# Patient Record
Sex: MALE | Race: BLACK OR AFRICAN AMERICAN | NOT HISPANIC OR LATINO | Employment: OTHER | URBAN - METROPOLITAN AREA
[De-identification: names, ages, dates, MRNs, and addresses within clinical notes are randomized per-mention and may not be internally consistent; named-entity substitution may affect disease eponyms.]

---

## 2023-03-09 DIAGNOSIS — E78.49 OTHER HYPERLIPIDEMIA: Primary | ICD-10-CM

## 2023-03-09 DIAGNOSIS — R60.0 PEDAL EDEMA: ICD-10-CM

## 2023-03-09 RX ORDER — LOSARTAN POTASSIUM 25 MG/1
1 TABLET ORAL DAILY
COMMUNITY
Start: 2020-11-11 | End: 2023-08-01 | Stop reason: SDUPTHER

## 2023-03-09 RX ORDER — TAMSULOSIN HYDROCHLORIDE 0.4 MG/1
1 CAPSULE ORAL DAILY
COMMUNITY
Start: 2014-03-26 | End: 2023-08-01 | Stop reason: SDUPTHER

## 2023-03-09 RX ORDER — ACETAMINOPHEN 500 MG
1 TABLET ORAL DAILY
COMMUNITY
Start: 2017-06-29

## 2023-03-09 RX ORDER — AZELASTINE 1 MG/ML
SPRAY, METERED NASAL 2 TIMES DAILY
COMMUNITY
Start: 2020-02-17 | End: 2024-04-22 | Stop reason: ENTERED-IN-ERROR

## 2023-03-09 RX ORDER — DONEPEZIL HYDROCHLORIDE 10 MG/1
1 TABLET, FILM COATED ORAL DAILY
COMMUNITY
Start: 2017-03-21 | End: 2024-02-15 | Stop reason: SDUPTHER

## 2023-03-09 RX ORDER — FUROSEMIDE 20 MG/1
1 TABLET ORAL DAILY
COMMUNITY
Start: 2018-11-05 | End: 2023-03-09 | Stop reason: SDUPTHER

## 2023-03-09 RX ORDER — FUROSEMIDE 20 MG/1
20 TABLET ORAL DAILY
Qty: 90 TABLET | Refills: 0 | Status: SHIPPED | OUTPATIENT
Start: 2023-03-09 | End: 2023-03-13 | Stop reason: SDUPTHER

## 2023-03-09 RX ORDER — ASPIRIN 81 MG/1
1 TABLET ORAL DAILY
COMMUNITY
Start: 2019-04-08

## 2023-03-09 RX ORDER — POTASSIUM CHLORIDE 20 MEQ/1
1 TABLET, EXTENDED RELEASE ORAL DAILY
COMMUNITY
Start: 2022-09-06 | End: 2023-05-31 | Stop reason: SDUPTHER

## 2023-03-09 RX ORDER — MEMANTINE HYDROCHLORIDE 10 MG/1
1 TABLET ORAL 2 TIMES DAILY
COMMUNITY
Start: 2017-06-29 | End: 2024-02-15 | Stop reason: SDUPTHER

## 2023-03-09 RX ORDER — ATORVASTATIN CALCIUM 10 MG/1
10 TABLET, FILM COATED ORAL DAILY
Qty: 90 TABLET | Refills: 0 | Status: SHIPPED | OUTPATIENT
Start: 2023-03-09 | End: 2023-03-13 | Stop reason: SDUPTHER

## 2023-03-09 RX ORDER — FINASTERIDE 5 MG/1
1 TABLET, FILM COATED ORAL DAILY
COMMUNITY
Start: 2013-12-02 | End: 2023-05-31 | Stop reason: SDUPTHER

## 2023-03-09 RX ORDER — ATORVASTATIN CALCIUM 10 MG/1
1 TABLET, FILM COATED ORAL DAILY
COMMUNITY
Start: 2018-04-13 | End: 2023-03-09 | Stop reason: SDUPTHER

## 2023-03-09 RX ORDER — IPRATROPIUM BROMIDE AND ALBUTEROL SULFATE 2.5; .5 MG/3ML; MG/3ML
3 SOLUTION RESPIRATORY (INHALATION)
COMMUNITY
Start: 2018-03-07 | End: 2024-04-25 | Stop reason: HOSPADM

## 2023-03-09 RX ORDER — METOPROLOL TARTRATE 25 MG/1
1 TABLET, FILM COATED ORAL 2 TIMES DAILY
COMMUNITY
Start: 2016-11-11 | End: 2023-08-01 | Stop reason: SDUPTHER

## 2023-03-13 DIAGNOSIS — R60.0 PEDAL EDEMA: ICD-10-CM

## 2023-03-13 DIAGNOSIS — E78.49 OTHER HYPERLIPIDEMIA: ICD-10-CM

## 2023-03-13 RX ORDER — FUROSEMIDE 20 MG/1
20 TABLET ORAL DAILY
Qty: 90 TABLET | Refills: 0 | Status: SHIPPED | OUTPATIENT
Start: 2023-03-13 | End: 2023-05-31 | Stop reason: SDUPTHER

## 2023-03-13 RX ORDER — ATORVASTATIN CALCIUM 10 MG/1
10 TABLET, FILM COATED ORAL DAILY
Qty: 90 TABLET | Refills: 0 | Status: SHIPPED | OUTPATIENT
Start: 2023-03-13 | End: 2023-08-01 | Stop reason: SDUPTHER

## 2023-03-13 NOTE — TELEPHONE ENCOUNTER
Approving, but needs appt for additional refills. Patient need refill on furosemide 20mg to [facility]

## 2023-05-16 ENCOUNTER — OFFICE VISIT (OUTPATIENT)
Dept: PRIMARY CARE | Facility: CLINIC | Age: 88
End: 2023-05-16
Payer: MEDICARE

## 2023-05-16 VITALS
RESPIRATION RATE: 16 BRPM | SYSTOLIC BLOOD PRESSURE: 120 MMHG | DIASTOLIC BLOOD PRESSURE: 70 MMHG | WEIGHT: 172.2 LBS | TEMPERATURE: 98.4 F | HEART RATE: 68 BPM | BODY MASS INDEX: 24.71 KG/M2

## 2023-05-16 DIAGNOSIS — G30.9 MIXED ALZHEIMER'S AND VASCULAR DEMENTIA (MULTI): Primary | ICD-10-CM

## 2023-05-16 DIAGNOSIS — I48.11 LONGSTANDING PERSISTENT ATRIAL FIBRILLATION (MULTI): ICD-10-CM

## 2023-05-16 DIAGNOSIS — R35.1 BENIGN PROSTATIC HYPERPLASIA WITH NOCTURIA: ICD-10-CM

## 2023-05-16 DIAGNOSIS — E78.49 OTHER HYPERLIPIDEMIA: ICD-10-CM

## 2023-05-16 DIAGNOSIS — I10 PRIMARY HYPERTENSION: ICD-10-CM

## 2023-05-16 DIAGNOSIS — F02.80 MIXED ALZHEIMER'S AND VASCULAR DEMENTIA (MULTI): Primary | ICD-10-CM

## 2023-05-16 DIAGNOSIS — N40.1 BENIGN PROSTATIC HYPERPLASIA WITH NOCTURIA: ICD-10-CM

## 2023-05-16 DIAGNOSIS — I50.32 CHRONIC HEART FAILURE WITH PRESERVED EJECTION FRACTION (MULTI): ICD-10-CM

## 2023-05-16 DIAGNOSIS — F01.50 MIXED ALZHEIMER'S AND VASCULAR DEMENTIA (MULTI): Primary | ICD-10-CM

## 2023-05-16 PROBLEM — H04.203 BILATERAL EPIPHORA: Status: ACTIVE | Noted: 2023-05-16

## 2023-05-16 PROBLEM — H40.1190 OPEN ANGLE PRIMARY GLAUCOMA: Status: ACTIVE | Noted: 2023-05-16

## 2023-05-16 PROBLEM — M79.675 PAIN IN TOES OF BOTH FEET: Status: ACTIVE | Noted: 2023-05-16

## 2023-05-16 PROBLEM — A49.9 BACTERIAL UTI: Status: ACTIVE | Noted: 2023-05-16

## 2023-05-16 PROBLEM — R04.0 EPISTAXIS: Status: ACTIVE | Noted: 2023-05-16

## 2023-05-16 PROBLEM — I48.91 ATRIAL FIBRILLATION (MULTI): Status: ACTIVE | Noted: 2023-05-16

## 2023-05-16 PROBLEM — J32.9 CHRONIC SINUSITIS: Status: ACTIVE | Noted: 2023-05-16

## 2023-05-16 PROBLEM — R44.1 HALLUCINATION, VISUAL: Status: ACTIVE | Noted: 2023-05-16

## 2023-05-16 PROBLEM — I50.9 CHF (CONGESTIVE HEART FAILURE) (MULTI): Status: ACTIVE | Noted: 2023-05-16

## 2023-05-16 PROBLEM — I25.5 CARDIOMYOPATHY, ISCHEMIC: Status: ACTIVE | Noted: 2023-05-16

## 2023-05-16 PROBLEM — H26.492 POSTERIOR CAPSULAR OPACIFICATION VISUALLY SIGNIFICANT OF LEFT EYE: Status: ACTIVE | Noted: 2023-05-16

## 2023-05-16 PROBLEM — H02.539 LID RETRACTION: Status: ACTIVE | Noted: 2023-05-16

## 2023-05-16 PROBLEM — N39.0 BACTERIAL UTI: Status: ACTIVE | Noted: 2023-05-16

## 2023-05-16 PROBLEM — R45.3 APATHY: Status: ACTIVE | Noted: 2023-05-16

## 2023-05-16 PROBLEM — N40.0 BPH (BENIGN PROSTATIC HYPERPLASIA): Status: ACTIVE | Noted: 2023-05-16

## 2023-05-16 PROBLEM — B35.1 ONYCHOMYCOSIS: Status: ACTIVE | Noted: 2023-05-16

## 2023-05-16 PROBLEM — H93.8X3 FULLNESS IN EAR, BILATERAL: Status: ACTIVE | Noted: 2023-05-16

## 2023-05-16 PROBLEM — H61.23 BILATERAL IMPACTED CERUMEN: Status: ACTIVE | Noted: 2023-05-16

## 2023-05-16 PROBLEM — F03.90 DEMENTIA (MULTI): Status: ACTIVE | Noted: 2023-05-16

## 2023-05-16 PROBLEM — I67.9 CEREBROVASCULAR DISEASE: Status: ACTIVE | Noted: 2023-05-16

## 2023-05-16 PROBLEM — H02.102 ECTROPION OF RIGHT LOWER EYELID: Status: ACTIVE | Noted: 2023-05-16

## 2023-05-16 PROBLEM — H26.493 PCO (POSTERIOR CAPSULAR OPACIFICATION), BILATERAL: Status: ACTIVE | Noted: 2023-05-16

## 2023-05-16 PROBLEM — J34.89 NASAL DRYNESS: Status: ACTIVE | Noted: 2023-05-16

## 2023-05-16 PROBLEM — R41.3 MEMORY LOSS: Status: ACTIVE | Noted: 2023-05-16

## 2023-05-16 PROBLEM — E53.1 VITAMIN B6 DEFICIENCY: Status: ACTIVE | Noted: 2023-05-16

## 2023-05-16 PROBLEM — J34.89 NASAL DRAINAGE: Status: ACTIVE | Noted: 2023-05-16

## 2023-05-16 PROBLEM — J44.9 CHRONIC OBSTRUCTIVE PULMONARY DISEASE (MULTI): Status: ACTIVE | Noted: 2023-05-16

## 2023-05-16 PROBLEM — H40.10X0 ADVANCED OPEN-ANGLE GLAUCOMA: Status: ACTIVE | Noted: 2023-05-16

## 2023-05-16 PROBLEM — M79.674 PAIN IN TOES OF BOTH FEET: Status: ACTIVE | Noted: 2023-05-16

## 2023-05-16 PROBLEM — Z98.890 S/P YAG CAPSULOTOMY, BILATERAL: Status: ACTIVE | Noted: 2023-05-16

## 2023-05-16 PROBLEM — E78.5 HLD (HYPERLIPIDEMIA): Status: ACTIVE | Noted: 2023-05-16

## 2023-05-16 PROBLEM — H91.93 HEARING DIFFICULTY OF BOTH EARS: Status: ACTIVE | Noted: 2023-05-16

## 2023-05-16 PROBLEM — H61.21 IMPACTED CERUMEN OF RIGHT EAR: Status: ACTIVE | Noted: 2023-05-16

## 2023-05-16 PROBLEM — E53.8 FOLATE DEFICIENCY: Status: ACTIVE | Noted: 2023-05-16

## 2023-05-16 PROBLEM — Z96.1 PSEUDOPHAKIA OF BOTH EYES: Status: ACTIVE | Noted: 2023-05-16

## 2023-05-16 PROBLEM — I50.30 (HFPEF) HEART FAILURE WITH PRESERVED EJECTION FRACTION (MULTI): Status: ACTIVE | Noted: 2023-05-16

## 2023-05-16 PROCEDURE — 3078F DIAST BP <80 MM HG: CPT | Performed by: INTERNAL MEDICINE

## 2023-05-16 PROCEDURE — 3074F SYST BP LT 130 MM HG: CPT | Performed by: INTERNAL MEDICINE

## 2023-05-16 PROCEDURE — 99214 OFFICE O/P EST MOD 30 MIN: CPT | Performed by: INTERNAL MEDICINE

## 2023-05-16 PROCEDURE — 1160F RVW MEDS BY RX/DR IN RCRD: CPT | Performed by: INTERNAL MEDICINE

## 2023-05-16 PROCEDURE — 1159F MED LIST DOCD IN RCRD: CPT | Performed by: INTERNAL MEDICINE

## 2023-05-16 PROCEDURE — 1036F TOBACCO NON-USER: CPT | Performed by: INTERNAL MEDICINE

## 2023-05-16 ASSESSMENT — ENCOUNTER SYMPTOMS
DEPRESSION: 0
LOSS OF SENSATION IN FEET: 0
OCCASIONAL FEELINGS OF UNSTEADINESS: 0

## 2023-05-16 NOTE — PROGRESS NOTES
Jam Cox is a 89 y.o. male   Patient with a past medical history of Atrial fibrillation, BPH, HFpEF, COPD, HTN, HLD, Dementia, Chronic Sinusitis    Did fall 2 weeks ago  No injuries  Lost his balance  Refuses to use cane or walker    No chest pain/  SOB/ dizziness  BM OK  Energy level poor  Appetite OK             Review of Systems     Constitutional: no fever, no chills, not feeling poorly, not feeling tired and no recent weight gain, no recent weight loss.   ENT: no earache, no hearing loss, no nosebleeds, no nasal discharge, no sore throat and no hoarseness.   Cardiovascular: the heart rate was not slow, the heart rate was not fast, no chest pain, no palpitations, no intermittent leg claudication and no lower extremity edema.   Respiratory: no cough, wheezing or shortness of breath at rest or exertion  Gastrointestinal: no abdominal pain, no constipation, no melena, no nausea, no diarrhea, no vomiting and no blood in stools.   Musculoskeletal: no arthralgias, no myalgias, no back pain, no joint swelling, no joint stiffness, no limb pain and no limb swelling.   Integumentary: no skin rashes, no skin lesions, no itching, no skin wound and no dry skin.   Neurological: no headache, no confusion, no numbness, no dizziness, no tingling and no fainting.   All other systems have been reviewed and are negative for complaint.       There were no vitals filed for this visit.     Physical Exam     Constitutional   General appearance: Alert and in no acute distress.     Pulmonary   Respiratory assessment: No respiratory distress, normal respiratory rhythm and effort.    Auscultation of Lungs: Clear bilateral breath sounds.   Cardiovascular   Auscultation of heart: Apical pulse normal, heart rate and rhythm normal, normal S1 and S2, no murmurs and no pericardial rub.    Exam for edema: No peripheral edema.   Abdomen   Abdominal Exam: No bruits, normal bowel sounds, soft, non-tender, no abdominal mass palpated.    Liver  and Spleen exam: No hepato-splenomegaly.   Musculoskeletal   Examination of gait: Normal.    Inspection of digits and nails: No clubbing or cyanosis of the fingernails.    Inspection/palpation of joints, bones and muscles: No joint swelling. Normal movement of all extremities.   Skin   Skin inspection: Normal skin color and pigmentation, normal skin turgor and no visible rash.   Neurologic   Cranial nerves: Nerves 2-12 were intact, no focal neuro defects.     Assessment/Plan          Patient with a past medical history of Atrial fibrillation, BPH, HFpEF, COPD, HTN, HLD, Dementia, Chronic Sinusitis    # Fatigue with CASTANEDA  Check blood work    # 1  HTN  condition is stable  continue current medications      # Diastolic CHF  condition is stable  continue current medications      # Afib  cont Eliquis  Rate controlled      # HLD  condition is stable  continue current medications    # Dementia with falls  Refuses to use cane or a walker  Would recommend stopping the Aricept and Namenda if light headedness outweighs the benefit    Blood work

## 2023-05-23 ENCOUNTER — LAB (OUTPATIENT)
Dept: LAB | Facility: LAB | Age: 88
End: 2023-05-23
Payer: MEDICARE

## 2023-05-23 DIAGNOSIS — I10 PRIMARY HYPERTENSION: ICD-10-CM

## 2023-05-23 DIAGNOSIS — I50.32 CHRONIC HEART FAILURE WITH PRESERVED EJECTION FRACTION (MULTI): ICD-10-CM

## 2023-05-23 DIAGNOSIS — I48.11 LONGSTANDING PERSISTENT ATRIAL FIBRILLATION (MULTI): ICD-10-CM

## 2023-05-23 PROCEDURE — 84443 ASSAY THYROID STIM HORMONE: CPT

## 2023-05-23 PROCEDURE — 80053 COMPREHEN METABOLIC PANEL: CPT

## 2023-05-23 PROCEDURE — 85027 COMPLETE CBC AUTOMATED: CPT

## 2023-05-23 PROCEDURE — 36415 COLL VENOUS BLD VENIPUNCTURE: CPT

## 2023-05-24 LAB
ALANINE AMINOTRANSFERASE (SGPT) (U/L) IN SER/PLAS: 10 U/L (ref 10–52)
ALBUMIN (G/DL) IN SER/PLAS: 3.7 G/DL (ref 3.4–5)
ALKALINE PHOSPHATASE (U/L) IN SER/PLAS: 86 U/L (ref 33–136)
ANION GAP IN SER/PLAS: 13 MMOL/L (ref 10–20)
ASPARTATE AMINOTRANSFERASE (SGOT) (U/L) IN SER/PLAS: 15 U/L (ref 9–39)
BILIRUBIN TOTAL (MG/DL) IN SER/PLAS: 1.5 MG/DL (ref 0–1.2)
CALCIUM (MG/DL) IN SER/PLAS: 9.8 MG/DL (ref 8.6–10.6)
CARBON DIOXIDE, TOTAL (MMOL/L) IN SER/PLAS: 30 MMOL/L (ref 21–32)
CHLORIDE (MMOL/L) IN SER/PLAS: 104 MMOL/L (ref 98–107)
CREATININE (MG/DL) IN SER/PLAS: 0.91 MG/DL (ref 0.5–1.3)
ERYTHROCYTE DISTRIBUTION WIDTH (RATIO) BY AUTOMATED COUNT: 13.2 % (ref 11.5–14.5)
ERYTHROCYTE MEAN CORPUSCULAR HEMOGLOBIN CONCENTRATION (G/DL) BY AUTOMATED: 30.8 G/DL (ref 32–36)
ERYTHROCYTE MEAN CORPUSCULAR VOLUME (FL) BY AUTOMATED COUNT: 94 FL (ref 80–100)
ERYTHROCYTES (10*6/UL) IN BLOOD BY AUTOMATED COUNT: 4.33 X10E12/L (ref 4.5–5.9)
GFR MALE: 79 ML/MIN/1.73M2
GLUCOSE (MG/DL) IN SER/PLAS: 104 MG/DL (ref 74–99)
HEMATOCRIT (%) IN BLOOD BY AUTOMATED COUNT: 40.6 % (ref 41–52)
HEMOGLOBIN (G/DL) IN BLOOD: 12.5 G/DL (ref 13.5–17.5)
LEUKOCYTES (10*3/UL) IN BLOOD BY AUTOMATED COUNT: 4.7 X10E9/L (ref 4.4–11.3)
NRBC (PER 100 WBCS) BY AUTOMATED COUNT: 0 /100 WBC (ref 0–0)
PLATELETS (10*3/UL) IN BLOOD AUTOMATED COUNT: 187 X10E9/L (ref 150–450)
POTASSIUM (MMOL/L) IN SER/PLAS: 4.5 MMOL/L (ref 3.5–5.3)
PROTEIN TOTAL: 7.2 G/DL (ref 6.4–8.2)
SODIUM (MMOL/L) IN SER/PLAS: 142 MMOL/L (ref 136–145)
THYROTROPIN (MIU/L) IN SER/PLAS BY DETECTION LIMIT <= 0.05 MIU/L: 0.52 MIU/L (ref 0.44–3.98)
UREA NITROGEN (MG/DL) IN SER/PLAS: 11 MG/DL (ref 6–23)

## 2023-05-31 DIAGNOSIS — N40.0 BENIGN PROSTATIC HYPERPLASIA, UNSPECIFIED WHETHER LOWER URINARY TRACT SYMPTOMS PRESENT: ICD-10-CM

## 2023-05-31 DIAGNOSIS — E87.6 HYPOKALEMIA: ICD-10-CM

## 2023-05-31 DIAGNOSIS — R60.0 PEDAL EDEMA: ICD-10-CM

## 2023-06-01 RX ORDER — POTASSIUM CHLORIDE 20 MEQ/1
20 TABLET, EXTENDED RELEASE ORAL DAILY
Qty: 90 TABLET | Refills: 1 | Status: SHIPPED | OUTPATIENT
Start: 2023-06-01 | End: 2023-08-01 | Stop reason: SDUPTHER

## 2023-06-01 RX ORDER — FINASTERIDE 5 MG/1
5 TABLET, FILM COATED ORAL DAILY
Qty: 90 TABLET | Refills: 1 | Status: SHIPPED | OUTPATIENT
Start: 2023-06-01 | End: 2023-08-01 | Stop reason: SDUPTHER

## 2023-06-01 RX ORDER — FUROSEMIDE 20 MG/1
20 TABLET ORAL DAILY
Qty: 90 TABLET | Refills: 0 | Status: SHIPPED | OUTPATIENT
Start: 2023-06-01 | End: 2023-08-01 | Stop reason: SDUPTHER

## 2023-08-01 DIAGNOSIS — E78.49 OTHER HYPERLIPIDEMIA: ICD-10-CM

## 2023-08-01 DIAGNOSIS — N40.0 BENIGN PROSTATIC HYPERPLASIA, UNSPECIFIED WHETHER LOWER URINARY TRACT SYMPTOMS PRESENT: ICD-10-CM

## 2023-08-01 DIAGNOSIS — R60.0 PEDAL EDEMA: ICD-10-CM

## 2023-08-01 DIAGNOSIS — I10 PRIMARY HYPERTENSION: ICD-10-CM

## 2023-08-01 DIAGNOSIS — E87.6 HYPOKALEMIA: ICD-10-CM

## 2023-08-01 RX ORDER — METOPROLOL TARTRATE 25 MG/1
25 TABLET, FILM COATED ORAL 2 TIMES DAILY
Qty: 90 TABLET | Refills: 0 | Status: SHIPPED | OUTPATIENT
Start: 2023-08-01 | End: 2023-10-26 | Stop reason: SDUPTHER

## 2023-08-01 RX ORDER — LOSARTAN POTASSIUM 25 MG/1
25 TABLET ORAL DAILY
Qty: 90 TABLET | Refills: 0 | Status: SHIPPED | OUTPATIENT
Start: 2023-08-01 | End: 2023-08-02 | Stop reason: SDUPTHER

## 2023-08-01 RX ORDER — FUROSEMIDE 20 MG/1
20 TABLET ORAL DAILY
Qty: 90 TABLET | Refills: 0 | Status: SHIPPED | OUTPATIENT
Start: 2023-08-01 | End: 2023-08-02 | Stop reason: SDUPTHER

## 2023-08-01 RX ORDER — ATORVASTATIN CALCIUM 10 MG/1
10 TABLET, FILM COATED ORAL DAILY
Qty: 90 TABLET | Refills: 0 | Status: SHIPPED | OUTPATIENT
Start: 2023-08-01 | End: 2023-08-02 | Stop reason: SDUPTHER

## 2023-08-01 RX ORDER — FINASTERIDE 5 MG/1
5 TABLET, FILM COATED ORAL DAILY
Qty: 90 TABLET | Refills: 0 | Status: SHIPPED | OUTPATIENT
Start: 2023-08-01 | End: 2023-08-02 | Stop reason: SDUPTHER

## 2023-08-01 RX ORDER — TAMSULOSIN HYDROCHLORIDE 0.4 MG/1
0.4 CAPSULE ORAL DAILY
Qty: 90 CAPSULE | Refills: 0 | Status: SHIPPED | OUTPATIENT
Start: 2023-08-01 | End: 2023-08-02 | Stop reason: SDUPTHER

## 2023-08-01 RX ORDER — POTASSIUM CHLORIDE 20 MEQ/1
20 TABLET, EXTENDED RELEASE ORAL DAILY
Qty: 90 TABLET | Refills: 0 | Status: SHIPPED | OUTPATIENT
Start: 2023-08-01 | End: 2023-08-02 | Stop reason: SDUPTHER

## 2023-08-02 DIAGNOSIS — E78.49 OTHER HYPERLIPIDEMIA: ICD-10-CM

## 2023-08-02 DIAGNOSIS — E87.6 HYPOKALEMIA: ICD-10-CM

## 2023-08-02 DIAGNOSIS — I10 PRIMARY HYPERTENSION: ICD-10-CM

## 2023-08-02 DIAGNOSIS — N40.0 BENIGN PROSTATIC HYPERPLASIA, UNSPECIFIED WHETHER LOWER URINARY TRACT SYMPTOMS PRESENT: ICD-10-CM

## 2023-08-02 DIAGNOSIS — R60.0 PEDAL EDEMA: ICD-10-CM

## 2023-08-04 RX ORDER — POTASSIUM CHLORIDE 20 MEQ/1
20 TABLET, EXTENDED RELEASE ORAL DAILY
Qty: 90 TABLET | Refills: 1 | Status: SHIPPED | OUTPATIENT
Start: 2023-08-04 | End: 2024-02-01 | Stop reason: SDUPTHER

## 2023-08-04 RX ORDER — LOSARTAN POTASSIUM 25 MG/1
25 TABLET ORAL DAILY
Qty: 90 TABLET | Refills: 1 | Status: SHIPPED | OUTPATIENT
Start: 2023-08-04 | End: 2024-02-01 | Stop reason: SDUPTHER

## 2023-08-04 RX ORDER — ATORVASTATIN CALCIUM 10 MG/1
10 TABLET, FILM COATED ORAL DAILY
Qty: 90 TABLET | Refills: 1 | Status: SHIPPED | OUTPATIENT
Start: 2023-08-04 | End: 2024-02-01 | Stop reason: SDUPTHER

## 2023-08-04 RX ORDER — FINASTERIDE 5 MG/1
5 TABLET, FILM COATED ORAL DAILY
Qty: 90 TABLET | Refills: 1 | Status: SHIPPED | OUTPATIENT
Start: 2023-08-04 | End: 2024-02-01 | Stop reason: SDUPTHER

## 2023-08-04 RX ORDER — FUROSEMIDE 20 MG/1
20 TABLET ORAL DAILY
Qty: 90 TABLET | Refills: 1 | Status: SHIPPED | OUTPATIENT
Start: 2023-08-04 | End: 2024-02-15 | Stop reason: SDUPTHER

## 2023-08-04 RX ORDER — TAMSULOSIN HYDROCHLORIDE 0.4 MG/1
0.4 CAPSULE ORAL DAILY
Qty: 90 CAPSULE | Refills: 1 | Status: SHIPPED | OUTPATIENT
Start: 2023-08-04 | End: 2024-02-01 | Stop reason: SDUPTHER

## 2023-08-22 ENCOUNTER — APPOINTMENT (OUTPATIENT)
Dept: PRIMARY CARE | Facility: CLINIC | Age: 88
End: 2023-08-22
Payer: MEDICARE

## 2023-09-19 ENCOUNTER — OFFICE VISIT (OUTPATIENT)
Dept: PRIMARY CARE | Facility: CLINIC | Age: 88
End: 2023-09-19
Payer: MEDICARE

## 2023-09-19 VITALS
RESPIRATION RATE: 16 BRPM | HEART RATE: 68 BPM | TEMPERATURE: 98.2 F | BODY MASS INDEX: 24.54 KG/M2 | DIASTOLIC BLOOD PRESSURE: 70 MMHG | WEIGHT: 171 LBS | SYSTOLIC BLOOD PRESSURE: 130 MMHG

## 2023-09-19 DIAGNOSIS — I48.11 LONGSTANDING PERSISTENT ATRIAL FIBRILLATION (MULTI): Primary | ICD-10-CM

## 2023-09-19 DIAGNOSIS — F02.80 MIXED ALZHEIMER'S AND VASCULAR DEMENTIA (MULTI): ICD-10-CM

## 2023-09-19 DIAGNOSIS — E78.49 OTHER HYPERLIPIDEMIA: ICD-10-CM

## 2023-09-19 DIAGNOSIS — F01.50 MIXED ALZHEIMER'S AND VASCULAR DEMENTIA (MULTI): ICD-10-CM

## 2023-09-19 DIAGNOSIS — Z00.00 HEALTHCARE MAINTENANCE: ICD-10-CM

## 2023-09-19 DIAGNOSIS — G30.9 MIXED ALZHEIMER'S AND VASCULAR DEMENTIA (MULTI): ICD-10-CM

## 2023-09-19 DIAGNOSIS — I10 PRIMARY HYPERTENSION: ICD-10-CM

## 2023-09-19 PROCEDURE — 1036F TOBACCO NON-USER: CPT | Performed by: INTERNAL MEDICINE

## 2023-09-19 PROCEDURE — 99214 OFFICE O/P EST MOD 30 MIN: CPT | Performed by: INTERNAL MEDICINE

## 2023-09-19 PROCEDURE — 1159F MED LIST DOCD IN RCRD: CPT | Performed by: INTERNAL MEDICINE

## 2023-09-19 PROCEDURE — 3078F DIAST BP <80 MM HG: CPT | Performed by: INTERNAL MEDICINE

## 2023-09-19 PROCEDURE — 3075F SYST BP GE 130 - 139MM HG: CPT | Performed by: INTERNAL MEDICINE

## 2023-09-19 PROCEDURE — G0008 ADMIN INFLUENZA VIRUS VAC: HCPCS | Performed by: INTERNAL MEDICINE

## 2023-09-19 PROCEDURE — 1126F AMNT PAIN NOTED NONE PRSNT: CPT | Performed by: INTERNAL MEDICINE

## 2023-09-19 PROCEDURE — 90662 IIV NO PRSV INCREASED AG IM: CPT | Performed by: INTERNAL MEDICINE

## 2023-09-19 PROCEDURE — 1160F RVW MEDS BY RX/DR IN RCRD: CPT | Performed by: INTERNAL MEDICINE

## 2023-09-19 RX ORDER — CALCIUM CARBONATE 160(400)MG
1 TABLET,CHEWABLE ORAL DAILY
Qty: 1 EACH | Refills: 0 | Status: SHIPPED | OUTPATIENT
Start: 2023-09-19 | End: 2023-09-22 | Stop reason: SDUPTHER

## 2023-09-19 NOTE — PROGRESS NOTES
Jam Cox is a 89 y.o. male   Patient with a past medical history of Atrial fibrillation, BPH, HFpEF, COPD, HTN, HLD, Dementia, Chronic Sinusitis    Has fallen again  No injuries  Does not use a cane or a walker      No chest pain/  SOB/ dizziness  BM OK  Energy level poor  Appetite OK             Review of Systems     Constitutional: no fever, no chills, not feeling poorly, not feeling tired and no recent weight gain, no recent weight loss.   ENT: no earache, no hearing loss, no nosebleeds, no nasal discharge, no sore throat and no hoarseness.   Cardiovascular: the heart rate was not slow, the heart rate was not fast, no chest pain, no palpitations, no intermittent leg claudication and no lower extremity edema.   Respiratory: no cough, wheezing or shortness of breath at rest or exertion  Gastrointestinal: no abdominal pain, no constipation, no melena, no nausea, no diarrhea, no vomiting and no blood in stools.   Musculoskeletal: no arthralgias, no myalgias, no back pain, no joint swelling, no joint stiffness, no limb pain and no limb swelling.   Integumentary: no skin rashes, no skin lesions, no itching, no skin wound and no dry skin.   Neurological: no headache, no confusion, no numbness, no dizziness, no tingling and no fainting.   All other systems have been reviewed and are negative for complaint.       Vitals:    09/19/23 1539   BP: 130/70   Pulse: 68   Resp: 16   Temp: 36.8 °C (98.2 °F)        Physical Exam     Constitutional   General appearance: Alert and in no acute distress.     Pulmonary   Respiratory assessment: No respiratory distress, normal respiratory rhythm and effort.    Auscultation of Lungs: Clear bilateral breath sounds.   Cardiovascular   Auscultation of heart: Apical pulse normal, heart rate and rhythm normal, normal S1 and S2, no murmurs and no pericardial rub.    Exam for edema: No peripheral edema.   Abdomen   Abdominal Exam: No bruits, normal bowel sounds, soft, non-tender, no  abdominal mass palpated.    Liver and Spleen exam: No hepato-splenomegaly.   Musculoskeletal   Examination of gait: Normal.    Inspection of digits and nails: No clubbing or cyanosis of the fingernails.    Inspection/palpation of joints, bones and muscles: No joint swelling. Normal movement of all extremities.   Skin   Skin inspection: Normal skin color and pigmentation, normal skin turgor and no visible rash.   Neurologic   Cranial nerves: Nerves 2-12 were intact, no focal neuro defects.     Assessment/Plan          Patient with a past medical history of Atrial fibrillation, BPH, HFpEF, COPD, HTN, HLD, Dementia, Chronic Sinusitis          # 1  HTN  condition is stable  continue current medications      # Diastolic CHF  condition is stable  continue current medications      # Afib  cont Eliquis  Rate controlled      # HLD  condition is stable  continue current medications    # Dementia with falls  Refuses to use cane or a walker  Would recommend stopping the Aricept and Namenda if light headedness outweighs the benefit  Rx for Rollator

## 2023-09-21 ENCOUNTER — TELEPHONE (OUTPATIENT)
Dept: PRIMARY CARE | Facility: CLINIC | Age: 88
End: 2023-09-21

## 2023-09-22 DIAGNOSIS — F01.50 MIXED ALZHEIMER'S AND VASCULAR DEMENTIA (MULTI): ICD-10-CM

## 2023-09-22 DIAGNOSIS — F02.80 MIXED ALZHEIMER'S AND VASCULAR DEMENTIA (MULTI): ICD-10-CM

## 2023-09-22 DIAGNOSIS — G30.9 MIXED ALZHEIMER'S AND VASCULAR DEMENTIA (MULTI): ICD-10-CM

## 2023-09-22 RX ORDER — CALCIUM CARBONATE 160(400)MG
1 TABLET,CHEWABLE ORAL DAILY
Qty: 1 EACH | Refills: 0 | Status: SHIPPED | OUTPATIENT
Start: 2023-09-22 | End: 2023-10-06 | Stop reason: SDUPTHER

## 2023-10-06 DIAGNOSIS — F02.80 MIXED ALZHEIMER'S AND VASCULAR DEMENTIA (MULTI): ICD-10-CM

## 2023-10-06 DIAGNOSIS — F01.50 MIXED ALZHEIMER'S AND VASCULAR DEMENTIA (MULTI): ICD-10-CM

## 2023-10-06 DIAGNOSIS — G30.9 MIXED ALZHEIMER'S AND VASCULAR DEMENTIA (MULTI): ICD-10-CM

## 2023-10-06 RX ORDER — CALCIUM CARBONATE 160(400)MG
1 TABLET,CHEWABLE ORAL DAILY
Qty: 1 EACH | Refills: 0 | Status: SHIPPED | OUTPATIENT
Start: 2023-10-06

## 2023-10-06 RX ORDER — CALCIUM CARBONATE 160(400)MG
1 TABLET,CHEWABLE ORAL DAILY
Qty: 1 EACH | Refills: 0 | Status: SHIPPED | OUTPATIENT
Start: 2023-10-06 | End: 2023-10-06 | Stop reason: SDUPTHER

## 2023-10-26 DIAGNOSIS — I10 PRIMARY HYPERTENSION: ICD-10-CM

## 2023-10-26 RX ORDER — METOPROLOL TARTRATE 25 MG/1
25 TABLET, FILM COATED ORAL 2 TIMES DAILY
Qty: 90 TABLET | Refills: 0 | Status: SHIPPED | OUTPATIENT
Start: 2023-10-26 | End: 2023-11-01 | Stop reason: SDUPTHER

## 2023-11-01 DIAGNOSIS — I10 PRIMARY HYPERTENSION: ICD-10-CM

## 2023-11-01 RX ORDER — METOPROLOL TARTRATE 25 MG/1
25 TABLET, FILM COATED ORAL 2 TIMES DAILY
Qty: 90 TABLET | Refills: 0 | Status: SHIPPED | OUTPATIENT
Start: 2023-11-01 | End: 2024-02-15 | Stop reason: SDUPTHER

## 2023-12-18 ENCOUNTER — OFFICE VISIT (OUTPATIENT)
Dept: CARDIOLOGY | Facility: CLINIC | Age: 88
End: 2023-12-18
Payer: MEDICARE

## 2023-12-18 VITALS
HEART RATE: 76 BPM | WEIGHT: 169 LBS | BODY MASS INDEX: 24.2 KG/M2 | DIASTOLIC BLOOD PRESSURE: 60 MMHG | SYSTOLIC BLOOD PRESSURE: 125 MMHG | HEIGHT: 70 IN

## 2023-12-18 DIAGNOSIS — I48.91 ATRIAL FIBRILLATION, UNSPECIFIED TYPE (MULTI): Primary | ICD-10-CM

## 2023-12-18 PROCEDURE — 1160F RVW MEDS BY RX/DR IN RCRD: CPT | Performed by: INTERNAL MEDICINE

## 2023-12-18 PROCEDURE — 3078F DIAST BP <80 MM HG: CPT | Performed by: INTERNAL MEDICINE

## 2023-12-18 PROCEDURE — 1159F MED LIST DOCD IN RCRD: CPT | Performed by: INTERNAL MEDICINE

## 2023-12-18 PROCEDURE — 1126F AMNT PAIN NOTED NONE PRSNT: CPT | Performed by: INTERNAL MEDICINE

## 2023-12-18 PROCEDURE — 3074F SYST BP LT 130 MM HG: CPT | Performed by: INTERNAL MEDICINE

## 2023-12-18 PROCEDURE — 1036F TOBACCO NON-USER: CPT | Performed by: INTERNAL MEDICINE

## 2023-12-18 PROCEDURE — 99214 OFFICE O/P EST MOD 30 MIN: CPT | Performed by: INTERNAL MEDICINE

## 2023-12-18 PROCEDURE — 93005 ELECTROCARDIOGRAM TRACING: CPT | Performed by: INTERNAL MEDICINE

## 2023-12-18 ASSESSMENT — PAIN SCALES - GENERAL: PAINLEVEL: 0-NO PAIN

## 2023-12-18 ASSESSMENT — PATIENT HEALTH QUESTIONNAIRE - PHQ9
SUM OF ALL RESPONSES TO PHQ9 QUESTIONS 1 AND 2: 0
2. FEELING DOWN, DEPRESSED OR HOPELESS: NOT AT ALL
1. LITTLE INTEREST OR PLEASURE IN DOING THINGS: NOT AT ALL

## 2023-12-18 ASSESSMENT — ENCOUNTER SYMPTOMS
DEPRESSION: 0
LOSS OF SENSATION IN FEET: 0
OCCASIONAL FEELINGS OF UNSTEADINESS: 1

## 2023-12-18 NOTE — PROGRESS NOTES
Subjective:  Patient returns for a routine 1 year follow-up.  He is a pleasant but frail 89-year-old gentleman.  We follow him for hypertension and hyperlipidemia as well as PAF and a cardiomyopathy.  He has also had a prior stroke.  He is also struggling with some dementia.  He is accompanied by a very supportive granddaughter.  He continues to live at home with his wife.  He has a daughter that helps out with the household chores.  The granddaughter is the POA.  He has not had any hospitalizations.  He did have a fall but had a negative x-ray on his hip which they were concerned about.  His granddaughter does admit he is less steady and needs a cane on occasion at this point.  He also appears to be a bit more sleepy than usual.  He denies any other new health concerns.  They did not need any prescriptions renewed.    Subjective:  General: Alert, pleasant but cognitively challenged individual.  HEENT: Unchanged.  Lungs: Clear without crackles.  Cardiac: Normal S1 and S2 with soft systolic murmur.  Abdomen: Nontender with normal bowel sounds.  Extremities: No edema.  Skin: No acute rash.  Neuro: Grossly unchanged with persistent tremor.    EKG: Sinus rhythm with PVCs.  Left bundle branch block.  No acute ST or T wave changes.    Impression/plan:  Patient is a pleasant but frail 89-year-old gentleman.  He generally appears to be reasonably stable clinically at this time.  He does appear to be in sinus rhythm at this time and remains appropriately anticoagulated for his PAF and prior stroke.  His heart rate and blood pressure remain under good control.  He is on appropriate antiplatelet and lipid-lowering therapy.  Given his frail state, I elected not to embark on any additional cardiovascular testing at this time and will not make any medication changes.  I will see him back for routine follow-up in 1 year but will be happy to see him back sooner if needed.    Patient instructions:    Continue current medications  unchanged.    Return to clinic in 1 year.    Call for any intercurrent problems.

## 2023-12-27 ENCOUNTER — OFFICE VISIT (OUTPATIENT)
Dept: PRIMARY CARE | Facility: CLINIC | Age: 88
End: 2023-12-27
Payer: MEDICARE

## 2023-12-27 VITALS
RESPIRATION RATE: 18 BRPM | WEIGHT: 170.2 LBS | BODY MASS INDEX: 24.42 KG/M2 | HEART RATE: 66 BPM | SYSTOLIC BLOOD PRESSURE: 120 MMHG | TEMPERATURE: 98.3 F | DIASTOLIC BLOOD PRESSURE: 70 MMHG

## 2023-12-27 DIAGNOSIS — G30.9 MIXED ALZHEIMER'S AND VASCULAR DEMENTIA (MULTI): ICD-10-CM

## 2023-12-27 DIAGNOSIS — I48.11 LONGSTANDING PERSISTENT ATRIAL FIBRILLATION (MULTI): Primary | ICD-10-CM

## 2023-12-27 DIAGNOSIS — J42 CHRONIC BRONCHITIS, UNSPECIFIED CHRONIC BRONCHITIS TYPE (MULTI): ICD-10-CM

## 2023-12-27 DIAGNOSIS — I10 PRIMARY HYPERTENSION: ICD-10-CM

## 2023-12-27 DIAGNOSIS — F02.80 MIXED ALZHEIMER'S AND VASCULAR DEMENTIA (MULTI): ICD-10-CM

## 2023-12-27 DIAGNOSIS — F01.50 MIXED ALZHEIMER'S AND VASCULAR DEMENTIA (MULTI): ICD-10-CM

## 2023-12-27 PROCEDURE — 99214 OFFICE O/P EST MOD 30 MIN: CPT | Performed by: INTERNAL MEDICINE

## 2023-12-27 PROCEDURE — 1160F RVW MEDS BY RX/DR IN RCRD: CPT | Performed by: INTERNAL MEDICINE

## 2023-12-27 PROCEDURE — 3074F SYST BP LT 130 MM HG: CPT | Performed by: INTERNAL MEDICINE

## 2023-12-27 PROCEDURE — 3078F DIAST BP <80 MM HG: CPT | Performed by: INTERNAL MEDICINE

## 2023-12-27 PROCEDURE — 1036F TOBACCO NON-USER: CPT | Performed by: INTERNAL MEDICINE

## 2023-12-27 PROCEDURE — 1159F MED LIST DOCD IN RCRD: CPT | Performed by: INTERNAL MEDICINE

## 2023-12-27 PROCEDURE — 1126F AMNT PAIN NOTED NONE PRSNT: CPT | Performed by: INTERNAL MEDICINE

## 2023-12-27 NOTE — PROGRESS NOTES
Jam Cox is a 89 y.o. male   Patient with a past medical history of Atrial fibrillation, BPH, HFpEF, COPD, HTN, HLD, Dementia, Chronic Sinusitis    Has fallen again  Hurt his hip  Xrays negative  Will not use the cane        No chest pain/  SOB/ dizziness  BM OK  Energy level poor  Appetite OK             Review of Systems     Constitutional: no fever, no chills, not feeling poorly, not feeling tired and no recent weight gain, no recent weight loss.   ENT: no earache, no hearing loss, no nosebleeds, no nasal discharge, no sore throat and no hoarseness.   Cardiovascular: the heart rate was not slow, the heart rate was not fast, no chest pain, no palpitations, no intermittent leg claudication and no lower extremity edema.   Respiratory: wheezing or shortness of breath at rest or exertion  Gastrointestinal: no abdominal pain, no constipation, no melena, no nausea, no diarrhea, no vomiting and no blood in stools.   Musculoskeletal: no arthralgias, no myalgias, no back pain, no joint swelling, no joint stiffness, no limb pain and no limb swelling.   Integumentary: no skin rashes, no skin lesions, no itching, no skin wound and no dry skin.   Neurological: no headache, no confusion, no numbness, no dizziness, no tingling and no fainting.   All other systems have been reviewed and are negative for complaint.       Vitals:    12/27/23 1125   BP: 120/70   Pulse: 66   Resp: 18   Temp: 36.8 °C (98.3 °F)        Physical Exam     Constitutional   General appearance: Alert and in no acute distress.     Pulmonary   Respiratory assessment: No respiratory distress, normal respiratory rhythm and effort.    Auscultation of Lungs:  slight rhonchi  Cardiovascular   Auscultation of heart: Apical pulse normal, irregular rhythm  Exam for edema: No peripheral edema.   Abdomen   Abdominal Exam: No bruits, normal bowel sounds, soft, non-tender, no abdominal mass palpated.    Liver and Spleen exam: No hepato-splenomegaly.    Musculoskeletal   Examination of gait: Normal.    Inspection of digits and nails: No clubbing or cyanosis of the fingernails.    Inspection/palpation of joints, bones and muscles: No joint swelling. Normal movement of all extremities.   Skin   Skin inspection: Normal skin color and pigmentation, normal skin turgor and no visible rash.   Neurologic   Cranial nerves: Nerves 2-12 were intact, no focal neuro defects.   Alert x 2    Assessment/Plan          Patient with a past medical history of Atrial fibrillation, BPH, HFpEF, COPD, HTN, HLD, Dementia, Chronic Sinusitis          # 1  HTN  condition is stable  continue current medications      # Diastolic CHF  condition is stable  continue current medications      # Afib  cont Eliquis  Rate controlled  At risk of brain bleed with frequent falls, but refuses to use walker/ cane   Will need to go to ER if falls unwitnessed      # HLD  condition is stable  continue current medications    # Dementia with falls  Refuses to use cane or a walker  Would recommend stopping the Aricept and Namenda if light headedness outweighs the benefit  Rx for RollatorPatient was identified as a fall risk. Risk prevention instructions provided.    # COPD  Chronic cough  Using Mucinex  Duonebs as needed  No SOB

## 2023-12-28 ENCOUNTER — APPOINTMENT (OUTPATIENT)
Dept: PRIMARY CARE | Facility: CLINIC | Age: 88
End: 2023-12-28
Payer: MEDICARE

## 2024-01-30 DIAGNOSIS — I10 PRIMARY HYPERTENSION: ICD-10-CM

## 2024-01-30 RX ORDER — METOPROLOL TARTRATE 25 MG/1
25 TABLET, FILM COATED ORAL 2 TIMES DAILY
Qty: 180 TABLET | Refills: 1 | Status: ON HOLD | OUTPATIENT
Start: 2024-01-30 | End: 2024-04-24 | Stop reason: ALTCHOICE

## 2024-02-01 DIAGNOSIS — N40.0 BENIGN PROSTATIC HYPERPLASIA, UNSPECIFIED WHETHER LOWER URINARY TRACT SYMPTOMS PRESENT: ICD-10-CM

## 2024-02-01 DIAGNOSIS — E87.6 HYPOKALEMIA: ICD-10-CM

## 2024-02-01 DIAGNOSIS — I10 PRIMARY HYPERTENSION: ICD-10-CM

## 2024-02-01 DIAGNOSIS — E78.49 OTHER HYPERLIPIDEMIA: ICD-10-CM

## 2024-02-01 RX ORDER — TAMSULOSIN HYDROCHLORIDE 0.4 MG/1
0.4 CAPSULE ORAL DAILY
Qty: 90 CAPSULE | Refills: 1 | Status: SHIPPED | OUTPATIENT
Start: 2024-02-01 | End: 2024-02-15 | Stop reason: SDUPTHER

## 2024-02-01 RX ORDER — POTASSIUM CHLORIDE 20 MEQ/1
20 TABLET, EXTENDED RELEASE ORAL DAILY
Qty: 90 TABLET | Refills: 1 | Status: SHIPPED | OUTPATIENT
Start: 2024-02-01 | End: 2024-02-15 | Stop reason: SDUPTHER

## 2024-02-01 RX ORDER — LOSARTAN POTASSIUM 25 MG/1
25 TABLET ORAL DAILY
Qty: 90 TABLET | Refills: 1 | Status: SHIPPED | OUTPATIENT
Start: 2024-02-01 | End: 2024-02-15 | Stop reason: SDUPTHER

## 2024-02-01 RX ORDER — ATORVASTATIN CALCIUM 10 MG/1
10 TABLET, FILM COATED ORAL DAILY
Qty: 90 TABLET | Refills: 1 | Status: SHIPPED | OUTPATIENT
Start: 2024-02-01 | End: 2024-02-15 | Stop reason: SDUPTHER

## 2024-02-01 RX ORDER — FINASTERIDE 5 MG/1
5 TABLET, FILM COATED ORAL DAILY
Qty: 90 TABLET | Refills: 1 | Status: SHIPPED | OUTPATIENT
Start: 2024-02-01 | End: 2024-02-15 | Stop reason: SDUPTHER

## 2024-02-06 DIAGNOSIS — E78.49 OTHER HYPERLIPIDEMIA: ICD-10-CM

## 2024-02-06 DIAGNOSIS — N40.0 BENIGN PROSTATIC HYPERPLASIA, UNSPECIFIED WHETHER LOWER URINARY TRACT SYMPTOMS PRESENT: ICD-10-CM

## 2024-02-06 DIAGNOSIS — R60.0 PEDAL EDEMA: ICD-10-CM

## 2024-02-06 DIAGNOSIS — I10 PRIMARY HYPERTENSION: ICD-10-CM

## 2024-02-06 DIAGNOSIS — E87.6 HYPOKALEMIA: ICD-10-CM

## 2024-02-06 NOTE — TELEPHONE ENCOUNTER
PT DAUGHTER  CALLED ABOUT A BUMP THE PATIENT HAS ON HIS HEAD THAT HE KEEPS PICKING WITH DAUGHTER STATED THE BUMP HAS GOTTEN BIGGER AND WAS GOING TO  TAKE HIM TO URGENT CARE BUT WANTED TO KNOW ELLIE DUMONT OPINION FIRST ON WHAT TO DO.

## 2024-02-09 ENCOUNTER — OFFICE VISIT (OUTPATIENT)
Dept: PRIMARY CARE | Facility: CLINIC | Age: 89
End: 2024-02-09
Payer: MEDICARE

## 2024-02-09 VITALS
WEIGHT: 174 LBS | BODY MASS INDEX: 24.97 KG/M2 | SYSTOLIC BLOOD PRESSURE: 120 MMHG | DIASTOLIC BLOOD PRESSURE: 70 MMHG | RESPIRATION RATE: 16 BRPM | HEART RATE: 72 BPM | TEMPERATURE: 98.3 F

## 2024-02-09 DIAGNOSIS — L73.9 FOLLICULITIS: Primary | ICD-10-CM

## 2024-02-09 PROCEDURE — 3078F DIAST BP <80 MM HG: CPT | Performed by: INTERNAL MEDICINE

## 2024-02-09 PROCEDURE — 3074F SYST BP LT 130 MM HG: CPT | Performed by: INTERNAL MEDICINE

## 2024-02-09 PROCEDURE — 1159F MED LIST DOCD IN RCRD: CPT | Performed by: INTERNAL MEDICINE

## 2024-02-09 PROCEDURE — 99213 OFFICE O/P EST LOW 20 MIN: CPT | Performed by: INTERNAL MEDICINE

## 2024-02-09 PROCEDURE — 1126F AMNT PAIN NOTED NONE PRSNT: CPT | Performed by: INTERNAL MEDICINE

## 2024-02-09 PROCEDURE — 1036F TOBACCO NON-USER: CPT | Performed by: INTERNAL MEDICINE

## 2024-02-09 RX ORDER — CLINDAMYCIN PHOSPHATE 10 UG/ML
LOTION TOPICAL 2 TIMES DAILY
Qty: 60 ML | Refills: 0 | Status: SHIPPED | OUTPATIENT
Start: 2024-02-09 | End: 2025-02-08

## 2024-02-09 NOTE — PROGRESS NOTES
Jam Cox is a 89 y.o. male   Patient with a past medical history of Atrial fibrillation, BPH, HFpEF, COPD, HTN, HLD, Dementia, Chronic Sinusitis    The patient has a bump on his head that he keeps picking at  Not healing  Patient's wife did use some Bactroban which appears to have helped             Review of Systems     Constitutional: no fever, no chills, not feeling poorly, not feeling tired and no recent weight gain, no recent weight loss.   ENT: no earache, no hearing loss, no nosebleeds, no nasal discharge, no sore throat and no hoarseness.   Cardiovascular: the heart rate was not slow, the heart rate was not fast, no chest pain, no palpitations, no intermittent leg claudication and no lower extremity edema.   Respiratory: wheezing or shortness of breath at rest or exertion  Gastrointestinal: no abdominal pain, no constipation, no melena, no nausea, no diarrhea, no vomiting and no blood in stools.   Musculoskeletal: no arthralgias, no myalgias, no back pain, no joint swelling, no joint stiffness, no limb pain and no limb swelling.   Integumentary: Scalp rash/lesion  Neurological: no headache, no numbness, no dizziness, no tingling and no fainting.   All other systems have been reviewed and are negative for complaint.       There were no vitals filed for this visit.       Physical Exam     Constitutional   General appearance: Alert and in no acute distress.     Pulmonary   Respiratory assessment: No respiratory distress, normal respiratory rhythm and effort.    Auscultation of Lungs:  slight rhonchi  Cardiovascular   Auscultation of heart: Apical pulse normal, irregular rhythm  Exam for edema: No peripheral edema.   Abdomen   Abdominal Exam: No bruits, normal bowel sounds, soft, non-tender, no abdominal mass palpated.    Liver and Spleen exam: No hepato-splenomegaly.   Musculoskeletal   Examination of gait: Normal.    Inspection of digits and nails: No clubbing or cyanosis of the fingernails.     Inspection/palpation of joints, bones and muscles: No joint swelling. Normal movement of all extremities.   Skin   Skin inspection: Normal skin color and pigmentation, normal skin turgor and no visible rash.   Neurologic   Cranial nerves: Nerves 2-12 were intact, no focal neuro defects.   Alert x 2    Assessment/Plan          Patient with a past medical history of Atrial fibrillation, BPH, HFpEF, COPD, HTN, HLD, Dementia, Chronic Sinusitis      #Folliculitis  The patient keeps aggravating it by picking on it  We will prescribe Cleocin T lotion to apply twice a day  But the family will have to figure a way out of keeping his hands away from his head which will be challenging because of the patient's dementia

## 2024-02-15 ENCOUNTER — APPOINTMENT (OUTPATIENT)
Dept: NEUROLOGY | Facility: CLINIC | Age: 89
End: 2024-02-15
Payer: MEDICARE

## 2024-02-15 ENCOUNTER — TELEPHONE (OUTPATIENT)
Dept: CARDIOLOGY | Facility: CLINIC | Age: 89
End: 2024-02-15
Payer: MEDICARE

## 2024-02-15 DIAGNOSIS — F02.80 MIXED ALZHEIMER'S AND VASCULAR DEMENTIA (MULTI): ICD-10-CM

## 2024-02-15 DIAGNOSIS — F01.50 MIXED ALZHEIMER'S AND VASCULAR DEMENTIA (MULTI): ICD-10-CM

## 2024-02-15 DIAGNOSIS — G30.9 MIXED ALZHEIMER'S AND VASCULAR DEMENTIA (MULTI): ICD-10-CM

## 2024-02-15 RX ORDER — LOSARTAN POTASSIUM 25 MG/1
25 TABLET ORAL DAILY
Qty: 90 TABLET | Refills: 1 | Status: SHIPPED | OUTPATIENT
Start: 2024-02-15 | End: 2024-04-25 | Stop reason: HOSPADM

## 2024-02-15 RX ORDER — TAMSULOSIN HYDROCHLORIDE 0.4 MG/1
0.4 CAPSULE ORAL DAILY
Qty: 90 CAPSULE | Refills: 1 | Status: ON HOLD | OUTPATIENT
Start: 2024-02-15 | End: 2024-05-09

## 2024-02-15 RX ORDER — ATORVASTATIN CALCIUM 10 MG/1
10 TABLET, FILM COATED ORAL DAILY
Qty: 90 TABLET | Refills: 1 | Status: SHIPPED | OUTPATIENT
Start: 2024-02-15 | End: 2024-03-06 | Stop reason: SDUPTHER

## 2024-02-15 RX ORDER — METOPROLOL TARTRATE 25 MG/1
25 TABLET, FILM COATED ORAL 2 TIMES DAILY
Qty: 90 TABLET | Refills: 0 | Status: SHIPPED | OUTPATIENT
Start: 2024-02-15 | End: 2024-03-06 | Stop reason: SDUPTHER

## 2024-02-15 RX ORDER — FUROSEMIDE 20 MG/1
20 TABLET ORAL DAILY
Qty: 90 TABLET | Refills: 1 | Status: SHIPPED | OUTPATIENT
Start: 2024-02-15 | End: 2024-04-25 | Stop reason: HOSPADM

## 2024-02-15 RX ORDER — MEMANTINE HYDROCHLORIDE 10 MG/1
10 TABLET ORAL 2 TIMES DAILY
Qty: 90 TABLET | Refills: 0 | Status: SHIPPED | OUTPATIENT
Start: 2024-02-15

## 2024-02-15 RX ORDER — POTASSIUM CHLORIDE 20 MEQ/1
20 TABLET, EXTENDED RELEASE ORAL DAILY
Qty: 90 TABLET | Refills: 1 | Status: SHIPPED | OUTPATIENT
Start: 2024-02-15 | End: 2024-03-06 | Stop reason: SDUPTHER

## 2024-02-15 RX ORDER — DONEPEZIL HYDROCHLORIDE 10 MG/1
10 TABLET, FILM COATED ORAL DAILY
Qty: 90 TABLET | Refills: 0 | Status: SHIPPED | OUTPATIENT
Start: 2024-02-15 | End: 2024-04-04 | Stop reason: SDUPTHER

## 2024-02-15 RX ORDER — FINASTERIDE 5 MG/1
5 TABLET, FILM COATED ORAL DAILY
Qty: 90 TABLET | Refills: 1 | Status: SHIPPED | OUTPATIENT
Start: 2024-02-15

## 2024-02-15 NOTE — TELEPHONE ENCOUNTER
PT called in and needs RF on         RX: Tamsulosin 0.4 mg    Furosemide 20 mg      Losartan 25 mg      Potassium chloride  CR 20 meq ER tab      Finasteride 5 mg       Atorvastatin 10 mg      Metoprolol 25 mg        [facility] [place]

## 2024-02-15 NOTE — TELEPHONE ENCOUNTER
Patient nurse practioner Delia Reed is no longer working patient want to know if you can call in aricept and namenda until he see the his new doctor in September

## 2024-02-16 DIAGNOSIS — I48.0 PAROXYSMAL ATRIAL FIBRILLATION (MULTI): Primary | ICD-10-CM

## 2024-03-05 ENCOUNTER — TELEPHONE (OUTPATIENT)
Dept: PRIMARY CARE | Facility: CLINIC | Age: 89
End: 2024-03-05
Payer: MEDICARE

## 2024-03-05 DIAGNOSIS — E78.49 OTHER HYPERLIPIDEMIA: ICD-10-CM

## 2024-03-05 DIAGNOSIS — I10 PRIMARY HYPERTENSION: ICD-10-CM

## 2024-03-05 DIAGNOSIS — E87.6 HYPOKALEMIA: ICD-10-CM

## 2024-03-05 NOTE — TELEPHONE ENCOUNTER
PATIENT REQUEST REFILL FOR POTASSIUM 20 ROSAURA METOPROLOL  25 MG AND ATORVASTATIN 10 MG SEND TO [facility]

## 2024-03-06 RX ORDER — POTASSIUM CHLORIDE 20 MEQ/1
20 TABLET, EXTENDED RELEASE ORAL DAILY
Qty: 90 TABLET | Refills: 1 | Status: SHIPPED | OUTPATIENT
Start: 2024-03-06 | End: 2024-05-09 | Stop reason: HOSPADM

## 2024-03-06 RX ORDER — METOPROLOL TARTRATE 25 MG/1
25 TABLET, FILM COATED ORAL 2 TIMES DAILY
Qty: 90 TABLET | Refills: 0 | Status: SHIPPED | OUTPATIENT
Start: 2024-03-06 | End: 2024-04-04 | Stop reason: SDUPTHER

## 2024-03-06 RX ORDER — ATORVASTATIN CALCIUM 10 MG/1
10 TABLET, FILM COATED ORAL DAILY
Qty: 90 TABLET | Refills: 1 | Status: SHIPPED | OUTPATIENT
Start: 2024-03-06

## 2024-03-28 ENCOUNTER — OFFICE VISIT (OUTPATIENT)
Dept: OPHTHALMOLOGY | Facility: CLINIC | Age: 89
End: 2024-03-28
Payer: MEDICARE

## 2024-03-28 DIAGNOSIS — H40.10X3 ADVANCED OPEN-ANGLE GLAUCOMA, SEVERE STAGE: Primary | ICD-10-CM

## 2024-03-28 DIAGNOSIS — Z96.1 PSEUDOPHAKIA OF BOTH EYES: ICD-10-CM

## 2024-03-28 PROCEDURE — 92014 COMPRE OPH EXAM EST PT 1/>: CPT | Performed by: OPHTHALMOLOGY

## 2024-03-28 ASSESSMENT — REFRACTION_MANIFEST
OD_SPHERE: -0.75
OD_ADD: +2.50
OS_SPHERE: +0.00
OS_ADD: +2.50
OD_CYLINDER: SPHERE
OS_CYLINDER: SPHERE

## 2024-03-28 ASSESSMENT — EXTERNAL EXAM - LEFT EYE: OS_EXAM: NORMAL

## 2024-03-28 ASSESSMENT — CUP TO DISC RATIO
OD_RATIO: .8
OS_RATIO: .85

## 2024-03-28 ASSESSMENT — SLIT LAMP EXAM - LIDS
COMMENTS: GOOD POSITION
COMMENTS: GOOD POSITION

## 2024-03-28 ASSESSMENT — PACHYMETRY
OS_CT(UM): 528
OD_CT(UM): 537

## 2024-03-28 ASSESSMENT — TONOMETRY
IOP_METHOD: TONOPEN
OS_IOP_MMHG: 13
OD_IOP_MMHG: 10

## 2024-03-28 ASSESSMENT — EXTERNAL EXAM - RIGHT EYE: OD_EXAM: NORMAL

## 2024-03-28 ASSESSMENT — VISUAL ACUITY
OS_SC: 20/30
OD_SC: 20/25-1
METHOD: SNELLEN - LINEAR

## 2024-03-28 NOTE — PROGRESS NOTES
Assessment/Plan   Diagnoses and all orders for this visit:  Advanced open-angle glaucoma, severe stage  Pseudophakia of both eyes  IOP is still low off drops  OCT  nerve fiber layer (NFL) next visit

## 2024-04-01 ENCOUNTER — APPOINTMENT (OUTPATIENT)
Dept: OPHTHALMOLOGY | Facility: CLINIC | Age: 89
End: 2024-04-01
Payer: MEDICARE

## 2024-04-03 ENCOUNTER — TELEPHONE (OUTPATIENT)
Dept: PRIMARY CARE | Facility: CLINIC | Age: 89
End: 2024-04-03
Payer: MEDICARE

## 2024-04-03 DIAGNOSIS — F02.80 MIXED ALZHEIMER'S AND VASCULAR DEMENTIA (MULTI): ICD-10-CM

## 2024-04-03 DIAGNOSIS — F01.50 MIXED ALZHEIMER'S AND VASCULAR DEMENTIA (MULTI): ICD-10-CM

## 2024-04-03 DIAGNOSIS — I10 PRIMARY HYPERTENSION: ICD-10-CM

## 2024-04-03 DIAGNOSIS — G30.9 MIXED ALZHEIMER'S AND VASCULAR DEMENTIA (MULTI): ICD-10-CM

## 2024-04-04 RX ORDER — METOPROLOL TARTRATE 25 MG/1
25 TABLET, FILM COATED ORAL 2 TIMES DAILY
Qty: 90 TABLET | Refills: 0 | Status: SHIPPED | OUTPATIENT
Start: 2024-04-04 | End: 2024-05-15 | Stop reason: HOSPADM

## 2024-04-04 RX ORDER — DONEPEZIL HYDROCHLORIDE 10 MG/1
10 TABLET, FILM COATED ORAL DAILY
Qty: 90 TABLET | Refills: 0 | Status: SHIPPED | OUTPATIENT
Start: 2024-04-04

## 2024-04-22 ENCOUNTER — APPOINTMENT (OUTPATIENT)
Dept: RADIOLOGY | Facility: HOSPITAL | Age: 89
DRG: 291 | End: 2024-04-22
Payer: MEDICARE

## 2024-04-22 ENCOUNTER — OFFICE VISIT (OUTPATIENT)
Dept: PRIMARY CARE | Facility: CLINIC | Age: 89
End: 2024-04-22
Payer: MEDICARE

## 2024-04-22 ENCOUNTER — APPOINTMENT (OUTPATIENT)
Dept: CARDIOLOGY | Facility: HOSPITAL | Age: 89
DRG: 291 | End: 2024-04-22
Payer: MEDICARE

## 2024-04-22 ENCOUNTER — HOSPITAL ENCOUNTER (INPATIENT)
Facility: HOSPITAL | Age: 89
LOS: 3 days | Discharge: HOME HEALTH | DRG: 291 | End: 2024-04-25
Attending: EMERGENCY MEDICINE | Admitting: INTERNAL MEDICINE
Payer: MEDICARE

## 2024-04-22 VITALS
HEART RATE: 68 BPM | SYSTOLIC BLOOD PRESSURE: 120 MMHG | BODY MASS INDEX: 23.85 KG/M2 | TEMPERATURE: 98.5 F | RESPIRATION RATE: 20 BRPM | DIASTOLIC BLOOD PRESSURE: 70 MMHG | WEIGHT: 166.2 LBS

## 2024-04-22 DIAGNOSIS — R06.02 SHORTNESS OF BREATH: Primary | ICD-10-CM

## 2024-04-22 DIAGNOSIS — F01.50 MIXED ALZHEIMER'S AND VASCULAR DEMENTIA (MULTI): ICD-10-CM

## 2024-04-22 DIAGNOSIS — I50.33 ACUTE ON CHRONIC HEART FAILURE WITH PRESERVED EJECTION FRACTION (MULTI): ICD-10-CM

## 2024-04-22 DIAGNOSIS — J42 CHRONIC BRONCHITIS, UNSPECIFIED CHRONIC BRONCHITIS TYPE (MULTI): ICD-10-CM

## 2024-04-22 DIAGNOSIS — E78.49 OTHER HYPERLIPIDEMIA: ICD-10-CM

## 2024-04-22 DIAGNOSIS — J44.1 ASTHMA EXACERBATION WITH COPD (CHRONIC OBSTRUCTIVE PULMONARY DISEASE) (MULTI): ICD-10-CM

## 2024-04-22 DIAGNOSIS — I50.9 CONGESTIVE HEART FAILURE, UNSPECIFIED HF CHRONICITY, UNSPECIFIED HEART FAILURE TYPE (MULTI): ICD-10-CM

## 2024-04-22 DIAGNOSIS — F02.80 MIXED ALZHEIMER'S AND VASCULAR DEMENTIA (MULTI): ICD-10-CM

## 2024-04-22 DIAGNOSIS — Z00.00 ROUTINE GENERAL MEDICAL EXAMINATION AT HEALTH CARE FACILITY: Primary | ICD-10-CM

## 2024-04-22 DIAGNOSIS — J45.901 ASTHMA EXACERBATION WITH COPD (CHRONIC OBSTRUCTIVE PULMONARY DISEASE) (MULTI): ICD-10-CM

## 2024-04-22 DIAGNOSIS — I50.43 ACUTE ON CHRONIC COMBINED SYSTOLIC (CONGESTIVE) AND DIASTOLIC (CONGESTIVE) HEART FAILURE (MULTI): ICD-10-CM

## 2024-04-22 DIAGNOSIS — I50.32 CHRONIC HEART FAILURE WITH PRESERVED EJECTION FRACTION (MULTI): ICD-10-CM

## 2024-04-22 DIAGNOSIS — I48.11 LONGSTANDING PERSISTENT ATRIAL FIBRILLATION (MULTI): ICD-10-CM

## 2024-04-22 DIAGNOSIS — G30.9 MIXED ALZHEIMER'S AND VASCULAR DEMENTIA (MULTI): ICD-10-CM

## 2024-04-22 DIAGNOSIS — I10 PRIMARY HYPERTENSION: ICD-10-CM

## 2024-04-22 LAB
ALBUMIN SERPL BCP-MCNC: 4.1 G/DL (ref 3.4–5)
ALP SERPL-CCNC: 88 U/L (ref 33–136)
ALT SERPL W P-5'-P-CCNC: 33 U/L (ref 10–52)
ANION GAP BLDV CALCULATED.4IONS-SCNC: 12 MMOL/L (ref 10–25)
ANION GAP SERPL CALC-SCNC: 14 MMOL/L (ref 10–20)
AST SERPL W P-5'-P-CCNC: 39 U/L (ref 9–39)
BASE EXCESS BLDV CALC-SCNC: 0.5 MMOL/L (ref -2–3)
BASOPHILS # BLD AUTO: 0.02 X10*3/UL (ref 0–0.1)
BASOPHILS NFR BLD AUTO: 0.3 %
BILIRUB SERPL-MCNC: 4 MG/DL (ref 0–1.2)
BNP SERPL-MCNC: 1015 PG/ML (ref 0–99)
BODY TEMPERATURE: 37 DEGREES CELSIUS
BUN SERPL-MCNC: 13 MG/DL (ref 6–23)
CA-I BLDV-SCNC: 1.25 MMOL/L (ref 1.1–1.33)
CALCIUM SERPL-MCNC: 9.5 MG/DL (ref 8.6–10.3)
CHLORIDE BLDV-SCNC: 105 MMOL/L (ref 98–107)
CHLORIDE SERPL-SCNC: 104 MMOL/L (ref 98–107)
CO2 SERPL-SCNC: 25 MMOL/L (ref 21–32)
CREAT SERPL-MCNC: 1.05 MG/DL (ref 0.5–1.3)
EGFRCR SERPLBLD CKD-EPI 2021: 66 ML/MIN/1.73M*2
EOSINOPHIL # BLD AUTO: 0.05 X10*3/UL (ref 0–0.4)
EOSINOPHIL NFR BLD AUTO: 0.9 %
ERYTHROCYTE [DISTWIDTH] IN BLOOD BY AUTOMATED COUNT: 14.2 % (ref 11.5–14.5)
GLUCOSE BLDV-MCNC: 119 MG/DL (ref 74–99)
GLUCOSE SERPL-MCNC: 114 MG/DL (ref 74–99)
HCO3 BLDV-SCNC: 25.4 MMOL/L (ref 22–26)
HCT VFR BLD AUTO: 38.6 % (ref 41–52)
HCT VFR BLD EST: 38 % (ref 41–52)
HGB BLD-MCNC: 12.4 G/DL (ref 13.5–17.5)
HGB BLDV-MCNC: 12.8 G/DL (ref 13.5–17.5)
IMM GRANULOCYTES # BLD AUTO: 0.01 X10*3/UL (ref 0–0.5)
IMM GRANULOCYTES NFR BLD AUTO: 0.2 % (ref 0–0.9)
INHALED O2 CONCENTRATION: 22 %
LACTATE BLDV-SCNC: 2.9 MMOL/L (ref 0.4–2)
LYMPHOCYTES # BLD AUTO: 1.66 X10*3/UL (ref 0.8–3)
LYMPHOCYTES NFR BLD AUTO: 28.5 %
MCH RBC QN AUTO: 29.8 PG (ref 26–34)
MCHC RBC AUTO-ENTMCNC: 32.1 G/DL (ref 32–36)
MCV RBC AUTO: 93 FL (ref 80–100)
MONOCYTES # BLD AUTO: 0.66 X10*3/UL (ref 0.05–0.8)
MONOCYTES NFR BLD AUTO: 11.3 %
NEUTROPHILS # BLD AUTO: 3.42 X10*3/UL (ref 1.6–5.5)
NEUTROPHILS NFR BLD AUTO: 58.8 %
NRBC BLD-RTO: 0 /100 WBCS (ref 0–0)
OXYHGB MFR BLDV: 66 % (ref 45–75)
PCO2 BLDV: 41 MM HG (ref 41–51)
PH BLDV: 7.4 PH (ref 7.33–7.43)
PLATELET # BLD AUTO: 182 X10*3/UL (ref 150–450)
PO2 BLDV: 45 MM HG (ref 35–45)
POTASSIUM BLDV-SCNC: 4.5 MMOL/L (ref 3.5–5.3)
POTASSIUM SERPL-SCNC: 4.8 MMOL/L (ref 3.5–5.3)
PROT SERPL-MCNC: 7.1 G/DL (ref 6.4–8.2)
RBC # BLD AUTO: 4.16 X10*6/UL (ref 4.5–5.9)
SAO2 % BLDV: 68 % (ref 45–75)
SODIUM BLDV-SCNC: 138 MMOL/L (ref 136–145)
SODIUM SERPL-SCNC: 138 MMOL/L (ref 136–145)
WBC # BLD AUTO: 5.8 X10*3/UL (ref 4.4–11.3)

## 2024-04-22 PROCEDURE — G0439 PPPS, SUBSEQ VISIT: HCPCS | Performed by: INTERNAL MEDICINE

## 2024-04-22 PROCEDURE — 3078F DIAST BP <80 MM HG: CPT | Performed by: INTERNAL MEDICINE

## 2024-04-22 PROCEDURE — 1160F RVW MEDS BY RX/DR IN RCRD: CPT | Performed by: INTERNAL MEDICINE

## 2024-04-22 PROCEDURE — 71046 X-RAY EXAM CHEST 2 VIEWS: CPT

## 2024-04-22 PROCEDURE — 1158F ADVNC CARE PLAN TLK DOCD: CPT | Performed by: INTERNAL MEDICINE

## 2024-04-22 PROCEDURE — 99214 OFFICE O/P EST MOD 30 MIN: CPT | Performed by: INTERNAL MEDICINE

## 2024-04-22 PROCEDURE — 1123F ACP DISCUSS/DSCN MKR DOCD: CPT | Performed by: INTERNAL MEDICINE

## 2024-04-22 PROCEDURE — 99285 EMERGENCY DEPT VISIT HI MDM: CPT | Mod: 25

## 2024-04-22 PROCEDURE — 99222 1ST HOSP IP/OBS MODERATE 55: CPT

## 2024-04-22 PROCEDURE — 2500000001 HC RX 250 WO HCPCS SELF ADMINISTERED DRUGS (ALT 637 FOR MEDICARE OP): Performed by: NURSE PRACTITIONER

## 2024-04-22 PROCEDURE — 96374 THER/PROPH/DIAG INJ IV PUSH: CPT

## 2024-04-22 PROCEDURE — 94640 AIRWAY INHALATION TREATMENT: CPT

## 2024-04-22 PROCEDURE — 84132 ASSAY OF SERUM POTASSIUM: CPT | Performed by: EMERGENCY MEDICINE

## 2024-04-22 PROCEDURE — 3074F SYST BP LT 130 MM HG: CPT | Performed by: INTERNAL MEDICINE

## 2024-04-22 PROCEDURE — 1100000001 HC PRIVATE ROOM DAILY

## 2024-04-22 PROCEDURE — 1159F MED LIST DOCD IN RCRD: CPT | Performed by: INTERNAL MEDICINE

## 2024-04-22 PROCEDURE — 2500000004 HC RX 250 GENERAL PHARMACY W/ HCPCS (ALT 636 FOR OP/ED): Performed by: EMERGENCY MEDICINE

## 2024-04-22 PROCEDURE — 85025 COMPLETE CBC W/AUTO DIFF WBC: CPT | Performed by: EMERGENCY MEDICINE

## 2024-04-22 PROCEDURE — 2500000001 HC RX 250 WO HCPCS SELF ADMINISTERED DRUGS (ALT 637 FOR MEDICARE OP): Performed by: INTERNAL MEDICINE

## 2024-04-22 PROCEDURE — 1036F TOBACCO NON-USER: CPT | Performed by: INTERNAL MEDICINE

## 2024-04-22 PROCEDURE — 2500000002 HC RX 250 W HCPCS SELF ADMINISTERED DRUGS (ALT 637 FOR MEDICARE OP, ALT 636 FOR OP/ED): Performed by: EMERGENCY MEDICINE

## 2024-04-22 PROCEDURE — 83880 ASSAY OF NATRIURETIC PEPTIDE: CPT | Performed by: EMERGENCY MEDICINE

## 2024-04-22 PROCEDURE — 2500000002 HC RX 250 W HCPCS SELF ADMINISTERED DRUGS (ALT 637 FOR MEDICARE OP, ALT 636 FOR OP/ED): Mod: MUE | Performed by: EMERGENCY MEDICINE

## 2024-04-22 PROCEDURE — 36415 COLL VENOUS BLD VENIPUNCTURE: CPT | Performed by: EMERGENCY MEDICINE

## 2024-04-22 PROCEDURE — 2500000005 HC RX 250 GENERAL PHARMACY W/O HCPCS: Performed by: INTERNAL MEDICINE

## 2024-04-22 PROCEDURE — 71046 X-RAY EXAM CHEST 2 VIEWS: CPT | Performed by: RADIOLOGY

## 2024-04-22 PROCEDURE — 93005 ELECTROCARDIOGRAM TRACING: CPT

## 2024-04-22 RX ORDER — METOPROLOL TARTRATE 25 MG/1
25 TABLET, FILM COATED ORAL 2 TIMES DAILY
Status: DISCONTINUED | OUTPATIENT
Start: 2024-04-22 | End: 2024-04-25 | Stop reason: HOSPADM

## 2024-04-22 RX ORDER — METHYLPREDNISOLONE 4 MG/1
TABLET ORAL
Qty: 21 TABLET | Refills: 0 | Status: ON HOLD | OUTPATIENT
Start: 2024-04-22 | End: 2024-04-24 | Stop reason: ALTCHOICE

## 2024-04-22 RX ORDER — POLYETHYLENE GLYCOL 3350 17 G/17G
17 POWDER, FOR SOLUTION ORAL DAILY
Status: DISCONTINUED | OUTPATIENT
Start: 2024-04-23 | End: 2024-04-25 | Stop reason: HOSPADM

## 2024-04-22 RX ORDER — MEMANTINE HYDROCHLORIDE 5 MG/1
10 TABLET ORAL 2 TIMES DAILY
Status: DISCONTINUED | OUTPATIENT
Start: 2024-04-22 | End: 2024-04-25 | Stop reason: HOSPADM

## 2024-04-22 RX ORDER — FUROSEMIDE 10 MG/ML
20 INJECTION INTRAMUSCULAR; INTRAVENOUS ONCE
Status: COMPLETED | OUTPATIENT
Start: 2024-04-22 | End: 2024-04-22

## 2024-04-22 RX ORDER — IPRATROPIUM BROMIDE AND ALBUTEROL SULFATE 2.5; .5 MG/3ML; MG/3ML
3 SOLUTION RESPIRATORY (INHALATION)
Status: COMPLETED | OUTPATIENT
Start: 2024-04-22 | End: 2024-04-22

## 2024-04-22 RX ORDER — ATORVASTATIN CALCIUM 10 MG/1
10 TABLET, FILM COATED ORAL DAILY
Status: DISCONTINUED | OUTPATIENT
Start: 2024-04-23 | End: 2024-04-25 | Stop reason: HOSPADM

## 2024-04-22 RX ORDER — PREDNISONE 20 MG/1
20 TABLET ORAL ONCE
Status: COMPLETED | OUTPATIENT
Start: 2024-04-22 | End: 2024-04-22

## 2024-04-22 RX ORDER — ACETAMINOPHEN 325 MG/1
650 TABLET ORAL EVERY 6 HOURS PRN
Status: DISCONTINUED | OUTPATIENT
Start: 2024-04-22 | End: 2024-04-25 | Stop reason: HOSPADM

## 2024-04-22 RX ORDER — DONEPEZIL HYDROCHLORIDE 5 MG/1
10 TABLET, FILM COATED ORAL DAILY
Status: DISCONTINUED | OUTPATIENT
Start: 2024-04-23 | End: 2024-04-25 | Stop reason: HOSPADM

## 2024-04-22 RX ORDER — FUROSEMIDE 10 MG/ML
40 INJECTION INTRAMUSCULAR; INTRAVENOUS 2 TIMES DAILY
Status: DISCONTINUED | OUTPATIENT
Start: 2024-04-22 | End: 2024-04-25

## 2024-04-22 RX ORDER — ASPIRIN 81 MG/1
81 TABLET ORAL DAILY
Status: DISCONTINUED | OUTPATIENT
Start: 2024-04-23 | End: 2024-04-25 | Stop reason: HOSPADM

## 2024-04-22 RX ORDER — AZITHROMYCIN 250 MG/1
TABLET, FILM COATED ORAL DAILY
Qty: 6 TABLET | Refills: 0 | Status: SHIPPED | OUTPATIENT
Start: 2024-04-22 | End: 2024-04-25 | Stop reason: HOSPADM

## 2024-04-22 RX ORDER — FUROSEMIDE 10 MG/ML
40 INJECTION INTRAMUSCULAR; INTRAVENOUS 2 TIMES DAILY
Status: DISCONTINUED | OUTPATIENT
Start: 2024-04-23 | End: 2024-04-23

## 2024-04-22 RX ORDER — IPRATROPIUM BROMIDE AND ALBUTEROL SULFATE 2.5; .5 MG/3ML; MG/3ML
3 SOLUTION RESPIRATORY (INHALATION)
Status: DISCONTINUED | OUTPATIENT
Start: 2024-04-23 | End: 2024-04-23

## 2024-04-22 RX ORDER — LOSARTAN POTASSIUM 25 MG/1
25 TABLET ORAL DAILY
Status: DISCONTINUED | OUTPATIENT
Start: 2024-04-23 | End: 2024-04-24

## 2024-04-22 RX ORDER — FINASTERIDE 5 MG/1
5 TABLET, FILM COATED ORAL DAILY
Status: DISCONTINUED | OUTPATIENT
Start: 2024-04-23 | End: 2024-04-25 | Stop reason: HOSPADM

## 2024-04-22 RX ADMIN — PREDNISONE 20 MG: 20 TABLET ORAL at 13:21

## 2024-04-22 RX ADMIN — IPRATROPIUM BROMIDE AND ALBUTEROL SULFATE 3 ML: 2.5; .5 SOLUTION RESPIRATORY (INHALATION) at 14:36

## 2024-04-22 RX ADMIN — METOPROLOL TARTRATE 25 MG: 25 TABLET, FILM COATED ORAL at 23:26

## 2024-04-22 RX ADMIN — APIXABAN 5 MG: 5 TABLET, FILM COATED ORAL at 23:26

## 2024-04-22 RX ADMIN — MEMANTINE 10 MG: 5 TABLET ORAL at 23:26

## 2024-04-22 RX ADMIN — Medication: at 22:45

## 2024-04-22 RX ADMIN — FUROSEMIDE 20 MG: 10 INJECTION, SOLUTION INTRAMUSCULAR; INTRAVENOUS at 13:24

## 2024-04-22 RX ADMIN — IPRATROPIUM BROMIDE AND ALBUTEROL SULFATE 3 ML: 2.5; .5 SOLUTION RESPIRATORY (INHALATION) at 13:21

## 2024-04-22 RX ADMIN — IPRATROPIUM BROMIDE AND ALBUTEROL SULFATE 3 ML: 2.5; .5 SOLUTION RESPIRATORY (INHALATION) at 14:37

## 2024-04-22 SDOH — SOCIAL STABILITY: SOCIAL INSECURITY: DOES ANYONE TRY TO KEEP YOU FROM HAVING/CONTACTING OTHER FRIENDS OR DOING THINGS OUTSIDE YOUR HOME?: UNABLE TO ASSESS

## 2024-04-22 SDOH — SOCIAL STABILITY: SOCIAL INSECURITY: HAVE YOU HAD ANY THOUGHTS OF HARMING ANYONE ELSE?: UNABLE TO ASSESS

## 2024-04-22 SDOH — SOCIAL STABILITY: SOCIAL INSECURITY: WERE YOU ABLE TO COMPLETE ALL THE BEHAVIORAL HEALTH SCREENINGS?: YES

## 2024-04-22 SDOH — SOCIAL STABILITY: SOCIAL INSECURITY: ARE THERE ANY APPARENT SIGNS OF INJURIES/BEHAVIORS THAT COULD BE RELATED TO ABUSE/NEGLECT?: UNABLE TO ASSESS

## 2024-04-22 SDOH — SOCIAL STABILITY: SOCIAL INSECURITY: ABUSE: ADULT

## 2024-04-22 SDOH — SOCIAL STABILITY: SOCIAL INSECURITY: DO YOU FEEL UNSAFE GOING BACK TO THE PLACE WHERE YOU ARE LIVING?: UNABLE TO ASSESS

## 2024-04-22 SDOH — SOCIAL STABILITY: SOCIAL INSECURITY: HAS ANYONE EVER THREATENED TO HURT YOUR FAMILY OR YOUR PETS?: UNABLE TO ASSESS

## 2024-04-22 SDOH — SOCIAL STABILITY: SOCIAL INSECURITY: ARE YOU OR HAVE YOU BEEN THREATENED OR ABUSED PHYSICALLY, EMOTIONALLY, OR SEXUALLY BY ANYONE?: UNABLE TO ASSESS

## 2024-04-22 SDOH — SOCIAL STABILITY: SOCIAL INSECURITY: HAVE YOU HAD THOUGHTS OF HARMING ANYONE ELSE?: NO

## 2024-04-22 SDOH — SOCIAL STABILITY: SOCIAL INSECURITY: DO YOU FEEL ANYONE HAS EXPLOITED OR TAKEN ADVANTAGE OF YOU FINANCIALLY OR OF YOUR PERSONAL PROPERTY?: UNABLE TO ASSESS

## 2024-04-22 ASSESSMENT — LIFESTYLE VARIABLES
AUDIT-C TOTAL SCORE: 0
HOW OFTEN DO YOU HAVE 6 OR MORE DRINKS ON ONE OCCASION: NEVER
HOW OFTEN DO YOU HAVE A DRINK CONTAINING ALCOHOL: NEVER
AUDIT-C TOTAL SCORE: 0
HOW MANY STANDARD DRINKS CONTAINING ALCOHOL DO YOU HAVE ON A TYPICAL DAY: PATIENT DOES NOT DRINK
SKIP TO QUESTIONS 9-10: 1

## 2024-04-22 ASSESSMENT — PATIENT HEALTH QUESTIONNAIRE - PHQ9
1. LITTLE INTEREST OR PLEASURE IN DOING THINGS: NOT AT ALL
2. FEELING DOWN, DEPRESSED OR HOPELESS: NOT AT ALL
1. LITTLE INTEREST OR PLEASURE IN DOING THINGS: NOT AT ALL
SUM OF ALL RESPONSES TO PHQ9 QUESTIONS 1 AND 2: 0
2. FEELING DOWN, DEPRESSED OR HOPELESS: NOT AT ALL
SUM OF ALL RESPONSES TO PHQ9 QUESTIONS 1 & 2: 0

## 2024-04-22 ASSESSMENT — PAIN - FUNCTIONAL ASSESSMENT
PAIN_FUNCTIONAL_ASSESSMENT: 0-10
PAIN_FUNCTIONAL_ASSESSMENT: 0-10

## 2024-04-22 ASSESSMENT — COGNITIVE AND FUNCTIONAL STATUS - GENERAL
TOILETING: A LITTLE
DAILY ACTIVITIY SCORE: 18
WALKING IN HOSPITAL ROOM: A LITTLE
CLIMB 3 TO 5 STEPS WITH RAILING: A LITTLE
MOBILITY SCORE: 22
DRESSING REGULAR LOWER BODY CLOTHING: A LITTLE
PERSONAL GROOMING: A LITTLE
PATIENT BASELINE BEDBOUND: NO
EATING MEALS: A LITTLE
HELP NEEDED FOR BATHING: A LITTLE
DRESSING REGULAR UPPER BODY CLOTHING: A LITTLE

## 2024-04-22 ASSESSMENT — ENCOUNTER SYMPTOMS
LOSS OF SENSATION IN FEET: 0
DEPRESSION: 0
OCCASIONAL FEELINGS OF UNSTEADINESS: 0

## 2024-04-22 ASSESSMENT — ACTIVITIES OF DAILY LIVING (ADL)
WALKS IN HOME: NEEDS ASSISTANCE
TOILETING: NEEDS ASSISTANCE
HEARING - LEFT EAR: FUNCTIONAL
PATIENT'S MEMORY ADEQUATE TO SAFELY COMPLETE DAILY ACTIVITIES?: NO
ASSISTIVE_DEVICE: WALKER;DENTURES UPPER;DENTURES LOWER
GROOMING: INDEPENDENT
ADEQUATE_TO_COMPLETE_ADL: YES
BATHING: INDEPENDENT
LACK_OF_TRANSPORTATION: NO
DRESSING YOURSELF: INDEPENDENT
FEEDING YOURSELF: INDEPENDENT
HEARING - RIGHT EAR: FUNCTIONAL
JUDGMENT_ADEQUATE_SAFELY_COMPLETE_DAILY_ACTIVITIES: NO

## 2024-04-22 ASSESSMENT — PAIN SCALES - GENERAL
PAINLEVEL_OUTOF10: 0 - NO PAIN

## 2024-04-22 NOTE — CONSULTS
Consults  History Of Present Illness:    Jam Cox is a 89 y.o. male with past medical history of HFimpEF (LVEF 45-50% on echo in 2020), hypertension, paroxsymal atrial fibrillation on Eliquis, prior CVA, dementia who presents to [facility] from his PCP office where he was noted to be short of breath.  Cardiology consulted for CHF exacerbation.      Patient is confused,  but smiles and denies any concerns.  His granddaughter and POACarin is at bedside.  States they took patient to PCP visit for routine follow up.  Sent to ED for shortness of breath.  Granddaughter states patient is normally confused and tremulous at baseline, anxious as well.  No reported chest pain, but has been having shortness of breath , worse when walking stairs.  No arm or leg swelling.  No issues with blood in urine or stool.  Taking all medications as prescribed.      Home meds:  Eliquis 5mg oral BID, furosemide 20mg daily.  Metoprolol tartrate 25mg BID, losartan 25mg daily    Follows with Dr. Menezes for cardiology, last visit 12/2023    Past Cardiology Tests (Last 3 Years):  EKG:  Results for orders placed during the hospital encounter of 04/22/24    ECG 12 lead (Preliminary)  This result has not been signed. Information might be incomplete.    Narrative  Atrial fibrillation  Left axis deviation  Nonspecific intraventricular block  Minimal voltage criteria for LVH, may be normal variant ( [place] product )  Possible Anterolateral infarct , age undetermined  Abnormal ECG  When compared with ECG of 19-DEC-2022 11:28,  Atrial fibrillation has replaced Sinus rhythm  QRS axis Shifted left  Nonspecific T wave abnormality no longer evident in Inferior leads  Nonspecific T wave abnormality no longer evident in Lateral leads    Echo:    6/8/2020 tte  CONCLUSIONS:   1. The left ventricular systolic function is mildly decreased with a 45-50% estimated ejection fraction.   2. Spectral Doppler shows an impaired relaxation pattern of left  ventricular diastolic filling.   3. The left atrium is mild to moderately dilated.   4. Mild to moderate mitral valve regurgitation.   5. Slightly elevated RVSP.   6. Aortic valve sclerosis.   7. There is mild to moderate aortic valve regurgitation.   8. The estimated PASP is 38 mmHg.   9. There is plaque visualized in the ascending aorta.    TTE 4/2019  CONCLUSIONS:   1. The left ventricular systolic function is moderately decreased with a 35-40% estimated ejection fraction.   2. Abnormal septal motion consistent with left bundle branch block.   3. Spectral Doppler shows an impaired relaxation pattern of left ventricular diastolic filling.   4. Mild to moderate mitral valve regurgitation.   5. Slightly elevated RVSP.   6. There is mild to moderate tricuspid regurgitation.   7. There is moderate aortic valve regurgitation.   8. The estimated PASP is 36 mmHg.    TTE 2016  CONCLUSIONS:   1. The left ventricular systolic function is moderately to severely decreased with a 35% ejection fraction.   2. Spectral Doppler shows an impaired relaxation pattern of left ventricular diastolic filling.   3. The left atrium is mild to moderately dilated.   4. There is moderate mitral valve regurgitation.   5. Mildly elevated RVSP.   6. There is mild to moderate tricuspid regurgitation.   7. There is mild sclerotic thickening of the noncoronary cusp.   8. There is moderate aortic valve regurgitation.   9. There is moderate pulmonic valve regurgitation.    Cath:  No results found for this or any previous visit.    Stress Test:  No results found for this or any previous visit.    Cardiac Imaging:  No results found for this or any previous visit.      Past Medical History:  He has a past medical history of Abnormality of plasma protein, unspecified (05/01/2017), Benign prostatic hyperplasia without lower urinary tract symptoms (05/01/2017), Disorder of prostate, unspecified (05/01/2017), Elevated prostate specific antigen (PSA)  (05/01/2017), Elevated prostate specific antigen (PSA) (05/01/2017), Elevated prostate specific antigen (PSA) (05/01/2017), Elevated prostate specific antigen (PSA) (05/01/2017), Epistaxis (05/01/2017), Essential (primary) hypertension (12/19/2022), Frequency of micturition (05/01/2017), Hallucinations, unspecified (05/01/2017), Headache, unspecified (05/01/2017), Hematuria, unspecified (05/01/2017), Ischemic cardiomyopathy (12/13/2021), Other amnesia (05/01/2017), Other microscopic hematuria (05/01/2017), Other seasonal allergic rhinitis (05/01/2017), Other secondary cataract, right eye (05/31/2018), Other specified disorders of the male genital organs (05/01/2017), Personal history of other specified conditions (02/10/2020), Presence of intraocular lens (05/01/2017), Primary insomnia (05/01/2017), Primary open-angle glaucoma, unspecified eye, stage unspecified (05/01/2017), Unspecified atrial flutter (Multi) (05/01/2017), Unspecified glaucoma (05/01/2017), and Vitamin D deficiency, unspecified (05/01/2017).    Past Surgical History:  He has a past surgical history that includes Cataract extraction (10/07/2014); Other surgical history (10/07/2014); MR angio head wo IV contrast (3/3/2018); and MR angio neck wo IV contrast (3/3/2018).      Social History:  He reports that he has quit smoking. His smoking use included cigarettes. He has never been exposed to tobacco smoke. He has never used smokeless tobacco. He reports that he does not currently use alcohol. He reports that he does not currently use drugs.    Family History:  Family History   Problem Relation Name Age of Onset    No Known Problems Mother      No Known Problems Father          Allergies:  Ace inhibitors    ROS:  10 point review of systems including (Constitutional, Eyes, ENMT, Respiratory, Cardiac, Gastrointestinal, Neurological, Psychiatric, and Hematologic) was performed and is otherwise negative.    Objective Data:  Last Recorded Vitals:  Vitals:  "   24 1545 24 1600 24 1615 24 1630   BP:    (!) 143/102   Pulse: 100 97 95 (!) 102   Resp:    (!) 26   Temp:       TempSrc:       SpO2:  (!) 90%  (!) 93%   Weight:       Height:         Medical Gas Therapy: None (Room air)  Weight  Av.2 kg (168 lb 1.6 oz)  Min: 75.4 kg (166 lb 3.2 oz)  Max: 77.1 kg (170 lb)    LABS:  CMP:  Results from last 7 days   Lab Units 24  1214   SODIUM mmol/L 138   POTASSIUM mmol/L 4.8   CHLORIDE mmol/L 104   CO2 mmol/L 25   ANION GAP mmol/L 14   BUN mg/dL 13   CREATININE mg/dL 1.05   EGFR mL/min/1.73m*2 66   ALBUMIN g/dL 4.1   ALT U/L 33   AST U/L 39   BILIRUBIN TOTAL mg/dL 4.0*     CBC:  Results from last 7 days   Lab Units 24  1214   WBC AUTO x10*3/uL 5.8   HEMOGLOBIN g/dL 12.4*   HEMATOCRIT % 38.6*   PLATELETS AUTO x10*3/uL 182   MCV fL 93     COAG:     ABO: No results found for: \"ABO\"  HEME/ENDO:     CARDIAC:   Results from last 7 days   Lab Units 24  1214   BNP pg/mL 1,015*             Last I/O:  No intake or output data in the 24 hours ending 24 1636  Net IO Since Admission: No IO data has been entered for this period [24 1636]      Imaging Results:  XR chest 2 views    Result Date: 2024  Interpreted By:  Matthieu Tierney, STUDY: XR CHEST 2 VIEWS; 2024 12:32 pm   INDICATION: Signs/Symptoms:sob.   COMPARISON: 2018   ACCESSION NUMBER(S): [id number]   ORDERING CLINICIAN: JEFFREY NIETO   TECHNIQUE: AP and lateral views of the chest were obtained.   FINDINGS: The cardiac silhouette is quite prominent suggesting cardiomegaly. There are hazy ground-glass infiltrates present bilaterally along with minimal thickening of the fissural markings. No consolidation is noted.       Probable cardiomegaly with a hazy bilateral ground-glass infiltrates suggesting pulmonary edema and CHF. Findings are new compared to the prior study.   MACRO: none   Signed by: Matthieu Tierney 2024 12:48 PM Dictation workstation:   " [id number]    ECG 12 lead    Result Date: 4/22/2024  Atrial fibrillation Left axis deviation Nonspecific intraventricular block Minimal voltage criteria for LVH, may be normal variant ( [place] product ) Possible Anterolateral infarct , age undetermined Abnormal ECG When compared with ECG of 19-DEC-2022 11:28, Atrial fibrillation has replaced Sinus rhythm QRS axis Shifted left Nonspecific T wave abnormality no longer evident in Inferior leads Nonspecific T wave abnormality no longer evident in Lateral leads      Inpatient Medications:          Outpatient Medications:  Current Outpatient Medications   Medication Instructions    apixaban (ELIQUIS) 5 mg, oral, 2 times daily    aspirin 81 mg EC tablet 1 tablet, oral, Daily    atorvastatin (LIPITOR) 10 mg, oral, Daily    azelastine (Astelin) 137 mcg (0.1 %) nasal spray nasal, 2 times daily    azithromycin (Zithromax) 250 mg tablet Take 2 tablets (500 mg) by mouth once daily for 1 day, THEN 1 tablet (250 mg) once daily for 4 days. Take 2 tabs (500 mg) by mouth today, than 1 daily for 4 days..    cholecalciferol (Vitamin D-3) 50 mcg (2,000 unit) capsule 1 capsule, oral, Daily    clindamycin (Cleocin T) 1 % lotion Topical, 2 times daily    donepezil (ARICEPT) 10 mg, oral, Daily    finasteride (PROSCAR) 5 mg, oral, Daily    furosemide (LASIX) 20 mg, oral, Daily    ipratropium-albuteroL (Duo-Neb) 0.5-2.5 mg/3 mL nebulizer solution inhalation    losartan (COZAAR) 25 mg, oral, Daily    memantine (NAMENDA) 10 mg, oral, 2 times daily    methylPREDNISolone (Medrol Dospak) 4 mg tablets Take as directed on package.    metoprolol tartrate (LOPRESSOR) 25 mg, oral, 2 times daily    metoprolol tartrate (LOPRESSOR) 25 mg, oral, 2 times daily    potassium chloride CR 20 mEq ER tablet 20 mEq, oral, Daily    tamsulosin (FLOMAX) 0.4 mg, oral, Daily    VITAMIN B COMPLEX ORAL 1 tablet, oral, Daily    walker (Ultra-Light Rollator) misc 1 each, miscellaneous, Daily       Physical  Exam:    General:  Patient is awake, alert, and confused,  generalized tremors  HEENT:  Normocephalic.  Moist mucosa.    Neck:  + JVP  Cardiovascular:  Irregular rate and rhythm.   Pulmonary:  Crackles in bases  Abdomen:  Soft. Non-tender.   Non-distended.  Positive bowel sounds.  Lower Extremities:  2+ pedal pulses. No LE edema.  Neurologic:  Alert and confused, generalized tremors  Skin: Skin warm and dry, normal skin turgor.   Psychiatric: anxious       Assessment/Plan     Jam Cox is a 89 y.o. male with past medical history of HFimpEF (LVEF 45-50% on echo in 2020), hypertension, paroxsymal atrial fibrillation on Eliquis, prior CVA, dementia who presents to [facility] from his PCP office where he was noted to be short of breath.  Cardiology consulted for CHF exacerbation.      Assessment:  # Acute on chronic HFimpEF  # Paroxsymal atrial fibrillation on Eliquis  # Hypertension, acceptable  # Dementia    4/22 > CXR. suggesting pulmonary edema and CHF. BNP > 1000.  Creatinine stable.  H/h 12/38.  Volume up on exam.  Tele showing a fib with rates in 90's.  There are no significant clinical signs / symptoms or ischemic changes consistent with ACS.    Plan:  - We will obtain a transthoracic echocardiogram for structural evaluation including ejection fraction, assessment of regional wall motion abnormalities or valvular disease, and further evaluation of hemodynamics.  - Continue to diurese with furosemide 40mg IV BID.  Holding home furosemide 20mg daily at home.  Will likely need increased home dose of furosemide 40mg daily  - Continue home Eliquis 5mg BID  - Continue home metoprolol tartrate 25mg BID and losartan 25mg daily    He will need to follow up with Dr. Menezes within 2-3 weeks of hospital discharge.     Code Status:  No Order    ADIN Lindo-ZEHRA    ========================  Attending Note  ========================    Both the WINSTON and I have had a face to face encounter with the patient today. I  have examined the patient and edited the documented physical examination as necessary.  I personally reviewed the patient's recent labs, medications, orders, EKGs, and pertinent cardiac imaging.  I have reviewed the WINSTON's encounter note, approve the WINSTON's documentation and have edited the note to reflect the diagnostic and therapeutic plan.    Jam Cox is a 89 y.o. male with past medical history of HFimpEF (LVEF 45-50% on echo in 2020), hypertension, paroxsymal atrial fibrillation on Eliquis, prior CVA, dementia who presents to [facility] from his PCP office where he was noted to be short of breath.  Cardiology consulted for CHF exacerbation.      Patient is confused,  but smiles and denies any concerns.  His granddaughter and POA, Carin is at bedside.  States they took patient to PCP visit for routine follow up.  Sent to ED for shortness of breath.  Granddaughter states patient is normally confused and tremulous at baseline, anxious as well.  No reported chest pain, but has been having shortness of breath , worse when walking stairs.  No arm or leg swelling.  No issues with blood in urine or stool.  Taking all medications as prescribed.      Home meds:  Eliquis 5mg oral BID, furosemide 20mg daily.  Metoprolol tartrate 25mg BID, losartan 25mg daily    Follows with Dr. Menezes for cardiology, last visit 12/2023    No exacerbating or relieving factors.  Patient denies chest pain and angina.  Pt denies shortness of breath, dyspnea on exertion, orthopnea, and paroxysmal nocturnal dyspnea.  Pt denies worsening lower extremity edema.  Pt denies palpitations or syncope.  No recent falls.  No fever or chills.  No cough.  No change in bowel or bladder habits.  No sick contacts.  No recent travel.     Past medical history:  As above.    Medications were reviewed.    Allergies were reviewed.    Social history:  Patient denies smoking, alcohol abuse, or illicit drug use.    Family history:  No sudden cardiac death or  premature coronary artery disease.     Vital signs, telemetry, medications, labs, and imaging were reviewed as well.      Physical Exam:  General:  Patient is awake, alert, and confused,  generalized tremors  HEENT:  Normocephalic.  Moist mucosa.    Neck:  + JVP  Cardiovascular:  Irregular rate and rhythm.   Pulmonary:  Crackles in bases  Abdomen:  Soft. Non-tender.   Non-distended.  Positive bowel sounds.  Lower Extremities:  2+ pedal pulses. No LE edema.  Neurologic:  Alert and confused, generalized tremors  Skin: Skin warm and dry, normal skin turgor.   Psychiatric: anxious       Assessment/Plan   Jam Cox is a 89 y.o. male with past medical history of HFimpEF (LVEF 45-50% on echo in 2020), hypertension, paroxsymal atrial fibrillation on Eliquis, prior CVA, dementia who presents to [facility] from his PCP office where he was noted to be short of breath.  Cardiology consulted for CHF exacerbation.      Assessment:  # Acute on chronic HFimpEF  # Paroxsymal atrial fibrillation on Eliquis  # Hypertension, acceptable  # Dementia    4/22 > CXR. suggesting pulmonary edema and CHF. BNP > 1000.  Creatinine stable.  H/h 12/38.  Volume up on exam.  Tele showing a fib with rates in 90's.  There are no significant clinical signs / symptoms or ischemic changes consistent with ACS.    Plan:  - We will obtain a transthoracic echocardiogram for structural evaluation including ejection fraction, assessment of regional wall motion abnormalities or valvular disease, and further evaluation of hemodynamics.  - Continue to diurese with furosemide 40mg IV BID.  Holding home furosemide 20mg daily at home.  Will likely need increased home dose of furosemide 40mg daily  - Continue home Eliquis 5mg BID  - Continue home metoprolol tartrate 25mg BID and losartan 25mg daily  - Follow up with Dr. Menezes within 2-3 weeks of hospital discharge.     Pedro Muller MD

## 2024-04-22 NOTE — PROGRESS NOTES
Pharmacy Medication History Review    Jam Cox is a 89 y.o. male admitted for Shortness of breath. Pharmacy reviewed the patient's [id number] medications and allergies for accuracy.    The list below reflectives the updated PTA list. Please review each medication in order reconciliation for additional clarification and justification.  Prior to Admission Medications   Prescriptions Last Dose Informant   VITAMIN B COMPLEX ORAL 4/22/2024 Self   Sig: Take 1 tablet by mouth once daily.   apixaban (Eliquis) 5 mg tablet 4/22/2024 Self   Sig: Take 1 tablet (5 mg) by mouth 2 times a day.   aspirin 81 mg EC tablet 4/22/2024 Self   Sig: Take 1 tablet (81 mg) by mouth once daily.   atorvastatin (Lipitor) 10 mg tablet 4/22/2024 Self   Sig: Take 1 tablet (10 mg) by mouth once daily.   azithromycin (Zithromax) 250 mg tablet  Self   Sig: Take 2 tablets (500 mg) by mouth once daily for 1 day, THEN 1 tablet (250 mg) once daily for 4 days. Take 2 tabs (500 mg) by mouth today, than 1 daily for 4 days..   cholecalciferol (Vitamin D-3) 50 mcg (2,000 unit) capsule Unknown Self   Sig: Take 1 capsule (50 mcg) by mouth once daily.   clindamycin (Cleocin T) 1 % lotion Unknown Self   Sig: Apply topically 2 times a day.   donepezil (Aricept) 10 mg tablet 4/22/2024 Self   Sig: Take 1 tablet (10 mg) by mouth once daily.   finasteride (Proscar) 5 mg tablet 4/22/2024 Self   Sig: Take 1 tablet (5 mg) by mouth once daily.   furosemide (Lasix) 20 mg tablet 4/22/2024 Self   Sig: Take 1 tablet (20 mg) by mouth once daily.   ipratropium-albuteroL (Duo-Neb) 0.5-2.5 mg/3 mL nebulizer solution Unknown Self   Sig: Inhale 3 mL 4 times a day.   losartan (Cozaar) 25 mg tablet Unknown Self   Sig: Take 1 tablet (25 mg) by mouth once daily.   memantine (Namenda) 10 mg tablet 4/22/2024 Self   Sig: Take 1 tablet (10 mg) by mouth 2 times a day.   metoprolol tartrate (Lopressor) 25 mg tablet 4/22/2024 Self   Sig: Take 1 tablet (25 mg) by mouth 2 times a  day.   potassium chloride CR 20 mEq ER tablet 2024 Self   Sig: Take 1 tablet (20 mEq) by mouth once daily.   tamsulosin (Flomax) 0.4 mg 24 hr capsule 2024 Self   Sig: Take 1 capsule (0.4 mg) by mouth once daily.   walker (Ultra-Light Rollator) misc Unknown Self   Si each once daily.      Facility-Administered Medications: None         The list below reflectives the updated allergy list. Please review each documented allergy for additional clarification and justification.  Allergies  Reviewed by Bree Munoz RN on 2024        Severity Reactions Comments    Ace Inhibitors Not Specified Unknown ACE Inhibitors            Below are additional concerns with the patient's PTA list.  Spoke with family member at bedside for med list    Yulia Montoya

## 2024-04-22 NOTE — PROGRESS NOTES
"Jam Cox is a 89 y.o. male here for a Medicare Wellness Exam.    No chief complaint on file.       Patient with a past medical history of Atrial fibrillation, BPH, HFpEF, COPD, HTN, HLD, Dementia, Chronic Sinusitis    Has been SOB x 1 week  On exertion  More restless    Sounds SOB at rest too  Coughing constantly x 1 month         Medicare Wellness Exam    The patent is being seen for a follow up annual wellness visit  Past Medical, Surgical and family History: Reviewed and updated in chart  Interval History: Patient has not been hospitalized previously  Medications and Supplements: Review of all medications by a prescribing practitioner or clinical pharmacist (such as prescriptions, OTC, Herbal therapies and supplements) documented in the medical record.    Patient Self-Assessment of health: Fair  Tobacco Use: Yes  Alcohol Use: No  Illicit drug use: No  Patient using opioids: No    Current Diet: in general, an \"unhealthy\" diet  Adequate fluid intake: Yes  Caffeine intake: Yes  Exercise frequency: not active    Depression/Suicide screening: PHQ2/ PHQ9 (see screenings tab)    Hearing impairment: No  Uses hearing aids N/A  Cognitive impairment Observation: Yes   Patient or family reported cognitive impairment: Yes    Bathing: independent  Dressing: independent  Walking: with partial assistance  Taking Medications: with partial assistance  Feeding: independent  Personal Hygiene: independent  Managing Finances: dependant  Shopping: dependant  Housework/Basic Home Maintenance: dependant  Handling transportation: dependant  Preparing meals: dependant    Bowels: continent  Bladder: continent    Falls Risk: has notfallen in last 6 months.   Their fall has not resulted in an injury.   Fall risk Factors: Fall Risk Factors: Cognitive Impairment severe   Care Plan Risk: Care Plan: High Risk: Regular physical activity such as walking, water aerobics or hugh chi to improve strength, balance, coordination and flexibility. " Wear appropriate, sensible shoe wear. Remove fall hazards at home such as loose rugs, obstacles, use non-slip surface in bath or shower. Keep living space well lit. Use assistive devices such as cane or walker if recommended and at home use handrails on stairs, grab bars for shower or tub, raised toilet seat and seat in the shower or tub.       Home Safety Risk Factors: Home Safety Risk Factors: None  Advanced Directives:  Living will: Yes POA: Yes    Patient's End of Life Decisions: Provider agree to follow.      Past Medical History:   Diagnosis Date    Abnormality of plasma protein, unspecified 05/01/2017    Elevated blood protein    Benign prostatic hyperplasia without lower urinary tract symptoms 05/01/2017    BPH with elevated PSA    Disorder of prostate, unspecified 05/01/2017    Prostate disorder    Elevated prostate specific antigen (PSA) 05/01/2017    Elevated prostate specific antigen (PSA)    Elevated prostate specific antigen (PSA) 05/01/2017    Abnormal prostate specific antigen    Elevated prostate specific antigen (PSA) 05/01/2017    Abnormal PSA    Elevated prostate specific antigen (PSA) 05/01/2017    Abnormal prostate specific antigen test    Epistaxis 05/01/2017    Epistaxis, recurrent    Essential (primary) hypertension 12/19/2022    Hypertension    Frequency of micturition 05/01/2017    Urinary frequency    Hallucinations, unspecified 05/01/2017    Hallucinations    Headache, unspecified 05/01/2017    Bilateral headache    Hematuria, unspecified 05/01/2017    Hematuria    Ischemic cardiomyopathy 12/13/2021    Cardiomyopathy, ischemic    Other amnesia 05/01/2017    Memory loss    Other microscopic hematuria 05/01/2017    Hematuria, microscopic    Other seasonal allergic rhinitis 05/01/2017    Seasonal allergies    Other secondary cataract, right eye 05/31/2018    Posterior capsular opacification visually significant of right eye    Other specified disorders of the male genital organs 05/01/2017     Scrotal swelling    Personal history of other specified conditions 02/10/2020    History of epistaxis    Presence of intraocular lens 05/01/2017    Pseudophakia    Primary insomnia 05/01/2017    Primary insomnia    Primary open-angle glaucoma, unspecified eye, stage unspecified 05/01/2017    Open angle primary glaucoma    Unspecified atrial flutter (Multi) 05/01/2017    Atrial flutter    Unspecified glaucoma 05/01/2017    Glaucoma    Vitamin D deficiency, unspecified 05/01/2017    Vitamin D deficiency        Review of Systems     Constitutional: no fever, no chills, not feeling poorly, not feeling tired and no recent weight gain, no recent weight loss.   ENT: no earache, no hearing loss, no nosebleeds, no nasal discharge, no sore throat and no hoarseness.   Cardiovascular: the heart rate was not slow, the heart rate was not fast, no chest pain, no palpitations, no intermittent leg claudication and no lower extremity edema.   Respiratory: cough/ SOB  Gastrointestinal: no abdominal pain, no constipation, no melena, no nausea, no diarrhea, no vomiting and no blood in stools.   Musculoskeletal: no arthralgias, no myalgias, no back pain, no joint swelling, no joint stiffness, no limb pain and no limb swelling.   Integumentary: no skin rashes, no skin lesions, no itching, no skin wound and no dry skin.   Neurological: dementia  All other systems have been reviewed and are negative for complaint.          Physical Exam     Constitutional   General appearance: Alert and in no acute distress.     Pulmonary   Respiratory assessment: No respiratory distress, normal respiratory rhythm and effort.    Auscultation of Lungs: Clear bilateral breath sounds.   Cardiovascular   Auscultation of heart: Apical pulse normal, heart rate and rhythm normal, normal S1 and S2, no murmurs and no pericardial rub.    Exam for edema: No peripheral edema.   Abdomen   Abdominal Exam: No bruits, normal bowel sounds, soft, non-tender, no abdominal  mass palpated.    Liver and Spleen exam: No hepato-splenomegaly.   Musculoskeletal   Examination of gait: Normal.    Inspection of digits and nails: No clubbing or cyanosis of the fingernails.    Inspection/palpation of joints, bones and muscles: No joint swelling. Normal movement of all extremities.   Skin   Skin inspection: Normal skin color and pigmentation, normal skin turgor and no visible rash.   Neurologic   Cranial nerves: Nerves 2-12 were intact, Alert x 1-2         Assessment/Plan          Patient with a past medical history of Atrial fibrillation, BPH, HFpEF, COPD, HTN, HLD, Dementia, Chronic Sinusitis     # Exac of COPD  Use home nebulizer  Chest xray  Medrol dose pack/ Z caitlin    # Afib  Currently in NSR    # HfPEF  Check BNP  Lasix to 40 mg po every day x 3 days    # HTN    Stable  Continue current medications    # HLD  Stable  Continue current medications    # Dementia  progressing

## 2024-04-22 NOTE — ED PROVIDER NOTES
HPI   Chief Complaint   Patient presents with    Shortness of Breath       HPI  Patient is a 89-year-old male with a past medical history significant for atrial fibrillation, BPH, CHF, COPD, hypertension, hyperlipidemia, dementia, chronic sinusitis who presented to the emergency room with a chief complaint of shortness of breath.  According to family he has been short of breath and weak and wheezy for a few days.  She is not exactly sure how long.  Wife states that she has been giving him breathing treatments 3 times a day for several days and his albuterol inhaler on occasion.  She has not given him any breathing treatments or inhaler today because he was going to see his primary care physician.  He has been coughing up yellow sputum but no hematemesis.  He denies any chest pain and patient himself denies any shortness of breath but I suspect there is some component of dementia contributing.  No leg pain or swelling.  He has not experienced any fever, nausea, vomiting or diarrhea.  Patient was sent from his primary care physician's office as he was noted to be in some respiratory distress on assessment.      PMHx: As above  PSHx: Denies pertinent  FamilyHx: Denies pertinent  SocialHx: Denies  Allergies: ACE inhibitor  Medications: See Medication Reconciliation     ROS  As above but otherwise denies    Physical Exam    GENERAL: Awake and Alert, No Acute Distress  HEENT: AT/NC, PERRL, EOMI, Normal Oropharynx, No Signs of Dehydration  NECK: Normal Inspection, No JVD  CARDIOVASCULAR: RRR, No M/R/G  RESPIRATORY: Faint bilateral wheezes, no Rales or Rhonchi, Chest Wall Non-tender  ABDOMEN: Soft, non-tender abdomen, Normal Bowel Sounds, No Distention  BACK: No CVA Tenderness  SKIN: Normal Color, Warm, Dry, No Rashes   EXTREMITIES: Non-Tender, Full ROM, No Pedal Edema  NEURO: A&O x 3, Normal Motor and Sensation, Normal Mood and Affect    Nursing Assessment and Vitals Reviewed    EKG showed atrial fibrillation at 88 bpm.   There is left axis deviation and findings concerning for LVH and left bundle branch block which is seen on prior..    Medical Decision  Patient is a 89-year-old male with a past medical history significant for atrial fibrillation, BPH, CHF, COPD, hypertension, hyperlipidemia, dementia, chronic sinusitis who presented to the emergency room with a chief complaint of shortness of breath.  According to family he has been short of breath and weak and wheezy for a few days.  She is not exactly sure how long.  Wife states that she has been giving him breathing treatments 3 times a day for several days and his albuterol inhaler on occasion.  She has not given him any breathing treatments or inhaler today because he was going to see his primary care physician.  He has been coughing up yellow sputum but no hematemesis.  He denies any chest pain and patient himself denies any shortness of breath but I suspect there is some component of dementia contributing.  No leg pain or swelling.  He has not experienced any fever, nausea, vomiting or diarrhea.  Patient was sent from his primary care physician's office as he was noted to be in some respiratory distress on assessment.    On evaluation patient is well-appearing and in no acute distress.  Patient has faint wheezing in bilateral lungs.  He is saturating 94% on room air without signs of tachycardia, tachypnea or distress.  Given history, risk factors patient is started on DuoNebs and prednisone.  Will perform a workup.    Workup for patient included labs that revealed T. bili of 4 but he is currently without abdominal pain.  BNP is elevated at thousand.  He has no leukocytosis and only some mild anemia.  Chest x-ray showed signs concerning for pulmonary edema and CHF.  He is given Lasix.  After breathing treatments, steroids and Lasix patient is still desaturating to 90% on ambulation and becoming dyspneic.  As such plan to admit for further workup and management.                               Janice Coma Scale Score: 14                     Patient History   Past Medical History:   Diagnosis Date    Abnormality of plasma protein, unspecified 05/01/2017    Elevated blood protein    Benign prostatic hyperplasia without lower urinary tract symptoms 05/01/2017    BPH with elevated PSA    Disorder of prostate, unspecified 05/01/2017    Prostate disorder    Elevated prostate specific antigen (PSA) 05/01/2017    Elevated prostate specific antigen (PSA)    Elevated prostate specific antigen (PSA) 05/01/2017    Abnormal prostate specific antigen    Elevated prostate specific antigen (PSA) 05/01/2017    Abnormal PSA    Elevated prostate specific antigen (PSA) 05/01/2017    Abnormal prostate specific antigen test    Epistaxis 05/01/2017    Epistaxis, recurrent    Essential (primary) hypertension 12/19/2022    Hypertension    Frequency of micturition 05/01/2017    Urinary frequency    Hallucinations, unspecified 05/01/2017    Hallucinations    Headache, unspecified 05/01/2017    Bilateral headache    Hematuria, unspecified 05/01/2017    Hematuria    Ischemic cardiomyopathy 12/13/2021    Cardiomyopathy, ischemic    Other amnesia 05/01/2017    Memory loss    Other microscopic hematuria 05/01/2017    Hematuria, microscopic    Other seasonal allergic rhinitis 05/01/2017    Seasonal allergies    Other secondary cataract, right eye 05/31/2018    Posterior capsular opacification visually significant of right eye    Other specified disorders of the male genital organs 05/01/2017    Scrotal swelling    Personal history of other specified conditions 02/10/2020    History of epistaxis    Presence of intraocular lens 05/01/2017    Pseudophakia    Primary insomnia 05/01/2017    Primary insomnia    Primary open-angle glaucoma, unspecified eye, stage unspecified 05/01/2017    Open angle primary glaucoma    Unspecified atrial flutter (Multi) 05/01/2017    Atrial flutter    Unspecified glaucoma 05/01/2017    Glaucoma     Vitamin D deficiency, unspecified 05/01/2017    Vitamin D deficiency     Past Surgical History:   Procedure Laterality Date    CATARACT EXTRACTION  10/07/2014    Cataract Surgery    MR HEAD ANGIO WO IV CONTRAST  3/3/2018    MR HEAD ANGIO WO IV CONTRAST 3/3/2018 [facility]ICAL [facility]GAC[facility]    MR NECK ANGIO WO IV CONTRAST  3/3/2018    MR NECK ANGIO WO IV CONTRAST 3/3/2018 [facility] CLINICAL LEGAC[facility]    OTHER SURGICAL HISTORY  10/07/2014    Dental Surgery     Family History   Problem Relation Name Age of Onset    No Known Problems Mother      No Known Problems Father       Social History     Tobacco Use    Smoking status: Former     Types: Cigarettes     Passive exposure: Never    Smokeless tobacco: Never   Vaping Use    Vaping status: Never Used   Substance Use Topics    Alcohol use: Not Currently    Drug use: Not Currently       Physical Exam   ED Triage Vitals [04/22/24 1149]   Temperature Heart Rate Respirations BP   36.8 °C (98.2 °F) 92 18 103/66      Pulse Ox Temp Source Heart Rate Source Patient Position   97 % Temporal Monitor --      BP Location FiO2 (%)     -- --       Physical Exam    ED Course & MDM   Diagnoses as of 04/22/24 1616   Shortness of breath   Congestive heart failure, unspecified HF chronicity, unspecified heart failure type (Multi)       Medical Decision Making      Procedure  Procedures     Joanne Box MD  04/22/24 1554       Joanne Box MD  04/22/24 1616

## 2024-04-23 ENCOUNTER — APPOINTMENT (OUTPATIENT)
Dept: CARDIOLOGY | Facility: HOSPITAL | Age: 89
DRG: 291 | End: 2024-04-23
Payer: MEDICARE

## 2024-04-23 LAB
ANION GAP SERPL CALC-SCNC: 17 MMOL/L (ref 10–20)
AORTIC VALVE MEAN GRADIENT: 1 MMHG
AORTIC VALVE PEAK VELOCITY: 0.84 M/S
AV PEAK GRADIENT: 2.8 MMHG
AVA (PEAK VEL): 2.4 CM2
AVA (VTI): 2.72 CM2
BUN SERPL-MCNC: 14 MG/DL (ref 6–23)
CALCIUM SERPL-MCNC: 8.9 MG/DL (ref 8.6–10.3)
CARDIAC TROPONIN I PNL SERPL HS: 23 NG/L (ref 0–20)
CHLORIDE SERPL-SCNC: 103 MMOL/L (ref 98–107)
CO2 SERPL-SCNC: 21 MMOL/L (ref 21–32)
CREAT SERPL-MCNC: 1.12 MG/DL (ref 0.5–1.3)
EGFRCR SERPLBLD CKD-EPI 2021: 61 ML/MIN/1.73M*2
EJECTION FRACTION APICAL 4 CHAMBER: 43.6
ERYTHROCYTE [DISTWIDTH] IN BLOOD BY AUTOMATED COUNT: 14.2 % (ref 11.5–14.5)
GLUCOSE SERPL-MCNC: 125 MG/DL (ref 74–99)
HCT VFR BLD AUTO: 37 % (ref 41–52)
HGB BLD-MCNC: 12.7 G/DL (ref 13.5–17.5)
LEFT ATRIUM VOLUME AREA LENGTH INDEX BSA: 37.9 ML/M2
LEFT VENTRICLE INTERNAL DIMENSION DIASTOLE: 4.67 CM (ref 3.5–6)
LEFT VENTRICULAR OUTFLOW TRACT DIAMETER: 2 CM
LV EJECTION FRACTION BIPLANE: 42 %
MAGNESIUM SERPL-MCNC: 2.3 MG/DL (ref 1.6–2.4)
MCH RBC QN AUTO: 29.7 PG (ref 26–34)
MCHC RBC AUTO-ENTMCNC: 34.3 G/DL (ref 32–36)
MCV RBC AUTO: 87 FL (ref 80–100)
MITRAL VALVE E/E' RATIO: 16.57
NRBC BLD-RTO: 0 /100 WBCS (ref 0–0)
PLATELET # BLD AUTO: 183 X10*3/UL (ref 150–450)
POTASSIUM SERPL-SCNC: 3.9 MMOL/L (ref 3.5–5.3)
RBC # BLD AUTO: 4.27 X10*6/UL (ref 4.5–5.9)
RIGHT VENTRICLE PEAK SYSTOLIC PRESSURE: 50.6 MMHG
SODIUM SERPL-SCNC: 137 MMOL/L (ref 136–145)
TRICUSPID ANNULAR PLANE SYSTOLIC EXCURSION: 0.4 CM
WBC # BLD AUTO: 7.2 X10*3/UL (ref 4.4–11.3)

## 2024-04-23 PROCEDURE — 2500000002 HC RX 250 W HCPCS SELF ADMINISTERED DRUGS (ALT 637 FOR MEDICARE OP, ALT 636 FOR OP/ED): Mod: MUE | Performed by: INTERNAL MEDICINE

## 2024-04-23 PROCEDURE — 2500000001 HC RX 250 WO HCPCS SELF ADMINISTERED DRUGS (ALT 637 FOR MEDICARE OP): Performed by: NURSE PRACTITIONER

## 2024-04-23 PROCEDURE — 93306 TTE W/DOPPLER COMPLETE: CPT | Performed by: INTERNAL MEDICINE

## 2024-04-23 PROCEDURE — 99232 SBSQ HOSP IP/OBS MODERATE 35: CPT

## 2024-04-23 PROCEDURE — 83735 ASSAY OF MAGNESIUM: CPT | Performed by: INTERNAL MEDICINE

## 2024-04-23 PROCEDURE — 93306 TTE W/DOPPLER COMPLETE: CPT

## 2024-04-23 PROCEDURE — 80048 BASIC METABOLIC PNL TOTAL CA: CPT | Performed by: INTERNAL MEDICINE

## 2024-04-23 PROCEDURE — 99222 1ST HOSP IP/OBS MODERATE 55: CPT | Performed by: INTERNAL MEDICINE

## 2024-04-23 PROCEDURE — 85027 COMPLETE CBC AUTOMATED: CPT | Performed by: INTERNAL MEDICINE

## 2024-04-23 PROCEDURE — 36415 COLL VENOUS BLD VENIPUNCTURE: CPT | Performed by: INTERNAL MEDICINE

## 2024-04-23 PROCEDURE — 94640 AIRWAY INHALATION TREATMENT: CPT

## 2024-04-23 PROCEDURE — 2500000001 HC RX 250 WO HCPCS SELF ADMINISTERED DRUGS (ALT 637 FOR MEDICARE OP): Performed by: INTERNAL MEDICINE

## 2024-04-23 PROCEDURE — 2500000004 HC RX 250 GENERAL PHARMACY W/ HCPCS (ALT 636 FOR OP/ED): Performed by: NURSE PRACTITIONER

## 2024-04-23 PROCEDURE — 2500000004 HC RX 250 GENERAL PHARMACY W/ HCPCS (ALT 636 FOR OP/ED): Performed by: INTERNAL MEDICINE

## 2024-04-23 PROCEDURE — 2500000002 HC RX 250 W HCPCS SELF ADMINISTERED DRUGS (ALT 637 FOR MEDICARE OP, ALT 636 FOR OP/ED): Performed by: INTERNAL MEDICINE

## 2024-04-23 PROCEDURE — 84484 ASSAY OF TROPONIN QUANT: CPT | Performed by: INTERNAL MEDICINE

## 2024-04-23 PROCEDURE — 1200000002 HC GENERAL ROOM WITH TELEMETRY DAILY

## 2024-04-23 RX ORDER — IPRATROPIUM BROMIDE AND ALBUTEROL SULFATE 2.5; .5 MG/3ML; MG/3ML
3 SOLUTION RESPIRATORY (INHALATION) EVERY 2 HOUR PRN
Status: DISCONTINUED | OUTPATIENT
Start: 2024-04-23 | End: 2024-04-25 | Stop reason: HOSPADM

## 2024-04-23 RX ORDER — IPRATROPIUM BROMIDE AND ALBUTEROL SULFATE 2.5; .5 MG/3ML; MG/3ML
3 SOLUTION RESPIRATORY (INHALATION)
Status: DISCONTINUED | OUTPATIENT
Start: 2024-04-23 | End: 2024-04-25 | Stop reason: HOSPADM

## 2024-04-23 RX ADMIN — PERFLUTREN 10 ML OF DILUTION: 6.52 INJECTION, SUSPENSION INTRAVENOUS at 18:09

## 2024-04-23 RX ADMIN — IPRATROPIUM BROMIDE AND ALBUTEROL SULFATE 3 ML: 2.5; .5 SOLUTION RESPIRATORY (INHALATION) at 19:35

## 2024-04-23 RX ADMIN — MEMANTINE 10 MG: 5 TABLET ORAL at 20:49

## 2024-04-23 RX ADMIN — DONEPEZIL HYDROCHLORIDE 10 MG: 5 TABLET ORAL at 09:42

## 2024-04-23 RX ADMIN — APIXABAN 5 MG: 5 TABLET, FILM COATED ORAL at 20:49

## 2024-04-23 RX ADMIN — METOPROLOL TARTRATE 25 MG: 25 TABLET, FILM COATED ORAL at 09:42

## 2024-04-23 RX ADMIN — MEMANTINE 10 MG: 5 TABLET ORAL at 09:41

## 2024-04-23 RX ADMIN — FUROSEMIDE 40 MG: 10 INJECTION, SOLUTION INTRAMUSCULAR; INTRAVENOUS at 01:45

## 2024-04-23 RX ADMIN — FINASTERIDE 5 MG: 5 TABLET, FILM COATED ORAL at 09:42

## 2024-04-23 RX ADMIN — ATORVASTATIN CALCIUM 10 MG: 10 TABLET, FILM COATED ORAL at 09:42

## 2024-04-23 RX ADMIN — FUROSEMIDE 40 MG: 10 INJECTION, SOLUTION INTRAMUSCULAR; INTRAVENOUS at 12:28

## 2024-04-23 RX ADMIN — PSYLLIUM HUSK 1 PACKET: 3.4 POWDER ORAL at 09:43

## 2024-04-23 RX ADMIN — IPRATROPIUM BROMIDE AND ALBUTEROL SULFATE 3 ML: 2.5; .5 SOLUTION RESPIRATORY (INHALATION) at 00:25

## 2024-04-23 RX ADMIN — POLYETHYLENE GLYCOL 3350 17 G: 17 POWDER, FOR SOLUTION ORAL at 09:41

## 2024-04-23 RX ADMIN — ASPIRIN 81 MG: 81 TABLET, COATED ORAL at 09:42

## 2024-04-23 RX ADMIN — APIXABAN 5 MG: 5 TABLET, FILM COATED ORAL at 09:42

## 2024-04-23 RX ADMIN — METOPROLOL TARTRATE 25 MG: 25 TABLET, FILM COATED ORAL at 20:49

## 2024-04-23 RX ADMIN — IPRATROPIUM BROMIDE AND ALBUTEROL SULFATE 3 ML: 2.5; .5 SOLUTION RESPIRATORY (INHALATION) at 08:24

## 2024-04-23 RX ADMIN — IPRATROPIUM BROMIDE AND ALBUTEROL SULFATE 3 ML: 2.5; .5 SOLUTION RESPIRATORY (INHALATION) at 13:33

## 2024-04-23 RX ADMIN — LOSARTAN POTASSIUM 25 MG: 25 TABLET, FILM COATED ORAL at 09:43

## 2024-04-23 ASSESSMENT — COGNITIVE AND FUNCTIONAL STATUS - GENERAL
DAILY ACTIVITIY SCORE: 18
TOILETING: A LITTLE
DRESSING REGULAR UPPER BODY CLOTHING: A LITTLE
CLIMB 3 TO 5 STEPS WITH RAILING: A LOT
HELP NEEDED FOR BATHING: A LITTLE
PERSONAL GROOMING: A LITTLE
CLIMB 3 TO 5 STEPS WITH RAILING: A LOT
HELP NEEDED FOR BATHING: A LITTLE
PERSONAL GROOMING: A LITTLE
EATING MEALS: A LITTLE
WALKING IN HOSPITAL ROOM: A LOT
DAILY ACTIVITIY SCORE: 18
EATING MEALS: A LITTLE
MOBILITY SCORE: 20
MOBILITY SCORE: 20
DRESSING REGULAR LOWER BODY CLOTHING: A LITTLE
DRESSING REGULAR UPPER BODY CLOTHING: A LITTLE
DRESSING REGULAR LOWER BODY CLOTHING: A LITTLE
WALKING IN HOSPITAL ROOM: A LOT
TOILETING: A LITTLE

## 2024-04-23 ASSESSMENT — ENCOUNTER SYMPTOMS: SHORTNESS OF BREATH: 1

## 2024-04-23 ASSESSMENT — PAIN SCALES - GENERAL
PAINLEVEL_OUTOF10: 0 - NO PAIN

## 2024-04-23 ASSESSMENT — PAIN - FUNCTIONAL ASSESSMENT
PAIN_FUNCTIONAL_ASSESSMENT: 0-10

## 2024-04-23 ASSESSMENT — ACTIVITIES OF DAILY LIVING (ADL): LACK_OF_TRANSPORTATION: NO

## 2024-04-23 NOTE — CARE PLAN
The patient's goals for the shift include remain safe    The clinical goals for the shift include remain comfortable and free of falls    Over the shift, the patient did not make progress toward the following goals. Barriers to progression include dementia. Recommendations to address these barriers include frequent redirection and someone at the bedside constantly.

## 2024-04-23 NOTE — CARE PLAN
The patient's goals for the shift include      The clinical goals for the shift include safe and HD stable    Problem: Skin  Goal: Decreased wound size/increased tissue granulation at next dressing change  4/23/2024 0757 by Olayinka Harrison RN  Outcome: Progressing  4/23/2024 0155 by Olayinka Harrison RN  Outcome: Progressing  Goal: Participates in plan/prevention/treatment measures  4/23/2024 0757 by Olayinka Harrison RN  Outcome: Progressing  4/23/2024 0155 by Olayinka Harrison RN  Outcome: Progressing  Goal: Prevent/manage excess moisture  4/23/2024 0757 by Olayinka Harrison RN  Outcome: Progressing  4/23/2024 0155 by Olayinka Harrison RN  Outcome: Progressing  Goal: Prevent/minimize sheer/friction injuries  4/23/2024 0757 by Olayinka Harrison RN  Outcome: Progressing  4/23/2024 0155 by Olayinka Harrison RN  Outcome: Progressing  Goal: Promote/optimize nutrition  4/23/2024 0757 by Olayinka Harrison RN  Outcome: Progressing  4/23/2024 0155 by Olayinka Harrison RN  Outcome: Progressing  Goal: Promote skin healing  4/23/2024 0757 by Olayinka Harrison RN  Outcome: Progressing  4/23/2024 0155 by Olayinka Harrison RN  Outcome: Progressing     Problem: Pain  Goal: My pain/discomfort is manageable  4/23/2024 0757 by Olayinka Harrison RN  Outcome: Progressing  4/23/2024 0155 by Olayinka Harrison RN  Outcome: Progressing     Problem: Safety  Goal: Patient will be injury free during hospitalization  4/23/2024 0757 by Olayinka Harrison RN  Outcome: Progressing  4/23/2024 0155 by Olayinka Harrison RN  Outcome: Progressing  Goal: I will remain free of falls  4/23/2024 0757 by Olayinka Harrison RN  Outcome: Progressing  4/23/2024 0155 by Olayinka Harrison RN  Outcome: Progressing     Problem: Daily Care  Goal: Daily care needs are met  4/23/2024 0757 by Olayinka Harrison RN  Outcome: Progressing  4/23/2024 0155 by Olayinka Harrison, RN  Outcome: Progressing     Problem: Psychosocial Needs  Goal: Demonstrates ability to cope with hospitalization/illness  4/23/2024 0757 by Olayinka Harrison,  RN  Outcome: Progressing  4/23/2024 0155 by Olayinka Harrison RN  Outcome: Progressing  Goal: Collaborate with me, my family, and caregiver to identify my specific goals  4/23/2024 0757 by Olayinka Harrison RN  Outcome: Progressing  4/23/2024 0155 by Olayinka Harrison RN  Outcome: Progressing  Flowsheets (Taken 4/22/2024 2303)  Cultural Requests During Hospitalization: none  Spiritual Requests During Hospitalization: none     Problem: Discharge Barriers  Goal: My discharge needs are met  4/23/2024 0757 by Olayinka Harrison RN  Outcome: Progressing  4/23/2024 0155 by Olayinka Harrison RN  Outcome: Progressing

## 2024-04-23 NOTE — CARE PLAN
The patient's goals for the shift include      The clinical goals for the shift include safe and HD stable

## 2024-04-23 NOTE — H&P
Jam Cox is a 89 y.o. male   Shortness of Breath       Patient with a past medical history of Atrial fibrillation, BPH, HFpEF, COPD, HTN, HLD, Dementia, was seen for routine visit at the clinic  Noted to be struggling for breath  Sent to the emergency room for workup where imaging and blood work confirms acute congestive heart failure  Started on IV Lasix yesterday  Seen this morning, feels a bit better but still has some shortness of breath  Discussed with spouse at bedside    Past Medical History  Past Medical History:   Diagnosis Date    Abnormality of plasma protein, unspecified 05/01/2017    Elevated blood protein    Benign prostatic hyperplasia without lower urinary tract symptoms 05/01/2017    BPH with elevated PSA    Disorder of prostate, unspecified 05/01/2017    Prostate disorder    Elevated prostate specific antigen (PSA) 05/01/2017    Elevated prostate specific antigen (PSA)    Elevated prostate specific antigen (PSA) 05/01/2017    Abnormal prostate specific antigen    Elevated prostate specific antigen (PSA) 05/01/2017    Abnormal PSA    Elevated prostate specific antigen (PSA) 05/01/2017    Abnormal prostate specific antigen test    Epistaxis 05/01/2017    Epistaxis, recurrent    Essential (primary) hypertension 12/19/2022    Hypertension    Frequency of micturition 05/01/2017    Urinary frequency    Hallucinations, unspecified 05/01/2017    Hallucinations    Headache, unspecified 05/01/2017    Bilateral headache    Hematuria, unspecified 05/01/2017    Hematuria    Ischemic cardiomyopathy 12/13/2021    Cardiomyopathy, ischemic    Other amnesia 05/01/2017    Memory loss    Other microscopic hematuria 05/01/2017    Hematuria, microscopic    Other seasonal allergic rhinitis 05/01/2017    Seasonal allergies    Other secondary cataract, right eye 05/31/2018    Posterior capsular opacification visually significant of right eye    Other specified disorders of the male genital organs 05/01/2017     Scrotal swelling    Personal history of other specified conditions 02/10/2020    History of epistaxis    Presence of intraocular lens 05/01/2017    Pseudophakia    Primary insomnia 05/01/2017    Primary insomnia    Primary open-angle glaucoma, unspecified eye, stage unspecified 05/01/2017    Open angle primary glaucoma    Unspecified atrial flutter (Multi) 05/01/2017    Atrial flutter    Unspecified glaucoma 05/01/2017    Glaucoma    Vitamin D deficiency, unspecified 05/01/2017    Vitamin D deficiency       Surgical History  Past Surgical History:   Procedure Laterality Date    CATARACT EXTRACTION  10/07/2014    Cataract Surgery    MR HEAD ANGIO WO IV CONTRAST  3/3/2018    MR HEAD ANGIO WO IV CONTRAST 3/3/2018 [facility]    MR NECK ANGIO WO IV CONTRAST  3/3/2018    MR NECK ANGIO WO IV CONTRAST 3/3/2018 [facility] CLINICAL LEGAC[facility]    OTHER SURGICAL HISTORY  10/07/2014    Dental Surgery        Social History  He reports that he has quit smoking. His smoking use included cigarettes. He has never been exposed to tobacco smoke. He has never used smokeless tobacco. He reports that he does not currently use alcohol. He reports that he does not currently use drugs.    Family History  Family History   Problem Relation Name Age of Onset    No Known Problems Mother      No Known Problems Father          Allergies  Ace inhibitors    Review of Systems   Respiratory:  Positive for shortness of breath.         Constitutional: not feeling poorly, no fever, no recent weight gain and no recent weight loss.          Vitals:    04/23/24 0824   BP: 131/77   Pulse: 98   Resp: 17   Temp: 36.2 °C (97.2 °F)   SpO2: 94%        Scheduled medications  apixaban, 5 mg, oral, BID  aspirin, 81 mg, oral, Daily  atorvastatin, 10 mg, oral, Daily  donepezil, 10 mg, oral, Daily  finasteride, 5 mg, oral, Daily  furosemide, 40 mg, intravenous, BID  ipratropium-albuteroL, 3 mL, nebulization, TID  losartan, 25 mg, oral, Daily  memantine, 10 mg, oral,  BID  metoprolol tartrate, 25 mg, oral, BID  oxygen, , inhalation, Continuous - 02/gases  polyethylene glycol, 17 g, oral, Daily  psyllium, 1 packet, oral, Daily      Continuous medications     PRN medications  PRN medications: acetaminophen, ipratropium-albuteroL    Results from last 7 days   Lab Units 04/23/24  0630 04/22/24  1214   WBC AUTO x10*3/uL 7.2 5.8   HEMOGLOBIN g/dL 12.7* 12.4*   HEMATOCRIT % 37.0* 38.6*   PLATELETS AUTO x10*3/uL 183 182     Results from last 7 days   Lab Units 04/23/24  0630 04/22/24  1214   SODIUM mmol/L 137 138   POTASSIUM mmol/L 3.9 4.8   CHLORIDE mmol/L 103 104   CO2 mmol/L 21 25   BUN mg/dL 14 13   CREATININE mg/dL 1.12 1.05   CALCIUM mg/dL 8.9 9.5   PROTEIN TOTAL g/dL  --  7.1   BILIRUBIN TOTAL mg/dL  --  4.0*   ALK PHOS U/L  --  88   ALT U/L  --  33   AST U/L  --  39   GLUCOSE mg/dL 125* 114*     Results from last 7 days   Lab Units 04/23/24  0630   TROPHS ng/L 23*        XR chest 2 views   Final Result   Probable cardiomegaly with a hazy bilateral ground-glass infiltrates   suggesting pulmonary edema and CHF. Findings are new compared to the   prior study.        MACRO:   none        Signed by: Matthieu Tierney 4/22/2024 12:48 PM   Dictation workstation:   [id number]      Transthoracic Echo (TTE) Complete    (Results Pending)       Physical Exam     Constitutional   General appearance: Alert and in no acute distress.   Eyes   Inspection of eyes: Sclera and conjunctiva were normal.    Pupil exam: Pupils were equal in size. Extraocular movements were intact.   Pulmonary   Respiratory assessment: Tachypnea bilateral crackles  Cardiovascular   Auscultation of heart: Apical pulse normal, heart rate and rhythm normal, normal S1 and S2, no murmurs and no pericardial rub.    Exam for edema: No peripheral edema.   Abdomen   Abdominal Exam: No bruits, normal bowel sounds, soft, non-tender, no abdominal mass palpated.    Liver and Spleen exam: No hepato-splenomegaly.   Musculoskeletal    Examination of gait: Normal.    Inspection of digits and nails: No clubbing or cyanosis of the fingernails.    Inspection/palpation of joints, bones and muscles: No joint swelling. Normal movement of all extremities.   Skin   Skin inspection: Normal skin color and pigmentation, normal skin turgor and no visible rash.   Neurologic   Cranial nerves: Nerves 2-12 were intact, no focal neuro defects.   Psychiatric   Alert x 1-2      Assessment/Plan      #Acute on chronic combined systolic and diastolic congestive heart failure  Has responded well to IV Lasix  But continues to feel really short of breath  Will need at least another day of IV medications    #Paroxysmal atrial fibrillation  Rate controlled  Continue Eliquis    #Hypertension  Stable continue home medications    #Dyslipidemia  Continue statins    #Dementia  Will monitor for sundowning

## 2024-04-23 NOTE — PROGRESS NOTES
Jam Cox is a 89 y.o. male on day 1 of admission presenting with Shortness of breath.     04/23/24 1320   Discharge Planning   Living Arrangements Spouse/significant other;Children   Support Systems Spouse/significant other;Children   Assistance Needed walker, help with ADLs   Type of Residence Private residence   Number of Stairs to Enter Residence 3   Number of Stairs Within Residence 16   Do you have animals or pets at home? No   Who is requesting discharge planning? Provider   Home or Post Acute Services None   Patient expects to be discharged to: Home with spouse and daughter   Does the patient need discharge transport arranged? No   Financial Resource Strain   How hard is it for you to pay for the very basics like food, housing, medical care, and heating? Not hard   Housing Stability   In the last 12 months, was there a time when you were not able to pay the mortgage or rent on time? N   In the last 12 months, was there a time when you did not have a steady place to sleep or slept in a shelter (including now)? N   Transportation Needs   In the past 12 months, has lack of transportation kept you from medical appointments or from getting medications? no   In the past 12 months, has lack of transportation kept you from meetings, work, or from getting things needed for daily living? No     Spoke with patient's spouse Crystal at the bedside to discuss his preferences for care. Discussed how patient manages health at home. Lives home with wife and daughter. Uses walker to assist with ambulation, family assists with patients needs. Daughter transports patient and wife to appointments, helps with grocery shopping, etc. Spouse denies any problems getting to appointments or obtaining/affording medications. No additional resources or needs identified.    Plan: admitte dfor CHF, getting IV lasix, and awaiting ECHO  Status: inpatient  Payor: [employer]  Disposition: Home with spouse and daughter  Barrier: iv  lasix, echo  ADOD:   tomorrow      Zelda Holt RN

## 2024-04-23 NOTE — PROGRESS NOTES
Subjective Data:  Felling better  Patient states less shortness of breath  No chest pain, no palpitations  Echocardiogram pending    Overnight Events:    None reported     Objective Data:  Last Recorded Vitals:  Vitals:    04/22/24 2231 04/23/24 0025 04/23/24 0214 04/23/24 0824   BP: 139/67  148/66 131/77   BP Location: Right arm  Right arm Right arm   Patient Position:   Lying Lying   Pulse: (!) 128 101 107 98   Resp: 18 18 18 17   Temp: 36.6 °C (97.9 °F)  37.3 °C (99.2 °F) 36.2 °C (97.2 °F)   TempSrc: Oral  Temporal Oral   SpO2: 97%  96% 94%   Weight:       Height:           Last Labs:  CBC - 4/23/2024:  6:30 AM  7.2 12.7 183    37.0      CMP - 4/23/2024:  6:30 AM  8.9 7.1 39 --- 4.0   _ 4.1 33 88      PTT - No results in last year.  _   _ _     TROPHS   Date/Time Value Ref Range Status   04/23/2024 06:30 AM 23 0 - 20 ng/L Final     BNP   Date/Time Value Ref Range Status   04/22/2024 12:14 PM 1,015 0 - 99 pg/mL Final   03/05/2018 05:40  0 - 99 pg/mL Final     Comment:     .  <100 pg/mL - Heart failure unlikely  100-299 pg/mL - Intermediate probability of acute heart  .               failure exacerbation. Correlate with clinical  .               context and patient history.    >=300 pg/mL - Heart Failure likely. Correlate with clinical  .               context and patient history.  BNP testing is performed using different testing   methodology at [facility] than at other   [facility] hospitals. Direct result comparisons should   only be made within the same method.     10/05/2017 10:39  0 - 99 pg/mL Final     Comment:     .  <100 pg/mL - Heart failure unlikely  100-299 pg/mL - Intermediate probability of acute heart  .               failure exacerbation. Correlate with clinical  .               context and patient history.    >=300 pg/mL - Heart Failure likely. Correlate with clinical  .               context and patient history.  BNP testing is performed using different testing   methodology  "at [facility] than at other   [facility] hospitals. Direct result comparisons should   only be made within the same method.       VLDL   Date/Time Value Ref Range Status   03/05/2018 05:40 AM 15 0 - 40 mg/dL Final      Last I/O:  No intake/output data recorded.    Past Cardiology Tests (Last 3 Years):  EKG:  ECG 12 lead 04/22/2024 (Preliminary)    Echo:  No results found for this or any previous visit from the past 1095 days.    Ejection Fractions:  No results found for: \"EF\"  Cath:  No results found for this or any previous visit from the past 1095 days.    Stress Test:  No results found for this or any previous visit from the past 1095 days.    Cardiac Imaging:  No results found for this or any previous visit from the past 1095 days.      Inpatient Medications:  Scheduled medications   Medication Dose Route Frequency    apixaban  5 mg oral BID    aspirin  81 mg oral Daily    atorvastatin  10 mg oral Daily    donepezil  10 mg oral Daily    finasteride  5 mg oral Daily    furosemide  40 mg intravenous BID    ipratropium-albuteroL  3 mL nebulization TID    losartan  25 mg oral Daily    memantine  10 mg oral BID    metoprolol tartrate  25 mg oral BID    oxygen   inhalation Continuous - 02/gases    polyethylene glycol  17 g oral Daily    psyllium  1 packet oral Daily     PRN medications   Medication    acetaminophen    ipratropium-albuteroL     Continuous Medications   Medication Dose Last Rate       Physical Exam:  General:  Patient is awake, alert  HEENT:  Normocephalic.  Moist mucosa.    Neck:  + JVP  Cardiovascular:  Irregular rate and rhythm.   Pulmonary:  Crackles in bases  Abdomen:  Soft. Non-tender.   Non-distended.  Positive bowel sounds.  Lower Extremities:  2+ pedal pulses. No LE edema.  Neurologic:  Alert and confused, generalized tremors  Skin: Skin warm and dry, normal skin turgor.   Psychiatric: anxious        Assessment/Plan   Jam Cox is a 89 y.o. male with past medical history of " HFimpEF (LVEF 45-50% on echo in 2020), hypertension, paroxsymal atrial fibrillation on Eliquis, prior CVA, dementia who presents to AMS from his PCP office where he was noted to be short of breath.  Cardiology consulted for CHF exacerbation.       Assessment:  # Acute on chronic HFimpEF  # Paroxsymal atrial fibrillation on Eliquis  # Hypertension, acceptable  # Dementia    Plan   -Echocardiogram pending  - Continue to diurese with furosemide IV. Holding home furosemide 20mg daily at home.  Will likely need increased home dose of furosemide 40mg daily  - Continue home Eliquis 5mg BID  - Continue home metoprolol tartrate 25mg BID and losartan 25mg daily  - Follow up with Dr. Menezes within 2-3 weeks of hospital discharge.      Pedro Muller MD    Code Status:  Full Code    Pedro Muller MD

## 2024-04-24 DIAGNOSIS — I50.23 ACUTE ON CHRONIC SYSTOLIC HEART FAILURE (MULTI): Primary | ICD-10-CM

## 2024-04-24 LAB
ANION GAP SERPL CALC-SCNC: 14 MMOL/L (ref 10–20)
BUN SERPL-MCNC: 13 MG/DL (ref 6–23)
CALCIUM SERPL-MCNC: 8.7 MG/DL (ref 8.6–10.3)
CHLORIDE SERPL-SCNC: 103 MMOL/L (ref 98–107)
CO2 SERPL-SCNC: 26 MMOL/L (ref 21–32)
CREAT SERPL-MCNC: 0.97 MG/DL (ref 0.5–1.3)
EGFRCR SERPLBLD CKD-EPI 2021: 73 ML/MIN/1.73M*2
ERYTHROCYTE [DISTWIDTH] IN BLOOD BY AUTOMATED COUNT: 14.5 % (ref 11.5–14.5)
GLUCOSE SERPL-MCNC: 99 MG/DL (ref 74–99)
HCT VFR BLD AUTO: 39.1 % (ref 41–52)
HGB BLD-MCNC: 12.6 G/DL (ref 13.5–17.5)
MAGNESIUM SERPL-MCNC: 1.9 MG/DL (ref 1.6–2.4)
MCH RBC QN AUTO: 29.6 PG (ref 26–34)
MCHC RBC AUTO-ENTMCNC: 32.2 G/DL (ref 32–36)
MCV RBC AUTO: 92 FL (ref 80–100)
NRBC BLD-RTO: 0 /100 WBCS (ref 0–0)
PLATELET # BLD AUTO: 172 X10*3/UL (ref 150–450)
POTASSIUM SERPL-SCNC: 3.5 MMOL/L (ref 3.5–5.3)
Q ONSET: 187 MS
QRS COUNT: 14 BEATS
QRS DURATION: 136 MS
QT INTERVAL: 396 MS
QTC CALCULATION(BAZETT): 479 MS
QTC FREDERICIA: 450 MS
R AXIS: -63 DEGREES
RBC # BLD AUTO: 4.25 X10*6/UL (ref 4.5–5.9)
SODIUM SERPL-SCNC: 139 MMOL/L (ref 136–145)
T AXIS: 87 DEGREES
T OFFSET: 385 MS
VENTRICULAR RATE: 88 BPM
WBC # BLD AUTO: 6.9 X10*3/UL (ref 4.4–11.3)

## 2024-04-24 PROCEDURE — 2500000001 HC RX 250 WO HCPCS SELF ADMINISTERED DRUGS (ALT 637 FOR MEDICARE OP): Performed by: INTERNAL MEDICINE

## 2024-04-24 PROCEDURE — RXMED WILLOW AMBULATORY MEDICATION CHARGE

## 2024-04-24 PROCEDURE — 2500000004 HC RX 250 GENERAL PHARMACY W/ HCPCS (ALT 636 FOR OP/ED): Performed by: NURSE PRACTITIONER

## 2024-04-24 PROCEDURE — 2500000006 HC RX 250 W HCPCS SELF ADMINISTERED DRUGS (ALT 637 FOR ALL PAYERS): Mod: MUE | Performed by: NURSE PRACTITIONER

## 2024-04-24 PROCEDURE — 2500000002 HC RX 250 W HCPCS SELF ADMINISTERED DRUGS (ALT 637 FOR MEDICARE OP, ALT 636 FOR OP/ED): Performed by: NURSE PRACTITIONER

## 2024-04-24 PROCEDURE — 85027 COMPLETE CBC AUTOMATED: CPT | Performed by: INTERNAL MEDICINE

## 2024-04-24 PROCEDURE — 1200000002 HC GENERAL ROOM WITH TELEMETRY DAILY

## 2024-04-24 PROCEDURE — 2500000002 HC RX 250 W HCPCS SELF ADMINISTERED DRUGS (ALT 637 FOR MEDICARE OP, ALT 636 FOR OP/ED): Performed by: INTERNAL MEDICINE

## 2024-04-24 PROCEDURE — 2500000004 HC RX 250 GENERAL PHARMACY W/ HCPCS (ALT 636 FOR OP/ED): Performed by: INTERNAL MEDICINE

## 2024-04-24 PROCEDURE — 36415 COLL VENOUS BLD VENIPUNCTURE: CPT | Performed by: INTERNAL MEDICINE

## 2024-04-24 PROCEDURE — 99232 SBSQ HOSP IP/OBS MODERATE 35: CPT | Performed by: INTERNAL MEDICINE

## 2024-04-24 PROCEDURE — 2500000006 HC RX 250 W HCPCS SELF ADMINISTERED DRUGS (ALT 637 FOR ALL PAYERS): Performed by: NURSE PRACTITIONER

## 2024-04-24 PROCEDURE — 2500000001 HC RX 250 WO HCPCS SELF ADMINISTERED DRUGS (ALT 637 FOR MEDICARE OP): Performed by: NURSE PRACTITIONER

## 2024-04-24 PROCEDURE — 94640 AIRWAY INHALATION TREATMENT: CPT

## 2024-04-24 PROCEDURE — 80048 BASIC METABOLIC PNL TOTAL CA: CPT | Performed by: INTERNAL MEDICINE

## 2024-04-24 PROCEDURE — 2500000002 HC RX 250 W HCPCS SELF ADMINISTERED DRUGS (ALT 637 FOR MEDICARE OP, ALT 636 FOR OP/ED): Mod: MUE | Performed by: NURSE PRACTITIONER

## 2024-04-24 PROCEDURE — 83735 ASSAY OF MAGNESIUM: CPT | Performed by: INTERNAL MEDICINE

## 2024-04-24 PROCEDURE — 2500000002 HC RX 250 W HCPCS SELF ADMINISTERED DRUGS (ALT 637 FOR MEDICARE OP, ALT 636 FOR OP/ED): Mod: MUE | Performed by: INTERNAL MEDICINE

## 2024-04-24 RX ORDER — FLUTICASONE FUROATE AND VILANTEROL 200; 25 UG/1; UG/1
1 POWDER RESPIRATORY (INHALATION)
Status: DISCONTINUED | OUTPATIENT
Start: 2024-04-24 | End: 2024-04-24 | Stop reason: CLARIF

## 2024-04-24 RX ORDER — BUDESONIDE 0.5 MG/2ML
0.5 INHALANT ORAL
Status: DISCONTINUED | OUTPATIENT
Start: 2024-04-24 | End: 2024-04-25 | Stop reason: HOSPADM

## 2024-04-24 RX ORDER — FORMOTEROL FUMARATE DIHYDRATE 20 UG/2ML
20 SOLUTION RESPIRATORY (INHALATION)
Status: DISCONTINUED | OUTPATIENT
Start: 2024-04-24 | End: 2024-04-25 | Stop reason: HOSPADM

## 2024-04-24 RX ORDER — POTASSIUM CHLORIDE 20 MEQ/1
40 TABLET, EXTENDED RELEASE ORAL DAILY
Status: DISCONTINUED | OUTPATIENT
Start: 2024-04-24 | End: 2024-04-24

## 2024-04-24 RX ORDER — FLUTICASONE PROPIONATE AND SALMETEROL 250; 50 UG/1; UG/1
1 POWDER RESPIRATORY (INHALATION)
Qty: 60 EACH | Refills: 0 | Status: SHIPPED | OUTPATIENT
Start: 2024-04-24 | End: 2024-06-11 | Stop reason: SDUPTHER

## 2024-04-24 RX ORDER — OLANZAPINE 10 MG/2ML
2.5 INJECTION, POWDER, FOR SOLUTION INTRAMUSCULAR EVERY 6 HOURS PRN
Status: DISCONTINUED | OUTPATIENT
Start: 2024-04-24 | End: 2024-04-25 | Stop reason: HOSPADM

## 2024-04-24 RX ORDER — SPIRONOLACTONE 25 MG/1
25 TABLET ORAL DAILY
Status: DISCONTINUED | OUTPATIENT
Start: 2024-04-24 | End: 2024-04-25 | Stop reason: HOSPADM

## 2024-04-24 RX ORDER — IPRATROPIUM BROMIDE AND ALBUTEROL SULFATE 2.5; .5 MG/3ML; MG/3ML
3 SOLUTION RESPIRATORY (INHALATION) EVERY 4 HOURS PRN
Qty: 180 ML | Refills: 0 | Status: SHIPPED | OUTPATIENT
Start: 2024-04-24 | End: 2024-05-24

## 2024-04-24 RX ORDER — POTASSIUM CHLORIDE 20 MEQ/1
20 TABLET, EXTENDED RELEASE ORAL DAILY
Status: DISCONTINUED | OUTPATIENT
Start: 2024-04-25 | End: 2024-04-25 | Stop reason: HOSPADM

## 2024-04-24 RX ORDER — SPIRONOLACTONE 25 MG/1
25 TABLET ORAL DAILY
Qty: 30 TABLET | Refills: 0 | Status: SHIPPED | OUTPATIENT
Start: 2024-04-25 | End: 2024-05-02 | Stop reason: HOSPADM

## 2024-04-24 RX ADMIN — POLYETHYLENE GLYCOL 3350 17 G: 17 POWDER, FOR SOLUTION ORAL at 08:54

## 2024-04-24 RX ADMIN — APIXABAN 5 MG: 5 TABLET, FILM COATED ORAL at 08:54

## 2024-04-24 RX ADMIN — IPRATROPIUM BROMIDE AND ALBUTEROL SULFATE 3 ML: 2.5; .5 SOLUTION RESPIRATORY (INHALATION) at 19:41

## 2024-04-24 RX ADMIN — FORMOTEROL FUMARATE DIHYDRATE 20 MCG: 20 SOLUTION RESPIRATORY (INHALATION) at 19:41

## 2024-04-24 RX ADMIN — PSYLLIUM HUSK 1 PACKET: 3.4 POWDER ORAL at 08:54

## 2024-04-24 RX ADMIN — FINASTERIDE 5 MG: 5 TABLET, FILM COATED ORAL at 08:54

## 2024-04-24 RX ADMIN — FUROSEMIDE 40 MG: 10 INJECTION, SOLUTION INTRAMUSCULAR; INTRAVENOUS at 00:53

## 2024-04-24 RX ADMIN — BUDESONIDE INHALATION 0.5 MG: 0.5 SUSPENSION RESPIRATORY (INHALATION) at 19:40

## 2024-04-24 RX ADMIN — EMPAGLIFLOZIN 10 MG: 10 TABLET, FILM COATED ORAL at 15:17

## 2024-04-24 RX ADMIN — MEMANTINE 10 MG: 5 TABLET ORAL at 20:35

## 2024-04-24 RX ADMIN — SACUBITRIL AND VALSARTAN 1 TABLET: 24; 26 TABLET, FILM COATED ORAL at 20:35

## 2024-04-24 RX ADMIN — DONEPEZIL HYDROCHLORIDE 10 MG: 5 TABLET ORAL at 08:54

## 2024-04-24 RX ADMIN — SPIRONOLACTONE 25 MG: 25 TABLET ORAL at 15:17

## 2024-04-24 RX ADMIN — METOPROLOL TARTRATE 25 MG: 25 TABLET, FILM COATED ORAL at 20:35

## 2024-04-24 RX ADMIN — METOPROLOL TARTRATE 25 MG: 25 TABLET, FILM COATED ORAL at 08:54

## 2024-04-24 RX ADMIN — LOSARTAN POTASSIUM 25 MG: 25 TABLET, FILM COATED ORAL at 08:54

## 2024-04-24 RX ADMIN — ATORVASTATIN CALCIUM 10 MG: 10 TABLET, FILM COATED ORAL at 08:54

## 2024-04-24 RX ADMIN — MEMANTINE 10 MG: 5 TABLET ORAL at 08:54

## 2024-04-24 RX ADMIN — POTASSIUM CHLORIDE 40 MEQ: 1500 TABLET, EXTENDED RELEASE ORAL at 10:17

## 2024-04-24 RX ADMIN — APIXABAN 5 MG: 5 TABLET, FILM COATED ORAL at 20:35

## 2024-04-24 RX ADMIN — ASPIRIN 81 MG: 81 TABLET, COATED ORAL at 08:54

## 2024-04-24 RX ADMIN — IPRATROPIUM BROMIDE AND ALBUTEROL SULFATE 3 ML: 2.5; .5 SOLUTION RESPIRATORY (INHALATION) at 13:28

## 2024-04-24 RX ADMIN — IPRATROPIUM BROMIDE AND ALBUTEROL SULFATE 3 ML: 2.5; .5 SOLUTION RESPIRATORY (INHALATION) at 07:32

## 2024-04-24 RX ADMIN — FUROSEMIDE 40 MG: 10 INJECTION, SOLUTION INTRAMUSCULAR; INTRAVENOUS at 14:00

## 2024-04-24 ASSESSMENT — COGNITIVE AND FUNCTIONAL STATUS - GENERAL
PERSONAL GROOMING: A LITTLE
DRESSING REGULAR LOWER BODY CLOTHING: A LITTLE
DAILY ACTIVITIY SCORE: 18
TOILETING: A LITTLE
HELP NEEDED FOR BATHING: A LITTLE
DRESSING REGULAR UPPER BODY CLOTHING: A LITTLE
MOBILITY SCORE: 20
EATING MEALS: A LITTLE
CLIMB 3 TO 5 STEPS WITH RAILING: A LOT
WALKING IN HOSPITAL ROOM: A LOT

## 2024-04-24 NOTE — PROGRESS NOTES
Jam Cox is a 89 y.o. male     Breathing better this morning  Laying in bed appears comfortable  Still with some crackles in the lung bases    Review of Systems     Constitutional: no fever, no chills, not feeling poorly, not feeling tired         Vitals:    04/24/24 1132   BP: 133/87   Pulse: 61   Resp: 17   Temp: 36.7 °C (98 °F)   SpO2: 95%        Scheduled medications  apixaban, 5 mg, oral, BID  aspirin, 81 mg, oral, Daily  atorvastatin, 10 mg, oral, Daily  donepezil, 10 mg, oral, Daily  empagliflozin, 10 mg, oral, Daily  finasteride, 5 mg, oral, Daily  furosemide, 40 mg, intravenous, BID  ipratropium-albuteroL, 3 mL, nebulization, TID  memantine, 10 mg, oral, BID  metoprolol tartrate, 25 mg, oral, BID  oxygen, , inhalation, Continuous - 02/gases  polyethylene glycol, 17 g, oral, Daily  potassium chloride CR, 40 mEq, oral, Daily  psyllium, 1 packet, oral, Daily  sacubitriL-valsartan, 1 tablet, oral, BID  spironolactone, 25 mg, oral, Daily      Continuous medications     PRN medications  PRN medications: acetaminophen, ipratropium-albuteroL, OLANZapine    Lab Review   Results from last 7 days   Lab Units 04/24/24  0539 04/23/24  0630 04/22/24  1214   WBC AUTO x10*3/uL 6.9 7.2 5.8   HEMOGLOBIN g/dL 12.6* 12.7* 12.4*   HEMATOCRIT % 39.1* 37.0* 38.6*   PLATELETS AUTO x10*3/uL 172 183 182     Results from last 7 days   Lab Units 04/24/24  0539 04/23/24  0630 04/22/24  1214   SODIUM mmol/L 139 137 138   POTASSIUM mmol/L 3.5 3.9 4.8   CHLORIDE mmol/L 103 103 104   CO2 mmol/L 26 21 25   BUN mg/dL 13 14 13   CREATININE mg/dL 0.97 1.12 1.05   CALCIUM mg/dL 8.7 8.9 9.5   PROTEIN TOTAL g/dL  --   --  7.1   BILIRUBIN TOTAL mg/dL  --   --  4.0*   ALK PHOS U/L  --   --  88   ALT U/L  --   --  33   AST U/L  --   --  39   GLUCOSE mg/dL 99 125* 114*     Results from last 7 days   Lab Units 04/23/24  0630   TROPHS ng/L 23*        Transthoracic Echo (TTE) Complete   Final Result      XR chest 2 views   Final Result   Probable  cardiomegaly with a hazy bilateral ground-glass infiltrates   suggesting pulmonary edema and CHF. Findings are new compared to the   prior study.        MACRO:   none        Signed by: Matthieu Tierney 4/22/2024 12:48 PM   Dictation workstation:   [id number]            Physical Exam     Constitutional   General appearance: Alert and in no acute distress.     Pulmonary   Respiratory assessment: No respiratory distress, normal respiratory rhythm and effort.    Auscultation of Lungs: Bibasilar crackles  Cardiovascular   Auscultation of heart: Apical pulse normal, heart rate and rhythm normal, normal S1 and S2, no murmurs and no pericardial rub.    Exam for edema: No peripheral edema.   Abdomen   Abdominal Exam: No bruits, normal bowel sounds, soft, non-tender, no abdominal mass palpated.    Liver and Spleen exam: No hepato-splenomegaly.   Musculoskeletal   Examination of gait: ROM intact  Skin inspection: Normal skin color and pigmentation, normal skin turgor and no visible rash.   Neurologic   Cranial nerves: Nerves 2-12 were intact, no focal neuro defects.  Alert x 1    Assessment/Plan      #Acute on chronic combined systolic and diastolic congestive heart failure  Has responded well to IV Lasix  Appears close to baseline  Will discuss with cardiology about possible discharge today  If not should be ready by tomorrow    #COPD  DuoNebs as needed     #Paroxysmal atrial fibrillation  Rate controlled  Continue Eliquis     #Hypertension  Stable continue home medications     #Dyslipidemia  Continue statins     #Dementia  Will monitor for sundowning

## 2024-04-24 NOTE — CARE PLAN
Pt would benefit from a home nebulizer machine for known COPD/persistent wheezing on exam.   Pt verbalizes understanding.

## 2024-04-24 NOTE — PROGRESS NOTES
Subjective Data:  Still with some shortness of breath  More expiratory wheezing at the moment  No chest pain, no palpitations    Spoke with his daughters at bedside.     Not on tele    Overnight Events:    None reported     Objective Data:  Last Recorded Vitals:  Vitals:    04/24/24 0728 04/24/24 0732 04/24/24 1127 04/24/24 1132   BP: 135/78  120/71 133/87   BP Location: Right arm  Right arm Right arm   Patient Position: Lying  Lying Lying   Pulse: 96  90 61   Resp: 17  17 17   Temp: 36.7 °C (98.1 °F)  36.4 °C (97.6 °F) 36.7 °C (98 °F)   TempSrc: Axillary  Axillary Oral   SpO2: 90% 93% 96% 95%   Weight:       Height:           Last Labs:  CBC - 4/24/2024:  5:39 AM  6.9 12.6 172    39.1      CMP - 4/24/2024:  5:39 AM  8.7 7.1 39 --- 4.0   _ 4.1 33 88      PTT - No results in last year.  _   _ _     TROPHS   Date/Time Value Ref Range Status   04/23/2024 06:30 AM 23 0 - 20 ng/L Final     BNP   Date/Time Value Ref Range Status   04/22/2024 12:14 PM 1,015 0 - 99 pg/mL Final   03/05/2018 05:40  0 - 99 pg/mL Final     Comment:     .  <100 pg/mL - Heart failure unlikely  100-299 pg/mL - Intermediate probability of acute heart  .               failure exacerbation. Correlate with clinical  .               context and patient history.    >=300 pg/mL - Heart Failure likely. Correlate with clinical  .               context and patient history.  BNP testing is performed using different testing   methodology at [facility] than at other   [facility] hospitals. Direct result comparisons should   only be made within the same method.     10/05/2017 10:39  0 - 99 pg/mL Final     Comment:     .  <100 pg/mL - Heart failure unlikely  100-299 pg/mL - Intermediate probability of acute heart  .               failure exacerbation. Correlate with clinical  .               context and patient history.    >=300 pg/mL - Heart Failure likely. Correlate with clinical  .               context and patient history.  BNP testing  "is performed using different testing   methodology at [facility] than at other   [facility] hospitals. Direct result comparisons should   only be made within the same method.       VLDL   Date/Time Value Ref Range Status   03/05/2018 05:40 AM 15 0 - 40 mg/dL Final      Last I/O:  I/O last 3 completed shifts:  In: 240 (3.1 mL/kg) [P.O.:240]  Out: - (0 mL/kg)   Weight: 77.1 kg     Past Cardiology Tests (Last 3 Years):  EKG:  ECG 12 lead 04/22/2024 (Preliminary)    Echo:  No results found for this or any previous visit from the past 1095 days.    Ejection Fractions:  No results found for: \"EF\"  Cath:  No results found for this or any previous visit from the past 1095 days.    Stress Test:  No results found for this or any previous visit from the past 1095 days.    Cardiac Imaging:  No results found for this or any previous visit from the past 1095 days.      Inpatient Medications:  Scheduled medications   Medication Dose Route Frequency    apixaban  5 mg oral BID    aspirin  81 mg oral Daily    atorvastatin  10 mg oral Daily    donepezil  10 mg oral Daily    empagliflozin  10 mg oral Daily    finasteride  5 mg oral Daily    furosemide  40 mg intravenous BID    ipratropium-albuteroL  3 mL nebulization TID    memantine  10 mg oral BID    metoprolol tartrate  25 mg oral BID    oxygen   inhalation Continuous - 02/gases    polyethylene glycol  17 g oral Daily    potassium chloride CR  40 mEq oral Daily    psyllium  1 packet oral Daily    sacubitriL-valsartan  1 tablet oral BID    spironolactone  25 mg oral Daily     PRN medications   Medication    acetaminophen    ipratropium-albuteroL    OLANZapine     Continuous Medications   Medication Dose Last Rate       Physical Exam:  General:  Patient is awake, alert  HEENT:  Normocephalic.  Moist mucosa.    Neck:  + JVP  Cardiovascular:  Irregular rate and rhythm.   Pulmonary:  Crackles in bases, expiratory wheezing  Abdomen:  Soft. Non-tender.   Non-distended.  Positive " bowel sounds.  Lower Extremities:  2+ pedal pulses. No LE edema.  Neurologic:  Alert and confused, generalized tremors  Skin: Skin warm and dry, normal skin turgor.   Psychiatric: anxious        Assessment/Plan   Jam Cox is a 89 y.o. male with past medical history of HFimpEF (LVEF 45-50% on echo in 2020), hypertension, paroxsymal atrial fibrillation on Eliquis, prior CVA, dementia who presents to AMS from his PCP office where he was noted to be short of breath.  Cardiology consulted for CHF exacerbation.       Assessment:  # Acute on chronic HFimpEF  # Paroxsymal atrial fibrillation on Eliquis  # Hypertension, acceptable  # Dementia      TTE 4/23/2024  CONCLUSIONS:   1. Left ventricular systolic function is mildly decreased with a 40-45% estimated ejection fraction.   2. No left ventricular thrombus visualized.   3. There is an elevated mean left atrial pressure.   4. There is reduced right ventricular systolic function.   5. Mild to moderately elevated right ventricular systolic pressure.   6. Moderate aortic valve regurgitation.   7. There is global hypokinesis of the left ventricle with minor regional variations.   8. There has been a decline in the calculated LVEF from 45-50% with Biplane 53% to currently 40-45% with Biplane 42%. Diastology is indeterminate. RVSP has increased to 51 mmHg.    Plan   - Continue to diurese with furosemide IV. Holding home furosemide 20mg daily at home.  Will likely need increased home dose of furosemide 40mg daily  - Continue home Eliquis 5mg BID  - Continue home metoprolol tartrate 25mg BID   GDMT:   - Stop home losartan, start Entresto 24-26mg BID  - Start Jardiance 10mg daily, and spironolactone 25mg daily    - Follow up with Dr. Menezes within 2-3 weeks of hospital discharge.        Code Status:  Full Code    Marta Shin, APRN-CNP

## 2024-04-24 NOTE — PROGRESS NOTES
Medicine NP follow up note    Subjective:  Seen earlier.   No complaints, family at bedside.     Vitals (Last 24 Hours):  Heart Rate:  [61-98]   Temp:  [36.4 °C (97.6 °F)-36.8 °C (98.2 °F)]   Resp:  [17-18]   BP: (107-135)/(67-87)   SpO2:  [90 %-96 %]     I have reviewed all imaging reports and labs pertinent to this visit / presenting problem    PHYSICAL EXAM:  Constitutional: frail, NAD, alert and cooperative  Eyes: no icterus  ENMT: mucous membranes moist, no lesions  Head/Neck: supple  Respiratory/Thorax: bibasilar wheezing, non-labored breathing, no cough, on RA  Cardiovascular: irregular, no murmurs heard  Gastrointestinal: ND/S/NT  : no Cantu, no SP/flank discomfort  Musculoskeletal: no joint swelling, ROM intact  Extremities: no edema  Neurological: baseline memory deficit, otherwise grossly non-focal  Skin: warm and dry  Psych: calm, stable mood     MEDS:  Scheduled meds  apixaban, 5 mg, oral, BID  aspirin, 81 mg, oral, Daily  atorvastatin, 10 mg, oral, Daily  budesonide, 0.5 mg, nebulization, BID  donepezil, 10 mg, oral, Daily  empagliflozin, 10 mg, oral, Daily  finasteride, 5 mg, oral, Daily  formoterol, 20 mcg, nebulization, BID  furosemide, 40 mg, intravenous, BID  ipratropium-albuteroL, 3 mL, nebulization, TID  memantine, 10 mg, oral, BID  metoprolol tartrate, 25 mg, oral, BID  oxygen, , inhalation, Continuous - 02/gases  polyethylene glycol, 17 g, oral, Daily  potassium chloride CR, 40 mEq, oral, Daily  psyllium, 1 packet, oral, Daily  sacubitriL-valsartan, 1 tablet, oral, BID  spironolactone, 25 mg, oral, Daily    PRN meds  PRN medications: acetaminophen, ipratropium-albuteroL, OLANZapine    ASSESSMENT/PLAN:  89 y.o. man with past medical history of HFimpEF, HTN, PAF on Eliquis, prior CVA, COPD, dementia, admitted from PCP Dr Shen's office 2/2 SOB/acute HF.     Acute on chronic HFmrEF  -echo/worsened EF noted   -IV lasix per cardio, Entresto/Jardiance/Spironolactone added today (priced through  [employer], acceptable cost)     PAF  -continue metoprolol, eliquis     COPD  -add advair, continue nebulizers, home machine dispensed     Dementia   -stable at present, maintain proper sleep / wake cycle     Other comorbidities as above  -continue meds as ordered and adjust based on clinical course     VTE / GI prophylaxis   -on full AC, bowel regimen in place     Discharge planning  -home with HC on DC    Discussed with Dr. Ac Oconnor, APRN-CNP

## 2024-04-24 NOTE — PROGRESS NOTES
Care Coordinator Note:  TCC spoke with wife and dtr Mariah Ambrose [phone] at bedside regarding dc planning. [facility]. Discussed PT OT RN and they are agreeable. Would like [facility] as PCP is LAKESHA mendoza. NP notified will need referral placed to [facility].     Plan: patient in with sob, CHF. Echo done, remains on IV lasix 40 BID   Status: inpatient  Payor: [employer] medicare  Disposition: Home with wife and dtr and NEW [facility] PT OT RN.   Barrier: cardio clearance, iv diuresing  ADOD: 1-2 days    Crystal Fish [facility]      04/24/24 1124   Discharge Planning   Living Arrangements Spouse/significant other;Children   Home or Post Acute Services In home services   Type of Home Care Services Home nursing visits;Home OT;Home PT   Patient expects to be discharged to: Home with New [facility] RN PT OT. Wife wife and Dtr

## 2024-04-24 NOTE — CONSULTS
Reason For Consult  Chronic Systolic Heart Failure    History Of Present Illness  Jam Cox is a 89 y.o. male     Past Medical History  He has a past medical history of Abnormality of plasma protein, unspecified (05/01/2017), Benign prostatic hyperplasia without lower urinary tract symptoms (05/01/2017), Disorder of prostate, unspecified (05/01/2017), Elevated prostate specific antigen (PSA) (05/01/2017), Elevated prostate specific antigen (PSA) (05/01/2017), Elevated prostate specific antigen (PSA) (05/01/2017), Elevated prostate specific antigen (PSA) (05/01/2017), Epistaxis (05/01/2017), Essential (primary) hypertension (12/19/2022), Frequency of micturition (05/01/2017), Hallucinations, unspecified (05/01/2017), Headache, unspecified (05/01/2017), Hematuria, unspecified (05/01/2017), Ischemic cardiomyopathy (12/13/2021), Other amnesia (05/01/2017), Other microscopic hematuria (05/01/2017), Other seasonal allergic rhinitis (05/01/2017), Other secondary cataract, right eye (05/31/2018), Other specified disorders of the male genital organs (05/01/2017), Personal history of other specified conditions (02/10/2020), Presence of intraocular lens (05/01/2017), Primary insomnia (05/01/2017), Primary open-angle glaucoma, unspecified eye, stage unspecified (05/01/2017), Unspecified atrial flutter (Multi) (05/01/2017), Unspecified glaucoma (05/01/2017), and Vitamin D deficiency, unspecified (05/01/2017).    Surgical History  He has a past surgical history that includes Cataract extraction (10/07/2014); Other surgical history (10/07/2014); MR angio head wo IV contrast (3/3/2018); and MR angio neck wo IV contrast (3/3/2018).     Social History  He reports that he has quit smoking. His smoking use included cigarettes. He has never been exposed to tobacco smoke. He has never used smokeless tobacco. He reports that he does not currently use alcohol. He reports that he does not currently use drugs.    Family History  Family  History   Problem Relation Name Age of Onset    No Known Problems Mother      No Known Problems Father          Allergies  Ace inhibitors       Last Recorded Vitals  Blood pressure 133/87, pulse 61, temperature 36.7 °C (98 °F), temperature source Oral, resp. rate 17, height 1.829 m (6'), weight 77.1 kg (170 lb), SpO2 92%.    Relevant Results  LVEF estimated 40%-45% Echo 4/22/24  BNP 1,105 on 4/22/24     Assessment/Plan   Met with patient, wife, and two daughters at bedside.  Provided a digital scale and BP monitor with daily log.  Patient Alert and Oriented x 2.  Not to time. Discussed CHF, signs and symptoms, and when to call cardiologist. Reinforced the importance of following a Low Sodium Diet and monitoring daily weight, lower leg edema, and shortness of breath. Daily BP/Weight/HR log provided.  Reviewed importance of taking prescribed medications after discharge.  Living with Heart Failure Education Booklet provided and utilized for education.  Provided HF Navigator's Business card for additional resource post discharge.   HEART FAILURE EDUCATION:  1. Weigh yourself daily and record on your weight log.  2. If you gain more than 2 or 3 pounds overnight, call your cardiologist.  3. Follow a low sodium diet. No more than 2000 mg in one day, or more than 650 mg per meal.  4. Limit total fluids to no more than 8 cups (or 2 liters) per day - this includes all fluids (water, coffee, juice, milk, tea, etc.)  5. Monitor your blood pressure daily and record on your weight log.  6. Call to schedule your follow-up appointments when you get home if they were not already scheduled for you.  7. Keep your follow-up appointments! Bring your weight log with you so the doctors can see your weight trend and blood pressure readings.  8. Be sure to  any new prescriptions and take them as directed. If unsure of the medications, be sure to call your cardiologist.  9. Stay as active as you can tolerate.   10. If you notice  subtle change of symptoms (slight increase in swelling, slight shortness of breath, a new intolerance to laying flat, a new cough), be sure to call your cardiologist.    You have been referred to the [facility].This program is completely free and does not take the place of regularly scheduled doctor visits If you don't get a call from a nurse w/in 24 hours after discharge,please call [phone].   You have been referred to [facility].  It is located in the [facility] at [address].  If you need to cancel or reschedule, please call [phone].  Thank you         Delia Valencia RN

## 2024-04-24 NOTE — CARE PLAN
The patient's goals for the shift include remain safe    The clinical goals for the shift include patient will remain safe and free from falls/injury this shift    Over the shift, the patient did make progress toward the following goals. Patient in room with daughter at the bedside. Patient daughter request that bed alarm be turned off through the night and daughter would like to assist patient when needed.

## 2024-04-25 ENCOUNTER — DOCUMENTATION (OUTPATIENT)
Dept: HOME HEALTH SERVICES | Facility: HOME HEALTH | Age: 89
End: 2024-04-25
Payer: MEDICARE

## 2024-04-25 ENCOUNTER — HOME HEALTH ADMISSION (OUTPATIENT)
Dept: HOME HEALTH SERVICES | Facility: HOME HEALTH | Age: 89
End: 2024-04-25
Payer: MEDICARE

## 2024-04-25 ENCOUNTER — PHARMACY VISIT (OUTPATIENT)
Dept: PHARMACY | Facility: CLINIC | Age: 89
End: 2024-04-25
Payer: MEDICARE

## 2024-04-25 VITALS
HEIGHT: 72 IN | SYSTOLIC BLOOD PRESSURE: 101 MMHG | OXYGEN SATURATION: 94 % | DIASTOLIC BLOOD PRESSURE: 55 MMHG | WEIGHT: 157.41 LBS | TEMPERATURE: 97 F | BODY MASS INDEX: 21.32 KG/M2 | RESPIRATION RATE: 18 BRPM | HEART RATE: 96 BPM

## 2024-04-25 LAB
ANION GAP SERPL CALC-SCNC: 17 MMOL/L (ref 10–20)
BUN SERPL-MCNC: 11 MG/DL (ref 6–23)
CALCIUM SERPL-MCNC: 9 MG/DL (ref 8.6–10.3)
CHLORIDE SERPL-SCNC: 102 MMOL/L (ref 98–107)
CO2 SERPL-SCNC: 26 MMOL/L (ref 21–32)
CREAT SERPL-MCNC: 1.07 MG/DL (ref 0.5–1.3)
EGFRCR SERPLBLD CKD-EPI 2021: 65 ML/MIN/1.73M*2
ERYTHROCYTE [DISTWIDTH] IN BLOOD BY AUTOMATED COUNT: 14.6 % (ref 11.5–14.5)
GLUCOSE SERPL-MCNC: 102 MG/DL (ref 74–99)
HCT VFR BLD AUTO: 44.5 % (ref 41–52)
HGB BLD-MCNC: 14.5 G/DL (ref 13.5–17.5)
MAGNESIUM SERPL-MCNC: 1.9 MG/DL (ref 1.6–2.4)
MCH RBC QN AUTO: 30 PG (ref 26–34)
MCHC RBC AUTO-ENTMCNC: 32.6 G/DL (ref 32–36)
MCV RBC AUTO: 92 FL (ref 80–100)
NRBC BLD-RTO: 0 /100 WBCS (ref 0–0)
PLATELET # BLD AUTO: 212 X10*3/UL (ref 150–450)
POTASSIUM SERPL-SCNC: 3.9 MMOL/L (ref 3.5–5.3)
RBC # BLD AUTO: 4.83 X10*6/UL (ref 4.5–5.9)
SODIUM SERPL-SCNC: 141 MMOL/L (ref 136–145)
WBC # BLD AUTO: 8.3 X10*3/UL (ref 4.4–11.3)

## 2024-04-25 PROCEDURE — 2500000006 HC RX 250 W HCPCS SELF ADMINISTERED DRUGS (ALT 637 FOR ALL PAYERS): Performed by: NURSE PRACTITIONER

## 2024-04-25 PROCEDURE — 2500000002 HC RX 250 W HCPCS SELF ADMINISTERED DRUGS (ALT 637 FOR MEDICARE OP, ALT 636 FOR OP/ED): Mod: MUE | Performed by: INTERNAL MEDICINE

## 2024-04-25 PROCEDURE — 2500000001 HC RX 250 WO HCPCS SELF ADMINISTERED DRUGS (ALT 637 FOR MEDICARE OP): Performed by: INTERNAL MEDICINE

## 2024-04-25 PROCEDURE — 2500000006 HC RX 250 W HCPCS SELF ADMINISTERED DRUGS (ALT 637 FOR ALL PAYERS): Mod: MUE | Performed by: NURSE PRACTITIONER

## 2024-04-25 PROCEDURE — 94664 DEMO&/EVAL PT USE INHALER: CPT

## 2024-04-25 PROCEDURE — 2500000002 HC RX 250 W HCPCS SELF ADMINISTERED DRUGS (ALT 637 FOR MEDICARE OP, ALT 636 FOR OP/ED): Performed by: INTERNAL MEDICINE

## 2024-04-25 PROCEDURE — 99232 SBSQ HOSP IP/OBS MODERATE 35: CPT

## 2024-04-25 PROCEDURE — 83735 ASSAY OF MAGNESIUM: CPT | Performed by: NURSE PRACTITIONER

## 2024-04-25 PROCEDURE — 80048 BASIC METABOLIC PNL TOTAL CA: CPT | Performed by: NURSE PRACTITIONER

## 2024-04-25 PROCEDURE — 99239 HOSP IP/OBS DSCHRG MGMT >30: CPT | Performed by: INTERNAL MEDICINE

## 2024-04-25 PROCEDURE — 36415 COLL VENOUS BLD VENIPUNCTURE: CPT | Performed by: NURSE PRACTITIONER

## 2024-04-25 PROCEDURE — 2500000001 HC RX 250 WO HCPCS SELF ADMINISTERED DRUGS (ALT 637 FOR MEDICARE OP): Performed by: NURSE PRACTITIONER

## 2024-04-25 PROCEDURE — 2500000002 HC RX 250 W HCPCS SELF ADMINISTERED DRUGS (ALT 637 FOR MEDICARE OP, ALT 636 FOR OP/ED): Mod: MUE | Performed by: NURSE PRACTITIONER

## 2024-04-25 PROCEDURE — 85027 COMPLETE CBC AUTOMATED: CPT | Performed by: NURSE PRACTITIONER

## 2024-04-25 PROCEDURE — 2500000002 HC RX 250 W HCPCS SELF ADMINISTERED DRUGS (ALT 637 FOR MEDICARE OP, ALT 636 FOR OP/ED): Performed by: NURSE PRACTITIONER

## 2024-04-25 PROCEDURE — 2500000004 HC RX 250 GENERAL PHARMACY W/ HCPCS (ALT 636 FOR OP/ED): Performed by: NURSE PRACTITIONER

## 2024-04-25 PROCEDURE — 94640 AIRWAY INHALATION TREATMENT: CPT

## 2024-04-25 RX ORDER — FUROSEMIDE 40 MG/1
40 TABLET ORAL DAILY
Status: DISCONTINUED | OUTPATIENT
Start: 2024-04-25 | End: 2024-04-25 | Stop reason: HOSPADM

## 2024-04-25 RX ORDER — FUROSEMIDE 40 MG/1
40 TABLET ORAL DAILY
Qty: 30 TABLET | Refills: 0 | Status: SHIPPED | OUTPATIENT
Start: 2024-04-25 | End: 2024-05-15 | Stop reason: HOSPADM

## 2024-04-25 RX ADMIN — MEMANTINE 10 MG: 5 TABLET ORAL at 08:52

## 2024-04-25 RX ADMIN — ASPIRIN 81 MG: 81 TABLET, COATED ORAL at 08:54

## 2024-04-25 RX ADMIN — SACUBITRIL AND VALSARTAN 1 TABLET: 24; 26 TABLET, FILM COATED ORAL at 08:56

## 2024-04-25 RX ADMIN — APIXABAN 5 MG: 5 TABLET, FILM COATED ORAL at 08:56

## 2024-04-25 RX ADMIN — FINASTERIDE 5 MG: 5 TABLET, FILM COATED ORAL at 08:54

## 2024-04-25 RX ADMIN — SPIRONOLACTONE 25 MG: 25 TABLET ORAL at 08:53

## 2024-04-25 RX ADMIN — BUDESONIDE INHALATION 0.5 MG: 0.5 SUSPENSION RESPIRATORY (INHALATION) at 07:22

## 2024-04-25 RX ADMIN — METOPROLOL TARTRATE 25 MG: 25 TABLET, FILM COATED ORAL at 08:54

## 2024-04-25 RX ADMIN — EMPAGLIFLOZIN 10 MG: 10 TABLET, FILM COATED ORAL at 08:56

## 2024-04-25 RX ADMIN — DONEPEZIL HYDROCHLORIDE 10 MG: 5 TABLET ORAL at 08:54

## 2024-04-25 RX ADMIN — FORMOTEROL FUMARATE DIHYDRATE 20 MCG: 20 SOLUTION RESPIRATORY (INHALATION) at 07:22

## 2024-04-25 RX ADMIN — IPRATROPIUM BROMIDE AND ALBUTEROL SULFATE 3 ML: 2.5; .5 SOLUTION RESPIRATORY (INHALATION) at 07:22

## 2024-04-25 RX ADMIN — FUROSEMIDE 40 MG: 10 INJECTION, SOLUTION INTRAMUSCULAR; INTRAVENOUS at 05:21

## 2024-04-25 RX ADMIN — ATORVASTATIN CALCIUM 10 MG: 10 TABLET, FILM COATED ORAL at 08:53

## 2024-04-25 RX ADMIN — POTASSIUM CHLORIDE 20 MEQ: 1500 TABLET, EXTENDED RELEASE ORAL at 08:54

## 2024-04-25 NOTE — PROGRESS NOTES
Subjective Data:  The patient states he el feeling better than yesterday  No shortness of breath noticed today  No chest pain, no palpitations     Overnight Events:    None reported     Objective Data:  Last Recorded Vitals:  Vitals:    04/25/24 0448 04/25/24 0719 04/25/24 0722 04/25/24 0725   BP: 119/74 107/71     BP Location: Right arm      Patient Position: Lying      Pulse: 97 95     Resp: 17 18     Temp: 36.6 °C (97.8 °F) 36.6 °C (97.9 °F)     TempSrc:       SpO2: 96% 90% 93%    Weight:    71.4 kg (157 lb 6.5 oz)   Height:           Last Labs:  CBC - 4/25/2024:  6:17 AM  8.3 14.5 212    44.5      CMP - 4/25/2024:  6:17 AM  9.0 7.1 39 --- 4.0   _ 4.1 33 88      PTT - No results in last year.  _   _ _     TROPHS   Date/Time Value Ref Range Status   04/23/2024 06:30 AM 23 0 - 20 ng/L Final     BNP   Date/Time Value Ref Range Status   04/22/2024 12:14 PM 1,015 0 - 99 pg/mL Final   03/05/2018 05:40  0 - 99 pg/mL Final     Comment:     .  <100 pg/mL - Heart failure unlikely  100-299 pg/mL - Intermediate probability of acute heart  .               failure exacerbation. Correlate with clinical  .               context and patient history.    >=300 pg/mL - Heart Failure likely. Correlate with clinical  .               context and patient history.  BNP testing is performed using different testing   methodology at [facility] than at [facility]. Direct result comparisons should   only be made within the same method.     10/05/2017 10:39  0 - 99 pg/mL Final     Comment:     .  <100 pg/mL - Heart failure unlikely  100-299 pg/mL - Intermediate probability of acute heart  .               failure exacerbation. Correlate with clinical  .               context and patient history.    >=300 pg/mL - Heart Failure likely. Correlate with clinical  .               context and patient history.  BNP testing is performed using different testing   methodology at [facility] than at  "other   system hospitals. Direct result comparisons should   only be made within the same method.       VLDL   Date/Time Value Ref Range Status   03/05/2018 05:40 AM 15 0 - 40 mg/dL Final      Last I/O:  I/O last 3 completed shifts:  In: - (0 mL/kg)   Out: 2 (0 mL/kg) [Urine:2 (0 mL/kg/hr)]  Weight: 77.1 kg     Past Cardiology Tests (Last 3 Years):  EKG:  ECG 12 lead 04/22/2024    Echo:  Transthoracic Echo (TTE) Complete 04/23/2024    Ejection Fractions:  No results found for: \"EF\"  Cath:  No results found for this or any previous visit from the past 1095 days.    Stress Test:  No results found for this or any previous visit from the past 1095 days.    Cardiac Imaging:  No results found for this or any previous visit from the past 1095 days.      Inpatient Medications:  Scheduled medications   Medication Dose Route Frequency    apixaban  5 mg oral BID    aspirin  81 mg oral Daily    atorvastatin  10 mg oral Daily    budesonide  0.5 mg nebulization BID    donepezil  10 mg oral Daily    empagliflozin  10 mg oral Daily    finasteride  5 mg oral Daily    formoterol  20 mcg nebulization BID    furosemide  40 mg intravenous BID    ipratropium-albuteroL  3 mL nebulization TID    memantine  10 mg oral BID    metoprolol tartrate  25 mg oral BID    oxygen   inhalation Continuous - 02/gases    polyethylene glycol  17 g oral Daily    potassium chloride CR  20 mEq oral Daily    psyllium  1 packet oral Daily    sacubitriL-valsartan  1 tablet oral BID    spironolactone  25 mg oral Daily     PRN medications   Medication    acetaminophen    ipratropium-albuteroL    OLANZapine     Continuous Medications   Medication Dose Last Rate       Physical Exam:  General:  Patient is awake, alert  HEENT:  Normocephalic.  Moist mucosa.    Neck:  normal JVP  Cardiovascular:  Irregular rate and rhythm.   Pulmonary:  Crackles in bases, expiratory wheezing  Abdomen:  Soft. Non-tender.   Non-distended.  Positive bowel sounds.  Lower Extremities:  2+ " pedal pulses. No LE edema.  Neurologic:  Alert and confused, generalized tremors  Skin: Skin warm and dry, normal skin turgor.   Psychiatric: anxious      Assessment/Plan   Jam Cox is a 89 y.o. male with past medical history of HFimpEF (LVEF 45-50% on echo in 2020), hypertension, paroxsymal atrial fibrillation on Eliquis, prior CVA, dementia who presents to AMS from his PCP office where he was noted to be short of breath.  Cardiology consulted for CHF exacerbation.       Assessment:  # Acute on chronic HFimpEF  # Paroxsymal atrial fibrillation on Eliquis  # Hypertension, acceptable  # Dementia        TTE 4/23/2024  CONCLUSIONS:   1. Left ventricular systolic function is mildly decreased with a 40-45% estimated ejection fraction.   2. No left ventricular thrombus visualized.   3. There is an elevated mean left atrial pressure.   4. There is reduced right ventricular systolic function.   5. Mild to moderately elevated right ventricular systolic pressure.   6. Moderate aortic valve regurgitation.   7. There is global hypokinesis of the left ventricle with minor regional variations.   8. There has been a decline in the calculated LVEF from 45-50% with Biplane 53% to currently 40-45% with Biplane 42%. Diastology is indeterminate. RVSP has increased to 51 mmHg.     Plan   - Can be transitioned to oral furosemide 40mg daily  - Continue home Eliquis 5mg BID  - Continue home metoprolol tartrate 25mg BID   GDMT:   - Entresto 24-26mg BID, Jardiance 10mg daily, and spironolactone 25mg daily  - Follow up with Dr. Menezes within 2-3 weeks of hospital discharge.   - No cardiological barriers to discharge. Cardiology will sing off, please call if questions    Code Status:  Full Code    Pedro Muller MD

## 2024-04-25 NOTE — DISCHARGE SUMMARY
Discharge Diagnosis  Shortness of breath    Issues Requiring Follow-Up  BMP in 1 week    Test Results Pending At Discharge  Pending Labs       No current pending labs.            Hospital Course  Patient with a past medical his significant for combined systolic diastolic congestive heart failure hypertension paroxysmal regulation dementia was admitted with worsening shortness of breath and acute congestive heart failure diuresed with Lasix with good response  Is able to ambulate without any shortness of breath now  2 L echo shows an ejection fraction between 40 to 45%  We will transition the patient to Lasix 40 mg every day  Adding Entresto twice a day  Jardiance 10 mg every day  Spironolactone 25 mg every day  Will continue with other medications as before  Discharged in stable condition  Will see him in the clinic in a week      Pertinent Physical Exam At Time of Discharge  Physical Exam      Constitutional   General appearance: Alert and in no acute distress.     Pulmonary   Respiratory assessment: No respiratory distress, normal respiratory rhythm and effort.    Auscultation of Lungs: Clear bilateral breath sounds.   Cardiovascular   Auscultation of heart: Apical pulse normal, heart rate and rhythm normal, normal S1 and S2, no murmurs and no pericardial rub.    Exam for edema: No peripheral edema.   Abdomen   Abdominal Exam: No bruits, normal bowel sounds, soft, non-tender, no abdominal mass palpated.    Liver and Spleen exam: No hepato-splenomegaly.   Musculoskeletal   Examination of gait: Normal.    Inspection of digits and nails: No clubbing or cyanosis of the fingernails.    Inspection/palpation of joints, bones and muscles: No joint swelling. Normal movement of all extremities.   Skin   Skin inspection: Normal skin color and pigmentation, normal skin turgor and no visible rash.   Neurologic   Cranial nerves: Nerves 2-12 were intact, no focal neuro defects.  Alert x 1-2      Home Medications     Medication  List      START taking these medications     Entresto 24-26 mg tablet; Generic drug: sacubitriL-valsartan; Take 1   tablet by mouth 2 times a day.   fluticasone propion-salmeteroL 250-50 mcg/dose diskus inhaler; Commonly   known as: Advair Diskus; Inhale 1 puff 2 times a day. Rinse mouth with   water after use to reduce aftertaste and incidence of candidiasis. Do not   swallow.   Jardiance 10 mg; Generic drug: empagliflozin; Take 1 tablet (10 mg) by   mouth once daily.   spironolactone 25 mg tablet; Commonly known as: Aldactone; Take 1 tablet   (25 mg) by mouth once daily.     CHANGE how you take these medications     furosemide 40 mg tablet; Commonly known as: Lasix; Take 1 tablet (40 mg)   by mouth once daily.; What changed: medication strength, how much to take   ipratropium-albuteroL 0.5-2.5 mg/3 mL nebulizer solution; Commonly known   as: Duo-Neb; Take 3 mL by nebulization every 4 hours if needed for   wheezing or shortness of breath.; What changed: how to take this, when to   take this, reasons to take this     CONTINUE taking these medications     apixaban 5 mg tablet; Commonly known as: Eliquis; Take 1 tablet (5 mg)   by mouth 2 times a day.   aspirin 81 mg EC tablet   atorvastatin 10 mg tablet; Commonly known as: Lipitor; Take 1 tablet (10   mg) by mouth once daily.   cholecalciferol 50 mcg (2,000 unit) capsule; Commonly known as: Vitamin   D-3   clindamycin 1 % lotion; Commonly known as: Cleocin T; Apply topically 2   times a day.   donepezil 10 mg tablet; Commonly known as: Aricept; Take 1 tablet (10   mg) by mouth once daily.   finasteride 5 mg tablet; Commonly known as: Proscar; Take 1 tablet (5   mg) by mouth once daily.   memantine 10 mg tablet; Commonly known as: Namenda; Take 1 tablet (10   mg) by mouth 2 times a day.   metoprolol tartrate 25 mg tablet; Commonly known as: Lopressor; Take 1   tablet (25 mg) by mouth 2 times a day.   potassium chloride CR 20 mEq ER tablet; Commonly known as: Klor-Con  M20;   Take 1 tablet (20 mEq) by mouth once daily.   tamsulosin 0.4 mg 24 hr capsule; Commonly known as: Flomax; Take 1   capsule (0.4 mg) by mouth once daily.   Ultra-Light Rollator misc; Generic drug: walker; 1 each once daily.   VITAMIN B COMPLEX ORAL     STOP taking these medications     azithromycin 250 mg tablet; Commonly known as: Zithromax   losartan 25 mg tablet; Commonly known as: Cozaar       Outpatient Follow-Up  Future Appointments   Date Time Provider Department [place]   5/15/2024  1:30 PM [facility] [id number] ROOM [id number] [facility]   6/12/2024  3:00 PM Sanjay Black MD [id number] [facility]   7/25/2024 10:30 AM Malcolm Shen MD [id number] [facility]   4/10/2025 10:45 AM Sherrell Malcolm MD [id number] [facility]     Patient seen at bedside. Events from the last visit reviewed. Discussed with staff. Results of tests and investigations from last visit reviewed and discussed with patient/Family. Electronic chart on [facility] reviewed. Input / Recommendations  from consultants  appreciated and reviewed and agreed with.     discharge summary and profile completed. medications reviewed and discussed with patient and family.  scripts completed and signed.     total discharge time in excess of 30 minutes.    Malcolm Shen MD

## 2024-04-25 NOTE — HH CARE COORDINATION
Home Care received a Referral for Nursing, Physical Therapy, and Occupational Therapy. We have processed the referral for a Start of Care on 4/26-4/27.     If you have any questions or concerns, please feel free to contact us at [phone]. Follow the prompts, enter your five digit zip code, and you will be directed to your care team on CENTL 3.

## 2024-04-25 NOTE — DISCHARGE INSTRUCTIONS
Upon discharge, continue taking Furosemide 40mg daily. Monitor for signs and symptoms of fluid overload.   Please Follow up with Dr. Menezes within 2-3 weeks of hospital discharge.   Please Follow up with your Dr. Shen within 1 week of hospital discharge.

## 2024-04-25 NOTE — PROGRESS NOTES
Care Coordinator Note:    Plan: Patient in with chf. Echo done. Cleared by cardio for dc today  Status: inpatient  Payor: [employer]  Disposition: Home with new [facility] RN PT OT. Plan for CHF carepath and labs to be drawn  Barrier: None  ADOD: TODAY  TCC notified [facility] 3 team of patients DC today.     Crystal Polina [facility]      04/25/24 1212   Discharge Planning   [facility]

## 2024-04-25 NOTE — CARE PLAN
The patient's goals for the shift include remain safe    The clinical goals for the shift include patient will remain safe and free from falls/injury this shift    Problem: Skin  Goal: Decreased wound size/increased tissue granulation at next dressing change  Outcome: Progressing  Goal: Participates in plan/prevention/treatment measures  Outcome: Progressing  Goal: Prevent/manage excess moisture  Outcome: Progressing  Goal: Prevent/minimize sheer/friction injuries  Outcome: Progressing  Goal: Promote/optimize nutrition  Outcome: Progressing  Goal: Promote skin healing  Outcome: Progressing

## 2024-04-26 ENCOUNTER — PATIENT OUTREACH (OUTPATIENT)
Dept: HOME HEALTH SERVICES | Age: 89
End: 2024-04-26
Payer: MEDICARE

## 2024-04-26 ENCOUNTER — PATIENT OUTREACH (OUTPATIENT)
Dept: CARE COORDINATION | Facility: CLINIC | Age: 89
End: 2024-04-26
Payer: MEDICARE

## 2024-04-26 SDOH — HEALTH STABILITY: MENTAL HEALTH: HOW OFTEN DO YOU HAVE A DRINK CONTAINING ALCOHOL?: NEVER

## 2024-04-26 SDOH — HEALTH STABILITY: MENTAL HEALTH
STRESS IS WHEN SOMEONE FEELS TENSE, NERVOUS, ANXIOUS, OR CAN'T SLEEP AT NIGHT BECAUSE THEIR MIND IS TROUBLED. HOW STRESSED ARE YOU?: ONLY A LITTLE

## 2024-04-26 SDOH — SOCIAL STABILITY: SOCIAL INSECURITY
WITHIN THE LAST YEAR, HAVE TO BEEN RAPED OR FORCED TO HAVE ANY KIND OF SEXUAL ACTIVITY BY YOUR PARTNER OR EX-PARTNER?: NO

## 2024-04-26 SDOH — SOCIAL STABILITY: SOCIAL NETWORK: HOW OFTEN DO YOU ATTENT MEETINGS OF THE CLUB OR ORGANIZATION YOU BELONG TO?: NEVER

## 2024-04-26 SDOH — ECONOMIC STABILITY: INCOME INSECURITY: HOW HARD IS IT FOR YOU TO PAY FOR THE VERY BASICS LIKE FOOD, HOUSING, MEDICAL CARE, AND HEATING?: NOT HARD AT ALL

## 2024-04-26 SDOH — SOCIAL STABILITY: SOCIAL NETWORK: ARE YOU MARRIED, WIDOWED, DIVORCED, SEPARATED, NEVER MARRIED, OR LIVING WITH A PARTNER?: MARRIED

## 2024-04-26 SDOH — ECONOMIC STABILITY: FOOD INSECURITY: WITHIN THE PAST 12 MONTHS, YOU WORRIED THAT YOUR FOOD WOULD RUN OUT BEFORE YOU GOT MONEY TO BUY MORE.: NEVER TRUE

## 2024-04-26 SDOH — HEALTH STABILITY: PHYSICAL HEALTH: ON AVERAGE, HOW MANY MINUTES DO YOU ENGAGE IN EXERCISE AT THIS LEVEL?: 0 MIN

## 2024-04-26 SDOH — SOCIAL STABILITY: SOCIAL NETWORK: HOW OFTEN DO YOU GET TOGETHER WITH FRIENDS OR RELATIVES?: THREE TIMES A WEEK

## 2024-04-26 SDOH — SOCIAL STABILITY: SOCIAL NETWORK
DO YOU BELONG TO ANY CLUBS OR ORGANIZATIONS SUCH AS CHURCH GROUPS UNIONS, FRATERNAL OR ATHLETIC GROUPS, OR SCHOOL GROUPS?: NO

## 2024-04-26 SDOH — ECONOMIC STABILITY: TRANSPORTATION INSECURITY
IN THE PAST 12 MONTHS, HAS LACK OF TRANSPORTATION KEPT YOU FROM MEETINGS, WORK, OR FROM GETTING THINGS NEEDED FOR DAILY LIVING?: NO

## 2024-04-26 SDOH — HEALTH STABILITY: PHYSICAL HEALTH: ON AVERAGE, HOW MANY DAYS PER WEEK DO YOU ENGAGE IN MODERATE TO STRENUOUS EXERCISE (LIKE A BRISK WALK)?: 0 DAYS

## 2024-04-26 SDOH — ECONOMIC STABILITY: INCOME INSECURITY: IN THE PAST 12 MONTHS, HAS THE ELECTRIC, GAS, OIL, OR WATER COMPANY THREATENED TO SHUT OFF SERVICE IN YOUR HOME?: NO

## 2024-04-26 SDOH — SOCIAL STABILITY: SOCIAL NETWORK: HOW OFTEN DO YOU ATTEND CHURCH OR RELIGIOUS SERVICES?: 1 TO 4 TIMES PER YEAR

## 2024-04-26 SDOH — HEALTH STABILITY: MENTAL HEALTH
HOW OFTEN DO YOU NEED TO HAVE SOMEONE HELP YOU WHEN YOU READ INSTRUCTIONS, PAMPHLETS, OR OTHER WRITTEN MATERIAL FROM YOUR DOCTOR OR PHARMACY?: OFTEN

## 2024-04-26 SDOH — SOCIAL STABILITY: SOCIAL INSECURITY
WITHIN THE LAST YEAR, HAVE YOU BEEN HUMILIATED OR EMOTIONALLY ABUSED IN OTHER WAYS BY YOUR PARTNER OR EX-PARTNER?: PATIENT DECLINED

## 2024-04-26 SDOH — HEALTH STABILITY: MENTAL HEALTH: HOW OFTEN DO YOU HAVE 6 OR MORE DRINKS ON ONE OCCASION?: NEVER

## 2024-04-26 SDOH — ECONOMIC STABILITY: FOOD INSECURITY: WITHIN THE PAST 12 MONTHS, THE FOOD YOU BOUGHT JUST DIDN'T LAST AND YOU DIDN'T HAVE MONEY TO GET MORE.: NEVER TRUE

## 2024-04-26 SDOH — SOCIAL STABILITY: SOCIAL NETWORK: IN A TYPICAL WEEK, HOW MANY TIMES DO YOU TALK ON THE PHONE WITH FAMILY, FRIENDS, OR NEIGHBORS?: THREE TIMES A WEEK

## 2024-04-26 SDOH — SOCIAL STABILITY: SOCIAL INSECURITY
WITHIN THE LAST YEAR, HAVE YOU BEEN KICKED, HIT, SLAPPED, OR OTHERWISE PHYSICALLY HURT BY YOUR PARTNER OR EX-PARTNER?: PATIENT DECLINED

## 2024-04-26 SDOH — SOCIAL STABILITY: SOCIAL INSECURITY: WITHIN THE LAST YEAR, HAVE YOU BEEN AFRAID OF YOUR PARTNER OR EX-PARTNER?: PATIENT DECLINED

## 2024-04-26 SDOH — ECONOMIC STABILITY: TRANSPORTATION INSECURITY
IN THE PAST 12 MONTHS, HAS THE LACK OF TRANSPORTATION KEPT YOU FROM MEDICAL APPOINTMENTS OR FROM GETTING MEDICATIONS?: NO

## 2024-04-26 SDOH — HEALTH STABILITY: MENTAL HEALTH: HOW MANY STANDARD DRINKS CONTAINING ALCOHOL DO YOU HAVE ON A TYPICAL DAY?: PATIENT DOES NOT DRINK

## 2024-04-26 ASSESSMENT — LIFESTYLE VARIABLES
SKIP TO QUESTIONS 9-10: 1
AUDIT-C TOTAL SCORE: 0

## 2024-04-26 NOTE — PROGRESS NOTES
Discharge Facility:  [facility]  Discharge Diagnosis: shortness of breath, congestive heart failure  Admission Date: 4/22/24  Discharge Date: 4/25/25    PCP Appointment Date: no appointments, message sent to office  Specialist Appointment Date: cardiology 5/15/24, labwork  Hospital Encounter and Summary: Linked  See discharge assessment below for further details    Engagement  Call Start Time: 1045 (spoke primarily with wife wolf and daughter who assist in caring for patient) (4/26/2024 10:45 AM)    Medications  Medications reviewed with patient/caregiver?: Yes (4/26/2024 10:45 AM)  Is the patient having any side effects they believe may be caused by any medication additions or changes?: No (4/26/2024 10:45 AM)  Does the patient have all medications ordered at discharge?: Yes (4/26/2024 10:45 AM)  Medication Comments: new/changed medications reviewed. START  Entresto  Advair Diskus  Jardiance  spironolactone  CHANGE  furosemide duoneb (4/26/2024 10:45 AM)    Appointments  Does the patient have a primary care provider?: Yes (4/26/2024 10:45 AM)  Care Management Interventions: Advised patient to make appointment (4/26/2024 10:45 AM)  Care Management Interventions: Advised patient to keep appointment (4/26/2024 10:45 AM)    Self Management  What is the home health agency?:  (4/26/2024 10:45 AM)  Has home health visited the patient within 72 hours of discharge?: Call prior to 72 hours (4/26/2024 10:45 AM)  What Durable Medical Equipment (DME) was ordered?: n/a (4/26/2024 10:45 AM)    Patient Teaching  Care Management Interventions: Reviewed instructions with patient (4/26/2024 10:45 AM)  What is the patient's perception of their health status since discharge?: Improving (wife reports he seems okay but tired) (4/26/2024 10:45 AM)

## 2024-04-27 ENCOUNTER — PATIENT OUTREACH (OUTPATIENT)
Dept: HOME HEALTH SERVICES | Age: 89
End: 2024-04-27

## 2024-04-27 ENCOUNTER — HOME CARE VISIT (OUTPATIENT)
Dept: HOME HEALTH SERVICES | Facility: HOME HEALTH | Age: 89
End: 2024-04-27
Payer: MEDICARE

## 2024-04-27 ENCOUNTER — HOSPITAL ENCOUNTER (INPATIENT)
Facility: HOSPITAL | Age: 89
LOS: 4 days | Discharge: HOME HEALTH | DRG: 683 | End: 2024-05-02
Attending: EMERGENCY MEDICINE | Admitting: INTERNAL MEDICINE
Payer: MEDICARE

## 2024-04-27 ENCOUNTER — APPOINTMENT (OUTPATIENT)
Dept: CARDIOLOGY | Facility: HOSPITAL | Age: 89
DRG: 683 | End: 2024-04-27
Payer: MEDICARE

## 2024-04-27 ENCOUNTER — APPOINTMENT (OUTPATIENT)
Dept: RADIOLOGY | Facility: HOSPITAL | Age: 89
DRG: 683 | End: 2024-04-27
Payer: MEDICARE

## 2024-04-27 DIAGNOSIS — N17.9 AKI (ACUTE KIDNEY INJURY) (CMS-HCC): Primary | ICD-10-CM

## 2024-04-27 DIAGNOSIS — R13.12 OROPHARYNGEAL DYSPHAGIA: ICD-10-CM

## 2024-04-27 DIAGNOSIS — I48.91 ATRIAL FIBRILLATION, UNSPECIFIED TYPE (MULTI): ICD-10-CM

## 2024-04-27 DIAGNOSIS — R06.02 SHORTNESS OF BREATH: ICD-10-CM

## 2024-04-27 DIAGNOSIS — R53.83 OTHER FATIGUE: ICD-10-CM

## 2024-04-27 LAB
ALBUMIN SERPL BCP-MCNC: 3.6 G/DL (ref 3.4–5)
ALP SERPL-CCNC: 78 U/L (ref 33–136)
ALT SERPL W P-5'-P-CCNC: 18 U/L (ref 10–52)
ANION GAP BLDV CALCULATED.4IONS-SCNC: 10 MMOL/L (ref 10–25)
ANION GAP SERPL CALC-SCNC: 14 MMOL/L (ref 10–20)
AST SERPL W P-5'-P-CCNC: 17 U/L (ref 9–39)
BASE EXCESS BLDV CALC-SCNC: 3.9 MMOL/L (ref -2–3)
BASOPHILS # BLD AUTO: 0.02 X10*3/UL (ref 0–0.1)
BASOPHILS NFR BLD AUTO: 0.2 %
BILIRUB SERPL-MCNC: 2.8 MG/DL (ref 0–1.2)
BODY TEMPERATURE: 37 DEGREES CELSIUS
BUN SERPL-MCNC: 27 MG/DL (ref 6–23)
CA-I BLDV-SCNC: 1.22 MMOL/L (ref 1.1–1.33)
CALCIUM SERPL-MCNC: 9.1 MG/DL (ref 8.6–10.3)
CARDIAC TROPONIN I PNL SERPL HS: 27 NG/L (ref 0–20)
CHLORIDE BLDV-SCNC: 99 MMOL/L (ref 98–107)
CHLORIDE SERPL-SCNC: 100 MMOL/L (ref 98–107)
CO2 SERPL-SCNC: 26 MMOL/L (ref 21–32)
CREAT SERPL-MCNC: 1.62 MG/DL (ref 0.5–1.3)
EGFRCR SERPLBLD CKD-EPI 2021: 39 ML/MIN/1.73M*2
EOSINOPHIL # BLD AUTO: 0.01 X10*3/UL (ref 0–0.4)
EOSINOPHIL NFR BLD AUTO: 0.1 %
ERYTHROCYTE [DISTWIDTH] IN BLOOD BY AUTOMATED COUNT: 14.1 % (ref 11.5–14.5)
FLUAV RNA RESP QL NAA+PROBE: NOT DETECTED
FLUBV RNA RESP QL NAA+PROBE: NOT DETECTED
GLUCOSE BLDV-MCNC: 132 MG/DL (ref 74–99)
GLUCOSE SERPL-MCNC: 128 MG/DL (ref 74–99)
HCO3 BLDV-SCNC: 28.5 MMOL/L (ref 22–26)
HCT VFR BLD AUTO: 43.3 % (ref 41–52)
HCT VFR BLD EST: 44 % (ref 41–52)
HGB BLD-MCNC: 14.1 G/DL (ref 13.5–17.5)
HGB BLDV-MCNC: 14.6 G/DL (ref 13.5–17.5)
IMM GRANULOCYTES # BLD AUTO: 0.07 X10*3/UL (ref 0–0.5)
IMM GRANULOCYTES NFR BLD AUTO: 0.6 % (ref 0–0.9)
INHALED O2 CONCENTRATION: 21 %
INR PPP: 2.2 (ref 0.9–1.1)
LACTATE BLDV-SCNC: 1.8 MMOL/L (ref 0.4–2)
LYMPHOCYTES # BLD AUTO: 0.88 X10*3/UL (ref 0.8–3)
LYMPHOCYTES NFR BLD AUTO: 7.8 %
MAGNESIUM SERPL-MCNC: 2.1 MG/DL (ref 1.6–2.4)
MCH RBC QN AUTO: 29.7 PG (ref 26–34)
MCHC RBC AUTO-ENTMCNC: 32.6 G/DL (ref 32–36)
MCV RBC AUTO: 91 FL (ref 80–100)
MONOCYTES # BLD AUTO: 0.85 X10*3/UL (ref 0.05–0.8)
MONOCYTES NFR BLD AUTO: 7.6 %
NEUTROPHILS # BLD AUTO: 9.42 X10*3/UL (ref 1.6–5.5)
NEUTROPHILS NFR BLD AUTO: 83.7 %
NRBC BLD-RTO: 0 /100 WBCS (ref 0–0)
OXYHGB MFR BLDV: 58.3 % (ref 45–75)
PCO2 BLDV: 42 MM HG (ref 41–51)
PH BLDV: 7.44 PH (ref 7.33–7.43)
PLATELET # BLD AUTO: 208 X10*3/UL (ref 150–450)
PO2 BLDV: 35 MM HG (ref 35–45)
POTASSIUM BLDV-SCNC: 4.7 MMOL/L (ref 3.5–5.3)
POTASSIUM SERPL-SCNC: 4.6 MMOL/L (ref 3.5–5.3)
PROT SERPL-MCNC: 7.1 G/DL (ref 6.4–8.2)
PROTHROMBIN TIME: 24.9 SECONDS (ref 9.8–12.8)
RBC # BLD AUTO: 4.75 X10*6/UL (ref 4.5–5.9)
RSV RNA RESP QL NAA+PROBE: NOT DETECTED
SAO2 % BLDV: 60 % (ref 45–75)
SARS-COV-2 RNA RESP QL NAA+PROBE: NOT DETECTED
SODIUM BLDV-SCNC: 133 MMOL/L (ref 136–145)
SODIUM SERPL-SCNC: 135 MMOL/L (ref 136–145)
WBC # BLD AUTO: 11.3 X10*3/UL (ref 4.4–11.3)

## 2024-04-27 PROCEDURE — 71045 X-RAY EXAM CHEST 1 VIEW: CPT

## 2024-04-27 PROCEDURE — 84484 ASSAY OF TROPONIN QUANT: CPT | Performed by: EMERGENCY MEDICINE

## 2024-04-27 PROCEDURE — 99285 EMERGENCY DEPT VISIT HI MDM: CPT | Mod: 25

## 2024-04-27 PROCEDURE — 84075 ASSAY ALKALINE PHOSPHATASE: CPT | Performed by: EMERGENCY MEDICINE

## 2024-04-27 PROCEDURE — 83880 ASSAY OF NATRIURETIC PEPTIDE: CPT | Performed by: EMERGENCY MEDICINE

## 2024-04-27 PROCEDURE — 1090000001 HH PPS REVENUE CREDIT

## 2024-04-27 PROCEDURE — 1090000002 HH PPS REVENUE DEBIT

## 2024-04-27 PROCEDURE — 71045 X-RAY EXAM CHEST 1 VIEW: CPT | Mod: FOREIGN READ | Performed by: RADIOLOGY

## 2024-04-27 PROCEDURE — 36415 COLL VENOUS BLD VENIPUNCTURE: CPT | Performed by: EMERGENCY MEDICINE

## 2024-04-27 PROCEDURE — 83735 ASSAY OF MAGNESIUM: CPT | Performed by: EMERGENCY MEDICINE

## 2024-04-27 PROCEDURE — 96374 THER/PROPH/DIAG INJ IV PUSH: CPT

## 2024-04-27 PROCEDURE — 87637 SARSCOV2&INF A&B&RSV AMP PRB: CPT | Performed by: EMERGENCY MEDICINE

## 2024-04-27 PROCEDURE — 84132 ASSAY OF SERUM POTASSIUM: CPT | Mod: 91 | Performed by: EMERGENCY MEDICINE

## 2024-04-27 PROCEDURE — 169592 NO-PAY CLAIM PROCEDURE

## 2024-04-27 PROCEDURE — 93005 ELECTROCARDIOGRAM TRACING: CPT

## 2024-04-27 PROCEDURE — 0023 HH SOC

## 2024-04-27 PROCEDURE — 2500000004 HC RX 250 GENERAL PHARMACY W/ HCPCS (ALT 636 FOR OP/ED): Performed by: EMERGENCY MEDICINE

## 2024-04-27 PROCEDURE — 85610 PROTHROMBIN TIME: CPT | Performed by: EMERGENCY MEDICINE

## 2024-04-27 PROCEDURE — 85025 COMPLETE CBC W/AUTO DIFF WBC: CPT | Performed by: EMERGENCY MEDICINE

## 2024-04-27 PROCEDURE — G0299 HHS/HOSPICE OF RN EA 15 MIN: HCPCS | Mod: HHH

## 2024-04-27 PROCEDURE — 96361 HYDRATE IV INFUSION ADD-ON: CPT

## 2024-04-27 PROCEDURE — 99291 CRITICAL CARE FIRST HOUR: CPT | Performed by: EMERGENCY MEDICINE

## 2024-04-27 RX ADMIN — SODIUM CHLORIDE, POTASSIUM CHLORIDE, SODIUM LACTATE AND CALCIUM CHLORIDE 500 ML: 600; 310; 30; 20 INJECTION, SOLUTION INTRAVENOUS at 23:11

## 2024-04-27 ASSESSMENT — COLUMBIA-SUICIDE SEVERITY RATING SCALE - C-SSRS
1. IN THE PAST MONTH, HAVE YOU WISHED YOU WERE DEAD OR WISHED YOU COULD GO TO SLEEP AND NOT WAKE UP?: NO
6. HAVE YOU EVER DONE ANYTHING, STARTED TO DO ANYTHING, OR PREPARED TO DO ANYTHING TO END YOUR LIFE?: NO
2. HAVE YOU ACTUALLY HAD ANY THOUGHTS OF KILLING YOURSELF?: NO

## 2024-04-27 ASSESSMENT — PAIN SCALES - GENERAL: PAINLEVEL_OUTOF10: 0 - NO PAIN

## 2024-04-27 ASSESSMENT — PAIN - FUNCTIONAL ASSESSMENT: PAIN_FUNCTIONAL_ASSESSMENT: 0-10

## 2024-04-27 NOTE — PROGRESS NOTES
Daily Call Note: [facility] weekly call w Dr Abreu, and this RN.  Called conducted w pt's wife.  Wife reports decreased PO intake.  Pt has [facility] visits.  Today [facility] RN noted POX 86 % on RA, pt did breathing tx, and POX improved.  Pt to have Medic visit tomorrow and to be connected to [facility].  Wife in agreement.  No other concerns voiced.  Next [facility] weekly call 5/4/24 at 1730.    Pt Education: POC  Barriers:   Topics for Daily Review:   Pt demonstrates clear understanding: Yes    Daily Weight:  149 lbs  There were no vitals filed for this visit.   Last 3 Weights:  Wt Readings from Last 7 Encounters:   04/25/24 71.4 kg (157 lb 6.5 oz)   04/22/24 75.4 kg (166 lb 3.2 oz)   02/09/24 78.9 kg (174 lb)   12/27/23 77.2 kg (170 lb 3.2 oz)   12/18/23 76.7 kg (169 lb)   09/19/23 77.6 kg (171 lb)   08/10/23 76.5 kg (168 lb 11.2 oz)       Masimo Device: No   Masimo Clinical Impression:     Virtual Visits--Scheduled (Most Recent Date at Top)  Follow up Appointments  Recent Visits  Date Type Provider Dept   04/22/24 Office Visit Malcolm Shen MD [id number]   Showing recent visits within past 30 days and meeting all other requirements  Future Appointments  Date Type Provider Dept   07/25/24 Appointment Malcolm Shen MD [id number]   Showing future appointments within next 90 days and meeting all other requirements       Frequency of RN Calls & Virtual Visits per Team Agreement: Healthy at Home Frequency: Daily    Medication issues Addressed (what was done):     Follow up appointments scheduled by [facility] Staff:   Referrals made by [facility] staff:

## 2024-04-28 ENCOUNTER — HOME CARE VISIT (OUTPATIENT)
Dept: HOME HEALTH SERVICES | Facility: HOME HEALTH | Age: 89
End: 2024-04-28
Payer: MEDICARE

## 2024-04-28 ENCOUNTER — APPOINTMENT (OUTPATIENT)
Dept: RADIOLOGY | Facility: HOSPITAL | Age: 89
DRG: 683 | End: 2024-04-28
Payer: MEDICARE

## 2024-04-28 VITALS
HEART RATE: 66 BPM | SYSTOLIC BLOOD PRESSURE: 100 MMHG | DIASTOLIC BLOOD PRESSURE: 60 MMHG | TEMPERATURE: 98.4 F | RESPIRATION RATE: 20 BRPM | OXYGEN SATURATION: 92 %

## 2024-04-28 PROBLEM — N17.9 AKI (ACUTE KIDNEY INJURY) (CMS-HCC): Status: ACTIVE | Noted: 2024-04-28

## 2024-04-28 LAB
APPEARANCE UR: CLEAR
BILIRUB UR STRIP.AUTO-MCNC: NEGATIVE MG/DL
BNP SERPL-MCNC: 425 PG/ML (ref 0–99)
CARDIAC TROPONIN I PNL SERPL HS: 21 NG/L (ref 0–20)
COLOR UR: YELLOW
GLUCOSE UR STRIP.AUTO-MCNC: ABNORMAL MG/DL
KETONES UR STRIP.AUTO-MCNC: NEGATIVE MG/DL
LEUKOCYTE ESTERASE UR QL STRIP.AUTO: NEGATIVE
NITRITE UR QL STRIP.AUTO: NEGATIVE
PH UR STRIP.AUTO: 5.5 [PH]
PROT UR STRIP.AUTO-MCNC: NEGATIVE MG/DL
RBC # UR STRIP.AUTO: NEGATIVE /UL
SP GR UR STRIP.AUTO: 1.02
UROBILINOGEN UR STRIP.AUTO-MCNC: ABNORMAL MG/DL

## 2024-04-28 PROCEDURE — 71250 CT THORAX DX C-: CPT | Performed by: RADIOLOGY

## 2024-04-28 PROCEDURE — 1200000002 HC GENERAL ROOM WITH TELEMETRY DAILY

## 2024-04-28 PROCEDURE — 71250 CT THORAX DX C-: CPT

## 2024-04-28 PROCEDURE — 84484 ASSAY OF TROPONIN QUANT: CPT | Performed by: EMERGENCY MEDICINE

## 2024-04-28 PROCEDURE — 36415 COLL VENOUS BLD VENIPUNCTURE: CPT | Performed by: EMERGENCY MEDICINE

## 2024-04-28 PROCEDURE — 2500000002 HC RX 250 W HCPCS SELF ADMINISTERED DRUGS (ALT 637 FOR MEDICARE OP, ALT 636 FOR OP/ED): Mod: MUE | Performed by: EMERGENCY MEDICINE

## 2024-04-28 PROCEDURE — 94640 AIRWAY INHALATION TREATMENT: CPT

## 2024-04-28 PROCEDURE — 2500000004 HC RX 250 GENERAL PHARMACY W/ HCPCS (ALT 636 FOR OP/ED): Performed by: INTERNAL MEDICINE

## 2024-04-28 PROCEDURE — 2500000002 HC RX 250 W HCPCS SELF ADMINISTERED DRUGS (ALT 637 FOR MEDICARE OP, ALT 636 FOR OP/ED): Mod: MUE | Performed by: INTERNAL MEDICINE

## 2024-04-28 PROCEDURE — 81003 URINALYSIS AUTO W/O SCOPE: CPT | Performed by: EMERGENCY MEDICINE

## 2024-04-28 PROCEDURE — 99222 1ST HOSP IP/OBS MODERATE 55: CPT | Performed by: INTERNAL MEDICINE

## 2024-04-28 PROCEDURE — 1090000002 HH PPS REVENUE DEBIT

## 2024-04-28 PROCEDURE — 2500000001 HC RX 250 WO HCPCS SELF ADMINISTERED DRUGS (ALT 637 FOR MEDICARE OP): Performed by: EMERGENCY MEDICINE

## 2024-04-28 PROCEDURE — 2500000006 HC RX 250 W HCPCS SELF ADMINISTERED DRUGS (ALT 637 FOR ALL PAYERS): Mod: MUE | Performed by: INTERNAL MEDICINE

## 2024-04-28 PROCEDURE — 2500000005 HC RX 250 GENERAL PHARMACY W/O HCPCS: Performed by: INTERNAL MEDICINE

## 2024-04-28 PROCEDURE — 2500000001 HC RX 250 WO HCPCS SELF ADMINISTERED DRUGS (ALT 637 FOR MEDICARE OP): Performed by: INTERNAL MEDICINE

## 2024-04-28 PROCEDURE — 2500000005 HC RX 250 GENERAL PHARMACY W/O HCPCS: Performed by: EMERGENCY MEDICINE

## 2024-04-28 PROCEDURE — 1090000001 HH PPS REVENUE CREDIT

## 2024-04-28 RX ORDER — ASPIRIN 81 MG/1
81 TABLET ORAL DAILY
Status: DISCONTINUED | OUTPATIENT
Start: 2024-04-28 | End: 2024-05-02 | Stop reason: HOSPADM

## 2024-04-28 RX ORDER — IPRATROPIUM BROMIDE AND ALBUTEROL SULFATE 2.5; .5 MG/3ML; MG/3ML
3 SOLUTION RESPIRATORY (INHALATION) EVERY 2 HOUR PRN
Status: DISCONTINUED | OUTPATIENT
Start: 2024-04-28 | End: 2024-04-28 | Stop reason: ALTCHOICE

## 2024-04-28 RX ORDER — METOPROLOL TARTRATE 25 MG/1
25 TABLET, FILM COATED ORAL 2 TIMES DAILY
Status: DISCONTINUED | OUTPATIENT
Start: 2024-04-28 | End: 2024-05-02 | Stop reason: HOSPADM

## 2024-04-28 RX ORDER — METOPROLOL TARTRATE 1 MG/ML
2.5 INJECTION, SOLUTION INTRAVENOUS ONCE
Status: COMPLETED | OUTPATIENT
Start: 2024-04-28 | End: 2024-04-28

## 2024-04-28 RX ORDER — GUAIFENESIN 600 MG/1
600 TABLET, EXTENDED RELEASE ORAL EVERY 12 HOURS PRN
Status: DISCONTINUED | OUTPATIENT
Start: 2024-04-28 | End: 2024-05-02 | Stop reason: HOSPADM

## 2024-04-28 RX ORDER — ONDANSETRON HYDROCHLORIDE 2 MG/ML
4 INJECTION, SOLUTION INTRAVENOUS EVERY 8 HOURS PRN
Status: DISCONTINUED | OUTPATIENT
Start: 2024-04-28 | End: 2024-05-02 | Stop reason: HOSPADM

## 2024-04-28 RX ORDER — ONDANSETRON 4 MG/1
4 TABLET, FILM COATED ORAL EVERY 8 HOURS PRN
Status: DISCONTINUED | OUTPATIENT
Start: 2024-04-28 | End: 2024-05-02 | Stop reason: HOSPADM

## 2024-04-28 RX ORDER — PANTOPRAZOLE SODIUM 40 MG/1
40 TABLET, DELAYED RELEASE ORAL
Status: DISCONTINUED | OUTPATIENT
Start: 2024-04-29 | End: 2024-05-02 | Stop reason: HOSPADM

## 2024-04-28 RX ORDER — DONEPEZIL HYDROCHLORIDE 5 MG/1
10 TABLET, FILM COATED ORAL DAILY
Status: DISCONTINUED | OUTPATIENT
Start: 2024-04-28 | End: 2024-05-02 | Stop reason: HOSPADM

## 2024-04-28 RX ORDER — POLYETHYLENE GLYCOL 3350 17 G/17G
17 POWDER, FOR SOLUTION ORAL DAILY PRN
Status: DISCONTINUED | OUTPATIENT
Start: 2024-04-28 | End: 2024-05-02 | Stop reason: HOSPADM

## 2024-04-28 RX ORDER — ACETAMINOPHEN 160 MG/5ML
650 SOLUTION ORAL EVERY 4 HOURS PRN
Status: DISCONTINUED | OUTPATIENT
Start: 2024-04-28 | End: 2024-05-02 | Stop reason: HOSPADM

## 2024-04-28 RX ORDER — SODIUM CHLORIDE 9 MG/ML
75 INJECTION, SOLUTION INTRAVENOUS CONTINUOUS
Status: ACTIVE | OUTPATIENT
Start: 2024-04-28 | End: 2024-04-28

## 2024-04-28 RX ORDER — METOPROLOL TARTRATE 1 MG/ML
5 INJECTION, SOLUTION INTRAVENOUS EVERY 6 HOURS PRN
Status: DISCONTINUED | OUTPATIENT
Start: 2024-04-28 | End: 2024-05-02 | Stop reason: HOSPADM

## 2024-04-28 RX ORDER — FINASTERIDE 5 MG/1
5 TABLET, FILM COATED ORAL DAILY
Status: DISCONTINUED | OUTPATIENT
Start: 2024-04-28 | End: 2024-05-02 | Stop reason: HOSPADM

## 2024-04-28 RX ORDER — ACETAMINOPHEN 650 MG/1
650 SUPPOSITORY RECTAL EVERY 4 HOURS PRN
Status: DISCONTINUED | OUTPATIENT
Start: 2024-04-28 | End: 2024-05-02 | Stop reason: HOSPADM

## 2024-04-28 RX ORDER — IPRATROPIUM BROMIDE AND ALBUTEROL SULFATE 2.5; .5 MG/3ML; MG/3ML
3 SOLUTION RESPIRATORY (INHALATION)
Status: DISCONTINUED | OUTPATIENT
Start: 2024-04-28 | End: 2024-05-02 | Stop reason: HOSPADM

## 2024-04-28 RX ORDER — TALC
3 POWDER (GRAM) TOPICAL NIGHTLY PRN
Status: DISCONTINUED | OUTPATIENT
Start: 2024-04-28 | End: 2024-04-28 | Stop reason: SDUPTHER

## 2024-04-28 RX ORDER — ONDANSETRON HYDROCHLORIDE 2 MG/ML
4 INJECTION, SOLUTION INTRAVENOUS EVERY 6 HOURS PRN
Status: DISCONTINUED | OUTPATIENT
Start: 2024-04-28 | End: 2024-04-28 | Stop reason: SDUPTHER

## 2024-04-28 RX ORDER — PANTOPRAZOLE SODIUM 40 MG/1
40 TABLET, DELAYED RELEASE ORAL
Status: DISCONTINUED | OUTPATIENT
Start: 2024-04-28 | End: 2024-04-28 | Stop reason: SDUPTHER

## 2024-04-28 RX ORDER — METOPROLOL TARTRATE 25 MG/1
25 TABLET, FILM COATED ORAL ONCE
Status: COMPLETED | OUTPATIENT
Start: 2024-04-28 | End: 2024-04-28

## 2024-04-28 RX ORDER — MEMANTINE HYDROCHLORIDE 5 MG/1
10 TABLET ORAL 2 TIMES DAILY
Status: DISCONTINUED | OUTPATIENT
Start: 2024-04-28 | End: 2024-05-02 | Stop reason: HOSPADM

## 2024-04-28 RX ORDER — ACETAMINOPHEN 325 MG/1
650 TABLET ORAL EVERY 4 HOURS PRN
Status: DISCONTINUED | OUTPATIENT
Start: 2024-04-28 | End: 2024-05-02 | Stop reason: HOSPADM

## 2024-04-28 RX ORDER — TALC
3 POWDER (GRAM) TOPICAL NIGHTLY PRN
Status: DISCONTINUED | OUTPATIENT
Start: 2024-04-28 | End: 2024-05-02 | Stop reason: HOSPADM

## 2024-04-28 RX ORDER — TAMSULOSIN HYDROCHLORIDE 0.4 MG/1
0.4 CAPSULE ORAL DAILY
Status: DISCONTINUED | OUTPATIENT
Start: 2024-04-28 | End: 2024-05-02

## 2024-04-28 RX ORDER — ACETAMINOPHEN 325 MG/1
650 TABLET ORAL EVERY 6 HOURS PRN
Status: DISCONTINUED | OUTPATIENT
Start: 2024-04-28 | End: 2024-04-28 | Stop reason: SDUPTHER

## 2024-04-28 RX ORDER — BUDESONIDE 0.5 MG/2ML
0.5 INHALANT ORAL
Status: DISCONTINUED | OUTPATIENT
Start: 2024-04-28 | End: 2024-05-02 | Stop reason: HOSPADM

## 2024-04-28 RX ORDER — IPRATROPIUM BROMIDE AND ALBUTEROL SULFATE 2.5; .5 MG/3ML; MG/3ML
3 SOLUTION RESPIRATORY (INHALATION) EVERY 4 HOURS PRN
Status: DISCONTINUED | OUTPATIENT
Start: 2024-04-28 | End: 2024-05-02 | Stop reason: HOSPADM

## 2024-04-28 RX ORDER — PANTOPRAZOLE SODIUM 40 MG/10ML
40 INJECTION, POWDER, LYOPHILIZED, FOR SOLUTION INTRAVENOUS
Status: DISCONTINUED | OUTPATIENT
Start: 2024-04-29 | End: 2024-05-02 | Stop reason: HOSPADM

## 2024-04-28 RX ADMIN — Medication 2 L/MIN: at 15:00

## 2024-04-28 RX ADMIN — ASPIRIN 81 MG: 81 TABLET, COATED ORAL at 10:32

## 2024-04-28 RX ADMIN — METOPROLOL TARTRATE 2.5 MG: 1 INJECTION, SOLUTION INTRAVENOUS at 01:34

## 2024-04-28 RX ADMIN — METOPROLOL TARTRATE 25 MG: 25 TABLET, FILM COATED ORAL at 01:34

## 2024-04-28 RX ADMIN — FINASTERIDE 5 MG: 5 TABLET, FILM COATED ORAL at 10:31

## 2024-04-28 RX ADMIN — APIXABAN 2.5 MG: 2.5 TABLET, FILM COATED ORAL at 20:35

## 2024-04-28 RX ADMIN — IPRATROPIUM BROMIDE AND ALBUTEROL SULFATE 3 ML: 2.5; .5 SOLUTION RESPIRATORY (INHALATION) at 20:45

## 2024-04-28 RX ADMIN — MEMANTINE 10 MG: 5 TABLET ORAL at 20:35

## 2024-04-28 RX ADMIN — IPRATROPIUM BROMIDE AND ALBUTEROL SULFATE 3 ML: 2.5; .5 SOLUTION RESPIRATORY (INHALATION) at 14:36

## 2024-04-28 RX ADMIN — Medication 2 L/MIN: at 20:45

## 2024-04-28 RX ADMIN — IPRATROPIUM BROMIDE AND ALBUTEROL SULFATE 3 ML: 2.5; .5 SOLUTION RESPIRATORY (INHALATION) at 08:13

## 2024-04-28 RX ADMIN — Medication 3 MG: at 20:38

## 2024-04-28 RX ADMIN — METOPROLOL TARTRATE 5 MG: 5 INJECTION INTRAVENOUS at 17:34

## 2024-04-28 RX ADMIN — MEMANTINE 10 MG: 5 TABLET ORAL at 10:31

## 2024-04-28 RX ADMIN — APIXABAN 2.5 MG: 2.5 TABLET, FILM COATED ORAL at 10:40

## 2024-04-28 RX ADMIN — METOPROLOL TARTRATE 25 MG: 25 TABLET, FILM COATED ORAL at 20:35

## 2024-04-28 RX ADMIN — POLYETHYLENE GLYCOL 3350 17 G: 17 POWDER, FOR SOLUTION ORAL at 10:33

## 2024-04-28 RX ADMIN — BUDESONIDE 0.5 MG: 0.5 INHALANT RESPIRATORY (INHALATION) at 20:45

## 2024-04-28 RX ADMIN — DONEPEZIL HYDROCHLORIDE 10 MG: 5 TABLET ORAL at 10:32

## 2024-04-28 RX ADMIN — TAMSULOSIN HYDROCHLORIDE 0.4 MG: 0.4 CAPSULE ORAL at 10:32

## 2024-04-28 RX ADMIN — SODIUM CHLORIDE 75 ML/HR: 9 INJECTION, SOLUTION INTRAVENOUS at 10:33

## 2024-04-28 SDOH — SOCIAL STABILITY: SOCIAL INSECURITY: HAVE YOU HAD THOUGHTS OF HARMING ANYONE ELSE?: YES

## 2024-04-28 SDOH — SOCIAL STABILITY: SOCIAL INSECURITY: WERE YOU ABLE TO COMPLETE ALL THE BEHAVIORAL HEALTH SCREENINGS?: YES

## 2024-04-28 SDOH — SOCIAL STABILITY: SOCIAL INSECURITY: DOES ANYONE TRY TO KEEP YOU FROM HAVING/CONTACTING OTHER FRIENDS OR DOING THINGS OUTSIDE YOUR HOME?: NO

## 2024-04-28 SDOH — SOCIAL STABILITY: SOCIAL INSECURITY: HAVE YOU HAD ANY THOUGHTS OF HARMING ANYONE ELSE?: NO

## 2024-04-28 SDOH — SOCIAL STABILITY: SOCIAL INSECURITY: ARE YOU OR HAVE YOU BEEN THREATENED OR ABUSED PHYSICALLY, EMOTIONALLY, OR SEXUALLY BY ANYONE?: NO

## 2024-04-28 SDOH — SOCIAL STABILITY: SOCIAL INSECURITY: DO YOU FEEL ANYONE HAS EXPLOITED OR TAKEN ADVANTAGE OF YOU FINANCIALLY OR OF YOUR PERSONAL PROPERTY?: NO

## 2024-04-28 SDOH — SOCIAL STABILITY: SOCIAL INSECURITY: DO YOU FEEL UNSAFE GOING BACK TO THE PLACE WHERE YOU ARE LIVING?: NO

## 2024-04-28 SDOH — ECONOMIC STABILITY: FOOD INSECURITY: MEALS PER DAY: 2

## 2024-04-28 SDOH — SOCIAL STABILITY: SOCIAL INSECURITY: HAS ANYONE EVER THREATENED TO HURT YOUR FAMILY OR YOUR PETS?: NO

## 2024-04-28 SDOH — SOCIAL STABILITY: SOCIAL INSECURITY: ABUSE: ADULT

## 2024-04-28 SDOH — SOCIAL STABILITY: SOCIAL INSECURITY: ARE THERE ANY APPARENT SIGNS OF INJURIES/BEHAVIORS THAT COULD BE RELATED TO ABUSE/NEGLECT?: NO

## 2024-04-28 ASSESSMENT — ACTIVITIES OF DAILY LIVING (ADL)
OASIS_M1830: 03
CURRENT_FUNCTION: ONE PERSON
PATIENT'S MEMORY ADEQUATE TO SAFELY COMPLETE DAILY ACTIVITIES?: NO
ADEQUATE_TO_COMPLETE_ADL: YES
GROOMING: INDEPENDENT
ENTERING_EXITING_HOME: MODERATE ASSIST
ASSISTIVE_DEVICE: WALKER;DENTURES UPPER;DENTURES LOWER
WALKS IN HOME: NEEDS ASSISTANCE
TOILETING: NEEDS ASSISTANCE
HEARING - RIGHT EAR: FUNCTIONAL
FEEDING YOURSELF: INDEPENDENT
DRESSING YOURSELF: INDEPENDENT
JUDGMENT_ADEQUATE_SAFELY_COMPLETE_DAILY_ACTIVITIES: NO
HEARING - LEFT EAR: FUNCTIONAL
BATHING: INDEPENDENT
LACK_OF_TRANSPORTATION: NO

## 2024-04-28 ASSESSMENT — ENCOUNTER SYMPTOMS
SHORTNESS OF BREATH: 1
CHANGE IN APPETITE: UNCHANGED
BOWEL INCONTINENCE: 1
APPETITE LEVEL: POOR
STOOL FREQUENCY: DAILY
LAST BOWEL MOVEMENT: 66957
MUSCLE WEAKNESS: 1

## 2024-04-28 ASSESSMENT — PAIN - FUNCTIONAL ASSESSMENT
PAIN_FUNCTIONAL_ASSESSMENT: 0-10

## 2024-04-28 ASSESSMENT — COGNITIVE AND FUNCTIONAL STATUS - GENERAL
WALKING IN HOSPITAL ROOM: A LITTLE
CLIMB 3 TO 5 STEPS WITH RAILING: A LITTLE
DAILY ACTIVITIY SCORE: 22
MOBILITY SCORE: 22
DAILY ACTIVITIY SCORE: 22
HELP NEEDED FOR BATHING: A LITTLE
WALKING IN HOSPITAL ROOM: A LITTLE
TOILETING: A LITTLE
PATIENT BASELINE BEDBOUND: NO
CLIMB 3 TO 5 STEPS WITH RAILING: A LITTLE
MOBILITY SCORE: 22
HELP NEEDED FOR BATHING: A LITTLE
TOILETING: A LITTLE

## 2024-04-28 ASSESSMENT — LIFESTYLE VARIABLES
AUDIT-C TOTAL SCORE: 0
AUDIT-C TOTAL SCORE: 0
SKIP TO QUESTIONS 9-10: 1
HOW OFTEN DO YOU HAVE 6 OR MORE DRINKS ON ONE OCCASION: NEVER
HOW OFTEN DO YOU HAVE A DRINK CONTAINING ALCOHOL: NEVER
SMOKING_STATUS: 0
HOW MANY STANDARD DRINKS CONTAINING ALCOHOL DO YOU HAVE ON A TYPICAL DAY: PATIENT DOES NOT DRINK

## 2024-04-28 ASSESSMENT — PAIN SCALES - PAIN ASSESSMENT IN ADVANCED DEMENTIA (PAINAD)
NEGVOCALIZATION: 0 - NONE.
NEGVOCALIZATION: 0
FACIALEXPRESSION: 0
BODYLANGUAGE: 0 - RELAXED.
FACIALEXPRESSION: 0 - SMILING OR INEXPRESSIVE.
BODYLANGUAGE: 0
BREATHING: 0

## 2024-04-28 ASSESSMENT — PATIENT HEALTH QUESTIONNAIRE - PHQ9
1. LITTLE INTEREST OR PLEASURE IN DOING THINGS: NOT AT ALL
2. FEELING DOWN, DEPRESSED OR HOPELESS: NOT AT ALL
SUM OF ALL RESPONSES TO PHQ9 QUESTIONS 1 & 2: 0

## 2024-04-28 ASSESSMENT — PAIN SCALES - GENERAL
PAINLEVEL_OUTOF10: 0 - NO PAIN
PAINLEVEL_OUTOF10: 0 - NO PAIN

## 2024-04-28 NOTE — H&P
Jam Cox is a 89 y.o. male   Shortness of Breath       Patient with a past medical history of   combined systolic diastolic congestive heart failure hypertension paroxysmal atrial fibrillation dementia who was recently admitted for acute congestive heart failure  Started on diuretics and discharged home in stable condition  Was doing well for a day or 2 but then started getting progressively weaker with loss of appetite and difficulty ambulating  Advised him to bring him back to the emergency room where he was noted to be dehydrated with acute kidney injury  Also was noted to be hypoxic with pulse ox in the 80s  Discussed with patient's spouse at bedside  Past Medical History  Past Medical History:   Diagnosis Date    Abnormality of plasma protein, unspecified 05/01/2017    Elevated blood protein    Benign prostatic hyperplasia without lower urinary tract symptoms 05/01/2017    BPH with elevated PSA    Disorder of prostate, unspecified 05/01/2017    Prostate disorder    Elevated prostate specific antigen (PSA) 05/01/2017    Elevated prostate specific antigen (PSA)    Elevated prostate specific antigen (PSA) 05/01/2017    Abnormal prostate specific antigen    Elevated prostate specific antigen (PSA) 05/01/2017    Abnormal PSA    Elevated prostate specific antigen (PSA) 05/01/2017    Abnormal prostate specific antigen test    Epistaxis 05/01/2017    Epistaxis, recurrent    Essential (primary) hypertension 12/19/2022    Hypertension    Frequency of micturition 05/01/2017    Urinary frequency    Hallucinations, unspecified 05/01/2017    Hallucinations    Headache, unspecified 05/01/2017    Bilateral headache    Hematuria, unspecified 05/01/2017    Hematuria    Ischemic cardiomyopathy 12/13/2021    Cardiomyopathy, ischemic    Other amnesia 05/01/2017    Memory loss    Other microscopic hematuria 05/01/2017    Hematuria, microscopic    Other seasonal allergic rhinitis 05/01/2017    Seasonal allergies    Other  secondary cataract, right eye 05/31/2018    Posterior capsular opacification visually significant of right eye    Other specified disorders of the male genital organs 05/01/2017    Scrotal swelling    Personal history of other specified conditions 02/10/2020    History of epistaxis    Presence of intraocular lens 05/01/2017    Pseudophakia    Primary insomnia 05/01/2017    Primary insomnia    Primary open-angle glaucoma, unspecified eye, stage unspecified 05/01/2017    Open angle primary glaucoma    Unspecified atrial flutter (Multi) 05/01/2017    Atrial flutter    Unspecified glaucoma 05/01/2017    Glaucoma    Vitamin D deficiency, unspecified 05/01/2017    Vitamin D deficiency       Surgical History  Past Surgical History:   Procedure Laterality Date    CATARACT EXTRACTION  10/07/2014    Cataract Surgery    MR HEAD ANGIO WO IV CONTRAST  3/3/2018    MR HEAD ANGIO WO IV CONTRAST 3/3/2018 [facility] CLINICAL LEGACY    MR NECK ANGIO WO IV CONTRAST  3/3/2018    MR NECK ANGIO WO IV CONTRAST 3/3/2018 [facility] CLINICAL LEGACY    OTHER SURGICAL HISTORY  10/07/2014    Dental Surgery        Social History  He reports that he has quit smoking. His smoking use included cigarettes. He has never been exposed to tobacco smoke. He has never used smokeless tobacco. He reports that he does not currently use alcohol. He reports that he does not currently use drugs.    Family History  Family History   Problem Relation Name Age of Onset    No Known Problems Mother      No Known Problems Father          Allergies  Ace inhibitors    Review of Systems   Respiratory:  Positive for shortness of breath.         Feeling poorly  Loss of appetite  Increasing weakness and lethargy  All other systems have been reviewed and are negative for complaint.     Vitals:    04/28/24 0730   BP: (!) 99/44   Pulse: 86   Resp: 22   Temp: 36.6 °C (97.9 °F)   SpO2: 94%        Scheduled medications  apixaban, 2.5 mg, oral, BID  aspirin, 81 mg, oral, Daily  budesonide, 0.5  mg, nebulization, BID  donepezil, 10 mg, oral, Daily  finasteride, 5 mg, oral, Daily  ipratropium-albuteroL, 3 mL, nebulization, TID  memantine, 10 mg, oral, BID  metoprolol tartrate, 25 mg, oral, BID  [START ON 4/29/2024] pantoprazole, 40 mg, oral, Daily before breakfast   Or  [START ON 4/29/2024] pantoprazole, 40 mg, intravenous, Daily before breakfast  sacubitriL-valsartan, 1 tablet, oral, BID  tamsulosin, 0.4 mg, oral, Daily      Continuous medications  sodium chloride 0.9%, 75 mL/hr      PRN medications  PRN medications: acetaminophen **OR** acetaminophen **OR** acetaminophen, guaiFENesin, ipratropium-albuteroL, melatonin, ondansetron **OR** ondansetron, polyethylene glycol    Results from last 7 days   Lab Units 04/27/24 2254 04/25/24  0617 04/24/24  0539   WBC AUTO x10*3/uL 11.3 8.3 6.9   HEMOGLOBIN g/dL 14.1 14.5 12.6*   HEMATOCRIT % 43.3 44.5 39.1*   PLATELETS AUTO x10*3/uL 208 212 172     Results from last 7 days   Lab Units 04/27/24 2254 04/25/24  0617 04/24/24  0539 04/23/24  0630 04/22/24  1214   SODIUM mmol/L 135* 141 139   < > 138   POTASSIUM mmol/L 4.6 3.9 3.5   < > 4.8   CHLORIDE mmol/L 100 102 103   < > 104   CO2 mmol/L 26 26 26   < > 25   BUN mg/dL 27* 11 13   < > 13   CREATININE mg/dL 1.62* 1.07 0.97   < > 1.05   CALCIUM mg/dL 9.1 9.0 8.7   < > 9.5   PROTEIN TOTAL g/dL 7.1  --   --   --  7.1   BILIRUBIN TOTAL mg/dL 2.8*  --   --   --  4.0*   ALK PHOS U/L 78  --   --   --  88   ALT U/L 18  --   --   --  33   AST U/L 17  --   --   --  39   GLUCOSE mg/dL 128* 102* 99   < > 114*    < > = values in this interval not displayed.     Results from last 7 days   Lab Units 04/28/24  0027 04/27/24  2254 04/23/24  0630   TROPHS ng/L 21* 27* 23*        XR chest 1 view   Final Result   No acute pulmonary abnormality.   Signed by Stanford Gaffney MD          Physical Exam     Constitutional   General appearance: Alert   Eyes   Inspection of eyes: Sclera and conjunctiva were normal.    Pupil exam: Pupils were  equal in size. Extraocular movements were intact.   Pulmonary   Respiratory assessment: No respiratory distress, normal respiratory rhythm and effort.    Auscultation of Lungs: Bilateral crackles  Cardiovascular   Auscultation of heart: Irregular rhythm  Exam for edema: No peripheral edema.   Abdomen   Abdominal Exam: No bruits, normal bowel sounds, soft, non-tender, no abdominal mass palpated.    Liver and Spleen exam: No hepato-splenomegaly.   Musculoskeletal     Inspection of digits and nails: No clubbing or cyanosis of the fingernails.    Inspection/palpation of joints, bones and muscles: No joint swelling. Normal movement of all extremities.   Skin   Skin inspection: Normal skin color and pigmentation, normal skin turgor and no visible rash.   Neurologic   Cranial nerves: Nerves 2-12 were intact, no focal neuro defects.   Psychiatric   Orientation: Alert x 1-2  Mood and affect: Normal.       Assessment/Plan      #Acute kidney injury  Likely brought on by dehydration secondary to poor oral intake  Plus a slew of medications that was started recently  We will hold medications  Gentle hydration  Monitor BMP    #Hypoxia  Check a CT chest  Continue DuoNebs    #Combined systolic/diastolic congestive heart failure  Holding medications for now    #Hypertension  Low blood pressures  We will hold medications    #Paroxysmal atrial fibrillation  Rates are elevated  Continue Eliquis    #Dementia  PT OT eval

## 2024-04-28 NOTE — ED PROVIDER NOTES
History/Exam limitations: none.   Additional history was obtained from patient, relative(s), and past medical records.          HPI:    Jam Cox is a 89 y.o. male PMH hypertension, combined systolic/diastolic CHF, dementia, paroxysmal A-fib, hyperlipidemia presenting with reports of increased fatigue at home.  Patient's family states that he was markedly fatigued at home and was hard to keep awake.  They noticed that his O2 saturation briefly appeared to be in the 80s at home however when home health nurse rechecked it was in the 90s.  Has a probably had decreased oral intake as well over the last several days since returning from the hospital.  Patient recent hospitalized for volume overload and diuresed.  Compliant with his Eliquis.  Patient is alert, oriented, denies any chest pain, shortness of breath, abdominal pain, diarrhea, dysuria, fever, chills, neck stiffness.          Physical Exam:  ED Triage Vitals [04/27/24 2135]   Temperature Heart Rate Respirations BP   37.1 °C (98.8 °F) 73 18 94/57      Pulse Ox Temp Source Heart Rate Source Patient Position   (!) 93 % Temporal -- --      BP Location FiO2 (%)     -- --        GEN:      Alert, mildly fatigued appearing  Eyes:       PERRL, EOMI  HENT:      NC/AT, OP clear, airway patent, MM  CV:      Normal rate, irregular rhythm, no MRG, no LE pitting edema, 2+ radial and pedal pulses  PULM:     CTAB, no w/r/r, easy WOB, symmetric chest rise  ABD:      Soft, NT, ND, no masses, BS +  :       No CVA TTP  NEURO:   A&Ox3, no focal deficits    MSK:      FROM, no joint deformities or swelling, no e/o trauma  SKIN:       Warm and dry  PSYCH:    Appropriate mood and affect         MDM/ED Course:   Jam Cox is a 89 y.o. male PMH hypertension, combined systolic/diastolic CHF, dementia, paroxysmal A-fib, hyperlipidemia presenting with reports of increased fatigue at home.  Vitals markable for blood pressure 94/57, satting mid 90s on room air.  Exam as documented  above.  Per chart review patient's most recent blood pressures charted are 160 and 101/55, similar.  IV placed and labs drawn.  Differential includes volume overload, pneumonia, inaccurate home measurement, pneumothorax, PE (less likely given Eliquis compliance, lack of chest pain, no current hypoxia).  Patient was given a DuoNeb prior to my assessment, lungs clear.  Venous blood gas 7.44/42, lactate normal.  RSV COVID-negative, influenza negative.  Chemistry with creatinine 1.62 BUN 27, patient has a normal baseline, sodium 135.  Suspect patient may be somewhat dehydration given poor p.o. intake recently and recent diuresis.  CBC reassuring.  Magnesium reassuring.  Troponin negative x 2 less likely ACS.   from last 1015.  Urinalysis reassuring.  Patient was given 500 cc IV fluids with improvement in fatigue.  Blood pressure improved to 108/57.  Reportedly had not taken his home metoprolol 25 mg tartrate this evening, home dose was given in addition to 2.5 mg IV dose given increase in heart rates with A-fib RVR into the 120s.  Patient had successful rate control with rates in the 90s consistently following this.  Patient care signed out to Dr. Shen for continued management in stable condition.    EKG reviewed by me: 9:36 PM atrial fibrillation with RVR, rate 119, left axis, LBBB with appropriate discordance, no STEMI, QTc 475 ms, similar to prior EKGs the exception of new RVR.     ED Course as of 04/30/24 2102   Sat Apr 27, 2024   2207 Patient satting 98% on my initial assessment.  [JM]   Sun Apr 28, 2024   0104 Per family, patient did not take his nighttime medications, heart rate primarily in the low 100s, intermittently will jump to the 110s.  Will give home dose of metoprolol, blood pressure improved to 118/71 with approximate 400 cc IV fluid. [JM]   0111 Patient is having some heart rates in the 110s to 120s, will give 2.5 mg IV metoprolol and his home dose which she reported did not take this evening.  [JM]      ED Course User Index  [JM] Gage Yost MD         Diagnoses as of 04/30/24 2102   YOSEF (acute kidney injury) (CMS-HCC)   Atrial fibrillation, unspecified type (Multi)   Other fatigue         Chronic medical conditions affecting care listed in MDM. I independently reviewed imaging studies and agreed with radiology reads. I reviewed recent and relevant outside records including PCP notes, Prior discharge summaries, and prior radiology reports.    Procedure  Critical Care    Performed by: Gage Yost MD  Authorized by: Gage Yost MD    Critical care provider statement:     Critical care time (minutes):  32    Critical care time was exclusive of:  Separately billable procedures and treating other patients    Critical care was necessary to treat or prevent imminent or life-threatening deterioration of the following conditions: A-fib RVR.    Critical care was time spent personally by me on the following activities:  Development of treatment plan with patient or surrogate, evaluation of patient's response to treatment, discussions with primary provider, examination of patient, ordering and performing treatments and interventions, ordering and review of laboratory studies, ordering and review of radiographic studies, pulse oximetry, re-evaluation of patient's condition and review of old charts    Care discussed with: admitting provider        Diagnosis:   1.  YOSEF  2.  Atrial fibrillation with RVR  3.  Fatigue    Dispo: Hospitalized in stable condition      Disclaimer: Portions of this note were dictated by speech recognition. An attempt at proof reading was made to minimize errors. Minor errors in transcription may be present.  Please call if questions.     Gage Yost MD  04/30/24 2101       Gage Yost MD  04/30/24 2102

## 2024-04-28 NOTE — ED TRIAGE NOTES
Pt presents today with SOB and lethargic starting today. Pt had a visit from his home nurse where he was 88% on RA. Decreased appetite. Pt was released from the hospital Thursday where he was admitted for CHF, ECHO showed EF was 40-45.

## 2024-04-28 NOTE — CARE PLAN
Problem: Pain - Adult  Goal: Verbalizes/displays adequate comfort level or baseline comfort level  Outcome: Progressing     Problem: Safety - Adult  Goal: Free from fall injury  Outcome: Progressing     Problem: Discharge Planning  Goal: Discharge to home or other facility with appropriate resources  Outcome: Progressing     Problem: Chronic Conditions and Co-morbidities  Goal: Patient's chronic conditions and co-morbidity symptoms are monitored and maintained or improved  Outcome: Progressing   The patient's goals for the shift include      The clinical goals for the shift include Patient will wean off oxygen this shift

## 2024-04-29 ENCOUNTER — HOME CARE VISIT (OUTPATIENT)
Dept: HOME HEALTH SERVICES | Facility: HOME HEALTH | Age: 89
End: 2024-04-29
Payer: MEDICARE

## 2024-04-29 LAB
ANION GAP SERPL CALC-SCNC: 15 MMOL/L (ref 10–20)
BUN SERPL-MCNC: 14 MG/DL (ref 6–23)
CALCIUM SERPL-MCNC: 8.7 MG/DL (ref 8.6–10.3)
CHLORIDE SERPL-SCNC: 103 MMOL/L (ref 98–107)
CO2 SERPL-SCNC: 26 MMOL/L (ref 21–32)
CREAT SERPL-MCNC: 1.11 MG/DL (ref 0.5–1.3)
EGFRCR SERPLBLD CKD-EPI 2021: 62 ML/MIN/1.73M*2
ERYTHROCYTE [DISTWIDTH] IN BLOOD BY AUTOMATED COUNT: 14.4 % (ref 11.5–14.5)
EST. AVERAGE GLUCOSE BLD GHB EST-MCNC: 108 MG/DL
GLUCOSE SERPL-MCNC: 133 MG/DL (ref 74–99)
HBA1C MFR BLD: 5.4 %
HCT VFR BLD AUTO: 40.2 % (ref 41–52)
HGB BLD-MCNC: 12.5 G/DL (ref 13.5–17.5)
MCH RBC QN AUTO: 29.6 PG (ref 26–34)
MCHC RBC AUTO-ENTMCNC: 31.1 G/DL (ref 32–36)
MCV RBC AUTO: 95 FL (ref 80–100)
NRBC BLD-RTO: 0 /100 WBCS (ref 0–0)
PLATELET # BLD AUTO: 180 X10*3/UL (ref 150–450)
POTASSIUM SERPL-SCNC: 4.7 MMOL/L (ref 3.5–5.3)
Q ONSET: 212 MS
QRS COUNT: 19 BEATS
QRS DURATION: 126 MS
QT INTERVAL: 338 MS
QTC CALCULATION(BAZETT): 475 MS
QTC FREDERICIA: 424 MS
R AXIS: -34 DEGREES
RBC # BLD AUTO: 4.23 X10*6/UL (ref 4.5–5.9)
SODIUM SERPL-SCNC: 139 MMOL/L (ref 136–145)
T AXIS: 107 DEGREES
T OFFSET: 381 MS
T4 FREE SERPL-MCNC: 1.01 NG/DL (ref 0.61–1.12)
TSH SERPL-ACNC: 0.4 MIU/L (ref 0.44–3.98)
VENTRICULAR RATE: 119 BPM
WBC # BLD AUTO: 9.3 X10*3/UL (ref 4.4–11.3)

## 2024-04-29 PROCEDURE — 2500000001 HC RX 250 WO HCPCS SELF ADMINISTERED DRUGS (ALT 637 FOR MEDICARE OP): Performed by: INTERNAL MEDICINE

## 2024-04-29 PROCEDURE — 2500000002 HC RX 250 W HCPCS SELF ADMINISTERED DRUGS (ALT 637 FOR MEDICARE OP, ALT 636 FOR OP/ED): Mod: MUE | Performed by: INTERNAL MEDICINE

## 2024-04-29 PROCEDURE — 94640 AIRWAY INHALATION TREATMENT: CPT

## 2024-04-29 PROCEDURE — 84439 ASSAY OF FREE THYROXINE: CPT | Performed by: NURSE PRACTITIONER

## 2024-04-29 PROCEDURE — 85027 COMPLETE CBC AUTOMATED: CPT | Performed by: INTERNAL MEDICINE

## 2024-04-29 PROCEDURE — 2500000006 HC RX 250 W HCPCS SELF ADMINISTERED DRUGS (ALT 637 FOR ALL PAYERS): Mod: MUE | Performed by: INTERNAL MEDICINE

## 2024-04-29 PROCEDURE — 94664 DEMO&/EVAL PT USE INHALER: CPT

## 2024-04-29 PROCEDURE — 1090000002 HH PPS REVENUE DEBIT

## 2024-04-29 PROCEDURE — 84443 ASSAY THYROID STIM HORMONE: CPT | Performed by: NURSE PRACTITIONER

## 2024-04-29 PROCEDURE — 97165 OT EVAL LOW COMPLEX 30 MIN: CPT | Mod: GO

## 2024-04-29 PROCEDURE — 36415 COLL VENOUS BLD VENIPUNCTURE: CPT | Performed by: INTERNAL MEDICINE

## 2024-04-29 PROCEDURE — 99232 SBSQ HOSP IP/OBS MODERATE 35: CPT | Performed by: INTERNAL MEDICINE

## 2024-04-29 PROCEDURE — 83036 HEMOGLOBIN GLYCOSYLATED A1C: CPT | Mod: AHULAB | Performed by: NURSE PRACTITIONER

## 2024-04-29 PROCEDURE — 1090000001 HH PPS REVENUE CREDIT

## 2024-04-29 PROCEDURE — 80048 BASIC METABOLIC PNL TOTAL CA: CPT | Performed by: INTERNAL MEDICINE

## 2024-04-29 PROCEDURE — 2500000002 HC RX 250 W HCPCS SELF ADMINISTERED DRUGS (ALT 637 FOR MEDICARE OP, ALT 636 FOR OP/ED): Performed by: EMERGENCY MEDICINE

## 2024-04-29 PROCEDURE — 2500000005 HC RX 250 GENERAL PHARMACY W/O HCPCS: Performed by: INTERNAL MEDICINE

## 2024-04-29 PROCEDURE — 1200000002 HC GENERAL ROOM WITH TELEMETRY DAILY

## 2024-04-29 PROCEDURE — 9420000001 HC RT PATIENT EDUCATION 5 MIN

## 2024-04-29 RX ADMIN — MEMANTINE 10 MG: 5 TABLET ORAL at 20:38

## 2024-04-29 RX ADMIN — FINASTERIDE 5 MG: 5 TABLET, FILM COATED ORAL at 10:26

## 2024-04-29 RX ADMIN — DONEPEZIL HYDROCHLORIDE 10 MG: 5 TABLET ORAL at 10:26

## 2024-04-29 RX ADMIN — IPRATROPIUM BROMIDE AND ALBUTEROL SULFATE 3 ML: 2.5; .5 SOLUTION RESPIRATORY (INHALATION) at 13:45

## 2024-04-29 RX ADMIN — PANTOPRAZOLE SODIUM 40 MG: 40 TABLET, DELAYED RELEASE ORAL at 06:01

## 2024-04-29 RX ADMIN — APIXABAN 2.5 MG: 2.5 TABLET, FILM COATED ORAL at 10:26

## 2024-04-29 RX ADMIN — TAMSULOSIN HYDROCHLORIDE 0.4 MG: 0.4 CAPSULE ORAL at 10:25

## 2024-04-29 RX ADMIN — IPRATROPIUM BROMIDE AND ALBUTEROL SULFATE 3 ML: 2.5; .5 SOLUTION RESPIRATORY (INHALATION) at 07:59

## 2024-04-29 RX ADMIN — IPRATROPIUM BROMIDE AND ALBUTEROL SULFATE 3 ML: 2.5; .5 SOLUTION RESPIRATORY (INHALATION) at 19:14

## 2024-04-29 RX ADMIN — MEMANTINE 10 MG: 5 TABLET ORAL at 10:26

## 2024-04-29 RX ADMIN — BUDESONIDE 0.5 MG: 0.5 INHALANT RESPIRATORY (INHALATION) at 19:14

## 2024-04-29 RX ADMIN — METOPROLOL TARTRATE 25 MG: 25 TABLET, FILM COATED ORAL at 10:26

## 2024-04-29 RX ADMIN — ASPIRIN 81 MG: 81 TABLET, COATED ORAL at 10:26

## 2024-04-29 RX ADMIN — SACUBITRIL AND VALSARTAN 1 TABLET: 24; 26 TABLET, FILM COATED ORAL at 10:25

## 2024-04-29 RX ADMIN — BUDESONIDE 0.5 MG: 0.5 INHALANT RESPIRATORY (INHALATION) at 07:59

## 2024-04-29 RX ADMIN — Medication 2 L/MIN: at 08:00

## 2024-04-29 RX ADMIN — APIXABAN 2.5 MG: 2.5 TABLET, FILM COATED ORAL at 20:38

## 2024-04-29 RX ADMIN — SACUBITRIL AND VALSARTAN 1 TABLET: 24; 26 TABLET, FILM COATED ORAL at 20:38

## 2024-04-29 ASSESSMENT — COGNITIVE AND FUNCTIONAL STATUS - GENERAL
PERSONAL GROOMING: A LITTLE
DAILY ACTIVITIY SCORE: 22
CLIMB 3 TO 5 STEPS WITH RAILING: A LITTLE
DAILY ACTIVITIY SCORE: 19
TOILETING: A LITTLE
WALKING IN HOSPITAL ROOM: A LITTLE
HELP NEEDED FOR BATHING: A LITTLE
DRESSING REGULAR LOWER BODY CLOTHING: A LITTLE
MOBILITY SCORE: 22
DRESSING REGULAR UPPER BODY CLOTHING: A LITTLE
TOILETING: A LITTLE
HELP NEEDED FOR BATHING: A LITTLE

## 2024-04-29 ASSESSMENT — ACTIVITIES OF DAILY LIVING (ADL)
BATHING_ASSISTANCE: MINIMAL
LACK_OF_TRANSPORTATION: NO
ADL_ASSISTANCE: INDEPENDENT

## 2024-04-29 ASSESSMENT — PAIN SCALES - GENERAL: PAINLEVEL_OUTOF10: 0 - NO PAIN

## 2024-04-29 NOTE — PROGRESS NOTES
Occupational Therapy    Evaluation    Patient Name: Jam Cox  MRN: [id number]  Today's Date: 4/29/2024  Time Calculation  Start Time: 1639  Stop Time: 1656  Time Calculation (min): 17 min    Assessment  IP OT Assessment  OT Assessment:  (OT Eval complete, patient is SBA for most tasks. Decreased insight, cues required for all hand placement activities during transfers. Patient would benefit from LOW intensity therapy to maximize functional independence.)  Prognosis: Good  Evaluation/Treatment Tolerance: Patient tolerated treatment well  Medical Staff Made Aware: Yes  End of Session Communication: Bedside nurse  End of Session Patient Position: Bed, 3 rail up, Alarm on  Plan:  Treatment Interventions: ADL retraining, Functional transfer training, Endurance training, Patient/family training, Neuromuscular reeducation  OT Frequency: 3 times per week  OT Discharge Recommendations: Low intensity level of continued care  OT - OK to Discharge: Yes    Subjective   Current Problem:  1. YOSEF (acute kidney injury) (CMS-HCC)        2. Atrial fibrillation, unspecified type (Multi)        3. Other fatigue          General:  General  Reason for Referral:  (89 y/o male with SOB, CHF and increased fatigue with Spo2 88% on admission.)  Past Medical History Relevant to Rehab:   Past Medical History:   Diagnosis Date    Abnormality of plasma protein, unspecified 05/01/2017    Elevated blood protein    Benign prostatic hyperplasia without lower urinary tract symptoms 05/01/2017    BPH with elevated PSA    Disorder of prostate, unspecified 05/01/2017    Prostate disorder    Elevated prostate specific antigen (PSA) 05/01/2017    Elevated prostate specific antigen (PSA)    Elevated prostate specific antigen (PSA) 05/01/2017    Abnormal prostate specific antigen    Elevated prostate specific antigen (PSA) 05/01/2017    Abnormal PSA    Elevated prostate specific antigen (PSA) 05/01/2017    Abnormal prostate specific antigen test     Epistaxis 05/01/2017    Epistaxis, recurrent    Essential (primary) hypertension 12/19/2022    Hypertension    Frequency of micturition 05/01/2017    Urinary frequency    Hallucinations, unspecified 05/01/2017    Hallucinations    Headache, unspecified 05/01/2017    Bilateral headache    Hematuria, unspecified 05/01/2017    Hematuria    Ischemic cardiomyopathy 12/13/2021    Cardiomyopathy, ischemic    Other amnesia 05/01/2017    Memory loss    Other microscopic hematuria 05/01/2017    Hematuria, microscopic    Other seasonal allergic rhinitis 05/01/2017    Seasonal allergies    Other secondary cataract, right eye 05/31/2018    Posterior capsular opacification visually significant of right eye    Other specified disorders of the male genital organs 05/01/2017    Scrotal swelling    Personal history of other specified conditions 02/10/2020    History of epistaxis    Presence of intraocular lens 05/01/2017    Pseudophakia    Primary insomnia 05/01/2017    Primary insomnia    Primary open-angle glaucoma, unspecified eye, stage unspecified 05/01/2017    Open angle primary glaucoma    Unspecified atrial flutter (Multi) 05/01/2017    Atrial flutter    Unspecified glaucoma 05/01/2017    Glaucoma    Vitamin D deficiency, unspecified 05/01/2017    Vitamin D deficiency       Family/Caregiver Present: Yes  Caregiver Feedback:  (Family receptive of recommendations)  Prior to Session Communication: Bedside nurse  Patient Position Received: Bed, 3 rail up, Alarm on  General Comment:  (Patient unsteady with transitional movements, does better with wheeled walker, max cues to use correctly.)  Precautions:  Medical Precautions: Fall precautions    Objective   Cognition:  Overall Cognitive Status:  (Oriented to person, place, cues for month and year)     Home Living:  Type of Home:  (2 story home with 5 steps, R side HR, bed/bath on 2nd floor with 14 steps, L HR, walk in shower, raised toilet seat.)  Lives With: Spouse  Home Adaptive  Equipment:  (Shower seat, grab bars, rollator, wheeled walker, cane, raised toilet seat.)   Prior Function:  Level of [place]: Independent with ADLs and functional transfers  Receives Help From: Family  ADL Assistance: Independent  Homemaking Assistance:  (Spouse performs)  Ambulatory Assistance: Independent (No assistive device)  Hand Dominance: Right  IADL History:  Homemaking Responsibilities:  (Spouse is primarily responsible.)  ADL:  Eating Assistance: Independent  Grooming Assistance: Stand by  Bathing Assistance: Minimal  UE Dressing Assistance: Stand by  LE Dressing Assistance: Stand by (To don socks)  Toileting Assistance with Device: Stand by  Functional Assistance: Stand by  Activity Tolerance:  Endurance: Tolerates 10 - 20 min exercise with multiple rests  Activity Tolerance Comments:  (Fair activity tolerance)  Bed Mobility/Transfers: Bed Mobility  Bed Mobility: Yes  Bed Mobility 1  Bed Mobility 1: Supine to sitting  Level of Assistance 1: Contact guard  Bed Mobility Comments 1:  (Increased time required.)  Bed Mobility 2  Bed Mobility  2: Sitting to supine  Level of Assistance 2: Contact guard  Bed Mobility Comments 2:  (Cues for hand placement)    Transfers  Transfer: Yes  Transfer 1  Transfer From 1: Sit to  Transfer to 1: Stand  Technique 1: Sit to stand  Transfer Device 1: Walker  Transfer Level of Assistance 1: Contact guard  Trials/Comments 1:  (Cues for hand placement)    Sitting Balance:  Static Sitting Balance  Static Sitting-Comment/Number of Minutes:  (Supervision)  Dynamic Sitting Balance  Dynamic Sitting-Comments:  (Supervision)  Standing Balance:  Static Standing Balance  Static Standing-Comment/Number of Minutes:  (CGA required.)  Dynamic Standing Balance  Dynamic Standing-Comments:  (CGA required.)    IADL's:   Homemaking Responsibilities:  (Spouse is primarily responsible.)    Strength:  Strength Comments:  (4/5 B UEs)    Hand Function:  Hand Function  Gross Grasp:  Functional  Coordination: Functional    Outcome Measures: [facility] Daily Activity  Putting on and taking off regular lower body clothing: A little  Bathing (including washing, rinsing, drying): A little  Putting on and taking off regular upper body clothing: A little  Toileting, which includes using toilet, bedpan or urinal: A little  Taking care of personal grooming such as brushing teeth: A little  Eating Meals: None  Daily Activity - Total Score: 19      Education Documentation  ADL Training, taught by Augie Borja OT at 4/29/2024  5:13 PM.  Learner: Patient  Readiness: Acceptance  Method: Explanation  Response: Verbalizes Understanding    Education Comments  No comments found.      Goals:   Encounter Problems       Encounter Problems (Active)       ADLs       Patient will perform UB and LB bathing  with independent level of assistance.        Start:  04/29/24    Expected End:  05/13/24            Patient with complete upper body dressing with independent level of assistance donning and doffing all UE clothes with no adaptive equipment while edge of bed        Start:  04/29/24    Expected End:  05/13/24            Patient with complete lower body dressing with independent level of assistance donning and doffing all LE clothes  with no adaptive equipment while edge of bed        Start:  04/29/24    Expected End:  05/13/24            Patient will complete daily grooming tasks brushing teeth and washing face/hair with independent level of assistance and PRN adaptive equipment while edge of bed .       Start:  04/29/24    Expected End:  05/13/24            Patient will complete toileting including hygiene clothing management/hygiene with independent level of assistance and raised toilet seat.       Start:  04/29/24    Expected End:  05/13/24               BALANCE       Pt will maintain dynamic standing balance during ADL task with independent level of assistance in order to demonstrate decreased risk of falling  and improved postural control.       Start:  04/29/24    Expected End:  05/13/24               TRANSFERS       Patient will perform bed mobility independent level of assistance and bed rails in order to improve safety and independence with mobility       Start:  04/29/24    Expected End:  05/13/24            Patient will complete functional transfer to all surfaces with front wheeled walker with supervision level of assistance.       Start:  04/29/24    Expected End:  05/13/24

## 2024-04-29 NOTE — PROGRESS NOTES
Jam Cox is a 89 y.o. male     Kidney functions are better  We will ask physical therapy to assess the patient's strength and balance  Reintroduce medications  Will monitor 1 more day    Review of Systems     Constitutional: no fever, no chills, not feeling poorly, not feeling tired   Cardiovascular: no chest pain   Respiratory: no cough, wheezing or shortness of breath a  Gastrointestinal: no abdominal pain, no constipation, no melena, no nausea, no diarrhea, no vomiting and no blood in stools.   Neurological: no headache,   All other systems have been reviewed and are negative for complaint.       Vitals:    04/29/24 1250   BP: 120/60   Pulse: 95   Resp: 18   Temp: 36.7 °C (98.1 °F)   SpO2: 100%        Scheduled medications  apixaban, 2.5 mg, oral, BID  aspirin, 81 mg, oral, Daily  budesonide, 0.5 mg, nebulization, BID  donepezil, 10 mg, oral, Daily  finasteride, 5 mg, oral, Daily  ipratropium-albuteroL, 3 mL, nebulization, TID  memantine, 10 mg, oral, BID  metoprolol tartrate, 25 mg, oral, BID  oxygen, , inhalation, Continuous - Inhalation  pantoprazole, 40 mg, oral, Daily before breakfast   Or  pantoprazole, 40 mg, intravenous, Daily before breakfast  sacubitriL-valsartan, 1 tablet, oral, BID  tamsulosin, 0.4 mg, oral, Daily      Continuous medications     PRN medications  PRN medications: acetaminophen **OR** acetaminophen **OR** acetaminophen, guaiFENesin, ipratropium-albuteroL, melatonin, metoprolol, ondansetron **OR** ondansetron, polyethylene glycol    Lab Review   Results from last 7 days   Lab Units 04/29/24  0646 04/27/24 2254 04/25/24  0617   WBC AUTO x10*3/uL 9.3 11.3 8.3   HEMOGLOBIN g/dL 12.5* 14.1 14.5   HEMATOCRIT % 40.2* 43.3 44.5   PLATELETS AUTO x10*3/uL 180 208 212     Results from last 7 days   Lab Units 04/29/24  0646 04/27/24 2254 04/25/24  0617   SODIUM mmol/L 139 135* 141   POTASSIUM mmol/L 4.7 4.6 3.9   CHLORIDE mmol/L 103 100 102   CO2 mmol/L 26 26 26   BUN mg/dL 14 27* 11    CREATININE mg/dL 1.11 1.62* 1.07   CALCIUM mg/dL 8.7 9.1 9.0   PROTEIN TOTAL g/dL  --  7.1  --    BILIRUBIN TOTAL mg/dL  --  2.8*  --    ALK PHOS U/L  --  78  --    ALT U/L  --  18  --    AST U/L  --  17  --    GLUCOSE mg/dL 133* 128* 102*     Results from last 7 days   Lab Units 04/28/24  0027 04/27/24  2254 04/23/24  0630   TROPHS ng/L 21* 27* 23*        CT chest wo IV contrast   Final Result   Hypoventilatory exam.        Mild hazy density in the posterior lower lobes could be atelectasis   or scarring. There is mild localized but ill-defined parenchymal   scarring in the posterolateral mid to lower right upper lobe.        No masslike density in either lung worrisome for tumor or pneumonia.        Mild nonspecific and grossly stable mediastinal adenopathy, most   likely reactive.        Cardiomegaly. Advanced four-vessel coronary artery calcifications.        Moderate to advanced mural calcifications in the thoracic aorta and   advanced mural calcifications in the imaged proximal abdominal aorta.        Mild cholelithiasis.        MACRO:   None        Signed by: Eusebio Jesus 4/29/2024 8:23 AM   Dictation workstation:   [id number]      XR chest 1 view   Final Result   No acute pulmonary abnormality.   Signed by Stanford Gaffney MD            Physical Exam     Constitutional   General appearance: Alert and in no acute distress.     Pulmonary   Respiratory assessment: No respiratory distress, normal respiratory rhythm and effort.    Auscultation of Lungs: Clear bilateral breath sounds.   Cardiovascular   Auscultation of heart: Irregularly irregular  Exam for edema: No peripheral edema.   Abdomen   Abdominal Exam: No bruits, normal bowel sounds, soft, non-tender, no abdominal mass palpated.    Liver and Spleen exam: No hepato-splenomegaly.   Musculoskeletal     Skin inspection: Normal skin color and pigmentation, normal skin turgor and no visible rash.   Neurologic   Cranial nerves: Nerves 2-12 were intact, no  focal neuro defects.  Alert x 1    Assessment/Plan      #Acute kidney injury  Has resolved  We will restart medications including Entresto  Hold Aldactone    #Hypoxia  CT chest shows atelectasis  Continue DuoNebs  Pulmonary hygiene     #Combined systolic/diastolic congestive heart failure  Reintroduce medications and monitor tonight     #Hypertension  Low blood pressures  Restart medications and monitor     #Paroxysmal atrial fibrillation  Rates are elevated  Continue Eliquis     #Dementia  #Deconditioning  PT OT eval

## 2024-04-29 NOTE — PROGRESS NOTES
04/29/24 1304   Discharge Planning   Living Arrangements Children;Family members;Spouse/significant other   Support Systems Children;Family members;Spouse/significant other   Assistance Needed relies on others for dc planning   Type of Residence Private residence   Who is requesting discharge planning? Provider   Home or Post Acute Services In home services   Type of Home Care Services Home nursing visits;Home OT;Home PT   Patient expects to be discharged to: home, resume with [facility]   Does the patient need discharge transport arranged? No   Housing Stability   In the last 12 months, was there a time when you were not able to pay the mortgage or rent on time? N   In the last 12 months, how many places have you lived? 1   In the last 12 months, was there a time when you did not have a steady place to sleep or slept in a shelter (including now)? N   Transportation Needs   In the past 12 months, has lack of transportation kept you from medical appointments or from getting medications? no   In the past 12 months, has lack of transportation kept you from meetings, work, or from getting things needed for daily living? No   Patient Choice   Patient / Family choosing to utilize agency / facility established prior to hospitalization Yes     Met with patient and granddaughter- Carin at bedside who also states she is POA  Explained role of TCC  Patient admitted for CHF/SOB was 88% on RA at home with [facility] nurse    Patient relies on others for his care, lives with his wife and daughter, family provides all his transport to [facility], family will provide transport home at dc, requesting resume referral for [facility]. Also requesting dietician consult, NP aware.

## 2024-04-30 ENCOUNTER — APPOINTMENT (OUTPATIENT)
Dept: CARDIOLOGY | Facility: HOSPITAL | Age: 89
DRG: 683 | End: 2024-04-30
Payer: MEDICARE

## 2024-04-30 ENCOUNTER — APPOINTMENT (OUTPATIENT)
Dept: RADIOLOGY | Facility: HOSPITAL | Age: 89
DRG: 683 | End: 2024-04-30
Payer: MEDICARE

## 2024-04-30 LAB
ANION GAP SERPL CALC-SCNC: 13 MMOL/L (ref 10–20)
BUN SERPL-MCNC: 10 MG/DL (ref 6–23)
CALCIUM SERPL-MCNC: 8.2 MG/DL (ref 8.6–10.3)
CHLORIDE SERPL-SCNC: 104 MMOL/L (ref 98–107)
CO2 SERPL-SCNC: 24 MMOL/L (ref 21–32)
CREAT SERPL-MCNC: 0.93 MG/DL (ref 0.5–1.3)
EGFRCR SERPLBLD CKD-EPI 2021: 77 ML/MIN/1.73M*2
ERYTHROCYTE [DISTWIDTH] IN BLOOD BY AUTOMATED COUNT: 14.3 % (ref 11.5–14.5)
GLUCOSE SERPL-MCNC: 111 MG/DL (ref 74–99)
HCT VFR BLD AUTO: 37.7 % (ref 41–52)
HGB BLD-MCNC: 12.1 G/DL (ref 13.5–17.5)
MCH RBC QN AUTO: 29.1 PG (ref 26–34)
MCHC RBC AUTO-ENTMCNC: 32.1 G/DL (ref 32–36)
MCV RBC AUTO: 91 FL (ref 80–100)
NRBC BLD-RTO: 0 /100 WBCS (ref 0–0)
PLATELET # BLD AUTO: 182 X10*3/UL (ref 150–450)
POTASSIUM SERPL-SCNC: 4.4 MMOL/L (ref 3.5–5.3)
RBC # BLD AUTO: 4.16 X10*6/UL (ref 4.5–5.9)
SODIUM SERPL-SCNC: 137 MMOL/L (ref 136–145)
WBC # BLD AUTO: 9.1 X10*3/UL (ref 4.4–11.3)

## 2024-04-30 PROCEDURE — 2500000005 HC RX 250 GENERAL PHARMACY W/O HCPCS: Performed by: INTERNAL MEDICINE

## 2024-04-30 PROCEDURE — 1090000001 HH PPS REVENUE CREDIT

## 2024-04-30 PROCEDURE — 92611 MOTION FLUOROSCOPY/SWALLOW: CPT | Mod: GN

## 2024-04-30 PROCEDURE — 74230 X-RAY XM SWLNG FUNCJ C+: CPT

## 2024-04-30 PROCEDURE — 1090000002 HH PPS REVENUE DEBIT

## 2024-04-30 PROCEDURE — 94667 MNPJ CHEST WALL 1ST: CPT

## 2024-04-30 PROCEDURE — 71045 X-RAY EXAM CHEST 1 VIEW: CPT

## 2024-04-30 PROCEDURE — 74230 X-RAY XM SWLNG FUNCJ C+: CPT | Performed by: STUDENT IN AN ORGANIZED HEALTH CARE EDUCATION/TRAINING PROGRAM

## 2024-04-30 PROCEDURE — 2500000006 HC RX 250 W HCPCS SELF ADMINISTERED DRUGS (ALT 637 FOR ALL PAYERS): Performed by: INTERNAL MEDICINE

## 2024-04-30 PROCEDURE — 99232 SBSQ HOSP IP/OBS MODERATE 35: CPT | Performed by: INTERNAL MEDICINE

## 2024-04-30 PROCEDURE — 85027 COMPLETE CBC AUTOMATED: CPT | Performed by: INTERNAL MEDICINE

## 2024-04-30 PROCEDURE — 97161 PT EVAL LOW COMPLEX 20 MIN: CPT | Mod: GP

## 2024-04-30 PROCEDURE — 1200000002 HC GENERAL ROOM WITH TELEMETRY DAILY

## 2024-04-30 PROCEDURE — 2500000004 HC RX 250 GENERAL PHARMACY W/ HCPCS (ALT 636 FOR OP/ED): Performed by: INTERNAL MEDICINE

## 2024-04-30 PROCEDURE — 2500000002 HC RX 250 W HCPCS SELF ADMINISTERED DRUGS (ALT 637 FOR MEDICARE OP, ALT 636 FOR OP/ED): Performed by: EMERGENCY MEDICINE

## 2024-04-30 PROCEDURE — 2500000002 HC RX 250 W HCPCS SELF ADMINISTERED DRUGS (ALT 637 FOR MEDICARE OP, ALT 636 FOR OP/ED): Performed by: INTERNAL MEDICINE

## 2024-04-30 PROCEDURE — 92610 EVALUATE SWALLOWING FUNCTION: CPT | Mod: GN

## 2024-04-30 PROCEDURE — 71045 X-RAY EXAM CHEST 1 VIEW: CPT | Performed by: RADIOLOGY

## 2024-04-30 PROCEDURE — 93005 ELECTROCARDIOGRAM TRACING: CPT

## 2024-04-30 PROCEDURE — 3430000001 HC RX 343 DIAGNOSTIC RADIOPHARMACEUTICALS: Performed by: INTERNAL MEDICINE

## 2024-04-30 PROCEDURE — 80048 BASIC METABOLIC PNL TOTAL CA: CPT | Performed by: INTERNAL MEDICINE

## 2024-04-30 PROCEDURE — 2500000001 HC RX 250 WO HCPCS SELF ADMINISTERED DRUGS (ALT 637 FOR MEDICARE OP): Performed by: INTERNAL MEDICINE

## 2024-04-30 PROCEDURE — 9420000001 HC RT PATIENT EDUCATION 5 MIN

## 2024-04-30 PROCEDURE — 94640 AIRWAY INHALATION TREATMENT: CPT

## 2024-04-30 RX ORDER — FUROSEMIDE 40 MG/1
40 TABLET ORAL DAILY
Status: DISCONTINUED | OUTPATIENT
Start: 2024-04-30 | End: 2024-04-30

## 2024-04-30 RX ORDER — FUROSEMIDE 10 MG/ML
20 INJECTION INTRAMUSCULAR; INTRAVENOUS EVERY 24 HOURS
Status: DISCONTINUED | OUTPATIENT
Start: 2024-04-30 | End: 2024-05-02

## 2024-04-30 RX ADMIN — IPRATROPIUM BROMIDE AND ALBUTEROL SULFATE 3 ML: 2.5; .5 SOLUTION RESPIRATORY (INHALATION) at 20:27

## 2024-04-30 RX ADMIN — IPRATROPIUM BROMIDE AND ALBUTEROL SULFATE 3 ML: 2.5; .5 SOLUTION RESPIRATORY (INHALATION) at 12:21

## 2024-04-30 RX ADMIN — FUROSEMIDE 20 MG: 10 INJECTION, SOLUTION INTRAMUSCULAR; INTRAVENOUS at 12:25

## 2024-04-30 RX ADMIN — IPRATROPIUM BROMIDE AND ALBUTEROL SULFATE 3 ML: 2.5; .5 SOLUTION RESPIRATORY (INHALATION) at 07:14

## 2024-04-30 RX ADMIN — TAMSULOSIN HYDROCHLORIDE 0.4 MG: 0.4 CAPSULE ORAL at 09:27

## 2024-04-30 RX ADMIN — APIXABAN 2.5 MG: 2.5 TABLET, FILM COATED ORAL at 09:27

## 2024-04-30 RX ADMIN — METOPROLOL TARTRATE 5 MG: 5 INJECTION INTRAVENOUS at 02:48

## 2024-04-30 RX ADMIN — BUDESONIDE 0.5 MG: 0.5 INHALANT RESPIRATORY (INHALATION) at 07:14

## 2024-04-30 RX ADMIN — METOPROLOL TARTRATE 25 MG: 25 TABLET, FILM COATED ORAL at 09:27

## 2024-04-30 RX ADMIN — DONEPEZIL HYDROCHLORIDE 10 MG: 5 TABLET ORAL at 09:27

## 2024-04-30 RX ADMIN — PANTOPRAZOLE SODIUM 40 MG: 40 TABLET, DELAYED RELEASE ORAL at 06:10

## 2024-04-30 RX ADMIN — ASPIRIN 81 MG: 81 TABLET, COATED ORAL at 09:27

## 2024-04-30 RX ADMIN — BARIUM SULFATE 10 ML: 400 PASTE ORAL at 13:14

## 2024-04-30 RX ADMIN — BARIUM SULFATE 30 ML: 0.81 POWDER, FOR SUSPENSION ORAL at 13:13

## 2024-04-30 RX ADMIN — FINASTERIDE 5 MG: 5 TABLET, FILM COATED ORAL at 09:27

## 2024-04-30 RX ADMIN — MEMANTINE 10 MG: 5 TABLET ORAL at 09:27

## 2024-04-30 RX ADMIN — SACUBITRIL AND VALSARTAN 1 TABLET: 24; 26 TABLET, FILM COATED ORAL at 09:27

## 2024-04-30 RX ADMIN — APIXABAN 2.5 MG: 2.5 TABLET, FILM COATED ORAL at 21:37

## 2024-04-30 RX ADMIN — Medication 2 L/MIN: at 08:00

## 2024-04-30 RX ADMIN — BUDESONIDE 0.5 MG: 0.5 INHALANT RESPIRATORY (INHALATION) at 20:27

## 2024-04-30 RX ADMIN — BARIUM SULFATE 30 ML: 400 SUSPENSION ORAL at 13:14

## 2024-04-30 RX ADMIN — METOPROLOL TARTRATE 25 MG: 25 TABLET, FILM COATED ORAL at 21:37

## 2024-04-30 RX ADMIN — MEMANTINE 10 MG: 5 TABLET ORAL at 21:37

## 2024-04-30 RX ADMIN — SACUBITRIL AND VALSARTAN 1 TABLET: 24; 26 TABLET, FILM COATED ORAL at 21:37

## 2024-04-30 RX ADMIN — BARIUM SULFATE 5 ML: 400 SUSPENSION ORAL at 13:13

## 2024-04-30 ASSESSMENT — COGNITIVE AND FUNCTIONAL STATUS - GENERAL
DRESSING REGULAR UPPER BODY CLOTHING: A LITTLE
TOILETING: A LITTLE
TURNING FROM BACK TO SIDE WHILE IN FLAT BAD: A LITTLE
STANDING UP FROM CHAIR USING ARMS: A LITTLE
DRESSING REGULAR LOWER BODY CLOTHING: A LITTLE
WALKING IN HOSPITAL ROOM: A LITTLE
MOVING TO AND FROM BED TO CHAIR: A LITTLE
DAILY ACTIVITIY SCORE: 19
MOVING TO AND FROM BED TO CHAIR: A LITTLE
STANDING UP FROM CHAIR USING ARMS: A LITTLE
PERSONAL GROOMING: A LITTLE
CLIMB 3 TO 5 STEPS WITH RAILING: A LITTLE
TURNING FROM BACK TO SIDE WHILE IN FLAT BAD: A LITTLE
CLIMB 3 TO 5 STEPS WITH RAILING: A LITTLE
WALKING IN HOSPITAL ROOM: A LITTLE
MOBILITY SCORE: 18
MOVING FROM LYING ON BACK TO SITTING ON SIDE OF FLAT BED WITH BEDRAILS: A LITTLE
MOBILITY SCORE: 19
HELP NEEDED FOR BATHING: A LITTLE

## 2024-04-30 ASSESSMENT — PAIN SCALES - PAIN ASSESSMENT IN ADVANCED DEMENTIA (PAINAD)
CONSOLABILITY: 0
TOTALSCORE: 0
CONSOLABILITY: 0 - NO NEED TO CONSOLE.

## 2024-04-30 ASSESSMENT — PAIN - FUNCTIONAL ASSESSMENT
PAIN_FUNCTIONAL_ASSESSMENT: 0-10
PAIN_FUNCTIONAL_ASSESSMENT: 0-10

## 2024-04-30 ASSESSMENT — ACTIVITIES OF DAILY LIVING (ADL): ADL_ASSISTANCE: INDEPENDENT

## 2024-04-30 ASSESSMENT — PAIN SCALES - GENERAL
PAINLEVEL_OUTOF10: 0 - NO PAIN
PAINLEVEL_OUTOF10: 0 - NO PAIN

## 2024-04-30 NOTE — PROGRESS NOTES
Jam Cox is a 89 y.o. male     Did not do well with swallow eval  Will get an MBS done today  Sounds a little more crackly in the lungs and is back on oxygen    Review of Systems     Constitutional: no fever, no chills, not feeling poorly, not feeling tired   Cardiovascular: no chest pain   Respiratory: Cough  Gastrointestinal: no abdominal pain, no constipation, no melena, no nausea, no diarrhea, no vomiting and no blood in stools.   Neurological: no headache,   All other systems have been reviewed and are negative for complaint.       Vitals:    04/30/24 0847   BP: 114/61   Pulse: (!) 114   Resp: 18   Temp: 36.9 °C (98.5 °F)   SpO2: 93%        Scheduled medications  apixaban, 2.5 mg, oral, BID  aspirin, 81 mg, oral, Daily  budesonide, 0.5 mg, nebulization, BID  donepezil, 10 mg, oral, Daily  finasteride, 5 mg, oral, Daily  ipratropium-albuteroL, 3 mL, nebulization, TID  memantine, 10 mg, oral, BID  metoprolol tartrate, 25 mg, oral, BID  oxygen, , inhalation, Continuous - Inhalation  pantoprazole, 40 mg, oral, Daily before breakfast   Or  pantoprazole, 40 mg, intravenous, Daily before breakfast  sacubitriL-valsartan, 1 tablet, oral, BID  tamsulosin, 0.4 mg, oral, Daily      Continuous medications     PRN medications  PRN medications: acetaminophen **OR** acetaminophen **OR** acetaminophen, guaiFENesin, ipratropium-albuteroL, melatonin, metoprolol, ondansetron **OR** ondansetron, polyethylene glycol    Lab Review   Results from last 7 days   Lab Units 04/30/24  0611 04/29/24  0646 04/27/24  2254   WBC AUTO x10*3/uL 9.1 9.3 11.3   HEMOGLOBIN g/dL 12.1* 12.5* 14.1   HEMATOCRIT % 37.7* 40.2* 43.3   PLATELETS AUTO x10*3/uL 182 180 208     Results from last 7 days   Lab Units 04/30/24  0611 04/29/24  0646 04/27/24  2254   SODIUM mmol/L 137 139 135*   POTASSIUM mmol/L 4.4 4.7 4.6   CHLORIDE mmol/L 104 103 100   CO2 mmol/L 24 26 26   BUN mg/dL 10 14 27*   CREATININE mg/dL 0.93 1.11 1.62*   CALCIUM mg/dL 8.2* 8.7 9.1    PROTEIN TOTAL g/dL  --   --  7.1   BILIRUBIN TOTAL mg/dL  --   --  2.8*   ALK PHOS U/L  --   --  78   ALT U/L  --   --  18   AST U/L  --   --  17   GLUCOSE mg/dL 111* 133* 128*     Results from last 7 days   Lab Units 04/28/24  0027 04/27/24  2254   TROPHS ng/L 21* 27*        XR chest 1 view   Final Result   Development of mild CHF.        MACRO:   None        Signed by: Eusebio Jesus 4/30/2024 11:03 AM   Dictation workstation:   [id number]      CT chest wo IV contrast   Final Result   Hypoventilatory exam.        Mild hazy density in the posterior lower lobes could be atelectasis   or scarring. There is mild localized but ill-defined parenchymal   scarring in the posterolateral mid to lower right upper lobe.        No masslike density in either lung worrisome for tumor or pneumonia.        Mild nonspecific and grossly stable mediastinal adenopathy, most   likely reactive.        Cardiomegaly. Advanced four-vessel coronary artery calcifications.        Moderate to advanced mural calcifications in the thoracic aorta and   advanced mural calcifications in the imaged proximal abdominal aorta.        Mild cholelithiasis.        MACRO:   None        Signed by: Eusebio Jesus 4/29/2024 8:23 AM   Dictation workstation:   [id number]      XR chest 1 view   Final Result   No acute pulmonary abnormality.   Signed by Stanford Gaffney MD      FL modified barium swallow study    (Results Pending)         Physical Exam     Constitutional   General appearance: Alert and in no acute distress.     Pulmonary   Respiratory assessment: No respiratory distress, normal respiratory rhythm and effort.    Auscultation of Lungs: Clear bilateral breath sounds.   Cardiovascular   Auscultation of heart: Irregularly irregular  Exam for edema: No peripheral edema.   Abdomen   Abdominal Exam: No bruits, normal bowel sounds, soft, non-tender, no abdominal mass palpated.    Liver and Spleen exam: No hepato-splenomegaly.   Musculoskeletal     Skin  inspection: Normal skin color and pigmentation, normal skin turgor and no visible rash.   Neurologic   Cranial nerves: Nerves 2-12 were intact, no focal neuro defects.  Alert x 1    Assessment/Plan      #Acute kidney injury  Has resolved  We will restart medications including Entresto  Hold Aldactone    #Hypoxia  CT chest reviewed  Chest x-ray shows mild CHF  Will restart Lasix     #Combined systolic/diastolic congestive heart failure  Restart Lasix     #Hypertension  Low blood pressures  Restart medications and monitor     #Paroxysmal atrial fibrillation  Rates are elevated  Continue Eliquis     #Dementia  #Deconditioning  #Dysphagia  Will get MBS today

## 2024-04-30 NOTE — PROGRESS NOTES
Speech-Language Pathology    Inpatient Speech-Language Pathology Clinical Swallow Evaluation    Patient Name: Jam Cox  MRN: [id number]  : 1930  Today's Date: 24   Time Calculation  Start Time: 0850  Stop Time: 919  Time Calculation (min): 29 min        RECOMMENDATIONS:    Solid Diet Recommendations : NPO  2.   Liquid Diet Recommendations: NPO  3.   Medication Administration Recommendations: Non Oral  4.    MODIFIED BARIUM SWALLOW STUDY    Assessment:  Assessment Results: Patient seen for swallow evaluation. HOB elevated to 90 degrees with family members present at bedside. Patient with generalized OM weakness and dysarthric speech pattern. Ice chip and liquid boluses presented in a sequential manner. Bolus formation slowed with questionable delay in swallow onset. Patient with moist cough following multiple ice chip and water boluses presented via tsp and straw. Audible upper airway congestion noted post swallow. Patient's spouse reporting post prandial cough pta. Suspect aspiration therefore deferred presentation of 3 oz water challenge. Unable to determine swallowing safety at bedside. Will need to complete instrumental swallowing assessment to determine diet tolerance and extent of pharyngeal dysfunction.     Baseline Assessment:  Respiratory Status: Oxygen via nasal cannula  History of Intubation: No        Behavior/Cognition: Alert, Cooperative, Confused  Patient Positioning: Upright in Bed  Baseline Vocal Quality: Weak    Oral-Motor Assessment:  Dentition: Complete Dentures  Oral Motor: Impaired Function (generalized weakness)    Plan:  SLP Plan: Skilled SLP  SLP Frequency: 3x per week  Duration: Current admission  SLP Discharge Recommendations: Continue skilled speech therapy services post discharge  Discussed POC: Patient, Caregiver/family  Discussed Risks/Benefits: Yes  Patient/Caregiver Agreeable: Yes    Goals:   Patient will tolerate least restrictive diet without overt s/s  "aspiration.    General Visit Information:  Patient admitted: 4/27/24    Past Medical History: CHF, COPD, HTN, Dementia. Spouse endorsing dysphagia pta.    Chief Complaint/Reason for admission: Admitted with worsening SOB and \"acute CHF\".     Relevant Imaging Results: CT chest 4/28 \"Mild hazy density in the posterior lower lobes could be atelectasisor scarring. There is mild localized but ill-defined parenchymal scarring in the posterolateral mid to lower right upper lobe.\"    Living Environment: Other (comment)  Reason for Referral: concern for dysphagia  Ordering Physician: Milly Kersn CNP  Current Diet : regular/thin ordered    Pain:  Pain Assessment: 0-10  Pain Score: 0 - No pain    Treatment:    N/A    Inpatient Education:  Patient/Family educated on recommendation of Modified Barium swallow study and procedure  Family education results: gave verbal understanding                                           "

## 2024-04-30 NOTE — PROGRESS NOTES
Physical Therapy    Physical Therapy Evaluation    Patient Name: Jam Cox  MRN: [id number]  Today's Date: 4/30/2024   Time Calculation  Start Time: 1456  Stop Time: 1515  Time Calculation (min): 19 min    Assessment/Plan   PT Assessment  PT Assessment Results: Impaired balance, Decreased endurance, Decreased mobility, Decreased cognition, Impaired judgement, Decreased safety awareness  Rehab Prognosis: Good  Barriers to Discharge: none for PT  Evaluation/Treatment Tolerance: Patient limited by fatigue  Medical Staff Made Aware: Yes  Strengths: Support of Caregivers, Support and attitude of living partners, Premorbid level of function  Barriers to Participation: Comorbidities  End of Session Communication: Bedside nurse  Assessment Comment: Pt presents with  decreased ambulation and transfers, and mild unsteadiness; can benefit from skilled PT intervention to assist with discharge planning and address the aforementioned issues to enable the pt to return to their prior level of function, which was independent.  End of Session Patient Position: Bed, 3 rail up, Alarm on  IP OR SWING BED PT PLAN  Inpatient or Swing Bed: Inpatient  PT Plan  Treatment/Interventions: Bed mobility, Transfer training, Gait training, Stair training, Balance training, Neuromuscular re-education, Endurance training, Therapeutic activity  PT Plan: Skilled PT  PT Frequency: 3 times per week  PT Discharge Recommendations: Low intensity level of continued care, 24 hr supervision due to cognition  PT Recommended Transfer Status: Assist x1  PT - OK to Discharge: Yes (PT POC established)      Subjective   General Visit Information:  General  Reason for Referral: Pt admitted from home with SOB, CHF and increased fatigue.  Past Medical History Relevant to Rehab:   Past Medical History:   Diagnosis Date    Abnormality of plasma protein, unspecified 05/01/2017    Elevated blood protein    Benign prostatic hyperplasia without lower urinary tract  symptoms 05/01/2017    BPH with elevated PSA    Disorder of prostate, unspecified 05/01/2017    Prostate disorder    Elevated prostate specific antigen (PSA) 05/01/2017    Elevated prostate specific antigen (PSA)    Elevated prostate specific antigen (PSA) 05/01/2017    Abnormal prostate specific antigen    Elevated prostate specific antigen (PSA) 05/01/2017    Abnormal PSA    Elevated prostate specific antigen (PSA) 05/01/2017    Abnormal prostate specific antigen test    Epistaxis 05/01/2017    Epistaxis, recurrent    Essential (primary) hypertension 12/19/2022    Hypertension    Frequency of micturition 05/01/2017    Urinary frequency    Hallucinations, unspecified 05/01/2017    Hallucinations    Headache, unspecified 05/01/2017    Bilateral headache    Hematuria, unspecified 05/01/2017    Hematuria    Ischemic cardiomyopathy 12/13/2021    Cardiomyopathy, ischemic    Other amnesia 05/01/2017    Memory loss    Other microscopic hematuria 05/01/2017    Hematuria, microscopic    Other seasonal allergic rhinitis 05/01/2017    Seasonal allergies    Other secondary cataract, right eye 05/31/2018    Posterior capsular opacification visually significant of right eye    Other specified disorders of the male genital organs 05/01/2017    Scrotal swelling    Personal history of other specified conditions 02/10/2020    History of epistaxis    Presence of intraocular lens 05/01/2017    Pseudophakia    Primary insomnia 05/01/2017    Primary insomnia    Primary open-angle glaucoma, unspecified eye, stage unspecified 05/01/2017    Open angle primary glaucoma    Unspecified atrial flutter (Multi) 05/01/2017    Atrial flutter    Unspecified glaucoma 05/01/2017    Glaucoma    Vitamin D deficiency, unspecified 05/01/2017    Vitamin D deficiency     Past Surgical History:   Procedure Laterality Date    CATARACT EXTRACTION  10/07/2014    Cataract Surgery    MR HEAD ANGIO WO IV CONTRAST  3/3/2018    MR HEAD ANGIO WO IV CONTRAST 3/3/2018  [facility]ICAL [facility]GAC[facility]    MR NECK ANGIO WO IV CONTRAST  3/3/2018    MR NECK ANGIO WO IV CONTRAST 3/3/2018 [facility] CLINICAL LEGAC[facility]    OTHER SURGICAL HISTORY  10/07/2014    Dental Surgery       Family/Caregiver Present: Yes  Caregiver Feedback: Spouse and dtr present, very supportive.  Prior to Session Communication: Bedside nurse  Patient Position Received: Bed, 3 rail up, Alarm on  Preferred Learning Style: auditory, kinesthetic  General Comment: Pt asleep upon arrival, keeps falling asleep. Family reports that pt has been very sleepy. Pt wakes up more due to need to use toilet due to Lasix.  Home Living:  Home Living  Type of Home: House  Lives With: Spouse (dtr with + 24/7)  Home Adaptive Equipment:  (rollator)  Home Layout: Two level, Stairs to alternate level with rails  Alternate Level Stairs-Rails: Left  Alternate Level Stairs-Number of Steps: 14  Home Access: Stairs to enter with rails  Entrance Stairs-Rails: Right  Entrance Stairs-Number of Steps: 5  Prior Level of Function:  Prior Function Per Pt/Caregiver Report  Receives Help From: Family  ADL Assistance: Independent  Ambulatory Assistance: Independent  Prior Function Comments: family denies any falls  Precautions:  Precautions  Medical Precautions: Fall precautions, Oxygen therapy device and L/min    Objective   Pain:  Pain Assessment  Pain Assessment: 0-10  Pain Score: 0 - No pain  Cognition:  Cognition  Overall Cognitive Status:  (Pt very sleepy.)  Orientation Level: Disoriented to time, Disoriented to place  Insight: Mild  Impulsive: Moderately    General Assessments:  General Observation  General Observation: Pt very sleepy, exhibits decreased safety at this time. Family reports that he was doing better when he wasn't so tired. Family does feel safe taking him home, and is able to provide 24/7 assist.     Activity Tolerance  Endurance: Tolerates 10 - 20 min exercise with multiple rests    Perception  Inattention/Neglect: Appears intact    Static Sitting  Balance  Static Sitting-Balance Support: Feet supported, No upper extremity supported  Static Sitting-Level of Assistance: Close supervision  Static Sitting-Comment/Number of Minutes: 4  Dynamic Sitting Balance  Dynamic Sitting-Balance Support: Feet supported, No upper extremity supported  Dynamic Sitting-Balance: Forward lean, Reaching for objects  Dynamic Sitting-Comments: CGA    Static Standing Balance  Static Standing-Balance Support: Bilateral upper extremity supported  Static Standing-Level of Assistance: Contact guard  Dynamic Standing Balance  Dynamic Standing-Balance Support: Bilateral upper extremity supported  Dynamic Standing-Balance: Turning  Dynamic Standing-Comments: CGA  Functional Assessments:  Bed Mobility  Bed Mobility: Yes  Bed Mobility 1  Bed Mobility 1: Supine to sitting, Sitting to supine  Level of Assistance 1: Contact guard, Minimal verbal cues    Transfers  Transfer: Yes  Transfer 1  Technique 1: Sit to stand, Stand to sit  Transfer Level of Assistance 1: Contact guard  Trials/Comments 1: Pt with eyes closed, decreased safety, flexed posture. Instructed pt to return to sitting for safety.  Transfers 2  Technique 2: Sit to stand, Stand to sit  Transfer Device 2: Walker  Transfer Level of Assistance 2: Contact guard, Minimal verbal cues, Minimal tactile cues  Trials/Comments 2: Pt provided instruction in safe sit<->stand technique to enable them to move in/out of bed/chair safely; pt required min tactile and verbal cues for proper hand placements and to scoot to edge of sitting surface to facilitate ease of sit->stand, and to line up to and reach back for sitting surface before sitting  Transfers 3  Transfer to 3: Toilet  Technique 3: Sit to stand, Stand to sit  Transfer Device 3: Walker  Transfer Level of Assistance 3: Contact guard    Ambulation/Gait Training  Ambulation/Gait Training Performed: Yes  Ambulation/Gait Training 1  Surface 1: Level tile  Device 1: Rolling walker  Assistance 1:  Contact guard, Minimal verbal cues  Quality of Gait 1: Forward flexed posture (increased gait speed, cues to stay close to ww, cues to maintain safety)  Comments/Distance (ft) 1: 40', 12' x 2  Extremity/Trunk Assessments:  RUE   RUE : Within Functional Limits  LUE   LUE: Within Functional Limits  RLE   RLE : Within Functional Limits  LLE   LLE : Within Functional Limits  Outcome Measures:  [facility] Basic Mobility  Turning from your back to your side while in a flat bed without using bedrails: A little  Moving from lying on your back to sitting on the side of a flat bed without using bedrails: A little  Moving to and from bed to chair (including a wheelchair): A little  Standing up from a chair using your arms (e.g. wheelchair or bedside chair): A little  To walk in hospital room: A little  Climbing 3-5 steps with railing: A little  Basic Mobility - Total Score: 18    Encounter Problems       Encounter Problems (Active)       Balance       STG - Maintains dynamic standing balance without upper extremity support x 5 minutes with mod indep       Start:  04/30/24    Expected End:  05/14/24       INTERVENTIONS:  1. Practice standing with minimal support.  2. Educate patient about standing tolerance.  3. Educate patient about independence with gait, transfers, and ADL's.  4. Educate patient about use of assistive device.  5. Educate patient about self-directed care.            Mobility       STG - Patient will ambulate with ww vs no assistive device x 150' indep       Start:  04/30/24    Expected End:  05/14/24            STG - Patient will ascend and descend 5 steps with R HR with SBA.       Start:  04/30/24    Expected End:  05/14/24            STG - Patient will ascend and descend a flight of stairs with L HR with SBA       Start:  04/30/24    Expected End:  05/14/24               PT Transfers       STG - Transfer from bed to chair indep       Start:  04/30/24    Expected End:  05/14/24            STG - Patient to  transfer to and from sit to supine indep       Start:  04/30/24    Expected End:  05/14/24            STG - Patient will transfer sit to and from stand indep       Start:  04/30/24    Expected End:  05/14/24               Pain - Adult              Education Documentation  Precautions, taught by Saumya Florian, PT at 4/30/2024  3:48 PM.  Learner: Family, Patient  Readiness: Acceptance  Method: Explanation  Response: Verbalizes Understanding, Demonstrated Understanding, Needs Reinforcement    Body Mechanics, taught by Saumya Florian, PT at 4/30/2024  3:48 PM.  Learner: Family, Patient  Readiness: Acceptance  Method: Explanation  Response: Verbalizes Understanding, Demonstrated Understanding, Needs Reinforcement    Mobility Training, taught by Saumya Florian, PT at 4/30/2024  3:48 PM.  Learner: Family, Patient  Readiness: Acceptance  Method: Explanation  Response: Verbalizes Understanding, Demonstrated Understanding, Needs Reinforcement    Education Comments  No comments found.

## 2024-04-30 NOTE — CONSULTS
Nutrition Assessment Note  Nutrition Assessment      Reason for Assessment  Reason for Assessment: Admission nursing screening, Provider consult order MST- 2 and family requested    Chart reviewed and pt visited.    Jam is a 89 y.o. male that was admitted for YOSEF  PMH includes: CHF, HTN, Afib, dementia, recent admission for acute CHF    Pt was sleeping during visit, but family present to answer questions  Family states:  -#  -Typically did 3 meals daily at 100% a few weeks ago before appetite and po intake decreased  -No allergies nor concerns chewing/swallowing prior to this admission  -Agreeable to nutritional supplements if passes MBSS (MBSS passes- RD added mighty shakes BID)    Family requested high calorie food suggested- left family with handouts.    Scheduled medications  apixaban, 2.5 mg, oral, BID  aspirin, 81 mg, oral, Daily  budesonide, 0.5 mg, nebulization, BID  donepezil, 10 mg, oral, Daily  finasteride, 5 mg, oral, Daily  furosemide, 20 mg, intravenous, q24h  ipratropium-albuteroL, 3 mL, nebulization, TID  memantine, 10 mg, oral, BID  metoprolol tartrate, 25 mg, oral, BID  oxygen, , inhalation, Continuous - Inhalation  pantoprazole, 40 mg, oral, Daily before breakfast   Or  pantoprazole, 40 mg, intravenous, Daily before breakfast  sacubitriL-valsartan, 1 tablet, oral, BID  tamsulosin, 0.4 mg, oral, Daily      Continuous medications     PRN medications  PRN medications: acetaminophen **OR** acetaminophen **OR** acetaminophen, guaiFENesin, ipratropium-albuteroL, melatonin, metoprolol, ondansetron **OR** ondansetron, polyethylene glycol       Latest Reference Range & Units 04/29/24 06:46 04/30/24 06:11   GLUCOSE 74 - 99 mg/dL 133 (H) 111 (H)   SODIUM 136 - 145 mmol/L 139 137   POTASSIUM 3.5 - 5.3 mmol/L 4.7 4.4   CHLORIDE 98 - 107 mmol/L 103 104   Bicarbonate 21 - 32 mmol/L 26 24   Anion Gap 10 - 20 mmol/L 15 13   Blood Urea Nitrogen 6 - 23 mg/dL 14 10   Creatinine 0.50 - 1.30 mg/dL 1.11 0.93  "  EGFR >60 mL/min/1.73m*2 62 77   Calcium 8.6 - 10.3 mg/dL 8.7 8.2 (L)   Hemoglobin A1C see below % 5.4    HEMOGLOBIN 13.5 - 17.5 g/dL 12.5 (L) 12.1 (L)   HEMATOCRIT 41.0 - 52.0 % 40.2 (L) 37.7 (L)   (H): Data is abnormally high  (L): Data is abnormally low    Dietary Orders (From admission, onward)       Start     Ordered    04/30/24 1408  Oral nutritional supplements  Until discontinued        Question Answer Comment   Deliver with Lunch Chocolate or Strawberry   Select supplement: Mighty Shake        04/30/24 1407    04/30/24 1343  Adult diet Regular; Pureed 4; Mild thick 2  Diet effective now        Comments: MEDICATION CRUSHED IN APPLE SAUCE OR PUDDING   Question Answer Comment   Diet type Regular    Texture Pureed 4    Fluid consistency Mild thick 2        04/30/24 1342                    History:  Food and Nutrient History  Energy Intake: Poor < 50 %, Good > 75 %  Food and Nutrient History: Prior to a few weeks ago, po intake was 100% of 3 meals daily. Recently, appetite has been and intake <25% of meals (maybe 1-2 meals daily).  Vitamin/Herbal Supplement Use: Pt does not use nutritional supplement but family open to trying.    Anthropometrics:  Height: 175.3 cm (5' 9.02\")  Weight: 73.9 kg (163 lb)  BMI (Calculated): 24.06    Weight Change: 0    Wt Readings from Last 11 Encounters:   04/30/24 73.9 kg (163 lb)   04/25/24 71.4 kg (157 lb 6.5 oz)   04/22/24 75.4 kg (166 lb 3.2 oz)   02/09/24 78.9 kg (174 lb)   12/27/23 77.2 kg (170 lb 3.2 oz)   12/18/23 76.7 kg (169 lb)   09/19/23 77.6 kg (171 lb)   08/10/23 76.5 kg (168 lb 11.2 oz)   05/16/23 78.1 kg (172 lb 3.2 oz)   02/13/23 77.6 kg (171 lb)   02/06/23 77.2 kg (170 lb 4.8 oz)       Weight Change  Significant Weight Loss: Yes  Interpretation of Weight Loss: 1-2% in 1 week       IBW/kg (Dietitian Calculated): 72.7 kg  Percent of IBW: 102 %     I/O last 3 completed shifts:  In: 900 (12.2 mL/kg) [I.V.:900 (12.2 mL/kg)]  Out: - (0 mL/kg)   Weight: 73.9 kg   No " intake/output data recorded.    Energy Needs:    Estimated Energy Needs  Total Energy Estimated Needs (kCal): 1850 kCal  Total Estimated Energy Need per Day (kCal/kg): 2225 kCal/kg  Method for Estimating Needs: 25-30g/kg    Estimated Protein Needs  Total Protein Estimated Needs (g): 75 g  Total Protein Estimated Needs (g/kg): 90 g/kg  Method for Estimating Needs: 1.0-1.2g/kg    Estimated Fluid Needs  Method for Estimating Needs: 1mL/kcal or MD recommendations       Nutrition Focused Physical Findings:  Subcutaneous Fat Loss  Orbital Fat Pads: Mild-Moderate (slight dark circles and slight hollowing) (visual assessment- pt sleeping)  Buccal Fat Pads: Mild-Moderate (flat cheeks, minimal bounce)    Muscle Wasting  Temporalis: Severe (hollowed scooping depression)  Pectoralis (Clavicular Region): Mild-Moderate (some protrusion of clavicle)  Interosseous: Mild-Moderate (slightly depressed area between thumb and forefinger)    Edema  Edema: none       Physical Findings (Nutrition Deficiency/Toxicity)  Skin: Negative       Nutrition Diagnosis   Malnutrition Diagnosis  Patient has Malnutrition Diagnosis: Yes  Diagnosis Status: New  Malnutrition Diagnosis: Severe malnutrition related to acute disease or injury  As Evidenced by: 2% weight loss in 1 week, poor intake prior to hospital admit less than 50% of estimated energy needs for greater than 5 days, moderate fat losses and moderate/severe muscle loss.    Patient has Nutrition Diagnosis: Yes  Nutrition Diagnosis 1: Swallowing difficulity  Diagnosis Status (1): New  Related to (1): dysphagia  As Evidenced by (1): need for pureed/thickened liquids       Nutrition Interventions/Recommendations      Food and/or Nutrient Delivery Interventions  Meals and Snacks: Texture-modified diet, General healthful diet        Medical Food Supplement: Commercial food  Goal: Mighty shakes BID       Additional Interventions: Should appetite remain sub-optimal, pt may benefit from an appetite  stimulant. Pt may benefit from an MVI.    Should pt intake remain sub optimal, consider alternate route for nutrition vs goals of care discussion    Education Documentation  Nutrition Care Manual, taught by Susie Lomas RDN, ENE at 4/30/2024  4:43 PM.  Learner: Family  Readiness: Acceptance  Method: Explanation, Handout  Response: Verbalizes Understanding      Family asked for higher calorie food suggestions- worried that pt is losing wt. Suggested while he is here the mighty shakes since he needs nectar thickened liquids. Most foods that would be appropriate will be dairy products (but thickened). Family can add higher calorie foods to recipes like honey, butter jams.  Left family with handouts: high-calorie, high-protein nutrition therapy and IDDSI thickened liquid nutrition therapy     Nutrition Monitoring and Evaluation   Food and Nutrient Related History  Energy Intake: Estimated energy intake  Criteria: >75% of EEN consumed via po    Fluid Intake: Estimated fluid intake    Amount of Food: Estimated amout of food, Medical food intake  Criteria: >75% of meal trays consumed and nutritional supplements    Mealtime Behavior: Limited number of accepted foods, Refusal to eat/chew, Willingness to try new foods    Anthropometrics: Body Composition/Growth/Weight History  Weight: Measured weight, Weight change    Weight Change: Weight change percentage, Weight gain, Weight loss    Body Mass: Body mass index (BMI)       Biochemical Data, Medical Tests and Procedures  Electrolyte and Renal Panel: Calcium, serum, Calcium, ionized  Criteria: As clinically indicated       Glucose/Endocrine Profile: Glucose, casual  Criteria: As clinically indicated    Nutritional Anemia Profile: Hematocrit, Hemoglobin  Criteria: As clinically indicated       Nutrition Focused Physical Findings  Adipose: Loss of subcutaneous fat       Digestive System: Decrease in appetite    Muscles: Other (Comment)       Other: Overall appearance and  I/Os       Follow Up  Time Spent (min): 70 minutes  Last Date of Nutrition Visit: 04/30/24  Nutrition Follow-Up Needed?: Dietitian to reassess per policy  Follow up Comment: KJ Huang

## 2024-04-30 NOTE — PROGRESS NOTES
04/30/24 1254   Discharge Planning   Patient expects to be discharged to: home with family, resume [facility]     Patient seen by ST, now NPO and MBS needed    ADOD 1-3 days  BARRIERS- MBS results, pt/pot eval  DISPO- home with family and [facility]

## 2024-04-30 NOTE — CARE PLAN
The patient's goals for the shift include      The clinical goals for the shift include patient will maintain oxygen above 89% on room air this shift      Problem: Pain - Adult  Goal: Verbalizes/displays adequate comfort level or baseline comfort level  Outcome: Progressing     Problem: Safety - Adult  Goal: Free from fall injury  Outcome: Progressing     Problem: Discharge Planning  Goal: Discharge to home or other facility with appropriate resources  Outcome: Progressing     Problem: Chronic Conditions and Co-morbidities  Goal: Patient's chronic conditions and co-morbidity symptoms are monitored and maintained or improved  Outcome: Progressing

## 2024-04-30 NOTE — PROCEDURES
"Speech-Language Pathology      Modified Barium Swallow Study     Patient Name: Jam Cox  MRN: [id number]  : 1930  Today's Date: 24  Time Calculation  Start Time: 1245  Stop Time: 1310  Time Calculation (min): 25 min       Recommendations:  PUREE DIET WITH NECTAR LIQUIDS  UPRIGHT AT 90 DEGREES FOR ALL PO  SLOW RATE AND SMALL BOLUS SIZE  MEDICATION CRUSHED IN PUREE CARRIER     Assessment/Impression:    Full detailed SLP/Radiologist Modified Barium Swallow study report can be found under Chart Review tab, Imaging tab and  titled \"[place] Modified Barium Swallow Study\"      Pt. presenting with oropharyngeal dysphagia characterized by reduced bolus formation and swallow delay. Reduced bolus formation with solids exhibited by prolonged mastication with oral residue remaining post swallow. Premature pharyngeal entry viewed with all boluses. Thin/Nectar/Honey liquids extended to piriforms prior to swallow with purees and solids reaching valleculae. Laryngeal penetration viewed with thin and intermittently with nectar liquids. Nectar liquids cleared upper laryngeal vestibule post swallow. Thin liquids remained in airway followed by silent aspiration. Delayed cough exhibited in response to aspiration. No aspiration viewed with nectar liquids. No airway entry viewed with honey liquids or purees/solids. Modification of diet required to maximize swallowing efficiency and safety. Unable to assess effectiveness of compensatory swallowing strategies due to cognitive impairment. Did not perform an esophageal sweep due to body habitus and positioning challenges.    Plan:  Treatment/Interventions:  Patient/family education, Bolus trials  SLP Plan: Skilled SLP warranted  SLP Frequency: 3x per week  Duration: 30 days    Discussed POC: Patient  Discussed Risks/Benefits: Yes  Patient/Caregiver Agreeable: Yes    Pain:   Rating 0-10: 0  Location: n/a       Goals:  Pt. to tolerate least restrictive diet without pulmonary " compromise    Education:   Pt. educated on results of MBS study, recommended diet and recommended safe swallow strategies.  Unable to determine verbal understanding.

## 2024-05-01 LAB
ANION GAP SERPL CALC-SCNC: 14 MMOL/L (ref 10–20)
BUN SERPL-MCNC: 11 MG/DL (ref 6–23)
CALCIUM SERPL-MCNC: 9.2 MG/DL (ref 8.6–10.3)
CHLORIDE SERPL-SCNC: 101 MMOL/L (ref 98–107)
CO2 SERPL-SCNC: 25 MMOL/L (ref 21–32)
CREAT SERPL-MCNC: 1.05 MG/DL (ref 0.5–1.3)
EGFRCR SERPLBLD CKD-EPI 2021: 66 ML/MIN/1.73M*2
ERYTHROCYTE [DISTWIDTH] IN BLOOD BY AUTOMATED COUNT: 14.6 % (ref 11.5–14.5)
GLUCOSE SERPL-MCNC: 123 MG/DL (ref 74–99)
HCT VFR BLD AUTO: 43.2 % (ref 41–52)
HGB BLD-MCNC: 13.8 G/DL (ref 13.5–17.5)
MCH RBC QN AUTO: 29.7 PG (ref 26–34)
MCHC RBC AUTO-ENTMCNC: 31.9 G/DL (ref 32–36)
MCV RBC AUTO: 93 FL (ref 80–100)
NRBC BLD-RTO: 0 /100 WBCS (ref 0–0)
PLATELET # BLD AUTO: 200 X10*3/UL (ref 150–450)
POTASSIUM SERPL-SCNC: 4.3 MMOL/L (ref 3.5–5.3)
RBC # BLD AUTO: 4.64 X10*6/UL (ref 4.5–5.9)
SODIUM SERPL-SCNC: 136 MMOL/L (ref 136–145)
WBC # BLD AUTO: 9.3 X10*3/UL (ref 4.4–11.3)

## 2024-05-01 PROCEDURE — 1200000002 HC GENERAL ROOM WITH TELEMETRY DAILY

## 2024-05-01 PROCEDURE — 2500000004 HC RX 250 GENERAL PHARMACY W/ HCPCS (ALT 636 FOR OP/ED): Performed by: INTERNAL MEDICINE

## 2024-05-01 PROCEDURE — 36415 COLL VENOUS BLD VENIPUNCTURE: CPT | Performed by: INTERNAL MEDICINE

## 2024-05-01 PROCEDURE — 97530 THERAPEUTIC ACTIVITIES: CPT | Mod: GO,CO

## 2024-05-01 PROCEDURE — 92526 ORAL FUNCTION THERAPY: CPT | Mod: GN

## 2024-05-01 PROCEDURE — 2500000002 HC RX 250 W HCPCS SELF ADMINISTERED DRUGS (ALT 637 FOR MEDICARE OP, ALT 636 FOR OP/ED): Performed by: INTERNAL MEDICINE

## 2024-05-01 PROCEDURE — 94640 AIRWAY INHALATION TREATMENT: CPT

## 2024-05-01 PROCEDURE — 2500000005 HC RX 250 GENERAL PHARMACY W/O HCPCS: Performed by: INTERNAL MEDICINE

## 2024-05-01 PROCEDURE — 1090000002 HH PPS REVENUE DEBIT

## 2024-05-01 PROCEDURE — 2500000002 HC RX 250 W HCPCS SELF ADMINISTERED DRUGS (ALT 637 FOR MEDICARE OP, ALT 636 FOR OP/ED): Performed by: EMERGENCY MEDICINE

## 2024-05-01 PROCEDURE — 2500000004 HC RX 250 GENERAL PHARMACY W/ HCPCS (ALT 636 FOR OP/ED): Performed by: NURSE PRACTITIONER

## 2024-05-01 PROCEDURE — 1090000001 HH PPS REVENUE CREDIT

## 2024-05-01 PROCEDURE — 80048 BASIC METABOLIC PNL TOTAL CA: CPT | Performed by: INTERNAL MEDICINE

## 2024-05-01 PROCEDURE — 94668 MNPJ CHEST WALL SBSQ: CPT

## 2024-05-01 PROCEDURE — C9113 INJ PANTOPRAZOLE SODIUM, VIA: HCPCS | Performed by: INTERNAL MEDICINE

## 2024-05-01 PROCEDURE — 97535 SELF CARE MNGMENT TRAINING: CPT | Mod: GO,CO

## 2024-05-01 PROCEDURE — 85027 COMPLETE CBC AUTOMATED: CPT | Performed by: INTERNAL MEDICINE

## 2024-05-01 PROCEDURE — 2500000006 HC RX 250 W HCPCS SELF ADMINISTERED DRUGS (ALT 637 FOR ALL PAYERS): Performed by: INTERNAL MEDICINE

## 2024-05-01 PROCEDURE — 99232 SBSQ HOSP IP/OBS MODERATE 35: CPT | Performed by: INTERNAL MEDICINE

## 2024-05-01 PROCEDURE — 9420000001 HC RT PATIENT EDUCATION 5 MIN

## 2024-05-01 PROCEDURE — 2500000001 HC RX 250 WO HCPCS SELF ADMINISTERED DRUGS (ALT 637 FOR MEDICARE OP): Performed by: INTERNAL MEDICINE

## 2024-05-01 RX ORDER — METHYLPREDNISOLONE 4 MG/1
16 TABLET ORAL ONCE
Status: DISCONTINUED | OUTPATIENT
Start: 2024-05-03 | End: 2024-05-02 | Stop reason: HOSPADM

## 2024-05-01 RX ORDER — METHYLPREDNISOLONE 4 MG/1
12 TABLET ORAL ONCE
Status: DISCONTINUED | OUTPATIENT
Start: 2024-05-04 | End: 2024-05-02 | Stop reason: HOSPADM

## 2024-05-01 RX ORDER — METHYLPREDNISOLONE 4 MG/1
24 TABLET ORAL ONCE
Status: COMPLETED | OUTPATIENT
Start: 2024-05-01 | End: 2024-05-01

## 2024-05-01 RX ORDER — METHYLPREDNISOLONE 4 MG/1
8 TABLET ORAL ONCE
Status: DISCONTINUED | OUTPATIENT
Start: 2024-05-05 | End: 2024-05-02 | Stop reason: HOSPADM

## 2024-05-01 RX ORDER — METHYLPREDNISOLONE 4 MG/1
20 TABLET ORAL ONCE
Status: COMPLETED | OUTPATIENT
Start: 2024-05-02 | End: 2024-05-02

## 2024-05-01 RX ORDER — METHYLPREDNISOLONE 4 MG/1
4 TABLET ORAL ONCE
Status: DISCONTINUED | OUTPATIENT
Start: 2024-05-06 | End: 2024-05-02 | Stop reason: HOSPADM

## 2024-05-01 RX ADMIN — IPRATROPIUM BROMIDE AND ALBUTEROL SULFATE 3 ML: 2.5; .5 SOLUTION RESPIRATORY (INHALATION) at 13:49

## 2024-05-01 RX ADMIN — MEMANTINE 10 MG: 5 TABLET ORAL at 09:26

## 2024-05-01 RX ADMIN — METOPROLOL TARTRATE 25 MG: 25 TABLET, FILM COATED ORAL at 20:26

## 2024-05-01 RX ADMIN — METHYLPREDNISOLONE 24 MG: 4 TABLET ORAL at 12:35

## 2024-05-01 RX ADMIN — SACUBITRIL AND VALSARTAN 1 TABLET: 24; 26 TABLET, FILM COATED ORAL at 20:26

## 2024-05-01 RX ADMIN — FUROSEMIDE 20 MG: 10 INJECTION, SOLUTION INTRAMUSCULAR; INTRAVENOUS at 12:34

## 2024-05-01 RX ADMIN — BUDESONIDE 0.5 MG: 0.5 INHALANT RESPIRATORY (INHALATION) at 19:16

## 2024-05-01 RX ADMIN — Medication 2 L/MIN: at 07:30

## 2024-05-01 RX ADMIN — TAMSULOSIN HYDROCHLORIDE 0.4 MG: 0.4 CAPSULE ORAL at 09:26

## 2024-05-01 RX ADMIN — APIXABAN 2.5 MG: 2.5 TABLET, FILM COATED ORAL at 20:26

## 2024-05-01 RX ADMIN — MEMANTINE 10 MG: 5 TABLET ORAL at 20:26

## 2024-05-01 RX ADMIN — IPRATROPIUM BROMIDE AND ALBUTEROL SULFATE 3 ML: 2.5; .5 SOLUTION RESPIRATORY (INHALATION) at 19:16

## 2024-05-01 RX ADMIN — APIXABAN 2.5 MG: 2.5 TABLET, FILM COATED ORAL at 09:26

## 2024-05-01 RX ADMIN — Medication 3 MG: at 20:26

## 2024-05-01 RX ADMIN — SACUBITRIL AND VALSARTAN 1 TABLET: 24; 26 TABLET, FILM COATED ORAL at 09:26

## 2024-05-01 RX ADMIN — METOPROLOL TARTRATE 25 MG: 25 TABLET, FILM COATED ORAL at 09:26

## 2024-05-01 RX ADMIN — PANTOPRAZOLE SODIUM 40 MG: 40 INJECTION, POWDER, FOR SOLUTION INTRAVENOUS at 06:28

## 2024-05-01 RX ADMIN — DONEPEZIL HYDROCHLORIDE 10 MG: 5 TABLET ORAL at 09:26

## 2024-05-01 RX ADMIN — FINASTERIDE 5 MG: 5 TABLET, FILM COATED ORAL at 09:26

## 2024-05-01 RX ADMIN — ASPIRIN 81 MG: 81 TABLET, COATED ORAL at 09:26

## 2024-05-01 ASSESSMENT — COGNITIVE AND FUNCTIONAL STATUS - GENERAL
PERSONAL GROOMING: A LITTLE
DRESSING REGULAR LOWER BODY CLOTHING: A LOT
DAILY ACTIVITIY SCORE: 16
HELP NEEDED FOR BATHING: A LITTLE
DRESSING REGULAR UPPER BODY CLOTHING: A LITTLE
TOILETING: A LOT
EATING MEALS: A LITTLE

## 2024-05-01 ASSESSMENT — ACTIVITIES OF DAILY LIVING (ADL): HOME_MANAGEMENT_TIME_ENTRY: 8

## 2024-05-01 ASSESSMENT — PAIN - FUNCTIONAL ASSESSMENT
PAIN_FUNCTIONAL_ASSESSMENT: 0-10
PAIN_FUNCTIONAL_ASSESSMENT: 0-10

## 2024-05-01 ASSESSMENT — PAIN SCALES - GENERAL
PAINLEVEL_OUTOF10: 0 - NO PAIN
PAINLEVEL_OUTOF10: 0 - NO PAIN

## 2024-05-01 NOTE — PROGRESS NOTES
Occupational Therapy    Occupational Therapy Treatment    Name: Jam Cox  MRN: [id number]  : 1930  Date: 24  Time Calculation  Start Time: 1054  Stop Time: 1122  Time Calculation (min): 28 min    Assessment:  Prognosis: Fair  Evaluation/Treatment Tolerance: Patient limited by fatigue  Medical Staff Made Aware: Yes  End of Session Communication: Bedside nurse (RN notified)  End of Session Patient Position: Up in chair, Alarm on (RN notified)  Plan:  Treatment Interventions:  (Student participated in performance of tx and documentation under the direct supervision of Pam RAMIREZ SOTA.)  OT Frequency: 2 times per week  OT Discharge Recommendations: Low intensity level of continued care  Equipment Recommended upon Discharge: Wheeled walker  OT Recommended Transfer Status: Moderate assist  OT - OK to Discharge: Yes (Per POC)    Subjective   Previous Visit Info:  OT Last Visit  OT Received On: 24  General:  General  Reason for Referral: Pt admitted from home with SOB, CHF and increased fatigue.  Family/Caregiver Present: Yes (Pt wife and daughters present)  Prior to Session Communication: Bedside nurse  Patient Position Received: Bed, 3 rail up, Alarm on  Preferred Learning Style: auditory, verbal  General Comment: Pt agreeable and compliant to OT tx this day. Increased time with seated rest breaks d/t pt afib and fatigued  Precautions:  Medical Precautions: Oxygen therapy device and L/min, Fall precautions  Vitals:  Vital Signs  Heart Rate: 53 (Ranging from  for OOB activity. RN notified)  Heart Rate Source: Monitor  Pain Assessment:  Pain Assessment  Pain Assessment: 0-10  Pain Score: 0 - No pain     Objective   Cognition:  Orientation Level: Oriented X4  Activities of Daily Living:   Grooming  Grooming Level of Assistance: Close supervision, Minimal verbal cues  Grooming Where Assessed: Chair  Grooming Comments: Pt completed oral care and face washing task (VC provided for  "intiation and sequencing of task)    UE Bathing  UE Bathing Comments: Pt declined despite Max VC for encouragement stating \"daughter got him cleaned up already \"      UE Dressing  UE Dressing Comments: Pt declined despite Max VC for encouragement stating \"daughter changed gown this morning\"    Bed Mobility/Transfers: Bed Mobility  Bed Mobility: Yes  Bed Mobility 1  Bed Mobility 1: Supine to sitting  Level of Assistance 1: Modified independent  Bed Mobility Comments 1: HOB elevated  Bed Mobility 2  Bed Mobility  2: Scooting  Level of Assistance 2: Close supervision, Minimal verbal cues  Bed Mobility Comments 2: to scoot hips towards EOB (VC provided for ecouragement to complete task)    Transfers  Transfer: Yes  Transfer 1  Transfer From 1: Bed to  Transfer to 1: Chair with drop arm  Technique 1: Sit to stand, Stand to sit  Transfer Device 1: Gait belt, Walker  Transfer Level of Assistance 1: Moderate assistance, Moderate verbal cues  Trials/Comments 1: Mod assist d/t retropulsion (VC provided for hand placement, VC/TC provided to maintain upright posture and hand placement)    Sitting Balance:  Static Sitting Balance  Static Sitting-Balance Support: Feet supported, Bilateral upper extremity supported  Static Sitting-Level of Assistance: Close supervision  Static Sitting-Comment/Number of Minutes: Pt tolerated ~ 5 minutes of static sitting EOB. No LOB noted  Standing Balance:  Static Standing Balance  Static Standing-Balance Support: Bilateral upper extremity supported  Static Standing-Level of Assistance: Contact guard  Static Standing-Comment/Number of Minutes: Pt tolerated ~15 secs of static standing with FWW before reporting dizziness. (RN notified)      Outcome Measures:  [facility] Daily Activity  Putting on and taking off regular lower body clothing: A lot  Bathing (including washing, rinsing, drying): A little  Putting on and taking off regular upper body clothing: A little  Toileting, which includes using toilet, " bedpan or urinal: A lot  Taking care of personal grooming such as brushing teeth: A little  Eating Meals: A little  Daily Activity - Total Score: 16        Education Documentation  ADL Training, taught by DEBI Duarte at 5/1/2024 12:29 PM.  Learner: Patient  Readiness: Acceptance  Method: Explanation  Response: Demonstrated Understanding, Needs Reinforcement    Education Comments  No comments found.      Goals:  Encounter Problems       Encounter Problems (Active)       ADLs       Patient will perform UB and LB bathing  with independent level of assistance.  (Not Progressing)       Start:  04/29/24    Expected End:  05/01/24            Patient with complete upper body dressing with independent level of assistance donning and doffing all UE clothes with no adaptive equipment while edge of bed  (Not Progressing)       Start:  04/29/24    Expected End:  05/01/24            Patient with complete lower body dressing with independent level of assistance donning and doffing all LE clothes  with no adaptive equipment while edge of bed  (Not Progressing)       Start:  04/29/24    Expected End:  05/01/24            Patient will complete daily grooming tasks brushing teeth and washing face/hair with independent level of assistance and PRN adaptive equipment while edge of bed . (Progressing)       Start:  04/29/24    Expected End:  05/01/24            Patient will complete toileting including hygiene clothing management/hygiene with independent level of assistance and raised toilet seat. (Not Progressing)       Start:  04/29/24    Expected End:  05/01/24               BALANCE       Pt will maintain dynamic standing balance during ADL task with independent level of assistance in order to demonstrate decreased risk of falling and improved postural control. (Not Progressing)       Start:  04/29/24    Expected End:  05/01/24               TRANSFERS       Patient will perform bed mobility independent level of assistance and  bed rails in order to improve safety and independence with mobility (Progressing)       Start:  04/29/24    Expected End:  05/01/24            Patient will complete functional transfer to all surfaces with front wheeled walker with supervision level of assistance. (Progressing)       Start:  04/29/24    Expected End:  05/01/24

## 2024-05-01 NOTE — PROGRESS NOTES
Jam Cox is a 89 y.o. male   MBS noted  More wheezy today    Review of Systems     Constitutional: no fever, no chills, not feeling poorly, not feeling tired   Cardiovascular: no chest pain   Respiratory: Cough  Gastrointestinal: no abdominal pain, no constipation, no melena, no nausea, no diarrhea, no vomiting and no blood in stools.   Neurological: no headache,   All other systems have been reviewed and are negative for complaint.       Vitals:    05/01/24 1201   BP:    Pulse: 99   Resp:    Temp: 37 °C (98.6 °F)   SpO2: 93%        Scheduled medications  apixaban, 2.5 mg, oral, BID  aspirin, 81 mg, oral, Daily  budesonide, 0.5 mg, nebulization, BID  donepezil, 10 mg, oral, Daily  finasteride, 5 mg, oral, Daily  furosemide, 20 mg, intravenous, q24h  ipratropium-albuteroL, 3 mL, nebulization, TID  memantine, 10 mg, oral, BID  methylPREDNISolone, 24 mg, oral, Once   Followed by  [START ON 5/2/2024] methylPREDNISolone, 20 mg, oral, Once   Followed by  [START ON 5/3/2024] methylPREDNISolone, 16 mg, oral, Once   Followed by  [START ON 5/4/2024] methylPREDNISolone, 12 mg, oral, Once   Followed by  [START ON 5/5/2024] methylPREDNISolone, 8 mg, oral, Once   Followed by  [START ON 5/6/2024] methylPREDNISolone, 4 mg, oral, Once  metoprolol tartrate, 25 mg, oral, BID  oxygen, , inhalation, Continuous - Inhalation  pantoprazole, 40 mg, oral, Daily before breakfast   Or  pantoprazole, 40 mg, intravenous, Daily before breakfast  sacubitriL-valsartan, 1 tablet, oral, BID  tamsulosin, 0.4 mg, oral, Daily      Continuous medications     PRN medications  PRN medications: acetaminophen **OR** acetaminophen **OR** acetaminophen, guaiFENesin, ipratropium-albuteroL, melatonin, metoprolol, ondansetron **OR** ondansetron, polyethylene glycol    Lab Review   Results from last 7 days   Lab Units 05/01/24  0626 04/30/24  0611 04/29/24  0646   WBC AUTO x10*3/uL 9.3 9.1 9.3   HEMOGLOBIN g/dL 13.8 12.1* 12.5*   HEMATOCRIT % 43.2 37.7*  40.2*   PLATELETS AUTO x10*3/uL 200 182 180     Results from last 7 days   Lab Units 05/01/24  0626 04/30/24  0611 04/29/24  0646 04/27/24  2254   SODIUM mmol/L 136 137 139 135*   POTASSIUM mmol/L 4.3 4.4 4.7 4.6   CHLORIDE mmol/L 101 104 103 100   CO2 mmol/L 25 24 26 26   BUN mg/dL 11 10 14 27*   CREATININE mg/dL 1.05 0.93 1.11 1.62*   CALCIUM mg/dL 9.2 8.2* 8.7 9.1   PROTEIN TOTAL g/dL  --   --   --  7.1   BILIRUBIN TOTAL mg/dL  --   --   --  2.8*   ALK PHOS U/L  --   --   --  78   ALT U/L  --   --   --  18   AST U/L  --   --   --  17   GLUCOSE mg/dL 123* 111* 133* 128*     Results from last 7 days   Lab Units 04/28/24  0027 04/27/24  2254   TROPHS ng/L 21* 27*        XR chest 1 view   Final Result   Development of mild CHF.        MACRO:   None        Signed by: Eusebio Jesus 4/30/2024 11:03 AM   Dictation workstation:   [id number]      CT chest wo IV contrast   Final Result   Hypoventilatory exam.        Mild hazy density in the posterior lower lobes could be atelectasis   or scarring. There is mild localized but ill-defined parenchymal   scarring in the posterolateral mid to lower right upper lobe.        No masslike density in either lung worrisome for tumor or pneumonia.        Mild nonspecific and grossly stable mediastinal adenopathy, most   likely reactive.        Cardiomegaly. Advanced four-vessel coronary artery calcifications.        Moderate to advanced mural calcifications in the thoracic aorta and   advanced mural calcifications in the imaged proximal abdominal aorta.        Mild cholelithiasis.        MACRO:   None        Signed by: Eusebio Jesus 4/29/2024 8:23 AM   Dictation workstation:   [id number]      XR chest 1 view   Final Result   No acute pulmonary abnormality.   Signed by Stanford Gaffney MD      FL modified barium swallow study    (Results Pending)         Physical Exam     Constitutional   General appearance: Alert and in no acute distress.     Pulmonary   Respiratory assessment: No  respiratory distress, normal respiratory rhythm and effort.    Auscultation of Lungs: Lateral wheezing  Cardiovascular   Auscultation of heart: Irregularly irregular  Exam for edema: No peripheral edema.   Abdomen   Abdominal Exam: No bruits, normal bowel sounds, soft, non-tender, no abdominal mass palpated.    Liver and Spleen exam: No hepato-splenomegaly.   Musculoskeletal     Skin inspection: Normal skin color and pigmentation, normal skin turgor and no visible rash.   Neurologic   Cranial nerves: Nerves 2-12 were intact, no focal neuro defects.  Alert x 1    Assessment/Plan        #COPD exacerbation  Continue DuoNebs  Add Medrol Dosepak     #Combined systolic/diastolic congestive heart failure  New Lasix     #Hypertension  Stable continue medications     #Paroxysmal atrial fibrillation  Continue Eliquis     #Dementia  #Deconditioning  #Dysphagia  Modified diet commended by MBS

## 2024-05-01 NOTE — PROGRESS NOTES
Speech-Language Pathology    Inpatient  Speech-Language Pathology Dysphagia Treatment    Patient Name: Jam Cox  MRN: [id number]  : 1930  Today's Date: 24   Time Calculation  Start Time: 902  Stop Time: 931  Time Calculation (min): 29 min        Subjective:     Patient alert with confusion. Daughter present for session.    Objective:  Goals:   Pt. to tolerate least restrictive diet without pulmonary compromise       Therapeutic Swallow Intervention : Caregiver Education    Treatment Results/ Swallow Comments: Patient seen for dysphagia treatment. Daughter present at bedside. Reviewed results of MBSS with rationale for modified diet. Daughter reporting providing father with thin liquids. Encouraged family to obtain thickener from nursing staff in the event liquids requested by patient in order to remain consistent with diet recommendations. Concern for aspiration reviewed. Educated daughter on s/s aspiration to be aware of with need to contact medical staff if identified.     Handouts reviewed with swallowing/diet recommendations, compensatory swallowing strategies, instructions for thickened liquids and puree foods, oral hygiene, aspiration precautions and safest method for medication administration. Reinforced need to add thickener to all liquids (I.e. water, coffee, tea, soda, milk, juices, soup etc) in order to maintain safety and reduce aspiration risk. Avoidance of adding ice to thickened liquids reinforced to prevent dilution of liquid consistency.    POC discussed with recommendation for ongoing SLP to determine readiness for diet advancement. Plan to proceed with SLP guided therapeutic trials to assess patient tolerance advised.     Assessment:  SLP TX Intervention Outcome: Making Progress Towards Goals    Patient with good family support. Reinforcement of diet recommendations by family needed as patient unable to self regulate and implement same independently. Ongoing SLP services  required to determine diet tolerance and to assess potential for diet advancement.     Plan:    CONTINUE PUREE/NECTAR LIQUID DIET   MEDICATION CRUSHED IN PUREE CARRIER  SLP TO FOLLOW FOR DYSPHAGIA MANAGEMENT    SLP TX Plan: Continue Plan of Care  SLP Plan: Skilled SLP  SLP Frequency: 3x per week  Duration: Current admission  SLP Discharge Recommendations: Continue skilled speech therapy services post discharge  Discussed POC: Patient, Caregiver/family  Discussed Risks/Benefits: Yes  Patient/Caregiver Agreeable: Yes

## 2024-05-02 ENCOUNTER — DOCUMENTATION (OUTPATIENT)
Dept: HOME HEALTH SERVICES | Facility: HOME HEALTH | Age: 89
End: 2024-05-02
Payer: MEDICARE

## 2024-05-02 ENCOUNTER — APPOINTMENT (OUTPATIENT)
Dept: CARDIOLOGY | Facility: HOSPITAL | Age: 89
DRG: 683 | End: 2024-05-02
Payer: MEDICARE

## 2024-05-02 VITALS
HEART RATE: 103 BPM | RESPIRATION RATE: 18 BRPM | DIASTOLIC BLOOD PRESSURE: 54 MMHG | BODY MASS INDEX: 24.14 KG/M2 | WEIGHT: 163 LBS | SYSTOLIC BLOOD PRESSURE: 108 MMHG | OXYGEN SATURATION: 96 % | HEIGHT: 69 IN | TEMPERATURE: 97.9 F

## 2024-05-02 LAB
ANION GAP SERPL CALC-SCNC: 13 MMOL/L (ref 10–20)
BUN SERPL-MCNC: 18 MG/DL (ref 6–23)
CALCIUM SERPL-MCNC: 8.7 MG/DL (ref 8.6–10.3)
CHLORIDE SERPL-SCNC: 104 MMOL/L (ref 98–107)
CO2 SERPL-SCNC: 24 MMOL/L (ref 21–32)
CREAT SERPL-MCNC: 1.13 MG/DL (ref 0.5–1.3)
EGFRCR SERPLBLD CKD-EPI 2021: 61 ML/MIN/1.73M*2
ERYTHROCYTE [DISTWIDTH] IN BLOOD BY AUTOMATED COUNT: 14.4 % (ref 11.5–14.5)
GLUCOSE SERPL-MCNC: 133 MG/DL (ref 74–99)
HCT VFR BLD AUTO: 36.4 % (ref 41–52)
HGB BLD-MCNC: 11.8 G/DL (ref 13.5–17.5)
MAGNESIUM SERPL-MCNC: 2.1 MG/DL (ref 1.6–2.4)
MCH RBC QN AUTO: 29.4 PG (ref 26–34)
MCHC RBC AUTO-ENTMCNC: 32.4 G/DL (ref 32–36)
MCV RBC AUTO: 91 FL (ref 80–100)
NRBC BLD-RTO: 0 /100 WBCS (ref 0–0)
PLATELET # BLD AUTO: 200 X10*3/UL (ref 150–450)
POTASSIUM SERPL-SCNC: 4.2 MMOL/L (ref 3.5–5.3)
RBC # BLD AUTO: 4.01 X10*6/UL (ref 4.5–5.9)
SODIUM SERPL-SCNC: 137 MMOL/L (ref 136–145)
WBC # BLD AUTO: 9.8 X10*3/UL (ref 4.4–11.3)

## 2024-05-02 PROCEDURE — 94640 AIRWAY INHALATION TREATMENT: CPT

## 2024-05-02 PROCEDURE — 83735 ASSAY OF MAGNESIUM: CPT | Performed by: NURSE PRACTITIONER

## 2024-05-02 PROCEDURE — 99239 HOSP IP/OBS DSCHRG MGMT >30: CPT | Performed by: INTERNAL MEDICINE

## 2024-05-02 PROCEDURE — 2500000002 HC RX 250 W HCPCS SELF ADMINISTERED DRUGS (ALT 637 FOR MEDICARE OP, ALT 636 FOR OP/ED): Performed by: EMERGENCY MEDICINE

## 2024-05-02 PROCEDURE — 9420000001 HC RT PATIENT EDUCATION 5 MIN

## 2024-05-02 PROCEDURE — 1090000002 HH PPS REVENUE DEBIT

## 2024-05-02 PROCEDURE — 2500000001 HC RX 250 WO HCPCS SELF ADMINISTERED DRUGS (ALT 637 FOR MEDICARE OP): Performed by: INTERNAL MEDICINE

## 2024-05-02 PROCEDURE — 80048 BASIC METABOLIC PNL TOTAL CA: CPT | Performed by: INTERNAL MEDICINE

## 2024-05-02 PROCEDURE — 2500000004 HC RX 250 GENERAL PHARMACY W/ HCPCS (ALT 636 FOR OP/ED): Performed by: NURSE PRACTITIONER

## 2024-05-02 PROCEDURE — 1090000001 HH PPS REVENUE CREDIT

## 2024-05-02 PROCEDURE — 36415 COLL VENOUS BLD VENIPUNCTURE: CPT | Performed by: INTERNAL MEDICINE

## 2024-05-02 PROCEDURE — 94668 MNPJ CHEST WALL SBSQ: CPT

## 2024-05-02 PROCEDURE — 2500000002 HC RX 250 W HCPCS SELF ADMINISTERED DRUGS (ALT 637 FOR MEDICARE OP, ALT 636 FOR OP/ED): Performed by: INTERNAL MEDICINE

## 2024-05-02 PROCEDURE — 93005 ELECTROCARDIOGRAM TRACING: CPT

## 2024-05-02 PROCEDURE — 85027 COMPLETE CBC AUTOMATED: CPT | Performed by: INTERNAL MEDICINE

## 2024-05-02 RX ORDER — METHYLPREDNISOLONE 4 MG/1
TABLET ORAL DAILY
Qty: 15 TABLET | Refills: 0 | Status: CANCELLED | OUTPATIENT
Start: 2024-05-02 | End: 2024-05-07

## 2024-05-02 RX ORDER — FUROSEMIDE 40 MG/1
40 TABLET ORAL DAILY
Status: DISCONTINUED | OUTPATIENT
Start: 2024-05-02 | End: 2024-05-02 | Stop reason: HOSPADM

## 2024-05-02 RX ORDER — METHYLPREDNISOLONE 4 MG/1
TABLET ORAL DAILY
Qty: 10 TABLET | Refills: 0 | Status: SHIPPED | OUTPATIENT
Start: 2024-05-03 | End: 2024-05-15 | Stop reason: HOSPADM

## 2024-05-02 RX ADMIN — METHYLPREDNISOLONE 20 MG: 4 TABLET ORAL at 10:29

## 2024-05-02 RX ADMIN — PANTOPRAZOLE SODIUM 40 MG: 40 TABLET, DELAYED RELEASE ORAL at 06:01

## 2024-05-02 RX ADMIN — BUDESONIDE 0.5 MG: 0.5 INHALANT RESPIRATORY (INHALATION) at 07:29

## 2024-05-02 RX ADMIN — IPRATROPIUM BROMIDE AND ALBUTEROL SULFATE 3 ML: 2.5; .5 SOLUTION RESPIRATORY (INHALATION) at 07:29

## 2024-05-02 RX ADMIN — MEMANTINE 10 MG: 5 TABLET ORAL at 10:29

## 2024-05-02 RX ADMIN — FINASTERIDE 5 MG: 5 TABLET, FILM COATED ORAL at 10:29

## 2024-05-02 RX ADMIN — METOPROLOL TARTRATE 25 MG: 25 TABLET, FILM COATED ORAL at 10:29

## 2024-05-02 RX ADMIN — ASPIRIN 81 MG: 81 TABLET, COATED ORAL at 10:29

## 2024-05-02 RX ADMIN — APIXABAN 2.5 MG: 2.5 TABLET, FILM COATED ORAL at 10:29

## 2024-05-02 RX ADMIN — DONEPEZIL HYDROCHLORIDE 10 MG: 5 TABLET ORAL at 10:29

## 2024-05-02 RX ADMIN — FUROSEMIDE 40 MG: 40 TABLET ORAL at 10:29

## 2024-05-02 ASSESSMENT — PAIN SCALES - GENERAL: PAINLEVEL_OUTOF10: 0 - NO PAIN

## 2024-05-02 ASSESSMENT — COGNITIVE AND FUNCTIONAL STATUS - GENERAL
DRESSING REGULAR UPPER BODY CLOTHING: A LITTLE
MOBILITY SCORE: 18
TOILETING: A LITTLE
TURNING FROM BACK TO SIDE WHILE IN FLAT BAD: A LITTLE
CLIMB 3 TO 5 STEPS WITH RAILING: A LOT
DRESSING REGULAR LOWER BODY CLOTHING: A LOT
PERSONAL GROOMING: A LITTLE
DAILY ACTIVITIY SCORE: 18
WALKING IN HOSPITAL ROOM: A LITTLE
MOVING TO AND FROM BED TO CHAIR: A LITTLE
STANDING UP FROM CHAIR USING ARMS: A LITTLE
HELP NEEDED FOR BATHING: A LITTLE

## 2024-05-02 NOTE — HH CARE COORDINATION
Home Care received a Referral to Resume Care for Nursing, Physical Therapy, Occupational Therapy, and Registered Dietician. We have processed the referral for a Resumption of Care on 5.3 or 5.4.2024.     If you have any questions or concerns, please feel free to contact us at [phone]. Follow the prompts, enter your five digit zip code, and you will be directed to your care team on CENTL 3.

## 2024-05-02 NOTE — DISCHARGE SUMMARY
Discharge Diagnosis  YOSEF (acute kidney injury) (CMS-[facility])    Issues Requiring Follow-Up  Follow-up BMP  Monitor blood pressure    Test Results Pending At Discharge  Pending Labs       Order Current Status    Extra Urine Gray Tube Collected (04/28/24 6019)    Urinalysis with Reflex Culture and Microscopic In process            Hospital Course   Patient with a past medical history significant for combined systolic diastolic congestive heart failure hypertension dyslipidemia paroxysmal atrial fibrillation COPD and dementia who was recently admitted and treated for acute congestive heart failure was discharged home  Got progressively weaker and lethargic when at home readmitted with acute kidney injury and low blood pressures  Heart medications held and gentle hydration given with improvement and resolution of acute kidney injury  Medications then reintroduced at a lower dose  Also had a COPD exacerbation which was treated with a steroid taper  Now off oxygen  Will discharge the patient home on half dose of Entresto  Lasix 40 every day  We have stopped the patient's spironolactone  Nebulizers at home for COPD  Will discharge in stable condition with home health care    Pertinent Physical Exam At Time of Discharge  Physical Exam      Constitutional   General appearance: Alert and in no acute distress.     Pulmonary   Respiratory assessment: No respiratory distress, normal respiratory rhythm and effort.    Auscultation of Lungs: Rhonchi  Cardiovascular   Auscultation of heart: Apical pulse normal, heart rate and rhythm normal, normal S1 and S2, no murmurs and no pericardial rub.    Exam for edema: No peripheral edema.   Abdomen   Abdominal Exam: No bruits, normal bowel sounds, soft, non-tender, no abdominal mass palpated.    Liver and Spleen exam: No hepato-splenomegaly.   Musculoskeletal   Examination of gait: Normal.    Inspection of digits and nails: No clubbing or cyanosis of the fingernails.    Inspection/palpation of  joints, bones and muscles: No joint swelling. Normal movement of all extremities.   Skin   Skin inspection: Normal skin color and pigmentation, normal skin turgor and no visible rash.   Neurologic   Cranial nerves: Nerves 2-12 were intact, no focal neuro defects.       Home Medications     Medication List      START taking these medications     methylPREDNISolone 4 mg tablet; Commonly known as: Medrol; Take 4   tablets (16 mg) by mouth once daily for 1 day, THEN 3 tablets (12 mg) once   daily for 1 day, THEN 2 tablets (8 mg) once daily for 1 day, THEN 1 tablet   (4 mg) once daily for 1 day. Do not fill before May 3, 2024.; Start taking   on: May 3, 2024     CHANGE how you take these medications     sacubitriL-valsartan 24-26 mg tablet; Commonly known as: Entresto; What   changed: how much to take, additional instructions     CONTINUE taking these medications     apixaban 5 mg tablet; Commonly known as: Eliquis; Take 1 tablet (5 mg)   by mouth 2 times a day.   aspirin 81 mg EC tablet   atorvastatin 10 mg tablet; Commonly known as: Lipitor; Take 1 tablet (10   mg) by mouth once daily.   cholecalciferol 50 mcg (2,000 unit) capsule; Commonly known as: Vitamin   D-3   clindamycin 1 % lotion; Commonly known as: Cleocin T; Apply topically 2   times a day.   donepezil 10 mg tablet; Commonly known as: Aricept; Take 1 tablet (10   mg) by mouth once daily.   finasteride 5 mg tablet; Commonly known as: Proscar; Take 1 tablet (5   mg) by mouth once daily.   fluticasone propion-salmeteroL 250-50 mcg/dose diskus inhaler; Commonly   known as: Advair Diskus; Inhale 1 puff 2 times a day. Rinse mouth with   water after use to reduce aftertaste and incidence of candidiasis. Do not   swallow.   furosemide 40 mg tablet; Commonly known as: Lasix; Take 1 tablet (40 mg)   by mouth once daily.   ipratropium-albuteroL 0.5-2.5 mg/3 mL nebulizer solution; Commonly known   as: Duo-Neb; Take 3 mL by nebulization every 4 hours if needed for    wheezing or shortness of breath.   Jardiance 10 mg; Generic drug: empagliflozin; Take 1 tablet (10 mg) by   mouth once daily.   memantine 10 mg tablet; Commonly known as: Namenda; Take 1 tablet (10   mg) by mouth 2 times a day.   metoprolol tartrate 25 mg tablet; Commonly known as: Lopressor; Take 1   tablet (25 mg) by mouth 2 times a day.   potassium chloride CR 20 mEq ER tablet; Commonly known as: Klor-Con M20;   Take 1 tablet (20 mEq) by mouth once daily.   tamsulosin 0.4 mg 24 hr capsule; Commonly known as: Flomax; Take 1   capsule (0.4 mg) by mouth once daily.   Ultra-Light Rollator misc; Generic drug: walker; 1 each once daily.   VITAMIN B COMPLEX ORAL     STOP taking these medications     spironolactone 25 mg tablet; Commonly known as: Aldactone       Outpatient Follow-Up  Future Appointments   Date Time Provider Department Center   5/15/2024  1:30 PM [facility] [id number] ROOM [id number] [facility]   6/12/2024  3:00 PM Sanjay Black MD [id number] [facility]   7/25/2024 10:30 AM Malcolm Shen MD [id number] [facility]   4/10/2025 10:45 AM Sherrell Malcolm MD [id number] [facility]     Patient seen at bedside. Events from the last visit reviewed. Discussed with staff. Results of tests and investigations from last visit reviewed and discussed with patient/Family. Electronic chart on [facility] reviewed. Input / Recommendations  from consultants  appreciated and reviewed and agreed with.     discharge summary and profile completed. medications reviewed and discussed with patient and family.  scripts completed and signed.     total discharge time in excess of 30 minutes.    Malcolm Shen MD

## 2024-05-02 NOTE — CARE PLAN
Problem: Pain - Adult  Goal: Verbalizes/displays adequate comfort level or baseline comfort level  Outcome: Adequate for Discharge     Problem: Safety - Adult  Goal: Free from fall injury  Outcome: Adequate for Discharge     Problem: Discharge Planning  Goal: Discharge to home or other facility with appropriate resources  Outcome: Adequate for Discharge     Problem: Chronic Conditions and Co-morbidities  Goal: Patient's chronic conditions and co-morbidity symptoms are monitored and maintained or improved  Outcome: Adequate for Discharge     Problem: Skin  Goal: Decreased wound size/increased tissue granulation at next dressing change  Outcome: Adequate for Discharge  Goal: Participates in plan/prevention/treatment measures  Outcome: Adequate for Discharge  Goal: Prevent/manage excess moisture  Outcome: Adequate for Discharge  Goal: Prevent/minimize sheer/friction injuries  Outcome: Adequate for Discharge  Goal: Promote/optimize nutrition  Outcome: Adequate for Discharge  Goal: Promote skin healing  Outcome: Adequate for Discharge

## 2024-05-02 NOTE — PROGRESS NOTES
Jam Cox is a 89 y.o. male on day 4 of admission presenting with YOSEF (acute kidney injury) (CMS-formerly [facility]).     05/02/24 0803   Discharge Planning   Home or Post Acute Services In home services   Type of Home Care Services Home nursing visits;Home OT;Home PT   Patient expects to be discharged to: Home with family/ resume [facility]     Plan: continues treatment for CHF,COPD. MBS complete, speech recommendations puree/nectar thick liquids. Transitioned to PO medrol.   Disposition: Home/ resume [facility]  ADOD:  today/tomorrow      Zelda Holt RN

## 2024-05-03 ENCOUNTER — HOME CARE VISIT (OUTPATIENT)
Dept: HOME HEALTH SERVICES | Facility: HOME HEALTH | Age: 89
End: 2024-05-03
Payer: MEDICARE

## 2024-05-03 ENCOUNTER — HOSPITAL ENCOUNTER (INPATIENT)
Facility: HOSPITAL | Age: 89
LOS: 6 days | Discharge: HOME | DRG: 871 | End: 2024-05-09
Attending: EMERGENCY MEDICINE | Admitting: STUDENT IN AN ORGANIZED HEALTH CARE EDUCATION/TRAINING PROGRAM
Payer: MEDICARE

## 2024-05-03 ENCOUNTER — APPOINTMENT (OUTPATIENT)
Dept: RADIOLOGY | Facility: HOSPITAL | Age: 89
DRG: 871 | End: 2024-05-03
Payer: MEDICARE

## 2024-05-03 ENCOUNTER — CLINICAL SUPPORT (OUTPATIENT)
Dept: EMERGENCY MEDICINE | Facility: HOSPITAL | Age: 89
DRG: 871 | End: 2024-05-03
Payer: MEDICARE

## 2024-05-03 ENCOUNTER — APPOINTMENT (OUTPATIENT)
Dept: CARDIOLOGY | Facility: HOSPITAL | Age: 89
DRG: 871 | End: 2024-05-03
Payer: MEDICARE

## 2024-05-03 DIAGNOSIS — J18.9 PNEUMONIA OF RIGHT LOWER LOBE DUE TO INFECTIOUS ORGANISM: ICD-10-CM

## 2024-05-03 DIAGNOSIS — A41.9 SEPSIS, DUE TO UNSPECIFIED ORGANISM, UNSPECIFIED WHETHER ACUTE ORGAN DYSFUNCTION PRESENT (MULTI): ICD-10-CM

## 2024-05-03 DIAGNOSIS — J18.9 MULTIFOCAL PNEUMONIA: Primary | ICD-10-CM

## 2024-05-03 DIAGNOSIS — I50.22 HEART FAILURE, CHRONIC SYSTOLIC (MULTI): ICD-10-CM

## 2024-05-03 DIAGNOSIS — I10 PRIMARY HYPERTENSION: ICD-10-CM

## 2024-05-03 DIAGNOSIS — R06.02 SHORTNESS OF BREATH: ICD-10-CM

## 2024-05-03 DIAGNOSIS — I50.42 CHRONIC COMBINED SYSTOLIC AND DIASTOLIC CONGESTIVE HEART FAILURE (MULTI): ICD-10-CM

## 2024-05-03 LAB
ACANTHOCYTES BLD QL SMEAR: ABNORMAL
ALBUMIN SERPL BCP-MCNC: 2.9 G/DL (ref 3.4–5)
ALBUMIN SERPL BCP-MCNC: 3 G/DL (ref 3.4–5)
ALBUMIN SERPL BCP-MCNC: 3.7 G/DL (ref 3.4–5)
ALP SERPL-CCNC: 88 U/L (ref 33–136)
ALP SERPL-CCNC: 98 U/L (ref 33–136)
ALT SERPL W P-5'-P-CCNC: 106 U/L (ref 10–52)
ALT SERPL W P-5'-P-CCNC: 41 U/L (ref 10–52)
ANION GAP BLDMV CALCULATED.4IONS-SCNC: 10 MMO/L (ref 10–25)
ANION GAP BLDMV CALCULATED.4IONS-SCNC: 11 MMO/L (ref 10–25)
ANION GAP BLDMV CALCULATED.4IONS-SCNC: 11 MMO/L (ref 10–25)
ANION GAP BLDV CALCULATED.4IONS-SCNC: 12 MMOL/L (ref 10–25)
ANION GAP BLDV CALCULATED.4IONS-SCNC: 13 MMOL/L (ref 10–25)
ANION GAP BLDV CALCULATED.4IONS-SCNC: 14 MMOL/L (ref 10–25)
ANION GAP SERPL CALC-SCNC: 15 MMOL/L (ref 10–20)
ANION GAP SERPL CALC-SCNC: 16 MMOL/L (ref 10–20)
ANION GAP SERPL CALC-SCNC: 18 MMOL/L (ref 10–20)
AORTIC VALVE PEAK VELOCITY: 1.25 M/S
APPEARANCE UR: CLEAR
APTT PPP: 24 SECONDS (ref 27–38)
APTT PPP: 30 SECONDS (ref 27–38)
AST SERPL W P-5'-P-CCNC: 142 U/L (ref 9–39)
AST SERPL W P-5'-P-CCNC: 58 U/L (ref 9–39)
AV PEAK GRADIENT: 6.3 MMHG
AVA (PEAK VEL): 1.77 CM2
BASE EXCESS BLDMV CALC-SCNC: -0.6 MMOL/L (ref -2–3)
BASE EXCESS BLDMV CALC-SCNC: -0.6 MMOL/L (ref -2–3)
BASE EXCESS BLDMV CALC-SCNC: -0.8 MMOL/L (ref -2–3)
BASE EXCESS BLDV CALC-SCNC: -3 MMOL/L (ref -2–3)
BASE EXCESS BLDV CALC-SCNC: 0.6 MMOL/L (ref -2–3)
BASE EXCESS BLDV CALC-SCNC: 0.9 MMOL/L (ref -2–3)
BASOPHILS # BLD AUTO: 0.01 X10*3/UL (ref 0–0.1)
BASOPHILS # BLD MANUAL: 0 X10*3/UL (ref 0–0.1)
BASOPHILS NFR BLD AUTO: 0.1 %
BASOPHILS NFR BLD MANUAL: 0 %
BILIRUB SERPL-MCNC: 2.2 MG/DL (ref 0–1.2)
BILIRUB SERPL-MCNC: 2.6 MG/DL (ref 0–1.2)
BILIRUB UR STRIP.AUTO-MCNC: NEGATIVE MG/DL
BNP SERPL-MCNC: 1225 PG/ML (ref 0–99)
BNP SERPL-MCNC: 488 PG/ML (ref 0–99)
BODY TEMPERATURE: 37 DEGREES CELSIUS
BUN SERPL-MCNC: 24 MG/DL (ref 6–23)
BUN SERPL-MCNC: 29 MG/DL (ref 6–23)
BUN SERPL-MCNC: 30 MG/DL (ref 6–23)
BURR CELLS BLD QL SMEAR: ABNORMAL
CA-I BLDMV-SCNC: 1.17 MMOL/L (ref 1.1–1.33)
CA-I BLDMV-SCNC: 1.18 MMOL/L (ref 1.1–1.33)
CA-I BLDMV-SCNC: 1.24 MMOL/L (ref 1.1–1.33)
CA-I BLDV-SCNC: 1.15 MMOL/L (ref 1.1–1.33)
CA-I BLDV-SCNC: 1.22 MMOL/L (ref 1.1–1.33)
CA-I BLDV-SCNC: 1.22 MMOL/L (ref 1.1–1.33)
CALCIUM SERPL-MCNC: 8.7 MG/DL (ref 8.6–10.6)
CALCIUM SERPL-MCNC: 8.8 MG/DL (ref 8.6–10.6)
CALCIUM SERPL-MCNC: 9.8 MG/DL (ref 8.6–10.6)
CARDIAC TROPONIN I PNL SERPL HS: 23 NG/L (ref 0–53)
CHLORIDE BLD-SCNC: 97 MMOL/L (ref 98–107)
CHLORIDE BLD-SCNC: 99 MMOL/L (ref 98–107)
CHLORIDE BLD-SCNC: 99 MMOL/L (ref 98–107)
CHLORIDE BLDV-SCNC: 101 MMOL/L (ref 98–107)
CHLORIDE BLDV-SCNC: 101 MMOL/L (ref 98–107)
CHLORIDE BLDV-SCNC: 98 MMOL/L (ref 98–107)
CHLORIDE SERPL-SCNC: 101 MMOL/L (ref 98–107)
CHLORIDE SERPL-SCNC: 97 MMOL/L (ref 98–107)
CHLORIDE SERPL-SCNC: 98 MMOL/L (ref 98–107)
CO2 SERPL-SCNC: 24 MMOL/L (ref 21–32)
CO2 SERPL-SCNC: 25 MMOL/L (ref 21–32)
CO2 SERPL-SCNC: 26 MMOL/L (ref 21–32)
COLOR UR: YELLOW
CREAT SERPL-MCNC: 1.4 MG/DL (ref 0.5–1.3)
CREAT SERPL-MCNC: 1.83 MG/DL (ref 0.5–1.3)
CREAT SERPL-MCNC: 1.89 MG/DL (ref 0.5–1.3)
CRP SERPL-MCNC: 12.56 MG/DL
EGFRCR SERPLBLD CKD-EPI 2021: 33 ML/MIN/1.73M*2
EGFRCR SERPLBLD CKD-EPI 2021: 34 ML/MIN/1.73M*2
EGFRCR SERPLBLD CKD-EPI 2021: 47 ML/MIN/1.73M*2
EJECTION FRACTION APICAL 4 CHAMBER: 24.2
EOSINOPHIL # BLD AUTO: 0 X10*3/UL (ref 0–0.4)
EOSINOPHIL # BLD MANUAL: 0 X10*3/UL (ref 0–0.4)
EOSINOPHIL NFR BLD AUTO: 0 %
EOSINOPHIL NFR BLD MANUAL: 0 %
ERYTHROCYTE [DISTWIDTH] IN BLOOD BY AUTOMATED COUNT: 14.3 % (ref 11.5–14.5)
ERYTHROCYTE [DISTWIDTH] IN BLOOD BY AUTOMATED COUNT: 14.6 % (ref 11.5–14.5)
ERYTHROCYTE [SEDIMENTATION RATE] IN BLOOD BY WESTERGREN METHOD: 37 MM/H (ref 0–20)
FLUAV RNA RESP QL NAA+PROBE: NOT DETECTED
FLUBV RNA RESP QL NAA+PROBE: NOT DETECTED
GLUCOSE BLD MANUAL STRIP-MCNC: 204 MG/DL (ref 74–99)
GLUCOSE BLD MANUAL STRIP-MCNC: 217 MG/DL (ref 74–99)
GLUCOSE BLD MANUAL STRIP-MCNC: 221 MG/DL (ref 74–99)
GLUCOSE BLD MANUAL STRIP-MCNC: 248 MG/DL (ref 74–99)
GLUCOSE BLD-MCNC: 231 MG/DL (ref 74–99)
GLUCOSE BLD-MCNC: 252 MG/DL (ref 74–99)
GLUCOSE BLD-MCNC: 289 MG/DL (ref 74–99)
GLUCOSE BLDV-MCNC: 129 MG/DL (ref 74–99)
GLUCOSE BLDV-MCNC: 131 MG/DL (ref 74–99)
GLUCOSE BLDV-MCNC: 275 MG/DL (ref 74–99)
GLUCOSE SERPL-MCNC: 107 MG/DL (ref 74–99)
GLUCOSE SERPL-MCNC: 236 MG/DL (ref 74–99)
GLUCOSE SERPL-MCNC: 271 MG/DL (ref 74–99)
GLUCOSE UR STRIP.AUTO-MCNC: NORMAL MG/DL
HCO3 BLDMV-SCNC: 24.3 MMOL/L (ref 22–26)
HCO3 BLDMV-SCNC: 25.8 MMOL/L (ref 22–26)
HCO3 BLDMV-SCNC: 25.8 MMOL/L (ref 22–26)
HCO3 BLDV-SCNC: 22.7 MMOL/L (ref 22–26)
HCO3 BLDV-SCNC: 24.5 MMOL/L (ref 22–26)
HCO3 BLDV-SCNC: 27.9 MMOL/L (ref 22–26)
HCT VFR BLD AUTO: 36.6 % (ref 41–52)
HCT VFR BLD AUTO: 41.1 % (ref 41–52)
HCT VFR BLD EST: 35 % (ref 41–52)
HCT VFR BLD EST: 36 % (ref 41–52)
HCT VFR BLD EST: 36 % (ref 41–52)
HCT VFR BLD EST: 37 % (ref 41–52)
HCT VFR BLD EST: 40 % (ref 41–52)
HCT VFR BLD EST: 43 % (ref 41–52)
HGB BLD-MCNC: 11.7 G/DL (ref 13.5–17.5)
HGB BLD-MCNC: 13.9 G/DL (ref 13.5–17.5)
HGB BLDMV-MCNC: 11.8 G/DL (ref 13.5–17.5)
HGB BLDMV-MCNC: 12 G/DL (ref 13.5–17.5)
HGB BLDMV-MCNC: 12.3 G/DL (ref 13.5–17.5)
HGB BLDV-MCNC: 12 G/DL (ref 13.5–17.5)
HGB BLDV-MCNC: 13.2 G/DL (ref 13.5–17.5)
HGB BLDV-MCNC: 14.3 G/DL (ref 13.5–17.5)
HOLD SPECIMEN: NORMAL
IMM GRANULOCYTES # BLD AUTO: 0.03 X10*3/UL (ref 0–0.5)
IMM GRANULOCYTES # BLD AUTO: 0.07 X10*3/UL (ref 0–0.5)
IMM GRANULOCYTES NFR BLD AUTO: 0.3 % (ref 0–0.9)
IMM GRANULOCYTES NFR BLD AUTO: 0.5 % (ref 0–0.9)
INHALED O2 CONCENTRATION: 24 %
INHALED O2 CONCENTRATION: 44 %
INHALED O2 CONCENTRATION: 6 %
INHALED O2 CONCENTRATION: 6 %
INR PPP: 1.7 (ref 0.9–1.1)
INR PPP: 1.8 (ref 0.9–1.1)
KETONES UR STRIP.AUTO-MCNC: NEGATIVE MG/DL
LACTATE BLDMV-SCNC: 2 MMOL/L (ref 0.4–2)
LACTATE BLDMV-SCNC: 2.2 MMOL/L (ref 0.4–2)
LACTATE BLDMV-SCNC: 3.7 MMOL/L (ref 0.4–2)
LACTATE BLDV-SCNC: 2.6 MMOL/L (ref 0.4–2)
LACTATE BLDV-SCNC: 3.7 MMOL/L (ref 0.4–2)
LACTATE BLDV-SCNC: 4.4 MMOL/L (ref 0.4–2)
LACTATE SERPL-SCNC: 3.7 MMOL/L (ref 0.4–2)
LEFT ATRIUM VOLUME AREA LENGTH INDEX BSA: 40.7 ML/M2
LEFT VENTRICLE INTERNAL DIMENSION DIASTOLE: 4.7 CM (ref 3.5–6)
LEFT VENTRICULAR OUTFLOW TRACT DIAMETER: 2 CM
LEUKOCYTE ESTERASE UR QL STRIP.AUTO: NEGATIVE
LV EJECTION FRACTION BIPLANE: 32 %
LYMPHOCYTES # BLD AUTO: 0.79 X10*3/UL (ref 0.8–3)
LYMPHOCYTES # BLD MANUAL: 0.69 X10*3/UL (ref 0.8–3)
LYMPHOCYTES NFR BLD AUTO: 9.1 %
LYMPHOCYTES NFR BLD MANUAL: 4.9 %
MAGNESIUM SERPL-MCNC: 2.48 MG/DL (ref 1.6–2.4)
MCH RBC QN AUTO: 29.4 PG (ref 26–34)
MCH RBC QN AUTO: 29.4 PG (ref 26–34)
MCHC RBC AUTO-ENTMCNC: 32 G/DL (ref 32–36)
MCHC RBC AUTO-ENTMCNC: 33.8 G/DL (ref 32–36)
MCV RBC AUTO: 87 FL (ref 80–100)
MCV RBC AUTO: 92 FL (ref 80–100)
METAMYELOCYTES # BLD MANUAL: 0.34 X10*3/UL
METAMYELOCYTES NFR BLD MANUAL: 2.4 %
MONOCYTES # BLD AUTO: 0.63 X10*3/UL (ref 0.05–0.8)
MONOCYTES # BLD MANUAL: 0.8 X10*3/UL (ref 0.05–0.8)
MONOCYTES NFR BLD AUTO: 7.3 %
MONOCYTES NFR BLD MANUAL: 5.7 %
MRSA DNA SPEC QL NAA+PROBE: NOT DETECTED
NEUTROPHILS # BLD AUTO: 7.19 X10*3/UL (ref 1.6–5.5)
NEUTROPHILS # BLD MANUAL: 12.27 X10*3/UL (ref 1.6–5.5)
NEUTROPHILS NFR BLD AUTO: 83.2 %
NEUTS BAND # BLD MANUAL: 2.19 X10*3/UL (ref 0–0.5)
NEUTS BAND NFR BLD MANUAL: 15.5 %
NEUTS SEG # BLD MANUAL: 10.08 X10*3/UL (ref 1.6–5)
NEUTS SEG NFR BLD MANUAL: 71.5 %
NITRITE UR QL STRIP.AUTO: NEGATIVE
NRBC BLD-RTO: 0 /100 WBCS (ref 0–0)
NRBC BLD-RTO: 0 /100 WBCS (ref 0–0)
OVALOCYTES BLD QL SMEAR: ABNORMAL
OXYHGB MFR BLDMV: 42.8 % (ref 45–75)
OXYHGB MFR BLDMV: 56.4 % (ref 45–75)
OXYHGB MFR BLDMV: 83.1 % (ref 45–75)
OXYHGB MFR BLDV: 40.7 % (ref 45–75)
OXYHGB MFR BLDV: 53.1 % (ref 45–75)
OXYHGB MFR BLDV: 87 % (ref 45–75)
PCO2 BLDMV: 41 MM HG (ref 41–51)
PCO2 BLDMV: 49 MM HG (ref 41–51)
PCO2 BLDMV: 49 MM HG (ref 41–51)
PCO2 BLDV: 36 MM HG (ref 41–51)
PCO2 BLDV: 42 MM HG (ref 41–51)
PCO2 BLDV: 53 MM HG (ref 41–51)
PH BLDMV: 7.33 PH (ref 7.33–7.43)
PH BLDMV: 7.33 PH (ref 7.33–7.43)
PH BLDMV: 7.38 PH (ref 7.33–7.43)
PH BLDV: 7.33 PH (ref 7.33–7.43)
PH BLDV: 7.34 PH (ref 7.33–7.43)
PH BLDV: 7.44 PH (ref 7.33–7.43)
PH UR STRIP.AUTO: 5 [PH]
PHOSPHATE SERPL-MCNC: 3.8 MG/DL (ref 2.5–4.9)
PHOSPHATE SERPL-MCNC: 3.9 MG/DL (ref 2.5–4.9)
PLATELET # BLD AUTO: 251 X10*3/UL (ref 150–450)
PLATELET # BLD AUTO: 252 X10*3/UL (ref 150–450)
PO2 BLDMV: 31 MM HG (ref 35–45)
PO2 BLDMV: 37 MM HG (ref 35–45)
PO2 BLDMV: 53 MM HG (ref 35–45)
PO2 BLDV: 33 MM HG (ref 35–45)
PO2 BLDV: 37 MM HG (ref 35–45)
PO2 BLDV: 57 MM HG (ref 35–45)
POLYCHROMASIA BLD QL SMEAR: ABNORMAL
POTASSIUM BLDMV-SCNC: 4.6 MMOL/L (ref 3.5–5.3)
POTASSIUM BLDMV-SCNC: 5 MMOL/L (ref 3.5–5.3)
POTASSIUM BLDMV-SCNC: 5.6 MMOL/L (ref 3.5–5.3)
POTASSIUM BLDV-SCNC: 4.5 MMOL/L (ref 3.5–5.3)
POTASSIUM BLDV-SCNC: 5.2 MMOL/L (ref 3.5–5.3)
POTASSIUM BLDV-SCNC: 6.3 MMOL/L (ref 3.5–5.3)
POTASSIUM SERPL-SCNC: 4.3 MMOL/L (ref 3.5–5.3)
POTASSIUM SERPL-SCNC: 4.7 MMOL/L (ref 3.5–5.3)
POTASSIUM SERPL-SCNC: 5.4 MMOL/L (ref 3.5–5.3)
POTASSIUM SERPL-SCNC: 5.9 MMOL/L (ref 3.5–5.3)
PROCALCITONIN SERPL-MCNC: 11.8 NG/ML
PROT SERPL-MCNC: 6.3 G/DL (ref 6.4–8.2)
PROT SERPL-MCNC: 7.9 G/DL (ref 6.4–8.2)
PROT UR STRIP.AUTO-MCNC: ABNORMAL MG/DL
PROTHROMBIN TIME: 18.8 SECONDS (ref 9.8–12.8)
PROTHROMBIN TIME: 20.7 SECONDS (ref 9.8–12.8)
RBC # BLD AUTO: 3.98 X10*6/UL (ref 4.5–5.9)
RBC # BLD AUTO: 4.73 X10*6/UL (ref 4.5–5.9)
RBC # UR STRIP.AUTO: NEGATIVE /UL
RBC #/AREA URNS AUTO: ABNORMAL /HPF
RBC MORPH BLD: ABNORMAL
RIGHT VENTRICLE FREE WALL PEAK S': 6.96 CM/S
RIGHT VENTRICLE PEAK SYSTOLIC PRESSURE: 51.6 MMHG
RSV RNA RESP QL NAA+PROBE: NOT DETECTED
SAO2 % BLDMV: 44 % (ref 45–75)
SAO2 % BLDMV: 58 % (ref 45–75)
SAO2 % BLDMV: 85 % (ref 45–75)
SAO2 % BLDV: 41 % (ref 45–75)
SAO2 % BLDV: 55 % (ref 45–75)
SAO2 % BLDV: 90 % (ref 45–75)
SARS-COV-2 RNA RESP QL NAA+PROBE: NOT DETECTED
SODIUM BLDMV-SCNC: 129 MMOL/L (ref 136–145)
SODIUM BLDMV-SCNC: 129 MMOL/L (ref 136–145)
SODIUM BLDMV-SCNC: 130 MMOL/L (ref 136–145)
SODIUM BLDV-SCNC: 130 MMOL/L (ref 136–145)
SODIUM BLDV-SCNC: 133 MMOL/L (ref 136–145)
SODIUM BLDV-SCNC: 135 MMOL/L (ref 136–145)
SODIUM SERPL-SCNC: 133 MMOL/L (ref 136–145)
SODIUM SERPL-SCNC: 134 MMOL/L (ref 136–145)
SODIUM SERPL-SCNC: 137 MMOL/L (ref 136–145)
SP GR UR STRIP.AUTO: >1.05
SQUAMOUS #/AREA URNS AUTO: ABNORMAL /HPF
TOTAL CELLS COUNTED BLD: 123
TRICUSPID ANNULAR PLANE SYSTOLIC EXCURSION: 0.7 CM
UFH PPP CHRO-ACNC: 0.9 IU/ML
UROBILINOGEN UR STRIP.AUTO-MCNC: NORMAL MG/DL
WBC # BLD AUTO: 14.1 X10*3/UL (ref 4.4–11.3)
WBC # BLD AUTO: 8.7 X10*3/UL (ref 4.4–11.3)
WBC #/AREA URNS AUTO: ABNORMAL /HPF

## 2024-05-03 PROCEDURE — 83880 ASSAY OF NATRIURETIC PEPTIDE: CPT | Performed by: EMERGENCY MEDICINE

## 2024-05-03 PROCEDURE — 83880 ASSAY OF NATRIURETIC PEPTIDE: CPT | Mod: 91,MUE

## 2024-05-03 PROCEDURE — 85007 BL SMEAR W/DIFF WBC COUNT: CPT

## 2024-05-03 PROCEDURE — 99291 CRITICAL CARE FIRST HOUR: CPT | Performed by: EMERGENCY MEDICINE

## 2024-05-03 PROCEDURE — 37799 UNLISTED PX VASCULAR SURGERY: CPT

## 2024-05-03 PROCEDURE — 87637 SARSCOV2&INF A&B&RSV AMP PRB: CPT | Performed by: STUDENT IN AN ORGANIZED HEALTH CARE EDUCATION/TRAINING PROGRAM

## 2024-05-03 PROCEDURE — 84132 ASSAY OF SERUM POTASSIUM: CPT | Mod: 91

## 2024-05-03 PROCEDURE — 85025 COMPLETE CBC W/AUTO DIFF WBC: CPT | Performed by: EMERGENCY MEDICINE

## 2024-05-03 PROCEDURE — 1090000001 HH PPS REVENUE CREDIT

## 2024-05-03 PROCEDURE — 93010 ELECTROCARDIOGRAM REPORT: CPT | Performed by: EMERGENCY MEDICINE

## 2024-05-03 PROCEDURE — 2500000004 HC RX 250 GENERAL PHARMACY W/ HCPCS (ALT 636 FOR OP/ED): Performed by: EMERGENCY MEDICINE

## 2024-05-03 PROCEDURE — 92610 EVALUATE SWALLOWING FUNCTION: CPT | Mod: GN

## 2024-05-03 PROCEDURE — 1090000002 HH PPS REVENUE DEBIT

## 2024-05-03 PROCEDURE — 2500000004 HC RX 250 GENERAL PHARMACY W/ HCPCS (ALT 636 FOR OP/ED): Performed by: STUDENT IN AN ORGANIZED HEALTH CARE EDUCATION/TRAINING PROGRAM

## 2024-05-03 PROCEDURE — 2500000004 HC RX 250 GENERAL PHARMACY W/ HCPCS (ALT 636 FOR OP/ED)

## 2024-05-03 PROCEDURE — 71045 X-RAY EXAM CHEST 1 VIEW: CPT | Performed by: STUDENT IN AN ORGANIZED HEALTH CARE EDUCATION/TRAINING PROGRAM

## 2024-05-03 PROCEDURE — 96374 THER/PROPH/DIAG INJ IV PUSH: CPT | Mod: 59

## 2024-05-03 PROCEDURE — 2500000005 HC RX 250 GENERAL PHARMACY W/O HCPCS

## 2024-05-03 PROCEDURE — 94660 CPAP INITIATION&MGMT: CPT

## 2024-05-03 PROCEDURE — 82947 ASSAY GLUCOSE BLOOD QUANT: CPT | Mod: 91,MUE

## 2024-05-03 PROCEDURE — 87641 MR-STAPH DNA AMP PROBE: CPT | Performed by: EMERGENCY MEDICINE

## 2024-05-03 PROCEDURE — 2550000001 HC RX 255 CONTRASTS: Performed by: EMERGENCY MEDICINE

## 2024-05-03 PROCEDURE — 99291 CRITICAL CARE FIRST HOUR: CPT

## 2024-05-03 PROCEDURE — 2500000005 HC RX 250 GENERAL PHARMACY W/O HCPCS: Performed by: STUDENT IN AN ORGANIZED HEALTH CARE EDUCATION/TRAINING PROGRAM

## 2024-05-03 PROCEDURE — 71045 X-RAY EXAM CHEST 1 VIEW: CPT

## 2024-05-03 PROCEDURE — 86140 C-REACTIVE PROTEIN: CPT

## 2024-05-03 PROCEDURE — 85027 COMPLETE CBC AUTOMATED: CPT | Mod: 91

## 2024-05-03 PROCEDURE — 2500000001 HC RX 250 WO HCPCS SELF ADMINISTERED DRUGS (ALT 637 FOR MEDICARE OP)

## 2024-05-03 PROCEDURE — 93306 TTE W/DOPPLER COMPLETE: CPT

## 2024-05-03 PROCEDURE — 84132 ASSAY OF SERUM POTASSIUM: CPT | Mod: 91 | Performed by: EMERGENCY MEDICINE

## 2024-05-03 PROCEDURE — 87449 NOS EACH ORGANISM AG IA: CPT

## 2024-05-03 PROCEDURE — 85610 PROTHROMBIN TIME: CPT | Performed by: EMERGENCY MEDICINE

## 2024-05-03 PROCEDURE — 81001 URINALYSIS AUTO W/SCOPE: CPT | Performed by: EMERGENCY MEDICINE

## 2024-05-03 PROCEDURE — 84145 PROCALCITONIN (PCT): CPT

## 2024-05-03 PROCEDURE — 96375 TX/PRO/DX INJ NEW DRUG ADDON: CPT

## 2024-05-03 PROCEDURE — 96361 HYDRATE IV INFUSION ADD-ON: CPT

## 2024-05-03 PROCEDURE — 93005 ELECTROCARDIOGRAM TRACING: CPT

## 2024-05-03 PROCEDURE — 99223 1ST HOSP IP/OBS HIGH 75: CPT | Performed by: STUDENT IN AN ORGANIZED HEALTH CARE EDUCATION/TRAINING PROGRAM

## 2024-05-03 PROCEDURE — 85520 HEPARIN ASSAY: CPT

## 2024-05-03 PROCEDURE — 87040 BLOOD CULTURE FOR BACTERIA: CPT | Performed by: EMERGENCY MEDICINE

## 2024-05-03 PROCEDURE — 87081 CULTURE SCREEN ONLY: CPT

## 2024-05-03 PROCEDURE — 2020000001 HC ICU ROOM DAILY

## 2024-05-03 PROCEDURE — 83735 ASSAY OF MAGNESIUM: CPT

## 2024-05-03 PROCEDURE — 83605 ASSAY OF LACTIC ACID: CPT | Mod: MUE

## 2024-05-03 PROCEDURE — 84484 ASSAY OF TROPONIN QUANT: CPT | Performed by: EMERGENCY MEDICINE

## 2024-05-03 PROCEDURE — 71275 CT ANGIOGRAPHY CHEST: CPT | Performed by: STUDENT IN AN ORGANIZED HEALTH CARE EDUCATION/TRAINING PROGRAM

## 2024-05-03 PROCEDURE — 84132 ASSAY OF SERUM POTASSIUM: CPT | Performed by: STUDENT IN AN ORGANIZED HEALTH CARE EDUCATION/TRAINING PROGRAM

## 2024-05-03 PROCEDURE — 71275 CT ANGIOGRAPHY CHEST: CPT

## 2024-05-03 PROCEDURE — 36415 COLL VENOUS BLD VENIPUNCTURE: CPT

## 2024-05-03 PROCEDURE — 2500000002 HC RX 250 W HCPCS SELF ADMINISTERED DRUGS (ALT 637 FOR MEDICARE OP, ALT 636 FOR OP/ED)

## 2024-05-03 PROCEDURE — 93306 TTE W/DOPPLER COMPLETE: CPT | Performed by: INTERNAL MEDICINE

## 2024-05-03 PROCEDURE — 3E033XZ INTRODUCTION OF VASOPRESSOR INTO PERIPHERAL VEIN, PERCUTANEOUS APPROACH: ICD-10-PCS | Performed by: INTERNAL MEDICINE

## 2024-05-03 PROCEDURE — 84100 ASSAY OF PHOSPHORUS: CPT

## 2024-05-03 PROCEDURE — 85652 RBC SED RATE AUTOMATED: CPT

## 2024-05-03 PROCEDURE — 2500000002 HC RX 250 W HCPCS SELF ADMINISTERED DRUGS (ALT 637 FOR MEDICARE OP, ALT 636 FOR OP/ED): Performed by: EMERGENCY MEDICINE

## 2024-05-03 PROCEDURE — 96365 THER/PROPH/DIAG IV INF INIT: CPT

## 2024-05-03 PROCEDURE — 85610 PROTHROMBIN TIME: CPT | Mod: 91

## 2024-05-03 PROCEDURE — 05HM33Z INSERTION OF INFUSION DEVICE INTO RIGHT INTERNAL JUGULAR VEIN, PERCUTANEOUS APPROACH: ICD-10-PCS | Performed by: STUDENT IN AN ORGANIZED HEALTH CARE EDUCATION/TRAINING PROGRAM

## 2024-05-03 PROCEDURE — A4217 STERILE WATER/SALINE, 500 ML: HCPCS

## 2024-05-03 PROCEDURE — 2500000002 HC RX 250 W HCPCS SELF ADMINISTERED DRUGS (ALT 637 FOR MEDICARE OP, ALT 636 FOR OP/ED): Mod: MUE | Performed by: STUDENT IN AN ORGANIZED HEALTH CARE EDUCATION/TRAINING PROGRAM

## 2024-05-03 PROCEDURE — 2500000005 HC RX 250 GENERAL PHARMACY W/O HCPCS: Performed by: EMERGENCY MEDICINE

## 2024-05-03 PROCEDURE — 36415 COLL VENOUS BLD VENIPUNCTURE: CPT | Performed by: EMERGENCY MEDICINE

## 2024-05-03 PROCEDURE — 87899 AGENT NOS ASSAY W/OPTIC: CPT

## 2024-05-03 RX ORDER — FLUTICASONE FUROATE AND VILANTEROL 100; 25 UG/1; UG/1
1 POWDER RESPIRATORY (INHALATION)
Status: DISCONTINUED | OUTPATIENT
Start: 2024-05-03 | End: 2024-05-09 | Stop reason: HOSPADM

## 2024-05-03 RX ORDER — PANTOPRAZOLE SODIUM 40 MG/1
40 TABLET, DELAYED RELEASE ORAL
Status: DISCONTINUED | OUTPATIENT
Start: 2024-05-04 | End: 2024-05-06

## 2024-05-03 RX ORDER — VANCOMYCIN HYDROCHLORIDE 1 G/20ML
INJECTION, POWDER, LYOPHILIZED, FOR SOLUTION INTRAVENOUS DAILY PRN
Status: DISCONTINUED | OUTPATIENT
Start: 2024-05-03 | End: 2024-05-03 | Stop reason: SDUPTHER

## 2024-05-03 RX ORDER — DEXTROSE 50 % IN WATER (D50W) INTRAVENOUS SYRINGE
25
Status: DISCONTINUED | OUTPATIENT
Start: 2024-05-03 | End: 2024-05-09 | Stop reason: HOSPADM

## 2024-05-03 RX ORDER — IPRATROPIUM BROMIDE AND ALBUTEROL SULFATE 2.5; .5 MG/3ML; MG/3ML
3 SOLUTION RESPIRATORY (INHALATION)
Status: DISCONTINUED | OUTPATIENT
Start: 2024-05-03 | End: 2024-05-03

## 2024-05-03 RX ORDER — IPRATROPIUM BROMIDE AND ALBUTEROL SULFATE 2.5; .5 MG/3ML; MG/3ML
3 SOLUTION RESPIRATORY (INHALATION) EVERY 6 HOURS PRN
Status: DISCONTINUED | OUTPATIENT
Start: 2024-05-03 | End: 2024-05-09 | Stop reason: HOSPADM

## 2024-05-03 RX ORDER — IPRATROPIUM BROMIDE AND ALBUTEROL SULFATE 2.5; .5 MG/3ML; MG/3ML
SOLUTION RESPIRATORY (INHALATION)
Status: COMPLETED
Start: 2024-05-03 | End: 2024-05-03

## 2024-05-03 RX ORDER — FINASTERIDE 5 MG/1
5 TABLET, FILM COATED ORAL DAILY
Status: DISCONTINUED | OUTPATIENT
Start: 2024-05-03 | End: 2024-05-09 | Stop reason: HOSPADM

## 2024-05-03 RX ORDER — METOPROLOL TARTRATE 1 MG/ML
5 INJECTION, SOLUTION INTRAVENOUS ONCE
Status: COMPLETED | OUTPATIENT
Start: 2024-05-03 | End: 2024-05-03

## 2024-05-03 RX ORDER — ONDANSETRON HYDROCHLORIDE 2 MG/ML
4 INJECTION, SOLUTION INTRAVENOUS ONCE
Status: COMPLETED | OUTPATIENT
Start: 2024-05-03 | End: 2024-05-03

## 2024-05-03 RX ORDER — ONDANSETRON HYDROCHLORIDE 2 MG/ML
INJECTION, SOLUTION INTRAVENOUS
Status: COMPLETED
Start: 2024-05-03 | End: 2024-05-03

## 2024-05-03 RX ORDER — MAGNESIUM SULFATE HEPTAHYDRATE 40 MG/ML
INJECTION, SOLUTION INTRAVENOUS
Status: COMPLETED
Start: 2024-05-03 | End: 2024-05-03

## 2024-05-03 RX ORDER — DEXTROSE 50 % IN WATER (D50W) INTRAVENOUS SYRINGE
12.5
Status: DISCONTINUED | OUTPATIENT
Start: 2024-05-03 | End: 2024-05-09 | Stop reason: HOSPADM

## 2024-05-03 RX ORDER — VANCOMYCIN HYDROCHLORIDE 1 G/20ML
INJECTION, POWDER, LYOPHILIZED, FOR SOLUTION INTRAVENOUS DAILY PRN
Status: DISCONTINUED | OUTPATIENT
Start: 2024-05-03 | End: 2024-05-03

## 2024-05-03 RX ORDER — VANCOMYCIN HYDROCHLORIDE 750 MG/150ML
750 INJECTION, SOLUTION INTRAVENOUS ONCE
Status: DISCONTINUED | OUTPATIENT
Start: 2024-05-03 | End: 2024-05-03

## 2024-05-03 RX ORDER — INSULIN LISPRO 100 [IU]/ML
0-10 INJECTION, SOLUTION INTRAVENOUS; SUBCUTANEOUS
Status: DISCONTINUED | OUTPATIENT
Start: 2024-05-03 | End: 2024-05-09 | Stop reason: HOSPADM

## 2024-05-03 RX ORDER — MAGNESIUM SULFATE HEPTAHYDRATE 40 MG/ML
2 INJECTION, SOLUTION INTRAVENOUS ONCE
Status: COMPLETED | OUTPATIENT
Start: 2024-05-03 | End: 2024-05-03

## 2024-05-03 RX ORDER — NOREPINEPHRINE BITARTRATE/D5W 8 MG/250ML
0-3 PLASTIC BAG, INJECTION (ML) INTRAVENOUS CONTINUOUS
Status: DISCONTINUED | OUTPATIENT
Start: 2024-05-03 | End: 2024-05-05

## 2024-05-03 RX ORDER — MEMANTINE HYDROCHLORIDE 10 MG/1
10 TABLET ORAL 2 TIMES DAILY
Status: DISCONTINUED | OUTPATIENT
Start: 2024-05-03 | End: 2024-05-09 | Stop reason: HOSPADM

## 2024-05-03 RX ORDER — HEPARIN SODIUM 10000 [USP'U]/100ML
0-4000 INJECTION, SOLUTION INTRAVENOUS CONTINUOUS
Status: DISCONTINUED | OUTPATIENT
Start: 2024-05-03 | End: 2024-05-07

## 2024-05-03 RX ORDER — METOPROLOL TARTRATE 1 MG/ML
INJECTION, SOLUTION INTRAVENOUS
Status: COMPLETED
Start: 2024-05-03 | End: 2024-05-03

## 2024-05-03 RX ORDER — ASPIRIN 81 MG/1
81 TABLET ORAL DAILY
Status: DISCONTINUED | OUTPATIENT
Start: 2024-05-03 | End: 2024-05-09 | Stop reason: HOSPADM

## 2024-05-03 RX ORDER — VANCOMYCIN HYDROCHLORIDE 1 G/20ML
INJECTION, POWDER, LYOPHILIZED, FOR SOLUTION INTRAVENOUS DAILY PRN
Status: DISCONTINUED | OUTPATIENT
Start: 2024-05-03 | End: 2024-05-05

## 2024-05-03 RX ORDER — PANTOPRAZOLE SODIUM 40 MG/10ML
40 INJECTION, POWDER, LYOPHILIZED, FOR SOLUTION INTRAVENOUS
Status: DISCONTINUED | OUTPATIENT
Start: 2024-05-04 | End: 2024-05-03

## 2024-05-03 RX ORDER — VANCOMYCIN HYDROCHLORIDE 500 MG/100ML
500 INJECTION, SOLUTION INTRAVENOUS ONCE
Status: DISCONTINUED | OUTPATIENT
Start: 2024-05-03 | End: 2024-05-03

## 2024-05-03 RX ORDER — DONEPEZIL HYDROCHLORIDE 10 MG/1
10 TABLET, FILM COATED ORAL DAILY
Status: DISCONTINUED | OUTPATIENT
Start: 2024-05-03 | End: 2024-05-09 | Stop reason: HOSPADM

## 2024-05-03 RX ORDER — IPRATROPIUM BROMIDE AND ALBUTEROL SULFATE 2.5; .5 MG/3ML; MG/3ML
3 SOLUTION RESPIRATORY (INHALATION)
Status: COMPLETED | OUTPATIENT
Start: 2024-05-03 | End: 2024-05-03

## 2024-05-03 RX ORDER — VANCOMYCIN HYDROCHLORIDE 1 G/200ML
1000 INJECTION, SOLUTION INTRAVENOUS EVERY 24 HOURS
Status: DISCONTINUED | OUTPATIENT
Start: 2024-05-04 | End: 2024-05-03

## 2024-05-03 RX ORDER — ATORVASTATIN CALCIUM 10 MG/1
10 TABLET, FILM COATED ORAL DAILY
Status: DISCONTINUED | OUTPATIENT
Start: 2024-05-03 | End: 2024-05-09 | Stop reason: HOSPADM

## 2024-05-03 RX ORDER — DOBUTAMINE HYDROCHLORIDE 400 MG/100ML
2.5 INJECTION INTRAVENOUS CONTINUOUS
Status: DISCONTINUED | OUTPATIENT
Start: 2024-05-03 | End: 2024-05-06

## 2024-05-03 RX ADMIN — PERFLUTREN 3 ML OF DILUTION: 6.52 INJECTION, SUSPENSION INTRAVENOUS at 11:10

## 2024-05-03 RX ADMIN — IPRATROPIUM BROMIDE AND ALBUTEROL SULFATE 3 ML: .5; 3 SOLUTION RESPIRATORY (INHALATION) at 02:10

## 2024-05-03 RX ADMIN — ASPIRIN 81 MG: 81 TABLET, COATED ORAL at 12:14

## 2024-05-03 RX ADMIN — AMIODARONE HYDROCHLORIDE 150 MG: 1.5 INJECTION, SOLUTION INTRAVENOUS at 04:57

## 2024-05-03 RX ADMIN — HEPARIN SODIUM 1200 UNITS/HR: 10000 INJECTION, SOLUTION INTRAVENOUS at 14:47

## 2024-05-03 RX ADMIN — IPRATROPIUM BROMIDE AND ALBUTEROL SULFATE 3 ML: .5; 3 SOLUTION RESPIRATORY (INHALATION) at 01:50

## 2024-05-03 RX ADMIN — Medication 55 PERCENT: at 02:25

## 2024-05-03 RX ADMIN — AMIODARONE HYDROCHLORIDE 1 MG/MIN: 1.8 INJECTION, SOLUTION INTRAVENOUS at 16:14

## 2024-05-03 RX ADMIN — PIPERACILLIN SODIUM AND TAZOBACTAM SODIUM 4.5 G: 4; .5 INJECTION, SOLUTION INTRAVENOUS at 03:35

## 2024-05-03 RX ADMIN — NOREPINEPHRINE BITARTRATE 0.01 MCG/KG/MIN: 8 INJECTION, SOLUTION INTRAVENOUS at 05:02

## 2024-05-03 RX ADMIN — NOREPINEPHRINE BITARTRATE 0.14 MCG/KG/MIN: 8 INJECTION, SOLUTION INTRAVENOUS at 19:54

## 2024-05-03 RX ADMIN — METOPROLOL TARTRATE 5 MG: 1 INJECTION, SOLUTION INTRAVENOUS at 02:06

## 2024-05-03 RX ADMIN — VANCOMYCIN HYDROCHLORIDE 1250 MG: 5 INJECTION, POWDER, LYOPHILIZED, FOR SOLUTION INTRAVENOUS at 11:03

## 2024-05-03 RX ADMIN — MAGNESIUM SULFATE HEPTAHYDRATE 2 G: 40 INJECTION, SOLUTION INTRAVENOUS at 02:07

## 2024-05-03 RX ADMIN — ONDANSETRON 4 MG: 2 INJECTION INTRAMUSCULAR; INTRAVENOUS at 03:32

## 2024-05-03 RX ADMIN — AMIODARONE HYDROCHLORIDE 1 MG/MIN: 1.8 INJECTION, SOLUTION INTRAVENOUS at 05:10

## 2024-05-03 RX ADMIN — AMIODARONE HYDROCHLORIDE 1 MG/MIN: 1.8 INJECTION, SOLUTION INTRAVENOUS at 11:03

## 2024-05-03 RX ADMIN — AZITHROMYCIN 500 MG: 500 INJECTION, POWDER, LYOPHILIZED, FOR SOLUTION INTRAVENOUS at 04:10

## 2024-05-03 RX ADMIN — FINASTERIDE 5 MG: 5 TABLET, FILM COATED ORAL at 12:15

## 2024-05-03 RX ADMIN — IPRATROPIUM BROMIDE AND ALBUTEROL SULFATE 3 ML: .5; 3 SOLUTION RESPIRATORY (INHALATION) at 02:00

## 2024-05-03 RX ADMIN — INSULIN LISPRO 4 UNITS: 100 INJECTION, SOLUTION INTRAVENOUS; SUBCUTANEOUS at 17:20

## 2024-05-03 RX ADMIN — IOHEXOL 80 ML: 350 INJECTION, SOLUTION INTRAVENOUS at 03:09

## 2024-05-03 RX ADMIN — DONEPEZIL HYDROCHLORIDE 10 MG: 10 TABLET, FILM COATED ORAL at 14:48

## 2024-05-03 RX ADMIN — NOREPINEPHRINE BITARTRATE 0.18 MCG/KG/MIN: 8 INJECTION, SOLUTION INTRAVENOUS at 11:03

## 2024-05-03 RX ADMIN — METHYLPREDNISOLONE SODIUM SUCCINATE 125 MG: 125 INJECTION, POWDER, FOR SOLUTION INTRAMUSCULAR; INTRAVENOUS at 02:07

## 2024-05-03 RX ADMIN — MEMANTINE 10 MG: 10 TABLET ORAL at 22:25

## 2024-05-03 RX ADMIN — PIPERACILLIN SODIUM AND TAZOBACTAM SODIUM 2.25 G: 2; .25 INJECTION, SOLUTION INTRAVENOUS at 14:48

## 2024-05-03 RX ADMIN — PIPERACILLIN SODIUM AND TAZOBACTAM SODIUM 2.25 G: 2; .25 INJECTION, SOLUTION INTRAVENOUS at 22:25

## 2024-05-03 RX ADMIN — MEMANTINE 10 MG: 10 TABLET ORAL at 12:14

## 2024-05-03 RX ADMIN — SODIUM CHLORIDE, POTASSIUM CHLORIDE, SODIUM LACTATE AND CALCIUM CHLORIDE 1000 ML: 600; 310; 30; 20 INJECTION, SOLUTION INTRAVENOUS at 02:10

## 2024-05-03 RX ADMIN — ONDANSETRON HYDROCHLORIDE 4 MG: 2 INJECTION, SOLUTION INTRAVENOUS at 03:32

## 2024-05-03 RX ADMIN — AMIODARONE HYDROCHLORIDE 1 MG/MIN: 1.8 INJECTION, SOLUTION INTRAVENOUS at 22:25

## 2024-05-03 RX ADMIN — IPRATROPIUM BROMIDE AND ALBUTEROL SULFATE 3 ML: .5; 3 SOLUTION RESPIRATORY (INHALATION) at 02:21

## 2024-05-03 RX ADMIN — ATORVASTATIN CALCIUM 10 MG: 20 TABLET, FILM COATED ORAL at 12:14

## 2024-05-03 RX ADMIN — DOBUTAMINE HYDROCHLORIDE 2.5 MCG/KG/MIN: 400 INJECTION INTRAVENOUS at 14:47

## 2024-05-03 SDOH — SOCIAL STABILITY: SOCIAL INSECURITY
WITHIN THE LAST YEAR, HAVE YOU BEEN KICKED, HIT, SLAPPED, OR OTHERWISE PHYSICALLY HURT BY YOUR PARTNER OR EX-PARTNER?: NO

## 2024-05-03 SDOH — SOCIAL STABILITY: SOCIAL INSECURITY: WITHIN THE LAST YEAR, HAVE YOU BEEN AFRAID OF YOUR PARTNER OR EX-PARTNER?: NO

## 2024-05-03 SDOH — ECONOMIC STABILITY: FOOD INSECURITY: WITHIN THE PAST 12 MONTHS, THE FOOD YOU BOUGHT JUST DIDN'T LAST AND YOU DIDN'T HAVE MONEY TO GET MORE.: NEVER TRUE

## 2024-05-03 SDOH — ECONOMIC STABILITY: FOOD INSECURITY: WITHIN THE PAST 12 MONTHS, YOU WORRIED THAT YOUR FOOD WOULD RUN OUT BEFORE YOU GOT MONEY TO BUY MORE.: NEVER TRUE

## 2024-05-03 SDOH — SOCIAL STABILITY: SOCIAL INSECURITY: HAVE YOU HAD ANY THOUGHTS OF HARMING ANYONE ELSE?: UNABLE TO ASSESS

## 2024-05-03 SDOH — SOCIAL STABILITY: SOCIAL INSECURITY: HAS ANYONE EVER THREATENED TO HURT YOUR FAMILY OR YOUR PETS?: UNABLE TO ASSESS

## 2024-05-03 SDOH — ECONOMIC STABILITY: INCOME INSECURITY: IN THE PAST 12 MONTHS, HAS THE ELECTRIC, GAS, OIL, OR WATER COMPANY THREATENED TO SHUT OFF SERVICE IN YOUR HOME?: NO

## 2024-05-03 SDOH — SOCIAL STABILITY: SOCIAL INSECURITY: ARE YOU OR HAVE YOU BEEN THREATENED OR ABUSED PHYSICALLY, EMOTIONALLY, OR SEXUALLY BY ANYONE?: UNABLE TO ASSESS

## 2024-05-03 SDOH — SOCIAL STABILITY: SOCIAL INSECURITY: HAVE YOU HAD THOUGHTS OF HARMING ANYONE ELSE?: UNABLE TO ASSESS

## 2024-05-03 SDOH — SOCIAL STABILITY: SOCIAL INSECURITY: WITHIN THE LAST YEAR, HAVE YOU BEEN HUMILIATED OR EMOTIONALLY ABUSED IN OTHER WAYS BY YOUR PARTNER OR EX-PARTNER?: NO

## 2024-05-03 SDOH — SOCIAL STABILITY: SOCIAL INSECURITY: DO YOU FEEL UNSAFE GOING BACK TO THE PLACE WHERE YOU ARE LIVING?: UNABLE TO ASSESS

## 2024-05-03 SDOH — SOCIAL STABILITY: SOCIAL INSECURITY: DOES ANYONE TRY TO KEEP YOU FROM HAVING/CONTACTING OTHER FRIENDS OR DOING THINGS OUTSIDE YOUR HOME?: UNABLE TO ASSESS

## 2024-05-03 SDOH — SOCIAL STABILITY: SOCIAL INSECURITY: ARE THERE ANY APPARENT SIGNS OF INJURIES/BEHAVIORS THAT COULD BE RELATED TO ABUSE/NEGLECT?: UNABLE TO ASSESS

## 2024-05-03 SDOH — SOCIAL STABILITY: SOCIAL INSECURITY: WERE YOU ABLE TO COMPLETE ALL THE BEHAVIORAL HEALTH SCREENINGS?: NO

## 2024-05-03 SDOH — SOCIAL STABILITY: SOCIAL INSECURITY: DO YOU FEEL ANYONE HAS EXPLOITED OR TAKEN ADVANTAGE OF YOU FINANCIALLY OR OF YOUR PERSONAL PROPERTY?: UNABLE TO ASSESS

## 2024-05-03 SDOH — SOCIAL STABILITY: SOCIAL INSECURITY: ABUSE: ADULT

## 2024-05-03 ASSESSMENT — LIFESTYLE VARIABLES
HAVE YOU EVER FELT YOU SHOULD CUT DOWN ON YOUR DRINKING: NO
HOW OFTEN DO YOU HAVE 6 OR MORE DRINKS ON ONE OCCASION: NEVER
SUBSTANCE_ABUSE_PAST_12_MONTHS: NO
EVER FELT BAD OR GUILTY ABOUT YOUR DRINKING: NO
AUDIT-C TOTAL SCORE: 1
EVER HAD A DRINK FIRST THING IN THE MORNING TO STEADY YOUR NERVES TO GET RID OF A HANGOVER: NO
TOTAL SCORE: 0
HOW OFTEN DO YOU HAVE A DRINK CONTAINING ALCOHOL: MONTHLY OR LESS
HAVE PEOPLE ANNOYED YOU BY CRITICIZING YOUR DRINKING: NO
SKIP TO QUESTIONS 9-10: 1
HOW MANY STANDARD DRINKS CONTAINING ALCOHOL DO YOU HAVE ON A TYPICAL DAY: 1 OR 2
AUDIT-C TOTAL SCORE: 1
PRESCIPTION_ABUSE_PAST_12_MONTHS: NO

## 2024-05-03 ASSESSMENT — PATIENT HEALTH QUESTIONNAIRE - PHQ9
SUM OF ALL RESPONSES TO PHQ9 QUESTIONS 1 & 2: 0
2. FEELING DOWN, DEPRESSED OR HOPELESS: NOT AT ALL
1. LITTLE INTEREST OR PLEASURE IN DOING THINGS: NOT AT ALL

## 2024-05-03 ASSESSMENT — COGNITIVE AND FUNCTIONAL STATUS - GENERAL
DAILY ACTIVITIY SCORE: 18
HELP NEEDED FOR BATHING: A LITTLE
MOVING FROM LYING ON BACK TO SITTING ON SIDE OF FLAT BED WITH BEDRAILS: A LITTLE
TOILETING: A LITTLE
DAILY ACTIVITIY SCORE: 18
DRESSING REGULAR LOWER BODY CLOTHING: A LOT
CLIMB 3 TO 5 STEPS WITH RAILING: A LOT
MOVING TO AND FROM BED TO CHAIR: A LITTLE
MOVING TO AND FROM BED TO CHAIR: A LITTLE
STANDING UP FROM CHAIR USING ARMS: A LITTLE
MOBILITY SCORE: 17
PERSONAL GROOMING: A LITTLE
TURNING FROM BACK TO SIDE WHILE IN FLAT BAD: A LITTLE
TOILETING: A LITTLE
MOBILITY SCORE: 17
PATIENT BASELINE BEDBOUND: NO
MOVING FROM LYING ON BACK TO SITTING ON SIDE OF FLAT BED WITH BEDRAILS: A LITTLE
TURNING FROM BACK TO SIDE WHILE IN FLAT BAD: A LITTLE
DRESSING REGULAR UPPER BODY CLOTHING: A LITTLE
CLIMB 3 TO 5 STEPS WITH RAILING: A LOT
STANDING UP FROM CHAIR USING ARMS: A LITTLE
PERSONAL GROOMING: A LITTLE
WALKING IN HOSPITAL ROOM: A LITTLE
DRESSING REGULAR UPPER BODY CLOTHING: A LITTLE
DRESSING REGULAR LOWER BODY CLOTHING: A LOT
WALKING IN HOSPITAL ROOM: A LITTLE
HELP NEEDED FOR BATHING: A LITTLE

## 2024-05-03 ASSESSMENT — ACTIVITIES OF DAILY LIVING (ADL)
GROOMING: NEEDS ASSISTANCE
ADEQUATE_TO_COMPLETE_ADL: UNABLE TO ASSESS
JUDGMENT_ADEQUATE_SAFELY_COMPLETE_DAILY_ACTIVITIES: UNABLE TO ASSESS
LACK_OF_TRANSPORTATION: NO
DRESSING YOURSELF: NEEDS ASSISTANCE
PATIENT'S MEMORY ADEQUATE TO SAFELY COMPLETE DAILY ACTIVITIES?: UNABLE TO ASSESS
HEARING - RIGHT EAR: FUNCTIONAL
TOILETING: NEEDS ASSISTANCE
ASSISTIVE_DEVICE: WALKER;DENTURES UPPER;DENTURES LOWER
WALKS IN HOME: NEEDS ASSISTANCE
FEEDING YOURSELF: NEEDS ASSISTANCE
HEARING - LEFT EAR: FUNCTIONAL
BATHING: NEEDS ASSISTANCE

## 2024-05-03 ASSESSMENT — PAIN - FUNCTIONAL ASSESSMENT
PAIN_FUNCTIONAL_ASSESSMENT: 0-10

## 2024-05-03 ASSESSMENT — PAIN SCALES - GENERAL
PAINLEVEL_OUTOF10: 0 - NO PAIN

## 2024-05-03 NOTE — PROGRESS NOTES
SW intern met with patient and daughter Toñito  at bedside. See flowsheet for assessment details. Daughter confirmed patient lives at home with wife and daughter Mariah. Daughter states patients daughter Mariah is HCPOA. Copy is not scanned into chart. Daughter reports patient has DME at home including a cane, walker, and a rollater. Daughter reports patient was active with [facility] prior to admission. When prompted, daughter does not report SW needs or concerns. SW will continue to follow.      CADEN Richardson

## 2024-05-03 NOTE — CONSULTS
Nutrition Initial Assessment:   Nutrition Assessment    Reason for Assessment: Admission nursing screening    Patient is a 89 y.o. male presenting with SOB on the same day that he was discharged from recent hospital stay.  Now on levo and amio drips.    Past medical history includes HTN, dementia, systolic/diastolic CHF, paroxymal a-fib, HLD, COPD/asthma    Noted that on last hospital stay, SLP saw patient and rec'd pureed/nectar    Nutrition History:  Food and Nutrient History: Met with pt's daughter at bedside.  Pt was unable to answer questions at this time.  She reports an overall decrease in his appetite and intake since getting sick 3 weeks ago.  Says that since being changed to a pureed diet with thickened liquids his intake has actually increased.  They were following this diet at home for the brief time he was there.  Says he had a great dinner last night in ED including an Ensure shake and a milkshake.  He has lost 40#, possibly in longer than 3 weeks.  No GI issues to report.       Anthropometrics:  Height: 182.9 cm (6')   Weight: 71.2 kg (156 lb 15.5 oz)   BMI (Calculated): 21.28  IBW/kg (Dietitian Calculated): 81 kg  Percent of IBW: 89 %     Weight History:     5/3/24: 71.2kg (admit)  2/9/24: 78.9kg  12/18/23: 76.7kg  9/19/23: 77.6kg  8/10/23: 76.5kg  5/16/23: 78.1kg    Daughter reports a usual body weight of 84kg.  Pt is down 15% from usual body weight and 10% in about 3 months.  This would be a significant loss.     Weight Change %:       Nutrition Focused Physical Exam Findings:    Subcutaneous Fat Loss:   Orbital Fat Pads: Mild-Moderate (slight dark circles and slight hollowing)  Buccal Fat Pads: Mild-Moderate (flat cheeks, minimal bounce)  Triceps: Severe (negligible fat tissue)  Muscle Wasting:  Temporalis: Mild-Moderate (slight depression)  Pectoralis (Clavicular Region): Severe (protruding prominent clavicle)  Deltoid/Trapezius: Severe (squared shoulders, acromion process prominent)  Quadriceps:  "Severe (depressions on inner and outer thigh)  Gastrocnemius: Severe (minimal muscle definition)  Edema:  Edema: none  Physical Findings:  Skin: Negative    Nutrition Significant Labs:  BMP Trend:   Results from last 7 days   Lab Units 05/03/24  0520 05/03/24  0201 05/02/24  0555 05/01/24  0626 04/30/24  0611   GLUCOSE mg/dL  --  107* 133* 123* 111*   CALCIUM mg/dL  --  9.8 8.7 9.2 8.2*   SODIUM mmol/L  --  137 137 136 137   POTASSIUM mmol/L 4.3 5.9* 4.2 4.3 4.4   CO2 mmol/L  --  24 24 25 24   CHLORIDE mmol/L  --  101 104 101 104   BUN mg/dL  --  24* 18 11 10   CREATININE mg/dL  --  1.40* 1.13 1.05 0.93    , A1C:  Lab Results   Component Value Date    HGBA1C 5.4 04/29/2024   , BG POCT trend:   Results from last 7 days   Lab Units 05/03/24  0725   POCT GLUCOSE mg/dL 248*    , Renal Lab Trend:   Results from last 7 days   Lab Units 05/03/24  0520 05/03/24  0201 05/02/24  0555 05/01/24  0626   POTASSIUM mmol/L 4.3 5.9* 4.2 4.3   SODIUM mmol/L  --  137 137 136   MAGNESIUM mg/dL  --   --  2.10  --    EGFR mL/min/1.73m*2  --  47* 61 66   BUN mg/dL  --  24* 18 11   CREATININE mg/dL  --  1.40* 1.13 1.05    , Vit D: No results found for: \"VITD25\"     Nutrition Specific Medications:  Scheduled medications  aspirin, 81 mg, oral, Daily  atorvastatin, 10 mg, oral, Daily  [START ON 5/4/2024] azithromycin, 500 mg, intravenous, q24h  donepezil, 10 mg, oral, Daily  finasteride, 5 mg, oral, Daily  fluticasone furoate-vilanteroL, 1 puff, inhalation, Daily  ipratropium-albuteroL, 3 mL, nebulization, q6h  memantine, 10 mg, oral, BID  [START ON 5/4/2024] pantoprazole, 40 mg, oral, Daily before breakfast   Or  [START ON 5/4/2024] pantoprazole, 40 mg, intravenous, Daily before breakfast  perflutren lipid microspheres, 0.5-10 mL of dilution, intravenous, Once in imaging  perflutren protein A microsphere, 0.5 mL, intravenous, Once in imaging  piperacillin-tazobactam, 2.25 g, intravenous, q6h  sulfur hexafluoride microsphr, 2 mL, intravenous, " Once in imaging  vancomycin, 1,250 mg, intravenous, Once      Continuous medications  amiodarone, 0.5-1 mg/min, Last Rate: 1 mg/min (05/03/24 1103)  norepinephrine, 0-3 mcg/kg/min, Last Rate: 0.18 mcg/kg/min (05/03/24 1103)      PRN medications  PRN medications: alteplase, vancomycin     I/O:    ;          Dietary Orders (From admission, onward)       Start     Ordered    05/03/24 0918  NPO Diet; Effective now  Diet effective now         05/03/24 0917                     Estimated Needs:   Total Energy Estimated Needs (kCal):  (6117-6790)  Method for Estimating Needs: MSJ= 1402  Total Protein Estimated Needs (g):  ()  Method for Estimating Needs: 1.3-1.5 x 71.2kg              Nutrition Diagnosis   Malnutrition Diagnosis  Patient has Malnutrition Diagnosis: Yes  Diagnosis Status: New  Malnutrition Diagnosis: Severe malnutrition related to chronic disease or condition  As Evidenced by: reports of decrease PO intake for at least the last 3 weeks along with a loss of 10% of weight in 3 months/15% from overall body weight; has severe fat and muscle losses on physical exam          Nutrition Interventions/Recommendations         Nutrition Prescription:   Diet advancement per SLP recs if appropriate.    If he is made NPO and family desires dobhoff, can do the following: Isosource 1.5@ start rate of 20mls/hr.  Increase by 10mls q8hrs until goal rate of 60mls/hr reached.  Water flushes per team's discretion-- goal provides 1095mls free water daily.   Pt may be a refeeding risk.  Can order 100mgs thiamin once daily and check RFP with mag BID.         Nutrition Interventions:   Food and/or Nutrient Delivery Interventions  Interventions:  (TF if fails SLP eval; can order supplements if passes SLP eval)  Goal: TF at goal if needed= 2160kcals, 98grams protein  RDN can order supplements if diet able to be advanced-- will monitor  Check Vitamin D level.         Nutrition Education:   Not appropriate       Nutrition Monitoring  and Evaluation   Food/Nutrient Related History Monitoring  Monitoring and Evaluation Plan:  (Ability to advance PO diet vs need for TF)         Time Spent/Follow-up Reminder:   Time Spent (min): 60 minutes  Last Date of Nutrition Visit: 05/03/24  Nutrition Follow-Up Needed?: Dietitian to reassess per policy  Follow up Comment: awaiting SLP; TF recs in as needed

## 2024-05-03 NOTE — PROCEDURES
A time-out was performed identifying the correct procedure and the correct location with the nursing staff. The patient's right neck was prepped with 2% chlorhexidine and draped with a full length sterile sheet in the usual manner. Local anesthesia was achieved with administration of 1% lidocaine subcutaneously. The right internal jugular vein was accessed under ultrasound guidance using a finder needle and sheath. Venous blood was withdrawn and the sheath was advanced into the vein and the needle was withdrawn. A guidewire was advanced through the sheath. A small incision was made with a 10 blade scalpel and the sheath was exchanged for a dilator over the guidewire until appropriate dilation was obtained. The dilator was removed and a 7 Syriac triple-lumen central venous catheter was advanced over the guidewire. Blood was withdrawn from all lumens and flushed with normal saline. The catheter was sutured in place and a sterile dressing was applied over the site prior to removal of drapes. The patient tolerated the procedure well and there were no complications.     Chest x-ray is pending at this time.

## 2024-05-03 NOTE — PROGRESS NOTES
Occupational Therapy                 Therapy Communication Note    Patient Name: Jam Cox  MRN: [id number]  Today's Date: 5/3/2024     Discipline: Occupational Therapy    Missed Visit Reason: Missed Visit Reason:  (Line placement this AM. Patient is not medically appropriate for OT at this time.)    Missed Time: Attempt at 0908    Stephanie Salinas OT  05/03/2024

## 2024-05-03 NOTE — PROGRESS NOTES
Physical Therapy                 Therapy Communication Note    Patient Name: Jam Cox  MRN: [id number]  Today's Date: 5/3/2024     Discipline: Physical Therapy    Missed Visit Reason: Missed Visit Reason:  (medical team at bedside placing central line and arterial line; per RN, patient on .18 of levo at this time. PT will hold and re-attempt as time and schedule allows and as medically appropriate.)    Missed Time: Attempt    Comment:

## 2024-05-03 NOTE — CONSULTS
"Vancomycin Dosing by Pharmacy- INITIAL    Jam Cox is a 89 y.o. year old male who Pharmacy has been consulted for vancomycin dosing for pneumonia. Based on the patient's indication and renal status this patient will be dosed based on a goal AUC of 500-600.     Renal function is currently stable.    Visit Vitals  BP 96/74 (BP Location: Left arm, Patient Position: Lying)   Pulse (!) 137   Temp (!) 38.1 °C (100.6 °F) (Oral)   Resp 11        Lab Results   Component Value Date    CREATININE 1.13 05/02/2024    CREATININE 1.05 05/01/2024    CREATININE 0.93 04/30/2024    CREATININE 1.11 04/29/2024        Patient weight is No results found for: \"PTWEIGHT\"    No results found for: \"CULTURE\"     No intake/output data recorded.  [unfilled]    Lab Results   Component Value Date    PATIENTTEMP 37.0 05/03/2024    PATIENTTEMP 37.0 05/03/2024    PATIENTTEMP 37.0 04/27/2024          Assessment/Plan     Patient will be given a loading dose of 1250 mg.  Will initiate vancomycin maintenance,  1000 mg every 24 hours.    This dosing regimen is predicted by PT [employer] to result in the following pharmacokinetic parameters:  Loading dose: 1250 mg at 00:00 05/03/2024.  Regimen: 1000 mg IV every 24 hours.  Start time: 00:00 on 05/04/2024  Exposure target: AUC24 (range)400-600 mg/L.hr   AUC24,ss: 494 mg/L.hr  Probability of AUC24 > 400: 73 %  Ctrough,ss: 15.7 mg/L  Probability of Ctrough,ss > 20: 28 %  Probability of nephrotoxicity (Lodise ROMEO 2009): 11 %    Follow-up level will be ordered on 5/4 at 1st am labs unless clinically indicated sooner.  Will continue to monitor renal function daily while on vancomycin and order serum creatinine at least every 48 hours if not already ordered.  Follow for continued vancomycin needs, clinical response, and signs/symptoms of toxicity.       Kai Ragsdale, PharmD       "

## 2024-05-03 NOTE — ED PROVIDER NOTES
HPI:  Patient is a 89-year-old male with history of hypertension, combined systolic/diastolic CHF, dementia, paroxysmal atrial fibrillation (on Eliquis), hyperlipidemia, COPD/asthma who presents as a level 2 critical patient activation for evaluation of shortness of breath.  Of note, patient was discharged from the hospital earlier today for admission requiring further management of an YOSEF and A-fib with RVR.  Patient was persistently short of breath at home and family called EMS.  Patient minimally verbal and does not provide additional history.  He was reportedly low 70s on room air on EMS arrival.  Received 1 DuoNeb.    ROS: unable to assess 2/2 respiratory distress    PMH/PSH: Reviewed in EMR. As above in HPI.  SH: Denies EtOH, tobacco or illicit drug use.  Allergies:   Allergies   Allergen Reactions    Ace Inhibitors Unknown     ACE Inhibitors      Medications: See prescription writer for full medication list.     General: elderly Black male in moderate respiratory distress, minimally conversant   HEENT:  No rhinorrhea. Dry tongue.  Cardiac: IRIR rate and rhythm, no murmurs  Pulm:  increased respiratory effort, diminished breath sounds throughout but no wheeze or crackles  GI: soft, nontender, nondistended, +BS  Extremities:  moves all extremities freely, no edema noted  Skin: warm, well-perfused, no lesions noted on exposed skin.  Neuro:  awake/alert, moves all 4 extremities freely and independently     Assessment/Plan/MDM  Patient is a 89-year-old male with history of hypertension, combined systolic/diastolic CHF, dementia, paroxysmal atrial fibrillation (on Eliquis), hyperlipidemia, COPD/asthma who presents as a level 2 critical patient activation for evaluation of shortness of breath.  Patient initially treated with Solu-Medrol, magnesium and DuoNebs.  Patient on BiPAP, not grossly acidotic and was taken off within an hour.  CT angio chest negative for PE however he does have multifocal pneumonia treated  with broad-spectrum biotics.  Patient continues to be in A-fib with RVR despite metoprolol, started on amiodarone drip.  He is becoming hypotensive requiring peripheral pressors after 1 L of fluid.  There is no leukocytosis  or anemia.  Creatinine is elevated however this is baseline.  Initial troponin negative.  Given pressor requirement, patient accepted for admission to the MICU.  Patient's granddaughter (POA, at bedside) made aware and agreement with assessment and plan.  MICU requesting COVID/flu/RSV swabs which have been ordered.  Potassium hemolyzed on chemistry, will be resent.    EKG shows atrial fibrillation with RVR, rate 150, left axis deviation, upper limit of normal QTc, no ST elevation, left bundle branch block, widened QRS    ED Course/Progress:    Diagnoses as of 05/03/24 0518   Pneumonia of right lower lobe due to infectious organism   Sepsis, due to unspecified organism, unspecified whether acute organ dysfunction present (Multi)   Shortness of breath        Clinical Impression: as above  Dispo: admit to MICU    Pt seen and discussed with attending physician, Dr. Jh Jimenez MD  PGY3, Emergency Medicine    Disclaimer: This note was dictated by speech recognition. An attempt at proof reading was made to minimize errors. Errors in transcription may be present.  Please call if questions.      Courtney Jimenez MD  Resident  05/03/24 3248

## 2024-05-03 NOTE — ED TRIAGE NOTES
Patient presents as a critical patient for SOB, Hx of COPD and Afib. Found Pox 87%. Given breathing treatment, no improvement, placed on NRB, no improvement, placed on CPAP, pox up to 91%. . 110/.

## 2024-05-03 NOTE — ED PROCEDURE NOTE
Procedure  Critical Care    Performed by: Cr Peñaloza DO  Authorized by: Cr Peñaloza DO    Comments:      I have personally provided  31 minutes of critical care time exclusive of time spent on separately billable procedures. Time includes review of laboratory data, radiology results, discussion with consultants, and monitoring for potential decompensation. Interventions were performed as documented above.                   Cr Peñaloza DO  05/03/24 0520       Cr Peñaloza DO  05/03/24 0525

## 2024-05-03 NOTE — PROGRESS NOTES
Adult SLP Clinical Swallow Evaluation    Patient Name: Jam Cox  MRN: [id number]  Today's Date: 5/3/2024   Time Calculation  Start Time: 1120  Stop Time: 1140  Time Calculation (min): 20 min       Recommendations:  Solid Diet Recommendations : Pureed/extremely thick  (IDDSI Level 4)  Liquid Diet Recommendations: Nectar thick/mildly thick (IDDS Level 2)  Medication Administration Recommendations: Crushed, With Pureed  Frequent oral care  Direct feed assist  Slow rate  Repeat MBSS in 2-3 weeks pending return to baseline function    Assessment:  Clinical swallow evaluation completed. Pt with oropharyngeal dysphagia per MBSS completed 4/30/24 during recent hospital admission with recs for modified diet to reduce risk of aspiration and increase swallow efficiency. At the present, clinical status consistent with that on recent MBSS and pt without acute etiology for a worsening in swallow function. Based on this in addition to presentation at bedside with the recommended textures (mildly thick liquids and puree), recommend pt continue modified diet per MBSS. SLP to continue to follow to assess tolerance of diet. Repeat MBSS should be completed once pt returns to baseline function to assess readiness for diet advancement.     Plan:  SLP Plan: Skilled SLP  SLP Frequency: 1x per week  SLP Discharge Recommendations: Continue skilled SLP services at the next level of care  Discussed POC: Patient, Caregiver/family, Nursing, Physician  SLP - OK to Discharge: Yes      Goals:   Pt will tolerate least restrictive diet without s/s aspiration, respiratory compromise, or overt difficulty throughout admission.  Start: 05/03/24, End: 05/17/24    Patient/Family will verbalize/demonstrate comprehension of dysphagia education, strategies, recommendations and POC with 80% accuracy independently.   Start: 05/03/24, End: 05/17/24         Subjective     History and Physical:    Patient is a 89 y.o. male presenting with SOB on the same day  "that he was discharged from recent hospital stay.      Past medical history includes HTN, dementia,   systolic/diastolic CHF, paroxymal a-fib, HLD, COPD/asthma     Per dietician note, pt with recent weight loss d/t poor volume of intake, which has reportedly improved since being placed on mildly (nectar) thick liquids and puree.     Relevant SLP Hx:  Per MBSS 4/30/24: \"Pt. presenting with oropharyngeal dysphagia characterized by reduced bolus formation and swallow delay. \" With silent aspiration of thin liquids. Recommendations for nectar (mildly) thick liquids and pureed solids.     Subjective Presentation:  Pt received upright and alert. Good command following. Willing to participate. Family at bedside. Breathing On supplemental O2 via NC.     Family indicated that he has been consuming modified diet without overt difficulty. Currently NPO at time of evaluation.       Pain: Pt denied pain.           Objective     Oral/Motor Assessment:  Oral Hygiene: WFL  Dentition: Complete Dentures (though not in place during evaluation)  Oral Motor: Impaired Function (generalized wezakness)  Vocal Quality:  (Mild rough quality)  Intelligibility: Intelligible      Clinical Observations:  Textures Trialed: Puree and Mildly (Nectar) Thick Liquid  3oz Water Protocol Utilized: no, deferred d/t recent MBSS findings of silent aspiration     Pt given trials via direct feed assist including 8oz of mildly (nectar) thick liquids via straw and 4oz puree via tsp. Pt with adequate extraction via tsp and straw. Noted adequate oral containment as evidenced by no anterior spillage. Oral phase timely with mildly thick liquids and puree, non-significant residues noted between and following trials. Noted x1 instance of coughing toward end of trials; do not suspect related to aspiration given recent MBSS findings and infrequency of cough relative to volume of trials consumed however will continue to monitor closely for other clinical s/s " aspiration.     Inpatient Education:  Pt and family member educated on result and recommendations including diet, strategies, med administration, and prognosis.  Also provided recommendations regarding ongoing assessment once pt's clinical status returned to baseline including discussing goals for eating/drinking. Pt and his daughter indicated understanding.

## 2024-05-04 ENCOUNTER — APPOINTMENT (OUTPATIENT)
Dept: CARE COORDINATION | Age: 89
End: 2024-05-04
Payer: MEDICARE

## 2024-05-04 ENCOUNTER — APPOINTMENT (OUTPATIENT)
Dept: CARDIOLOGY | Facility: HOSPITAL | Age: 89
DRG: 871 | End: 2024-05-04
Payer: MEDICARE

## 2024-05-04 PROBLEM — A41.9 SEPTIC SHOCK (MULTI): Status: ACTIVE | Noted: 2024-05-04

## 2024-05-04 PROBLEM — R65.21 SEPTIC SHOCK (MULTI): Status: ACTIVE | Noted: 2024-05-04

## 2024-05-04 LAB
ALBUMIN SERPL BCP-MCNC: 2.7 G/DL (ref 3.4–5)
ANION GAP BLDMV CALCULATED.4IONS-SCNC: 6 MMO/L (ref 10–25)
ANION GAP BLDMV CALCULATED.4IONS-SCNC: 7 MMO/L (ref 10–25)
ANION GAP BLDV CALCULATED.4IONS-SCNC: 8 MMOL/L (ref 10–25)
ANION GAP SERPL CALC-SCNC: 14 MMOL/L (ref 10–20)
APTT PPP: 63 SECONDS (ref 27–38)
BASE EXCESS BLDMV CALC-SCNC: 2.8 MMOL/L (ref -2–3)
BASE EXCESS BLDMV CALC-SCNC: 3.7 MMOL/L (ref -2–3)
BASE EXCESS BLDV CALC-SCNC: 2.5 MMOL/L (ref -2–3)
BNP SERPL-MCNC: 346 PG/ML (ref 0–99)
BODY TEMPERATURE: 37 DEGREES CELSIUS
BUN SERPL-MCNC: 27 MG/DL (ref 6–23)
CA-I BLDMV-SCNC: 1.22 MMOL/L (ref 1.1–1.33)
CA-I BLDMV-SCNC: 1.24 MMOL/L (ref 1.1–1.33)
CA-I BLDV-SCNC: 1.23 MMOL/L (ref 1.1–1.33)
CALCIUM SERPL-MCNC: 8 MG/DL (ref 8.6–10.6)
CHLORIDE BLD-SCNC: 99 MMOL/L (ref 98–107)
CHLORIDE BLD-SCNC: 99 MMOL/L (ref 98–107)
CHLORIDE BLDV-SCNC: 98 MMOL/L (ref 98–107)
CHLORIDE SERPL-SCNC: 98 MMOL/L (ref 98–107)
CO2 SERPL-SCNC: 27 MMOL/L (ref 21–32)
CREAT SERPL-MCNC: 1.63 MG/DL (ref 0.5–1.3)
EGFRCR SERPLBLD CKD-EPI 2021: 39 ML/MIN/1.73M*2
ERYTHROCYTE [DISTWIDTH] IN BLOOD BY AUTOMATED COUNT: 14.5 % (ref 11.5–14.5)
GLUCOSE BLD MANUAL STRIP-MCNC: 117 MG/DL (ref 74–99)
GLUCOSE BLD MANUAL STRIP-MCNC: 129 MG/DL (ref 74–99)
GLUCOSE BLD MANUAL STRIP-MCNC: 168 MG/DL (ref 74–99)
GLUCOSE BLD MANUAL STRIP-MCNC: 179 MG/DL (ref 74–99)
GLUCOSE BLD MANUAL STRIP-MCNC: 199 MG/DL (ref 74–99)
GLUCOSE BLD MANUAL STRIP-MCNC: 201 MG/DL (ref 74–99)
GLUCOSE BLD-MCNC: 117 MG/DL (ref 74–99)
GLUCOSE BLD-MCNC: 129 MG/DL (ref 74–99)
GLUCOSE BLDV-MCNC: 178 MG/DL (ref 74–99)
GLUCOSE SERPL-MCNC: 176 MG/DL (ref 74–99)
HCO3 BLDMV-SCNC: 29.3 MMOL/L (ref 22–26)
HCO3 BLDMV-SCNC: 29.6 MMOL/L (ref 22–26)
HCO3 BLDV-SCNC: 29.5 MMOL/L (ref 22–26)
HCT VFR BLD AUTO: 33.8 % (ref 41–52)
HCT VFR BLD EST: 33 % (ref 41–52)
HCT VFR BLD EST: 33 % (ref 41–52)
HCT VFR BLD EST: 34 % (ref 41–52)
HGB BLD-MCNC: 11.4 G/DL (ref 13.5–17.5)
HGB BLDMV-MCNC: 10.9 G/DL (ref 13.5–17.5)
HGB BLDMV-MCNC: 11 G/DL (ref 13.5–17.5)
HGB BLDV-MCNC: 11.4 G/DL (ref 13.5–17.5)
INHALED O2 CONCENTRATION: 21 %
INHALED O2 CONCENTRATION: 28 %
INHALED O2 CONCENTRATION: 28 %
LACTATE BLDMV-SCNC: 1.6 MMOL/L (ref 0.4–2)
LACTATE BLDMV-SCNC: 1.6 MMOL/L (ref 0.4–2)
LACTATE BLDV-SCNC: 2 MMOL/L (ref 0.4–2)
LEGIONELLA AG UR QL: NEGATIVE
MAGNESIUM SERPL-MCNC: 2.49 MG/DL (ref 1.6–2.4)
MCH RBC QN AUTO: 30.2 PG (ref 26–34)
MCHC RBC AUTO-ENTMCNC: 33.7 G/DL (ref 32–36)
MCV RBC AUTO: 89 FL (ref 80–100)
NRBC BLD-RTO: 0 /100 WBCS (ref 0–0)
OXYHGB MFR BLDMV: 59 % (ref 45–75)
OXYHGB MFR BLDMV: 66.6 % (ref 45–75)
OXYHGB MFR BLDV: 54.2 % (ref 45–75)
PCO2 BLDMV: 50 MM HG (ref 41–51)
PCO2 BLDMV: 53 MM HG (ref 41–51)
PCO2 BLDV: 56 MM HG (ref 41–51)
PH BLDMV: 7.35 PH (ref 7.33–7.43)
PH BLDMV: 7.38 PH (ref 7.33–7.43)
PH BLDV: 7.33 PH (ref 7.33–7.43)
PHOSPHATE SERPL-MCNC: 3.4 MG/DL (ref 2.5–4.9)
PLATELET # BLD AUTO: 205 X10*3/UL (ref 150–450)
PO2 BLDMV: 37 MM HG (ref 35–45)
PO2 BLDMV: 43 MM HG (ref 35–45)
PO2 BLDV: 38 MM HG (ref 35–45)
POTASSIUM BLDMV-SCNC: 4.5 MMOL/L (ref 3.5–5.3)
POTASSIUM BLDMV-SCNC: 4.5 MMOL/L (ref 3.5–5.3)
POTASSIUM BLDV-SCNC: 4.2 MMOL/L (ref 3.5–5.3)
POTASSIUM SERPL-SCNC: 4.2 MMOL/L (ref 3.5–5.3)
Q ONSET: 214 MS
QRS COUNT: 24 BEATS
QRS DURATION: 124 MS
QT INTERVAL: 308 MS
QTC CALCULATION(BAZETT): 483 MS
QTC FREDERICIA: 416 MS
R AXIS: -3 DEGREES
RBC # BLD AUTO: 3.78 X10*6/UL (ref 4.5–5.9)
S PNEUM AG UR QL: NEGATIVE
SAO2 % BLDMV: 61 % (ref 45–75)
SAO2 % BLDMV: 68 % (ref 45–75)
SAO2 % BLDV: 56 % (ref 45–75)
SODIUM BLDMV-SCNC: 130 MMOL/L (ref 136–145)
SODIUM BLDMV-SCNC: 131 MMOL/L (ref 136–145)
SODIUM BLDV-SCNC: 131 MMOL/L (ref 136–145)
SODIUM SERPL-SCNC: 135 MMOL/L (ref 136–145)
T AXIS: 118 DEGREES
T OFFSET: 368 MS
UFH PPP CHRO-ACNC: 0.5 IU/ML
UFH PPP CHRO-ACNC: 0.6 IU/ML
UFH PPP CHRO-ACNC: 0.6 IU/ML
VANCOMYCIN TROUGH SERPL-MCNC: 8 UG/ML (ref 5–20)
VENTRICULAR RATE: 148 BPM
WBC # BLD AUTO: 14.5 X10*3/UL (ref 4.4–11.3)

## 2024-05-04 PROCEDURE — 84132 ASSAY OF SERUM POTASSIUM: CPT | Mod: 91

## 2024-05-04 PROCEDURE — 85027 COMPLETE CBC AUTOMATED: CPT

## 2024-05-04 PROCEDURE — 2500000004 HC RX 250 GENERAL PHARMACY W/ HCPCS (ALT 636 FOR OP/ED)

## 2024-05-04 PROCEDURE — 83735 ASSAY OF MAGNESIUM: CPT

## 2024-05-04 PROCEDURE — 2500000005 HC RX 250 GENERAL PHARMACY W/O HCPCS

## 2024-05-04 PROCEDURE — 82947 ASSAY GLUCOSE BLOOD QUANT: CPT

## 2024-05-04 PROCEDURE — 2020000001 HC ICU ROOM DAILY

## 2024-05-04 PROCEDURE — 82947 ASSAY GLUCOSE BLOOD QUANT: CPT | Mod: 91

## 2024-05-04 PROCEDURE — 82435 ASSAY OF BLOOD CHLORIDE: CPT

## 2024-05-04 PROCEDURE — 2500000002 HC RX 250 W HCPCS SELF ADMINISTERED DRUGS (ALT 637 FOR MEDICARE OP, ALT 636 FOR OP/ED)

## 2024-05-04 PROCEDURE — A4217 STERILE WATER/SALINE, 500 ML: HCPCS

## 2024-05-04 PROCEDURE — 93005 ELECTROCARDIOGRAM TRACING: CPT

## 2024-05-04 PROCEDURE — 1090000002 HH PPS REVENUE DEBIT

## 2024-05-04 PROCEDURE — 1090000001 HH PPS REVENUE CREDIT

## 2024-05-04 PROCEDURE — 99291 CRITICAL CARE FIRST HOUR: CPT

## 2024-05-04 PROCEDURE — 85730 THROMBOPLASTIN TIME PARTIAL: CPT

## 2024-05-04 PROCEDURE — 83880 ASSAY OF NATRIURETIC PEPTIDE: CPT

## 2024-05-04 PROCEDURE — 80202 ASSAY OF VANCOMYCIN: CPT

## 2024-05-04 PROCEDURE — 2500000001 HC RX 250 WO HCPCS SELF ADMINISTERED DRUGS (ALT 637 FOR MEDICARE OP)

## 2024-05-04 PROCEDURE — 37799 UNLISTED PX VASCULAR SURGERY: CPT

## 2024-05-04 PROCEDURE — 85520 HEPARIN ASSAY: CPT | Mod: 91

## 2024-05-04 PROCEDURE — 99233 SBSQ HOSP IP/OBS HIGH 50: CPT | Performed by: STUDENT IN AN ORGANIZED HEALTH CARE EDUCATION/TRAINING PROGRAM

## 2024-05-04 PROCEDURE — 93010 ELECTROCARDIOGRAM REPORT: CPT | Performed by: INTERNAL MEDICINE

## 2024-05-04 RX ADMIN — PIPERACILLIN SODIUM AND TAZOBACTAM SODIUM 2.25 G: 2; .25 INJECTION, SOLUTION INTRAVENOUS at 17:22

## 2024-05-04 RX ADMIN — VANCOMYCIN HYDROCHLORIDE 1250 MG: 5 INJECTION, POWDER, LYOPHILIZED, FOR SOLUTION INTRAVENOUS at 14:07

## 2024-05-04 RX ADMIN — ASPIRIN 81 MG: 81 TABLET, COATED ORAL at 08:56

## 2024-05-04 RX ADMIN — PIPERACILLIN SODIUM AND TAZOBACTAM SODIUM 2.25 G: 2; .25 INJECTION, SOLUTION INTRAVENOUS at 04:07

## 2024-05-04 RX ADMIN — AZITHROMYCIN 500 MG: 500 INJECTION, POWDER, LYOPHILIZED, FOR SOLUTION INTRAVENOUS at 06:41

## 2024-05-04 RX ADMIN — NOREPINEPHRINE BITARTRATE 0.04 MCG/KG/MIN: 8 INJECTION, SOLUTION INTRAVENOUS at 18:13

## 2024-05-04 RX ADMIN — PIPERACILLIN SODIUM AND TAZOBACTAM SODIUM 2.25 G: 2; .25 INJECTION, SOLUTION INTRAVENOUS at 22:14

## 2024-05-04 RX ADMIN — MEMANTINE 10 MG: 10 TABLET ORAL at 22:14

## 2024-05-04 RX ADMIN — FINASTERIDE 5 MG: 5 TABLET, FILM COATED ORAL at 08:57

## 2024-05-04 RX ADMIN — PIPERACILLIN SODIUM AND TAZOBACTAM SODIUM 2.25 G: 2; .25 INJECTION, SOLUTION INTRAVENOUS at 08:56

## 2024-05-04 RX ADMIN — SODIUM CHLORIDE, POTASSIUM CHLORIDE, SODIUM LACTATE AND CALCIUM CHLORIDE 500 ML: 600; 310; 30; 20 INJECTION, SOLUTION INTRAVENOUS at 12:29

## 2024-05-04 RX ADMIN — SODIUM CHLORIDE, POTASSIUM CHLORIDE, SODIUM LACTATE AND CALCIUM CHLORIDE 500 ML: 600; 310; 30; 20 INJECTION, SOLUTION INTRAVENOUS at 14:07

## 2024-05-04 RX ADMIN — DONEPEZIL HYDROCHLORIDE 10 MG: 10 TABLET, FILM COATED ORAL at 08:57

## 2024-05-04 RX ADMIN — AMIODARONE HYDROCHLORIDE 1 MG/MIN: 1.8 INJECTION, SOLUTION INTRAVENOUS at 04:59

## 2024-05-04 RX ADMIN — INSULIN LISPRO 2 UNITS: 100 INJECTION, SOLUTION INTRAVENOUS; SUBCUTANEOUS at 17:33

## 2024-05-04 RX ADMIN — HEPARIN SODIUM 1000 UNITS/HR: 10000 INJECTION, SOLUTION INTRAVENOUS at 12:30

## 2024-05-04 RX ADMIN — MEMANTINE 10 MG: 10 TABLET ORAL at 08:57

## 2024-05-04 RX ADMIN — AMIODARONE HYDROCHLORIDE 0.5 MG/MIN: 1.8 INJECTION, SOLUTION INTRAVENOUS at 11:23

## 2024-05-04 ASSESSMENT — PAIN - FUNCTIONAL ASSESSMENT
PAIN_FUNCTIONAL_ASSESSMENT: 0-10

## 2024-05-04 ASSESSMENT — PAIN SCALES - GENERAL
PAINLEVEL_OUTOF10: 0 - NO PAIN

## 2024-05-04 NOTE — PROGRESS NOTES
"Overnight Events:    No major overnight events. Bleeding noted from left arm due to IV coming out. No new symptoms per patient.      Objective Data:  Last Recorded Vitals:  Vitals:    05/04/24 1700 05/04/24 1715 05/04/24 1730 05/04/24 1800   BP:  104/55 110/61 102/54   Pulse: 89 87 94 95   Resp: 17 22 20 25   Temp:       TempSrc:       SpO2: 94% 94% 91% 94%   Weight:       Height:           Last Labs:  CBC - 5/4/2024:  4:35 AM  14.5 11.4 205    33.8      CMP - 5/4/2024: 10:24 AM  8.0 6.3 142 --- 2.6   3.4 2.7 106 98      PTT - 5/4/2024:  4:35 AM  1.8   20.7 63     TROPHS   Date/Time Value Ref Range Status   05/03/2024 02:01 AM 23 0 - 53 ng/L Final   04/28/2024 12:27 AM 21 0 - 20 ng/L Final   04/27/2024 10:54 PM 27 0 - 20 ng/L Final     BNP   Date/Time Value Ref Range Status   05/04/2024 04:35  0 - 99 pg/mL Final   05/03/2024 10:33 AM 1,225 0 - 99 pg/mL Final     HGBA1C   Date/Time Value Ref Range Status   04/29/2024 06:46 AM 5.4 see below % Final     VLDL   Date/Time Value Ref Range Status   03/05/2018 05:40 AM 15 0 - 40 mg/dL Final      Last I/O:  I/O last 3 completed shifts:  In: 3156.4 (44.3 mL/kg) [P.O.:240; I.V.:1695.6 (23.8 mL/kg); IV Piggyback:1220.8]  Out: 2100 (29.5 mL/kg) [Urine:2100 (0.8 mL/kg/hr)]  Dosing Weight: 71.2 kg     Past Cardiology Tests (Last 3 Years):  EKG:  Electrocardiogram, 12-lead PRN ACS symptoms 05/02/2024      ECG 12 lead 04/27/2024      ECG 12 lead 04/22/2024    Echo:  Transthoracic Echo (TTE) Complete 05/03/2024      Transthoracic Echo (TTE) Complete 04/23/2024    Ejection Fractions:  No results found for: \"EF\"  Cath:  No results found for this or any previous visit from the past 1095 days.    Stress Test:  No results found for this or any previous visit from the past 1095 days.    Cardiac Imaging:  No results found for this or any previous visit from the past 1095 days.      Inpatient Medications:  Scheduled medications   Medication Dose Route Frequency    aspirin  81 mg oral " Daily    [Held by provider] atorvastatin  10 mg oral Daily    donepezil  10 mg oral Daily    finasteride  5 mg oral Daily    fluticasone furoate-vilanteroL  1 puff inhalation Daily    insulin lispro  0-10 Units subcutaneous TID with meals    memantine  10 mg oral BID    pantoprazole  40 mg oral Daily before breakfast    perflutren protein A microsphere  0.5 mL intravenous Once in imaging    piperacillin-tazobactam  2.25 g intravenous q6h    sulfur hexafluoride microsphr  2 mL intravenous Once in imaging     PRN medications   Medication    alteplase    dextrose    dextrose    glucagon    glucagon    heparin    ipratropium-albuteroL    vancomycin     Continuous Medications   Medication Dose Last Rate    amiodarone  0.5 mg/min 0.5 mg/min (05/04/24 1809)    DOBUTamine  5 mcg/kg/min (Dosing Weight) 5 mcg/kg/min (05/04/24 1800)    heparin  0-4,000 Units/hr 1,000 Units/hr (05/04/24 1800)    norepinephrine  0-3 mcg/kg/min 0.02 mcg/kg/min (05/04/24 1906)       Physical Exam:  Constitutional:       General: He is not in acute distress.     Appearance: Normal appearance. He is normal weight. He is not toxic-appearing.   HENT:      Head: Normocephalic and atraumatic.      Mouth/Throat:      Mouth: Mucous membranes are moist.      Pharynx: Oropharynx is clear.   Neck:      Comments: Prominent Ejs.  Cardiovascular:      Rate and Rhythm: Normal rate. Rhythm irregular.      Pulses: Normal pulses.      Heart sounds: Normal heart sounds. No murmur heard.     No friction rub. No gallop.   Pulmonary:      Effort: Pulmonary effort is normal. No respiratory distress.      Comments: + crackles bibasilarly  Abdominal:      General: Abdomen is flat. Bowel sounds are normal. There is no distension.      Palpations: Abdomen is soft.      Tenderness: There is no abdominal tenderness.   Musculoskeletal:         General: Normal range of motion.      Cervical back: Normal range of motion and neck supple.      Right lower leg: No edema.      Left  lower leg: No edema.   Skin:     General: Skin is warm and dry.      Capillary Refill: Capillary refill takes less than 2 seconds.      Findings: No lesion.   Neurological:      Mental Status: He is alert.      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 He does well while admitted after stabilization and then shortly after discharge begins to develop abrupt symptoms (per the family taking care of him). He did have quite high rates on admission here, in the 150s and has responded to amiodarone well. At present, he does not appear shocky and is actually quite comfortable appearing on exam. Certainly there is evidence of volume overload on exam, but does not appear significantly decompensated today. Primary team has been checking mixed venous O2 through central line and have come back quite low in the 40s. Keeping in mind he has been agitated and delirious while in the ICU and this may confound the true MVO2 (agitation/frequent motion consumes more O2).      Recommendations:     - MVO2s have improved with starting dobutamine; continue as tolerated. Monitor tele for any ventricular ectopy.  - HR increased this morning but <110 while on inotrope and amio.   - Increased levo dosing this morning, with an increased WBC and improving MVO2 is suggesting a septic component of hypotension at this point. Continue with infectious management per primary team.  - Obtain BNP today. Obtain Echo Monday.   - Measure accurate I/O data.  - Ongoing supportive care from primary ICU team.   - HF service will continue to follow along. Please reach out with any questions. Plan discussed with primary team.      Pt seen and evaluated with HF attending, Dr. Jackson.        CVC 05/03/24 Triple lumen Right Internal jugular (Active)   Site Assessment Clean;Dry;Intact 05/04/24 1600   Proximal Lumen Status Flushed 05/04/24 1600   Medial 1 Lumen Status Flushed 05/04/24 1600   Distal Lumen Status Flushed 05/04/24 1600   Dressing Type Antimicrobial patch;Transparent 05/04/24 1600   Dressing Status Clean;Dry;Occlusive 05/04/24 1600   Number of days: 1       Peripheral IV 05/03/24 20 G Left Forearm (Active)   Site Assessment Clean;Dry;Intact 05/04/24 1600   Dressing Type Transparent 05/04/24 1600   Line Status Capped;Flushed;Blood return noted 05/04/24 1600   Dressing Status Clean;Dry;Occlusive 05/04/24 1600   Number of days: 1       Peripheral IV 05/03/24 20 G Right Forearm (Active)   Site Assessment Clean;Dry;Intact 05/04/24 1600   Dressing Type Transparent 05/04/24 1600   Line Status Infusing 05/04/24 1600   Dressing Status Clean;Dry;Occlusive 05/04/24 1600   Number of days: 1       Peripheral IV 05/03/24 20 G Right;Anterior Antecubital (Active)   Site Assessment Clean;Dry;Intact 05/04/24 1600   Dressing Type Transparent 05/04/24 1600   Line Status Infusing 05/04/24 1600   Dressing Status Clean;Dry;Occlusive 05/04/24 1600   Number of days: 1       Code Status:  Full Code    I spent 30 minutes in the professional and overall care of this patient.        Elijah Llanos DO

## 2024-05-04 NOTE — PROGRESS NOTES
Subjective   Interval History: has no complaint of CP, HA, vision changes, N/V. Overnight, patient without complaints or acute events. This morning he was on Dobutamine 5 and Levo 0.03, up to Levo 0.1 during rounds with moderate improvement to pressor requirements after a well-tolerated 500cc bolus.     Objective   Vital signs in last 24 hours:  Temp:  [36.1 °C (97 °F)-36.6 °C (97.9 °F)] 36.3 °C (97.3 °F)  Heart Rate:  [] 88  Resp:  [12-31] 18  BP: ()/(34-71) 118/58    Intake/Output last 3 shifts:  I/O last 3 completed shifts:  In: 1369.9 (19.2 mL/kg) [I.V.:1269.9 (17.8 mL/kg); IV Piggyback:100]  Out: 850 (11.9 mL/kg) [Urine:850 (0.3 mL/kg/hr)]  Dosing Weight: 71.2 kg   Intake/Output this shift:  I/O this shift:  In: 394.2 [P.O.:240; I.V.:154.2]  Out: 1250 [Urine:1250]    Physical Exam  Constitutional:       General: He is not in acute distress.     Appearance: Normal appearance. He is not ill-appearing.   HENT:      Head: Normocephalic and atraumatic.   Eyes:      Extraocular Movements: Extraocular movements intact.   Cardiovascular:      Rate and Rhythm: Normal rate and regular rhythm.      Pulses: Normal pulses.      Heart sounds: Normal heart sounds.   Pulmonary:      Effort: Pulmonary effort is normal.      Breath sounds: Normal breath sounds.   Abdominal:      General: Abdomen is flat. There is no distension.      Palpations: Abdomen is soft.      Tenderness: There is no abdominal tenderness.   Musculoskeletal:         General: No swelling.   Skin:     General: Skin is warm and dry.   Neurological:      General: No focal deficit present.      Mental Status: He is alert and oriented to person, place, and time.         Assessment/Plan   Principal Problem:    Multifocal pneumonia  89-year-old male PMH of HFimpEF, pAfib, COPD, dementia, who presents with shortness of breath. Patient was found to have multifocal pneumonia on CT, started on broadspectrum antibiotics. He was found to be in afib with RVR,  received metoprolol IV and his BP dropped, his HR seems to have improved with amiodarone drip. His shock is likely septic from pneumonia, with downtrending lactate, low pressor requirements. Will cont to wean pressors as possible.         Neuro/Psych  #Dementia  -c/w home donepezil and memantine    CV  #Shock Septic vs Cardiogenic   ::Likely septic in setting of pneumonia vs cardiogenic (known to have CHF)  ::Lactate improved from 4->2  :: S/p 1.5 L LR overnight 5/3  :: Mixed venous overnight 55 > 65 > (85am 5/4).  -Central venous saturation initially low. Consulted heart failure specialist team and recommended starting patient on dobutamine. Will CTM  -There is also likely a component of septic shock given patient's underlying pneumonia.  -Continue to monitor patient's pressures.  Goal is to maintain maps greater than 65.  - Will continue Fluid Resuscitation    #Afib with RVR  ::known to have afib, on apixiban  ::did not tolerate IV metoprolol in the ED  -c/w with amiodarone gtt, HR seems to be controlled  -HF consulted, appreciate recs  -Pt on heparin drip     #HFimpEF  -Hold home jardiance, entresto, metoprolol    Pulm  #COPD/asthma  -c/w home Advair    GI  #Dysphagia  #Concern for aspiration  -Speech evaluation, clear for Pureed 4; Mild Thick Liquids 2    /Renal  #YOSEF   :: Peak 1.89 (Baseline ~1.0)  :: Initially poor UOP has improved with >850cc out  -Will CTM    Endo  - No active issues at this time    ID  #Septic shock  #Bilateral lower lobe pneumonia     -Patient started on broad-spectrum coverage with start date 5/2  Abx  - Vancomycin (5/2 - )  - Zosyn (5/2 - )    Heme/Onc  -No issues at this time       Vent/O2: 2L NC  Lines/Devices: Peripheral IV, right IJ  Drips: Dobutamine, Levophed  ATBx: Vancomycin (5/2 - ), Zosyn (5/2 - )  Fluids: None  Diet: Pureed 4; Mild Thick Liquids 2  PPX: - None  DVT PPX: Heparin drip  Code status: Full code discussed with patient's granddaughter who is the POA  NOK/POA:  Carin Cox [phone]  Dispo: ICU     LOS: 1 day     Davey Sweeney MD  PGY-I, Internal Medicine

## 2024-05-04 NOTE — PROGRESS NOTES
Vancomycin Dosing by Pharmacy  FOLLOW UP    Jam Cox is a 89 y.o. male who Pharmacy is consulted to dose vancomycin for pneumonia.     Based on the patient's indication and renal status, this patient is being dosed based on a goal vancomycin level of 15-20.     Current vancomycin dose: Dose by level, last received 1250mg 5/3     Most recent vancomycin trough: 8.0 mcg/mL    Renal function is currently in YOSEF.    Estimated Creatinine Clearance: 25.2 mL/min (A) (by C-G formula based on SCr of 1.83 mg/dL (H)).    Vancomycin, Trough   Date Value Ref Range Status   05/04/2024 8.0 5.0 - 20.0 ug/mL Final     Comment:     Therapeutic Ranges:    Peak (all ages):        30.0-40.0 ug/mL                       Trough (all ages):        10.0-20.0 ug/mL                                             Vancomycin trough concentrations drawn immediately prior to the next dose at steady-state are preferred for concentration-guided monitoring of patients treated with vancomycin.    Reference: Am J Health-Syst Pharm. 2020; 77(11):835-864.            Results from last 7 days   Lab Units 05/04/24  0435 05/03/24  1720 05/03/24  1033 05/03/24  0201 05/02/24  0555   CREATININE mg/dL  --  1.83* 1.89* 1.40* 1.13   BUN mg/dL  --  30* 29* 24* 18   WBC AUTO x10*3/uL 14.5*  --  14.1* 8.7 9.8        Visit Vitals  /51   Pulse 78   Temp 36.4 °C (97.5 °F) (Temporal)   Resp 12       Blood Culture   Date/Time Value Ref Range Status   05/03/2024 05:20 AM Loaded on Instrument - Culture in progress  Preliminary   05/03/2024 05:20 AM Loaded on Instrument - Culture in progress  Preliminary        Assessment/Plan    Vancomycin level is below goal range of 15-20. Will re-dose vancomycin with one time order of 1250 mg.    The next vancomycin level will be ordered for 5/5 at 0900, unless clinically indicated sooner.  Will continue to monitor renal function daily while on vancomycin and order serum creatinine at least every 48 hours if not already  ordered.  Will follow for continued vancomycin needs, clinical response, and signs/symptoms of toxicity.       Trisha Chong, PharmD

## 2024-05-04 NOTE — CONSULTS
Inpatient consult to Heart Failure  Consult performed by: Elijah Llanos DO  Consult ordered by: Collette Sellers MD        History Of Present Illness:    Jam Cox is a 89 y.o. 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 He was recently admitted to [facility] for decompensated heart failure and was discharged yesterday. Additionally, he had another hospitalization for HF last month as well. His daughter and niece are at the bedside who report that he is generally functional around the house, able to go up stairs and do basic tasks. He lives at home with his wife and another daughter. He does have some component of dementia and frailty but they state he has been doing well before these recent HF decompensation admissions. He follows with Dr. Menezes for outpatient cardiology, and based on office notes, seems to have been stable from HF perspective before last month. The family states that his decline has not been steady overtime, rather he does well for a long time and then abruptly drops in functional status. On admission here, he was found to be in AF RVR and was started on an amiodarone gtt which has controlled the rates. A CT of the chest was done and showed bilateral multifocal pneumonias and he has been treated with antibiotics in the ICU, in addition to needing levophed for pressure support. HF service has been consulted to assist with management of acutely reduced HF and possible cardiogenic shock component.      Last Recorded Vitals:  Vitals:    05/03/24 1900 05/03/24 2000 05/03/24 2100 05/03/24 2200   BP: 111/55 123/61 118/60 105/53   BP Location:       Patient Position:       Pulse: 83 98 80 96   Resp: 18 20 16 18   Temp:  36.4 °C (97.5 °F)     TempSrc:  Temporal     SpO2: 94% 94% 97% 92%   Weight:       Height:           Last Labs:  CBC - 5/3/2024: 10:33 AM  14.1 11.7 251    36.6      CMP - 5/3/2024:  5:20 PM  8.7 6.3 142 --- 2.6   3.8  3.0 106 98      PTT - 5/3/2024: 10:33 AM  1.8   20.7 30     Troponin I, High Sensitivity   Date/Time Value Ref Range Status   05/03/2024 02:01 AM 23 0 - 53 ng/L Final   04/28/2024 12:27 AM 21 (H) 0 - 20 ng/L Final   04/27/2024 10:54 PM 27 (H) 0 - 20 ng/L Final     BNP   Date/Time Value Ref Range Status   05/03/2024 10:33 AM 1,225 (H) 0 - 99 pg/mL Final   05/03/2024 02:01  (H) 0 - 99 pg/mL Final     Hemoglobin A1C   Date/Time Value Ref Range Status   04/29/2024 06:46 AM 5.4 see below % Final     VLDL   Date/Time Value Ref Range Status   03/05/2018 05:40 AM 15 0 - 40 mg/dL Final      Last I/O:  I/O last 3 completed shifts:  In: 746 (10.5 mL/kg) [I.V.:646 (9.1 mL/kg); IV Piggyback:100]  Out: 300 (4.2 mL/kg) [Urine:300 (0.1 mL/kg/hr)]  Dosing Weight: 71.2 kg     Past Cardiology Tests (Last 3 Years):    Echo:  Transthoracic Echo (TTE) Complete 05/03/2024  PHYSICIAN INTERPRETATION:  Left Ventricle: The left ventricular systolic function is moderately to severely decreased, with an estimated ejection fraction of 30%. There is global hypokinesis of the left ventricle with minor regional variations. The left ventricular cavity size is normal. There is paradoxical septal wall motion. Spectral Doppler shows a restrictive pattern of left ventricular diastolic filling.  Left Atrium: The left atrium is mildly dilated.  Right Ventricle: The right ventricle is normal in size. There is reduced right ventricular systolic function. TAPSE=7.4 mm.  Right Atrium: The right atrium is mildly dilated.  Aortic Valve: The aortic valve is trileaflet. There is mild aortic valve cusp calcification. There is no evidence of reduced aortic valve leaflet excursion excursion. There is mild aortic valve regurgitation. The peak instantaneous gradient of the aortic valve is 6.2 mmHg.  Mitral Valve: The mitral valve is normal in structure. There is tethering of the anterior mitral leaflet. There is mild mitral annular calcification. There is mild  "to moderate mitral valve regurgitation.  Tricuspid Valve: The tricuspid valve is structurally normal. There is mild tricuspid regurgitation. The Doppler estimated RVSP is moderately elevated at 51.6 mmHg.  Pulmonic Valve: The pulmonic valve is not well visualized. The pulmonic valve regurgitation was not well visualized.  Pericardium: There is a small pericardial effusion.  Aorta: The aortic root is normal.  In comparison to the previous echocardiogram(s): Compared with study from 4/23/2024, the LV EF is decreased (was 40-45%) and there is a small pericardial effusion (was none).        CONCLUSIONS:   1. Left ventricular systolic function is moderately to severely decreased with a 30% estimated ejection fraction.   2. Spectral Doppler shows a restrictive pattern of left ventricular diastolic filling.   3. There is reduced right ventricular systolic function.   4. Mild to moderate mitral valve regurgitation.   5. Moderately elevated right ventricular systolic pressure.   6. Mild aortic valve regurgitation.   7. [facility]   8. There is global hypokinesis of the left ventricle with minor regional variations.    Ejection Fractions:  No results found for: \"EF\"  Cath:  No results found for this or any previous visit from the past 1095 days.    Stress Test:  No results found for this or any previous visit from the past 1095 days.    Cardiac Imaging:  No results found for this or any previous visit from the past 1095 days.      Past Medical History:  He has a past medical history of Abnormality of plasma protein, unspecified (05/01/2017), Benign prostatic hyperplasia without lower urinary tract symptoms (05/01/2017), Disorder of prostate, unspecified (05/01/2017), Elevated prostate specific antigen (PSA) (05/01/2017), Elevated prostate specific antigen (PSA) (05/01/2017), Elevated prostate specific antigen (PSA) (05/01/2017), Elevated prostate specific antigen (PSA) (05/01/2017), Epistaxis (05/01/2017), " Essential (primary) hypertension (12/19/2022), Frequency of micturition (05/01/2017), Hallucinations, unspecified (05/01/2017), Headache, unspecified (05/01/2017), Hematuria, unspecified (05/01/2017), Ischemic cardiomyopathy (12/13/2021), Other amnesia (05/01/2017), Other microscopic hematuria (05/01/2017), Other seasonal allergic rhinitis (05/01/2017), Other secondary cataract, right eye (05/31/2018), Other specified disorders of the male genital organs (05/01/2017), Personal history of other specified conditions (02/10/2020), Presence of intraocular lens (05/01/2017), Primary insomnia (05/01/2017), Primary open-angle glaucoma, unspecified eye, stage unspecified (05/01/2017), Unspecified atrial flutter (Multi) (05/01/2017), Unspecified glaucoma (05/01/2017), and Vitamin D deficiency, unspecified (05/01/2017).    Past Surgical History:  He has a past surgical history that includes Cataract extraction (10/07/2014); Other surgical history (10/07/2014); MR angio head wo IV contrast (3/3/2018); and MR angio neck wo IV contrast (3/3/2018).      Social History:  He reports that he has quit smoking. His smoking use included cigarettes. He has never been exposed to tobacco smoke. He has never used smokeless tobacco. He reports that he does not currently use alcohol. He reports that he does not currently use drugs.    Family History:  Family History   Problem Relation Name Age of Onset    No Known Problems Mother      No Known Problems Father          Allergies:  Ace inhibitors    Inpatient Medications:  Scheduled medications   Medication Dose Route Frequency    aspirin  81 mg oral Daily    [Held by provider] atorvastatin  10 mg oral Daily    [START ON 5/4/2024] azithromycin  500 mg intravenous q24h    donepezil  10 mg oral Daily    finasteride  5 mg oral Daily    fluticasone furoate-vilanteroL  1 puff inhalation Daily    insulin lispro  0-10 Units subcutaneous TID with meals    memantine  10 mg oral BID    [START ON  5/4/2024] pantoprazole  40 mg oral Daily before breakfast    perflutren protein A microsphere  0.5 mL intravenous Once in imaging    piperacillin-tazobactam  2.25 g intravenous q6h    sulfur hexafluoride microsphr  2 mL intravenous Once in imaging     PRN medications   Medication    alteplase    dextrose    dextrose    glucagon    glucagon    heparin    ipratropium-albuteroL    vancomycin     Continuous Medications   Medication Dose Last Rate    amiodarone  0.5-1 mg/min 1 mg/min (05/03/24 2225)    DOBUTamine  5 mcg/kg/min (Dosing Weight) 5 mcg/kg/min (05/03/24 1900)    heparin  0-4,000 Units/hr 1,200 Units/hr (05/03/24 1900)    norepinephrine  0-3 mcg/kg/min 0.14 mcg/kg/min (05/03/24 1954)     Outpatient Medications:  Current Outpatient Medications   Medication Instructions    apixaban (ELIQUIS) 5 mg, oral, 2 times daily    aspirin 81 mg EC tablet 1 tablet, oral, Daily    atorvastatin (LIPITOR) 10 mg, oral, Daily    cholecalciferol (Vitamin D-3) 50 mcg (2,000 unit) capsule 1 capsule, oral, Daily    clindamycin (Cleocin T) 1 % lotion Topical, 2 times daily    donepezil (ARICEPT) 10 mg, oral, Daily    finasteride (PROSCAR) 5 mg, oral, Daily    fluticasone propion-salmeteroL (Advair Diskus) 250-50 mcg/dose diskus inhaler 1 puff, inhalation, 2 times daily RT, Rinse mouth with water after use to reduce aftertaste and incidence of candidiasis. Do not swallow.    furosemide (LASIX) 40 mg, oral, Daily    ipratropium-albuteroL (Duo-Neb) 0.5-2.5 mg/3 mL nebulizer solution 3 mL, nebulization, Every 4 hours PRN    Jardiance 10 mg, oral, Daily    memantine (NAMENDA) 10 mg, oral, 2 times daily    methylPREDNISolone (Medrol) 4 mg tablet Take 4 tablets (16 mg) by mouth once daily for 1 day, THEN 3 tablets (12 mg) once daily for 1 day, THEN 2 tablets (8 mg) once daily for 1 day, THEN 1 tablet (4 mg) once daily for 1 day. Do not fill before May 3, 2024.    metoprolol tartrate (LOPRESSOR) 25 mg, oral, 2 times daily    potassium  chloride CR 20 mEq ER tablet 20 mEq, oral, Daily    sacubitriL-valsartan (Entresto) 24-26 mg tablet 0.5 tablets, oral, 2 times daily, *Please note decreased dose    tamsulosin (FLOMAX) 0.4 mg, oral, Daily    VITAMIN B COMPLEX ORAL 1 tablet, oral, Daily    walker (Ultra-Light Rollator) misc 1 each, miscellaneous, Daily       Physical Exam:  Physical Exam  Constitutional:       General: He is not in acute distress.     Appearance: Normal appearance. He is normal weight. He is not toxic-appearing.   HENT:      Head: Normocephalic and atraumatic.      Mouth/Throat:      Mouth: Mucous membranes are moist.      Pharynx: Oropharynx is clear.   Neck:      Comments: Engorged EJ veins  Cardiovascular:      Rate and Rhythm: Normal rate. Rhythm irregular.      Pulses: Normal pulses.      Heart sounds: Normal heart sounds. No murmur heard.     No friction rub. No gallop.   Pulmonary:      Effort: Pulmonary effort is normal. No respiratory distress.      Comments: + crackles bibasilarly  Abdominal:      General: Abdomen is flat. Bowel sounds are normal. There is no distension.      Palpations: Abdomen is soft.      Tenderness: There is no abdominal tenderness.   Musculoskeletal:         General: Normal range of motion.      Cervical back: Normal range of motion and neck supple.      Right lower leg: No edema.      Left lower leg: No edema.   Skin:     General: Skin is warm and dry.      Capillary Refill: Capillary refill takes less than 2 seconds.      Findings: No lesion.   Neurological:      Mental Status: He is alert.   Psychiatric:      Comments: Bilateral hand mits present.            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 He does well while admitted after stabilization and then shortly after discharge begins to develop abrupt symptoms (per the family taking care of him). He did have quite high rates on admission here, in the 150s and has responded to amiodarone well. At present, he does not appear shocky and is actually quite comfortable appearing on exam. Certainly there is evidence of volume overload on exam, but does not appear significantly decompensated today. Primary team has been checking mixed venous O2 through central line and have come back quite low in the 40s. Keeping in mind he has been agitated and delirious while in the ICU and this may confound the true MVO2 (agitation/frequent motion consumes more O2).     Recommendations:    - Based on available MVO2s, CI is ~1.3. Consider a trial of low dose dobutamine at 2.5 or 5 mcg/kg/min and monitor clinical response. AF rates and potential ectopy should be suppressed by concurrent amiodarone infusion.  - Attempt to wean levophed while on dobutamine.  - If renal function trend improves with inotropy, consider some gentle diuresis.  - Measure accurate I/O data.  - Ongoing supportive care from primary ICU team.   - HF service will continue to follow along. Please reach out with any questions. Plan discussed with primary team.    Pt seen and evaluated with HF attending, Dr. Jackson.        CVC 05/03/24 Triple lumen Right Internal jugular (Active)   Line Necessity Intravenous medication therapy 05/03/24 2000   Line Necessity Reviewed With micu team 05/03/24 2000   Site Assessment Clean;Dry;Intact 05/03/24 2000   Proximal Lumen Status Infusing 05/03/24 2000   Medial 1 Lumen Status Infusing 05/03/24 2000   Distal Lumen Status Blood return noted 05/03/24 2000   Dressing Status Clean;Dry;Occlusive 05/03/24 2000   Number of days: 0       Peripheral IV 05/03/24 20 G Left Forearm (Active)   Site Assessment Clean;Dry;Intact 05/03/24 2000   Dressing Type  Transparent 05/03/24 2000   Line Status Flushed;Blood return noted 05/03/24 2000   Dressing Status Clean;Dry 05/03/24 2000   Number of days: 0       Peripheral IV 05/03/24 20 G Right Forearm (Active)   Site Assessment Clean;Dry;Intact 05/03/24 2000   Dressing Type Transparent 05/03/24 2000   Line Status Flushed;Blood return noted 05/03/24 2000   Dressing Status Clean;Dry 05/03/24 2000   Number of days: 0       Peripheral IV 05/03/24 20 G Right;Anterior Antecubital (Active)   Site Assessment Clean;Dry;Intact 05/03/24 2000   Dressing Type Transparent 05/03/24 2000   Line Status Flushed;No blood return 05/03/24 2000   Dressing Status Clean;Dry 05/03/24 2000   Number of days: 0       Code Status:  Full Code    I spent 70 minutes in the professional and overall care of this patient.      Elijah Llanos DO

## 2024-05-05 PROBLEM — R57.0 CARDIOGENIC SHOCK (MULTI): Status: ACTIVE | Noted: 2024-05-05

## 2024-05-05 LAB
ALBUMIN SERPL BCP-MCNC: 2.5 G/DL (ref 3.4–5)
ALP SERPL-CCNC: 73 U/L (ref 33–136)
ALT SERPL W P-5'-P-CCNC: 77 U/L (ref 10–52)
ANION GAP BLDV CALCULATED.4IONS-SCNC: 5 MMOL/L (ref 10–25)
ANION GAP SERPL CALC-SCNC: 13 MMOL/L (ref 10–20)
AST SERPL W P-5'-P-CCNC: 45 U/L (ref 9–39)
BASE EXCESS BLDV CALC-SCNC: 6 MMOL/L (ref -2–3)
BASOPHILS # BLD AUTO: 0.01 X10*3/UL (ref 0–0.1)
BASOPHILS NFR BLD AUTO: 0.1 %
BILIRUB SERPL-MCNC: 1.7 MG/DL (ref 0–1.2)
BODY TEMPERATURE: 37 DEGREES CELSIUS
BUN SERPL-MCNC: 21 MG/DL (ref 6–23)
CA-I BLDV-SCNC: 1.21 MMOL/L (ref 1.1–1.33)
CALCIUM SERPL-MCNC: 7.9 MG/DL (ref 8.6–10.6)
CHLORIDE BLDV-SCNC: 101 MMOL/L (ref 98–107)
CHLORIDE SERPL-SCNC: 100 MMOL/L (ref 98–107)
CO2 SERPL-SCNC: 29 MMOL/L (ref 21–32)
CREAT SERPL-MCNC: 1.29 MG/DL (ref 0.5–1.3)
EGFRCR SERPLBLD CKD-EPI 2021: 52 ML/MIN/1.73M*2
EOSINOPHIL # BLD AUTO: 0.03 X10*3/UL (ref 0–0.4)
EOSINOPHIL NFR BLD AUTO: 0.3 %
ERYTHROCYTE [DISTWIDTH] IN BLOOD BY AUTOMATED COUNT: 14.1 % (ref 11.5–14.5)
ERYTHROCYTE [DISTWIDTH] IN BLOOD BY AUTOMATED COUNT: 14.2 % (ref 11.5–14.5)
GLUCOSE BLD MANUAL STRIP-MCNC: 113 MG/DL (ref 74–99)
GLUCOSE BLD MANUAL STRIP-MCNC: 123 MG/DL (ref 74–99)
GLUCOSE BLD MANUAL STRIP-MCNC: 129 MG/DL (ref 74–99)
GLUCOSE BLD MANUAL STRIP-MCNC: 86 MG/DL (ref 74–99)
GLUCOSE BLDV-MCNC: 84 MG/DL (ref 74–99)
GLUCOSE SERPL-MCNC: 84 MG/DL (ref 74–99)
HCO3 BLDV-SCNC: 31.7 MMOL/L (ref 22–26)
HCT VFR BLD AUTO: 30.5 % (ref 41–52)
HCT VFR BLD AUTO: 32.8 % (ref 41–52)
HCT VFR BLD EST: 33 % (ref 41–52)
HGB BLD-MCNC: 10.5 G/DL (ref 13.5–17.5)
HGB BLD-MCNC: 10.7 G/DL (ref 13.5–17.5)
HGB BLDV-MCNC: 11.1 G/DL (ref 13.5–17.5)
IMM GRANULOCYTES # BLD AUTO: 0.08 X10*3/UL (ref 0–0.5)
IMM GRANULOCYTES NFR BLD AUTO: 0.7 % (ref 0–0.9)
INHALED O2 CONCENTRATION: 21 %
LACTATE BLDV-SCNC: 1.6 MMOL/L (ref 0.4–2)
LYMPHOCYTES # BLD AUTO: 1.52 X10*3/UL (ref 0.8–3)
LYMPHOCYTES NFR BLD AUTO: 13.5 %
MAGNESIUM SERPL-MCNC: 2.23 MG/DL (ref 1.6–2.4)
MCH RBC QN AUTO: 29.7 PG (ref 26–34)
MCH RBC QN AUTO: 30.1 PG (ref 26–34)
MCHC RBC AUTO-ENTMCNC: 32.6 G/DL (ref 32–36)
MCHC RBC AUTO-ENTMCNC: 34.4 G/DL (ref 32–36)
MCV RBC AUTO: 87 FL (ref 80–100)
MCV RBC AUTO: 91 FL (ref 80–100)
MONOCYTES # BLD AUTO: 0.73 X10*3/UL (ref 0.05–0.8)
MONOCYTES NFR BLD AUTO: 6.5 %
NEUTROPHILS # BLD AUTO: 8.88 X10*3/UL (ref 1.6–5.5)
NEUTROPHILS NFR BLD AUTO: 78.9 %
NRBC BLD-RTO: 0 /100 WBCS (ref 0–0)
NRBC BLD-RTO: 0 /100 WBCS (ref 0–0)
OXYHGB MFR BLDV: 53.1 % (ref 45–75)
PCO2 BLDV: 50 MM HG (ref 41–51)
PH BLDV: 7.41 PH (ref 7.33–7.43)
PLATELET # BLD AUTO: 207 X10*3/UL (ref 150–450)
PLATELET # BLD AUTO: 210 X10*3/UL (ref 150–450)
PO2 BLDV: 32 MM HG (ref 35–45)
POTASSIUM BLDV-SCNC: 4.2 MMOL/L (ref 3.5–5.3)
POTASSIUM SERPL-SCNC: 4.2 MMOL/L (ref 3.5–5.3)
PROT SERPL-MCNC: 5.1 G/DL (ref 6.4–8.2)
RBC # BLD AUTO: 3.49 X10*6/UL (ref 4.5–5.9)
RBC # BLD AUTO: 3.6 X10*6/UL (ref 4.5–5.9)
SAO2 % BLDV: 54 % (ref 45–75)
SODIUM BLDV-SCNC: 133 MMOL/L (ref 136–145)
SODIUM SERPL-SCNC: 138 MMOL/L (ref 136–145)
STAPHYLOCOCCUS SPEC CULT: NORMAL
UFH PPP CHRO-ACNC: 0.2 IU/ML
UFH PPP CHRO-ACNC: 0.4 IU/ML
UFH PPP CHRO-ACNC: 0.5 IU/ML
VANCOMYCIN TROUGH SERPL-MCNC: 11.8 UG/ML (ref 5–20)
WBC # BLD AUTO: 11.3 X10*3/UL (ref 4.4–11.3)
WBC # BLD AUTO: 12.3 X10*3/UL (ref 4.4–11.3)

## 2024-05-05 PROCEDURE — 2500000004 HC RX 250 GENERAL PHARMACY W/ HCPCS (ALT 636 FOR OP/ED)

## 2024-05-05 PROCEDURE — 2500000001 HC RX 250 WO HCPCS SELF ADMINISTERED DRUGS (ALT 637 FOR MEDICARE OP)

## 2024-05-05 PROCEDURE — 85027 COMPLETE CBC AUTOMATED: CPT

## 2024-05-05 PROCEDURE — 82947 ASSAY GLUCOSE BLOOD QUANT: CPT

## 2024-05-05 PROCEDURE — 37799 UNLISTED PX VASCULAR SURGERY: CPT

## 2024-05-05 PROCEDURE — 94640 AIRWAY INHALATION TREATMENT: CPT

## 2024-05-05 PROCEDURE — 84132 ASSAY OF SERUM POTASSIUM: CPT | Mod: 91 | Performed by: NURSE PRACTITIONER

## 2024-05-05 PROCEDURE — 83735 ASSAY OF MAGNESIUM: CPT

## 2024-05-05 PROCEDURE — 1090000002 HH PPS REVENUE DEBIT

## 2024-05-05 PROCEDURE — 85025 COMPLETE CBC W/AUTO DIFF WBC: CPT

## 2024-05-05 PROCEDURE — 85520 HEPARIN ASSAY: CPT

## 2024-05-05 PROCEDURE — 84132 ASSAY OF SERUM POTASSIUM: CPT | Mod: 91

## 2024-05-05 PROCEDURE — 99291 CRITICAL CARE FIRST HOUR: CPT

## 2024-05-05 PROCEDURE — 80202 ASSAY OF VANCOMYCIN: CPT

## 2024-05-05 PROCEDURE — 99233 SBSQ HOSP IP/OBS HIGH 50: CPT | Performed by: STUDENT IN AN ORGANIZED HEALTH CARE EDUCATION/TRAINING PROGRAM

## 2024-05-05 PROCEDURE — 2020000001 HC ICU ROOM DAILY

## 2024-05-05 PROCEDURE — 83735 ASSAY OF MAGNESIUM: CPT | Mod: 91 | Performed by: NURSE PRACTITIONER

## 2024-05-05 PROCEDURE — 1090000001 HH PPS REVENUE CREDIT

## 2024-05-05 RX ORDER — ACETAMINOPHEN 500 MG
5 TABLET ORAL NIGHTLY PRN
Status: DISCONTINUED | OUTPATIENT
Start: 2024-05-05 | End: 2024-05-09 | Stop reason: HOSPADM

## 2024-05-05 RX ORDER — TRAZODONE HYDROCHLORIDE 50 MG/1
50 TABLET ORAL ONCE
Status: COMPLETED | OUTPATIENT
Start: 2024-05-05 | End: 2024-05-05

## 2024-05-05 RX ADMIN — DOBUTAMINE HYDROCHLORIDE 2.5 MCG/KG/MIN: 400 INJECTION INTRAVENOUS at 12:03

## 2024-05-05 RX ADMIN — MEMANTINE 10 MG: 10 TABLET ORAL at 20:21

## 2024-05-05 RX ADMIN — PIPERACILLIN SODIUM AND TAZOBACTAM SODIUM 2.25 G: 2; .25 INJECTION, SOLUTION INTRAVENOUS at 20:45

## 2024-05-05 RX ADMIN — MEMANTINE 10 MG: 10 TABLET ORAL at 08:54

## 2024-05-05 RX ADMIN — FLUTICASONE FUROATE AND VILANTEROL TRIFENATATE 1 PUFF: 100; 25 POWDER RESPIRATORY (INHALATION) at 07:10

## 2024-05-05 RX ADMIN — AMIODARONE HYDROCHLORIDE 0.5 MG/MIN: 1.8 INJECTION, SOLUTION INTRAVENOUS at 00:52

## 2024-05-05 RX ADMIN — AMIODARONE HYDROCHLORIDE 0.5 MG/MIN: 1.8 INJECTION, SOLUTION INTRAVENOUS at 21:38

## 2024-05-05 RX ADMIN — TRAZODONE HYDROCHLORIDE 50 MG: 50 TABLET ORAL at 20:21

## 2024-05-05 RX ADMIN — AMIODARONE HYDROCHLORIDE 0.5 MG/MIN: 1.8 INJECTION, SOLUTION INTRAVENOUS at 12:13

## 2024-05-05 RX ADMIN — FINASTERIDE 5 MG: 5 TABLET, FILM COATED ORAL at 08:54

## 2024-05-05 RX ADMIN — PIPERACILLIN SODIUM AND TAZOBACTAM SODIUM 2.25 G: 2; .25 INJECTION, SOLUTION INTRAVENOUS at 08:54

## 2024-05-05 RX ADMIN — ASPIRIN 81 MG: 81 TABLET, COATED ORAL at 08:54

## 2024-05-05 RX ADMIN — HEPARIN SODIUM 1200 UNITS/HR: 10000 INJECTION, SOLUTION INTRAVENOUS at 12:03

## 2024-05-05 RX ADMIN — Medication 5 MG: at 20:21

## 2024-05-05 RX ADMIN — PIPERACILLIN SODIUM AND TAZOBACTAM SODIUM 2.25 G: 2; .25 INJECTION, SOLUTION INTRAVENOUS at 15:24

## 2024-05-05 RX ADMIN — PIPERACILLIN SODIUM AND TAZOBACTAM SODIUM 2.25 G: 2; .25 INJECTION, SOLUTION INTRAVENOUS at 03:15

## 2024-05-05 RX ADMIN — DONEPEZIL HYDROCHLORIDE 10 MG: 10 TABLET, FILM COATED ORAL at 08:54

## 2024-05-05 ASSESSMENT — PAIN - FUNCTIONAL ASSESSMENT
PAIN_FUNCTIONAL_ASSESSMENT: 0-10

## 2024-05-05 ASSESSMENT — PAIN SCALES - GENERAL
PAINLEVEL_OUTOF10: 0 - NO PAIN

## 2024-05-05 NOTE — PROGRESS NOTES
"Overnight Events:    No major overnight events. Up in chair today. On dobutamine 2.5 today and tolerating well.     Objective Data:  Last Recorded Vitals:  Vitals:    05/05/24 1555 05/05/24 1600 05/05/24 1700 05/05/24 1800   BP:  102/67 122/52 95/58   Pulse:  99 87 88   Resp:  25 22 20   Temp: 37 °C (98.6 °F)      TempSrc: Temporal      SpO2:  92% 90% 98%   Weight:       Height:           Last Labs:  CBC - 5/5/2024:  4:49 AM  11.3 10.7 210    32.8      CMP - 5/5/2024:  4:49 AM  7.9 5.1 45 --- 1.7   3.4 2.5 77 73      PTT - 5/4/2024:  4:35 AM  1.8   20.7 63     TROPHS   Date/Time Value Ref Range Status   05/03/2024 02:01 AM 23 0 - 53 ng/L Final   04/28/2024 12:27 AM 21 0 - 20 ng/L Final   04/27/2024 10:54 PM 27 0 - 20 ng/L Final     BNP   Date/Time Value Ref Range Status   05/04/2024 04:35  0 - 99 pg/mL Final   05/03/2024 10:33 AM 1,225 0 - 99 pg/mL Final     HGBA1C   Date/Time Value Ref Range Status   04/29/2024 06:46 AM 5.4 see below % Final     VLDL   Date/Time Value Ref Range Status   03/05/2018 05:40 AM 15 0 - 40 mg/dL Final      Last I/O:  I/O last 3 completed shifts:  In: 2992.6 (42 mL/kg) [P.O.:240; I.V.:1531.8 (21.5 mL/kg); IV Piggyback:1220.8]  Out: 3250 (45.6 mL/kg) [Urine:3250 (1.3 mL/kg/hr)]  Dosing Weight: 71.2 kg     Past Cardiology Tests (Last 3 Years):  EKG:  Electrocardiogram, 12-lead PRN ACS symptoms 05/02/2024      ECG 12 lead 04/27/2024      ECG 12 lead 04/22/2024    Echo:  Transthoracic Echo (TTE) Complete 05/03/2024      Transthoracic Echo (TTE) Complete 04/23/2024    Ejection Fractions:  No results found for: \"EF\"  Cath:  No results found for this or any previous visit from the past 1095 days.    Stress Test:  No results found for this or any previous visit from the past 1095 days.    Cardiac Imaging:  No results found for this or any previous visit from the past 1095 days.      Inpatient Medications:  Scheduled medications   Medication Dose Route Frequency    aspirin  81 mg oral Daily "    [Held by provider] atorvastatin  10 mg oral Daily    donepezil  10 mg oral Daily    finasteride  5 mg oral Daily    fluticasone furoate-vilanteroL  1 puff inhalation Daily    insulin lispro  0-10 Units subcutaneous TID with meals    memantine  10 mg oral BID    pantoprazole  40 mg oral Daily before breakfast    perflutren protein A microsphere  0.5 mL intravenous Once in imaging    piperacillin-tazobactam  2.25 g intravenous q6h    sulfur hexafluoride microsphr  2 mL intravenous Once in imaging     PRN medications   Medication    alteplase    dextrose    dextrose    glucagon    glucagon    heparin    ipratropium-albuteroL     Continuous Medications   Medication Dose Last Rate    amiodarone  0.5 mg/min 0.5 mg/min (05/05/24 1213)    DOBUTamine  2.5 mcg/kg/min (Dosing Weight) 2.5 mcg/kg/min (05/05/24 1203)    heparin  0-4,000 Units/hr 1,200 Units/hr (05/05/24 1203)       Physical Exam:  Constitutional:       General: He is not in acute distress.     Appearance: Normal appearance. He is normal weight. He is not toxic-appearing.   HENT:      Head: Normocephalic and atraumatic.      Mouth/Throat:      Mouth: Mucous membranes are moist.      Pharynx: Oropharynx is clear.   Neck:      Comments: Prominent Ejs.  Cardiovascular:      Rate and Rhythm: Normal rate. Rhythm irregular.      Pulses: Normal pulses.      Heart sounds: Normal heart sounds. No murmur heard.     No friction rub. No gallop.   Pulmonary:      Effort: Pulmonary effort is normal. No respiratory distress.      Comments: minimal crackles bibasilarly  Abdominal:      General: Abdomen is flat. Bowel sounds are normal. There is no distension.      Palpations: Abdomen is soft.      Tenderness: There is no abdominal tenderness.   Musculoskeletal:         General: Normal range of motion.      Cervical back: Normal range of motion and neck supple.      Right lower leg: No edema.      Left lower leg: No edema.   Skin:     General: Skin is warm and dry.       Capillary Refill: Capillary refill takes less than 2 seconds.      Findings: No lesion.   Neurological:      Mental Status: He is alert.      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 He does well while admitted after stabilization and then shortly after discharge begins to develop abrupt symptoms (per the family taking care of him). He did have quite high rates on admission here, in the 150s and has responded to amiodarone well. At present, he does not appear shocky and is actually quite comfortable appearing on exam. Certainly there is evidence of volume overload on exam, but does not appear significantly decompensated today. Primary team has been checking mixed venous O2 through central line and have come back quite low in the 40s. Keeping in mind he has been agitated and delirious while in the ICU and this may confound the true MVO2 (agitation/frequent motion consumes more O2).      Recommendations:     - MVO2s stable and BNP improved with lower dose dobutamine 2.5; continue for now.  - Weaning to oral strategy still TBD, but may consider digoxin for inotropic benefit at least in the short term.  - Unclear if will need amiodarone chronically. May stop it once dobutamine is off as well.   - Obtain Echo Monday.   - Measure accurate I/O data.  - Ongoing supportive care from primary ICU team.   - HF service will continue to follow along. Please reach out with any questions. Plan discussed with primary team.     Pt seen and evaluated with HF attending, Dr. Jackson.        CVC 05/03/24 Triple lumen Right Internal jugular (Active)   Site Assessment Clean;Dry;Intact 05/04/24 1600    Proximal Lumen Status Flushed 05/04/24 1600   Medial 1 Lumen Status Flushed 05/04/24 1600   Distal Lumen Status Flushed 05/04/24 1600   Dressing Type Antimicrobial patch;Transparent 05/04/24 1600   Dressing Status Clean;Dry;Occlusive 05/04/24 1600   Number of days: 1       Peripheral IV 05/03/24 20 G Left Forearm (Active)   Site Assessment Clean;Dry;Intact 05/04/24 1600   Dressing Type Transparent 05/04/24 1600   Line Status Capped;Flushed;Blood return noted 05/04/24 1600   Dressing Status Clean;Dry;Occlusive 05/04/24 1600   Number of days: 1       Peripheral IV 05/03/24 20 G Right Forearm (Active)   Site Assessment Clean;Dry;Intact 05/04/24 1600   Dressing Type Transparent 05/04/24 1600   Line Status Infusing 05/04/24 1600   Dressing Status Clean;Dry;Occlusive 05/04/24 1600   Number of days: 1       Peripheral IV 05/03/24 20 G Right;Anterior Antecubital (Active)   Site Assessment Clean;Dry;Intact 05/04/24 1600   Dressing Type Transparent 05/04/24 1600   Line Status Infusing 05/04/24 1600   Dressing Status Clean;Dry;Occlusive 05/04/24 1600   Number of days: 1       Code Status:  Full Code    I spent 35 minutes in the professional and overall care of this patient.        Elijah Llanos DO

## 2024-05-05 NOTE — PROGRESS NOTES
Vancomycin Dosing by Pharmacy- Cessation of Therapy    Consult to pharmacy for vancomycin dosing has been discontinued by the prescriber, pharmacy will sign off at this time.    Please call pharmacy if there are further questions or re-enter a consult if vancomycin is resumed.     Latasha Crenshaw, FifiD

## 2024-05-05 NOTE — PROGRESS NOTES
Subjective     Npo acute events overnight. Pt weaned off levo and doing much better this AM. Denies any complaints.     Objective   Vital signs in last 24 hours:  Temp:  [36.4 °C (97.5 °F)-36.9 °C (98.4 °F)] 36.9 °C (98.4 °F)  Heart Rate:  [] 86  Resp:  [14-26] 17  BP: ()/(35-70) 103/55    Intake/Output last 3 shifts:  I/O last 3 completed shifts:  In: 2992.6 (42 mL/kg) [P.O.:240; I.V.:1531.8 (21.5 mL/kg); IV Piggyback:1220.8]  Out: 3250 (45.6 mL/kg) [Urine:3250 (1.3 mL/kg/hr)]  Dosing Weight: 71.2 kg   Intake/Output this shift:  I/O this shift:  In: 472.6 [P.O.:240; I.V.:182.6; IV Piggyback:50]  Out: -     Physical Exam  Constitutional:       General: He is not in acute distress.     Appearance: Normal appearance. He is not ill-appearing.   HENT:      Head: Normocephalic and atraumatic.   Eyes:      Extraocular Movements: Extraocular movements intact.   Cardiovascular:      Rate and Rhythm: Normal rate and regular rhythm.      Pulses: Normal pulses.      Heart sounds: Normal heart sounds.   Pulmonary:      Effort: Pulmonary effort is normal.      Breath sounds: Normal breath sounds.   Abdominal:      General: Abdomen is flat. There is no distension.      Palpations: Abdomen is soft.      Tenderness: There is no abdominal tenderness.   Musculoskeletal:         General: No swelling.   Skin:     General: Skin is warm and dry.   Neurological:      General: No focal deficit present.      Mental Status: He is alert and oriented to person, place, and time.         Assessment/Plan   Principal Problem:    Multifocal pneumonia  Active Problems:    Septic shock (Multi)  89-year-old male PMH of HFimpEF, pAfib, COPD, dementia, who presents with shortness of breath. Patient was found to have multifocal pneumonia on CT, started on broadspectrum antibiotics. He was found to be in afib with RVR, received metoprolol IV and his BP dropped, his HR seems to have improved with amiodarone drip. His shock is likely septic from  pneumonia, with downtrending lactate, low pressor requirements. Will cont to wean pressors as possible.         Neuro/Psych  #Dementia  -c/w home donepezil and memantine    CV  #Cardiogenic Shock   #Septic shock resolved     #Afib with RVR  ::known to have afib, on apixiban  ::did not tolerate IV metoprolol in the ED  -c/w with amiodarone gtt, HR seems to be controlled  -HF consulted, decrease dose of dobutamine to 2.5     #HFimpEF  -Hold home jardiance, entresto, metoprolol    Pulm  #COPD/asthma  -c/w home Advair    GI  #Dysphagia  #Concern for aspiration  -Speech evaluation, clear for Pureed 4; Mild Thick Liquids 2    /Renal  #YOSEF   :: Peak 1.89 (Baseline ~1.0)  :: Initially poor UOP has improved with >850cc out  -Will CTM    Endo  - No active issues at this time    ID  #Septic shock, resolved   #Bilateral lower lobe pneumonia     -Patient started on broad-spectrum coverage with start date 5/2  Abx  - Vancomycin (5/2 - 5/4)  - Zosyn (5/2 - 5/8)    Heme/Onc  -No issues at this time       Vent/O2: 2L NC  Lines/Devices: Peripheral IV, right IJ  Drips: Dobutamine  ATBx: Vancomycin (5/2 - 5/4), Zosyn (5/2 - 5/8 )  Fluids: None  Diet: Pureed 4; Mild Thick Liquids 2  PPX: - None  DVT PPX: Heparin drip  Code status: Full code discussed with patient's granddaughter who is the POA  NOK/POA: Carin Cox [phone]  Dispo: ICU     LOS: 2 days     Davey Sweeney MD  PGY-I, Internal Medicine

## 2024-05-05 NOTE — CARE PLAN
The patient's goals for the shift include  maintain a MAP of above 65.    The clinical goals for the shift include Pt to be weaned off levo by 5/5/24 0700    Over the shift, the patient did not make progress toward the following goals. Barriers to progression include septic shock. Recommendations to address these barriers include continue to monitor BP hourly.

## 2024-05-06 ENCOUNTER — APPOINTMENT (OUTPATIENT)
Dept: CARDIOLOGY | Facility: HOSPITAL | Age: 89
DRG: 871 | End: 2024-05-06
Payer: MEDICARE

## 2024-05-06 DIAGNOSIS — I50.41 ACUTE COMBINED SYSTOLIC AND DIASTOLIC HEART FAILURE (MULTI): ICD-10-CM

## 2024-05-06 LAB
ALBUMIN SERPL BCP-MCNC: 2.6 G/DL (ref 3.4–5)
ALBUMIN SERPL BCP-MCNC: 2.6 G/DL (ref 3.4–5)
ALBUMIN SERPL BCP-MCNC: 2.8 G/DL (ref 3.4–5)
ALP SERPL-CCNC: 75 U/L (ref 33–136)
ALT SERPL W P-5'-P-CCNC: 63 U/L (ref 10–52)
ANION GAP SERPL CALC-SCNC: 12 MMOL/L (ref 10–20)
ANION GAP SERPL CALC-SCNC: 16 MMOL/L (ref 10–20)
AST SERPL W P-5'-P-CCNC: 26 U/L (ref 9–39)
BASE EXCESS BLDMV CALC-SCNC: 6.4 MMOL/L (ref -2–3)
BASE EXCESS BLDV CALC-SCNC: 5.3 MMOL/L (ref -2–3)
BILIRUB DIRECT SERPL-MCNC: 0.6 MG/DL (ref 0–0.3)
BILIRUB SERPL-MCNC: 1.6 MG/DL (ref 0–1.2)
BODY TEMPERATURE: 37 DEGREES CELSIUS
BODY TEMPERATURE: 37 DEGREES CELSIUS
BUN SERPL-MCNC: 13 MG/DL (ref 6–23)
BUN SERPL-MCNC: 20 MG/DL (ref 6–23)
CALCIUM SERPL-MCNC: 8.2 MG/DL (ref 8.6–10.6)
CALCIUM SERPL-MCNC: 8.3 MG/DL (ref 8.6–10.6)
CHLORIDE SERPL-SCNC: 101 MMOL/L (ref 98–107)
CHLORIDE SERPL-SCNC: 104 MMOL/L (ref 98–107)
CO2 SERPL-SCNC: 26 MMOL/L (ref 21–32)
CO2 SERPL-SCNC: 29 MMOL/L (ref 21–32)
CREAT SERPL-MCNC: 1.03 MG/DL (ref 0.5–1.3)
CREAT SERPL-MCNC: 1.17 MG/DL (ref 0.5–1.3)
EGFRCR SERPLBLD CKD-EPI 2021: 58 ML/MIN/1.73M*2
EGFRCR SERPLBLD CKD-EPI 2021: 68 ML/MIN/1.73M*2
EJECTION FRACTION APICAL 4 CHAMBER: 23.9
GLUCOSE BLD MANUAL STRIP-MCNC: 80 MG/DL (ref 74–99)
GLUCOSE BLD MANUAL STRIP-MCNC: 96 MG/DL (ref 74–99)
GLUCOSE BLD MANUAL STRIP-MCNC: 98 MG/DL (ref 74–99)
GLUCOSE SERPL-MCNC: 101 MG/DL (ref 74–99)
GLUCOSE SERPL-MCNC: 89 MG/DL (ref 74–99)
HCO3 BLDMV-SCNC: 31.9 MMOL/L (ref 22–26)
HCO3 BLDV-SCNC: 31.1 MMOL/L (ref 22–26)
INHALED O2 CONCENTRATION: 21 %
INHALED O2 CONCENTRATION: 21 %
LACTATE SERPL-SCNC: 1.3 MMOL/L (ref 0.4–2)
MAGNESIUM SERPL-MCNC: 2.29 MG/DL (ref 1.6–2.4)
OXYHGB MFR BLDMV: 62.3 % (ref 45–75)
OXYHGB MFR BLDV: 49.4 % (ref 45–75)
PCO2 BLDMV: 48 MM HG (ref 41–51)
PCO2 BLDV: 49 MM HG (ref 41–51)
PH BLDMV: 7.43 PH (ref 7.33–7.43)
PH BLDV: 7.41 PH (ref 7.33–7.43)
PHOSPHATE SERPL-MCNC: 2.5 MG/DL (ref 2.5–4.9)
PHOSPHATE SERPL-MCNC: 3.3 MG/DL (ref 2.5–4.9)
PO2 BLDMV: 41 MM HG (ref 35–45)
PO2 BLDV: 36 MM HG (ref 35–45)
POTASSIUM SERPL-SCNC: 4.3 MMOL/L (ref 3.5–5.3)
POTASSIUM SERPL-SCNC: 4.8 MMOL/L (ref 3.5–5.3)
PROT SERPL-MCNC: 6.1 G/DL (ref 6.4–8.2)
SAO2 % BLDMV: 64 % (ref 45–75)
SAO2 % BLDV: 51 % (ref 45–75)
SODIUM SERPL-SCNC: 139 MMOL/L (ref 136–145)
SODIUM SERPL-SCNC: 140 MMOL/L (ref 136–145)
UFH PPP CHRO-ACNC: 0.3 IU/ML

## 2024-05-06 PROCEDURE — 82947 ASSAY GLUCOSE BLOOD QUANT: CPT

## 2024-05-06 PROCEDURE — 97166 OT EVAL MOD COMPLEX 45 MIN: CPT | Mod: GO

## 2024-05-06 PROCEDURE — 37799 UNLISTED PX VASCULAR SURGERY: CPT

## 2024-05-06 PROCEDURE — 2500000004 HC RX 250 GENERAL PHARMACY W/ HCPCS (ALT 636 FOR OP/ED): Performed by: STUDENT IN AN ORGANIZED HEALTH CARE EDUCATION/TRAINING PROGRAM

## 2024-05-06 PROCEDURE — 99233 SBSQ HOSP IP/OBS HIGH 50: CPT | Performed by: STUDENT IN AN ORGANIZED HEALTH CARE EDUCATION/TRAINING PROGRAM

## 2024-05-06 PROCEDURE — 2500000004 HC RX 250 GENERAL PHARMACY W/ HCPCS (ALT 636 FOR OP/ED)

## 2024-05-06 PROCEDURE — 1090000002 HH PPS REVENUE DEBIT

## 2024-05-06 PROCEDURE — 2500000006 HC RX 250 W HCPCS SELF ADMINISTERED DRUGS (ALT 637 FOR ALL PAYERS)

## 2024-05-06 PROCEDURE — 84100 ASSAY OF PHOSPHORUS: CPT | Performed by: NURSE PRACTITIONER

## 2024-05-06 PROCEDURE — 94640 AIRWAY INHALATION TREATMENT: CPT

## 2024-05-06 PROCEDURE — 2020000001 HC ICU ROOM DAILY

## 2024-05-06 PROCEDURE — 37799 UNLISTED PX VASCULAR SURGERY: CPT | Performed by: PEDIATRICS

## 2024-05-06 PROCEDURE — 93308 TTE F-UP OR LMTD: CPT | Performed by: INTERNAL MEDICINE

## 2024-05-06 PROCEDURE — 85027 COMPLETE CBC AUTOMATED: CPT

## 2024-05-06 PROCEDURE — 85520 HEPARIN ASSAY: CPT

## 2024-05-06 PROCEDURE — 97162 PT EVAL MOD COMPLEX 30 MIN: CPT | Mod: GP

## 2024-05-06 PROCEDURE — 93308 TTE F-UP OR LMTD: CPT

## 2024-05-06 PROCEDURE — 82248 BILIRUBIN DIRECT: CPT

## 2024-05-06 PROCEDURE — 1090000001 HH PPS REVENUE CREDIT

## 2024-05-06 PROCEDURE — 82805 BLOOD GASES W/O2 SATURATION: CPT | Performed by: PEDIATRICS

## 2024-05-06 PROCEDURE — 83605 ASSAY OF LACTIC ACID: CPT | Performed by: NURSE PRACTITIONER

## 2024-05-06 PROCEDURE — 82805 BLOOD GASES W/O2 SATURATION: CPT | Performed by: NURSE PRACTITIONER

## 2024-05-06 RX ORDER — ESOMEPRAZOLE MAGNESIUM 40 MG/1
40 GRANULE, DELAYED RELEASE ORAL
Status: DISCONTINUED | OUTPATIENT
Start: 2024-05-06 | End: 2024-05-09 | Stop reason: HOSPADM

## 2024-05-06 RX ORDER — QUETIAPINE FUMARATE 25 MG/1
25 TABLET, FILM COATED ORAL NIGHTLY
Status: DISCONTINUED | OUTPATIENT
Start: 2024-05-06 | End: 2024-05-09 | Stop reason: HOSPADM

## 2024-05-06 RX ORDER — QUETIAPINE FUMARATE 25 MG/1
25 TABLET, FILM COATED ORAL ONCE
Status: COMPLETED | OUTPATIENT
Start: 2024-05-06 | End: 2024-05-06

## 2024-05-06 RX ORDER — DIGOXIN 125 MCG
125 TABLET ORAL DAILY
Status: DISCONTINUED | OUTPATIENT
Start: 2024-05-06 | End: 2024-05-08

## 2024-05-06 RX ORDER — DEXMEDETOMIDINE HYDROCHLORIDE 4 UG/ML
.2-1.5 INJECTION, SOLUTION INTRAVENOUS CONTINUOUS
Status: DISCONTINUED | OUTPATIENT
Start: 2024-05-06 | End: 2024-05-06

## 2024-05-06 RX ADMIN — PIPERACILLIN SODIUM AND TAZOBACTAM SODIUM 2.25 G: 2; .25 INJECTION, SOLUTION INTRAVENOUS at 16:44

## 2024-05-06 RX ADMIN — FLUTICASONE FUROATE AND VILANTEROL TRIFENATATE 1 PUFF: 100; 25 POWDER RESPIRATORY (INHALATION) at 09:51

## 2024-05-06 RX ADMIN — AMIODARONE HYDROCHLORIDE 0.5 MG/MIN: 1.8 INJECTION, SOLUTION INTRAVENOUS at 09:01

## 2024-05-06 RX ADMIN — QUETIAPINE FUMARATE 25 MG: 25 TABLET ORAL at 03:29

## 2024-05-06 RX ADMIN — PIPERACILLIN SODIUM AND TAZOBACTAM SODIUM 2.25 G: 2; .25 INJECTION, SOLUTION INTRAVENOUS at 20:56

## 2024-05-06 RX ADMIN — PIPERACILLIN SODIUM AND TAZOBACTAM SODIUM 2.25 G: 2; .25 INJECTION, SOLUTION INTRAVENOUS at 02:45

## 2024-05-06 RX ADMIN — QUETIAPINE FUMARATE 25 MG: 25 TABLET ORAL at 00:35

## 2024-05-06 RX ADMIN — HEPARIN SODIUM 1200 UNITS/HR: 10000 INJECTION, SOLUTION INTRAVENOUS at 02:32

## 2024-05-06 RX ADMIN — HEPARIN SODIUM 1200 UNITS/HR: 10000 INJECTION, SOLUTION INTRAVENOUS at 21:00

## 2024-05-06 RX ADMIN — PIPERACILLIN SODIUM AND TAZOBACTAM SODIUM 2.25 G: 2; .25 INJECTION, SOLUTION INTRAVENOUS at 09:17

## 2024-05-06 RX ADMIN — DEXMEDETOMIDINE HYDROCHLORIDE 0.2 MCG/KG/HR: 400 INJECTION INTRAVENOUS at 05:24

## 2024-05-06 RX ADMIN — PERFLUTREN 10 ML OF DILUTION: 6.52 INJECTION, SUSPENSION INTRAVENOUS at 09:33

## 2024-05-06 ASSESSMENT — PAIN - FUNCTIONAL ASSESSMENT
PAIN_FUNCTIONAL_ASSESSMENT: CPOT (CRITICAL CARE PAIN OBSERVATION TOOL)
PAIN_FUNCTIONAL_ASSESSMENT: 0-10
PAIN_FUNCTIONAL_ASSESSMENT: PAINAD (PAIN ASSESSMENT IN ADVANCED DEMENTIA SCALE)
PAIN_FUNCTIONAL_ASSESSMENT: 0-10

## 2024-05-06 ASSESSMENT — PAIN SCALES - PAIN ASSESSMENT IN ADVANCED DEMENTIA (PAINAD)
BREATHING: NORMAL
CONSOLABILITY: NO NEED TO CONSOLE
BODYLANGUAGE: RELAXED
FACIALEXPRESSION: SMILING OR INEXPRESSIVE
TOTALSCORE: 0

## 2024-05-06 ASSESSMENT — COGNITIVE AND FUNCTIONAL STATUS - GENERAL
MOBILITY SCORE: 6
STANDING UP FROM CHAIR USING ARMS: TOTAL
WALKING IN HOSPITAL ROOM: TOTAL
TOILETING: TOTAL
HELP NEEDED FOR BATHING: TOTAL
TURNING FROM BACK TO SIDE WHILE IN FLAT BAD: TOTAL
EATING MEALS: A LOT
PERSONAL GROOMING: A LOT
CLIMB 3 TO 5 STEPS WITH RAILING: TOTAL
MOVING TO AND FROM BED TO CHAIR: TOTAL
MOVING FROM LYING ON BACK TO SITTING ON SIDE OF FLAT BED WITH BEDRAILS: TOTAL
DRESSING REGULAR UPPER BODY CLOTHING: TOTAL
DRESSING REGULAR LOWER BODY CLOTHING: TOTAL
DAILY ACTIVITIY SCORE: 8

## 2024-05-06 ASSESSMENT — PAIN SCALES - GENERAL: PAINLEVEL_OUTOF10: 0 - NO PAIN

## 2024-05-06 ASSESSMENT — ACTIVITIES OF DAILY LIVING (ADL): BATHING_ASSISTANCE: TOTAL

## 2024-05-06 NOTE — PROGRESS NOTES
Occupational Therapy    Evaluation    Patient Name: Jam Cox  MRN: [id number]  Today's Date: 5/6/2024  Time Calculation  Start Time: 1021  Stop Time: 1044  Time Calculation (min): 23 min    Assessment  IP OT Assessment  OT Assessment: impaired ADLs/transfers  Prognosis: Good  End of Session Communication: Bedside nurse  End of Session Patient Position: Bed, 3 rail up, Alarm off, not on at start of session  Plan:  Treatment Interventions: ADL retraining, Functional transfer training, UE strengthening/ROM, Cognitive reorientation, Patient/family training, Equipment evaluation/education, Neuromuscular reeducation, Fine motor coordination activities, Compensatory technique education  OT Frequency: 3 times per week  OT Discharge Recommendations: Moderate intensity level of continued care  OT - OK to Discharge: Yes    Subjective   General:  General  Reason for Referral: presents with SOB; found to have multifocal PNA, c/b Afib with RVR.  Past Medical History Relevant to Rehab: heart failure with moderately reduced ejection fraction, Afib, dementia; per family, patient has not had any falls within the past 6 months per family. patient was discharged yesterday [facility] where he was admitted initially for CHF exacerbation.  Within the last month this is patient's third hospital admission.  Family/Caregiver Present: Yes  Caregiver Feedback: spouse and dtr present in the room; provided collateral information as patient with reduced alertness/impaired cognition  Co-Treatment: PT  Co-Treatment Reason: d/t patient with impaired cognition, alertness, recent frequent hospitalizations  Prior to Session Communication: Bedside nurse  Patient Position Received: Bed, 3 rail up, Alarm off, not on at start of session  General Comment: patient required frequent verbal and tactile stim to become alert, however, limited ability to remain alert to interact with therapists for entirety of session. heparin gtt, tele, external  "catheter.  Precautions:  Hearing/Visual Limitations: ? [place], per family patient has hearing aid(s) but does not wear them  Medical Precautions: Fall precautions  Vital Signs:  Heart Rate:  (pre 75, post 76)  SpO2:  (pre 96%, post 94%)  BP:  (pre 112/61, post 115/67)  Pain:  Pain Assessment  Pain Assessment: 0-10  Pain Score:  (patient shook head \"no\" when asked if he was having pain)    Objective   Cognition:  Overall Cognitive Status: Impaired  Arousal/Alertness:  (delayed and inconsistent responses; limited ability to attend)  Orientation Level:  (patient appeared to respond to his name; did not answer orientation questions; re-orientation provided)  Following Commands:  (25% single step commands, inconsistent, delayed, increased time/cues)     Home Living:  Home Living Comments: home set-up obtained from family in the room: patient lives in a house with wife, wife reports someone is always there; 3+4-5 VIOLET with HR, 2nd floor set-up with walk-in shower (10-12 stairs with HR); grab bars and shower chair; ownes a SPC, ww, rollator   Prior Function:  Prior Function Comments: PLOF obtained from family in the room d/t patient with current impaired cognition: indep with ADLs, indep with bed mobility, transfers and ambulation; family reports they complete the iADLs; reports patient uses rollator in stores, however, patient often forgets to use DME to ambulate. (-) drive.     ADL:  Eating Assistance: Maximal  Eating Deficit:  (anticipated)  Grooming Assistance: Maximal  Grooming Deficit:  (patient spontaneously reached up to face to scratch face, however anticipate increased assist)  Bathing Assistance: Total  Bathing Deficit:  (anticipated)  UE Dressing Assistance: Total  UE Dressing Deficit:  (to manage gown)  LE Dressing Assistance: Total  LE Dressing Deficit: Don/doff R sock, Don/doff L sock  Toileting Assistance with Device: Total  Toileting Deficit:  (anticipated)  Activity Tolerance:  Early Mobility/Exercise Safety " Screen: Proceed with mobilization - No exclusion criteria met  Bed Mobility/Transfers: Bed Mobility  Bed Mobility: Yes  Bed Mobility 1  Bed Mobility 1: Supine to sitting, Sitting to supine  Level of Assistance 1: Dependent, Maximum verbal cues, Maximum tactile cues  Bed Mobility Comments 1: x2 assist, HOB elevated, use of draw sheet    Transfers  Transfer: No    Sitting Balance:  Static Sitting Balance  Static Sitting-Comment/Number of Minutes: max A initially, progressed to very brief moments of min A; posterior and (R) lateral lean  Dynamic Sitting Balance  Dynamic Sitting-Comments: max A     Vision: Vision - Basic Assessment  Current Vision:  (patient with brief eye opening throughout session with increased stim, unable to maintain eyes open; patient's family reports patient has glasess but does not wear them)  Sensation:  Sensation Comment: patient did not report  Strength:  Strength Comments: patient did not follow commands for MMT, spontenously reached to face with (R)UE     Extremities: RUE   RUE :  (PROM WFL) and LUE   LUE:  (PROM WFL)    Outcome Measures: [facility] Daily Activity  Putting on and taking off regular lower body clothing: Total  Bathing (including washing, rinsing, drying): Total  Putting on and taking off regular upper body clothing: Total  Toileting, which includes using toilet, bedpan or urinal: Total  Taking care of personal grooming such as brushing teeth: A lot  Eating Meals: A lot  Daily Activity - Total Score: 8    , Confusion Assessment Method-ICU (CAM-ICU)  Feature 1: Acute Onset or Fluctuating Course: Positive  Feature 2: Inattention: Positive  Feature 4: Disorganized Thinking: Positive  Overall CAM-ICU: Positive  , and E = Exercise and Early Mobility  Early Mobility/Exercise Safety Screen: Proceed with mobilization - No exclusion criteria met  Current Activity: Sitting at edge of bed  Education Documentation  Body Mechanics, taught by Hien Negron OT at 5/6/2024  1:19 PM.  Learner:  Patient  Readiness: Nonacceptance  Method: Explanation  Response: No Evidence of Learning, Needs Reinforcement    ADL Training, taught by Hien Negron OT at 5/6/2024  1:19 PM.  Learner: Patient  Readiness: Nonacceptance  Method: Explanation  Response: No Evidence of Learning, Needs Reinforcement    Education Comments  No comments found.    Goals:   Encounter Problems       Encounter Problems (Active)       ADLs       Patient with complete upper body dressing with minimal assist  level of assistance donning and doffing all UE clothes with no adaptive equipment. (Progressing)       Start:  05/06/24    Expected End:  05/20/24            Patient with complete lower body dressing with moderate assist level of assistance donning and doffing all LE clothes  with no adaptive equipment. (Progressing)       Start:  05/06/24    Expected End:  05/20/24            Patient will complete daily grooming tasks with minimal assist  level of assistance and PRN adaptive equipment. (Progressing)       Start:  05/06/24    Expected End:  05/20/24            Patient will complete toileting including hygiene clothing management/hygiene with moderate assist level of assistance. (Progressing)       Start:  05/06/24    Expected End:  05/20/24               BALANCE       Pt will maintain dynamic sitting balance during ADL task with contact guard assist level of assistance in order to demonstrate decreased risk of falling and improved postural control. (Progressing)       Start:  05/06/24    Expected End:  05/20/24               COGNITION/SAFETY       Patient will follow >50% Simple commands to allow improved ADL performance. (Progressing)       Start:  05/06/24    Expected End:  05/20/24            Patient will demonstrate orientation x >1. (Progressing)       Start:  05/06/24    Expected End:  05/20/24       ORIENTATION            EXERCISE/STRENGTHENING       Patient will complete BUE exercises in order to improve AROM, strength,  coordination and activity tolerance for ADL performance.  (Progressing)       Start:  05/06/24    Expected End:  05/20/24               TRANSFERS       Patient will perform bed mobility moderate assist level of assistance in order to improve safety and independence with mobility (Progressing)       Start:  05/06/24    Expected End:  05/20/24            Patient will complete functional transfers with least restrictive device with moderate assist level of assistance. (Progressing)       Start:  05/06/24    Expected End:  05/20/24               Hien Negron OTR/L  Inpatient Occupational Therapist   Rehab Office: [phone]

## 2024-05-06 NOTE — PROGRESS NOTES
Physical Therapy                 Therapy Communication Note    Patient Name: Jam Cox  MRN: [id number]  Today's Date: 5/6/2024     Discipline: Physical Therapy    Missed Visit Reason: Missed Visit Reason:  (bedside imaigng being performed, will re-attempt as time and schedule allows and as medically appropriate.)    Missed Time: Attempt    Comment:

## 2024-05-06 NOTE — PROGRESS NOTES
"Overnight Events:    No major overnight events. Did not sleep well so getting some rest this morning. Family bedside, answered their questions. Off dobutamine this morning and hemodynamically stable.      Objective Data:  Last Recorded Vitals:  Vitals:    05/06/24 1430 05/06/24 1445 05/06/24 1500 05/06/24 1515   BP:   101/64    BP Location:       Patient Position:       Pulse: 85 74 82 78   Resp: 13 23 11 20   Temp:       TempSrc:       SpO2: 91% 95% 95% 93%   Weight:       Height:           Last Labs:  CBC - 5/5/2024:  4:49 AM  11.3 10.7 210    32.8      CMP - 5/5/2024:  8:19 AM  8.2 6.1 26 --- 1.6   2.5 2.8 63 75      PTT - 5/4/2024:  4:35 AM  1.8   20.7 63     TROPHS   Date/Time Value Ref Range Status   05/03/2024 02:01 AM 23 0 - 53 ng/L Final   04/28/2024 12:27 AM 21 0 - 20 ng/L Final   04/27/2024 10:54 PM 27 0 - 20 ng/L Final     BNP   Date/Time Value Ref Range Status   05/04/2024 04:35  0 - 99 pg/mL Final   05/03/2024 10:33 AM 1,225 0 - 99 pg/mL Final     HGBA1C   Date/Time Value Ref Range Status   04/29/2024 06:46 AM 5.4 see below % Final     VLDL   Date/Time Value Ref Range Status   03/05/2018 05:40 AM 15 0 - 40 mg/dL Final      Last I/O:  I/O last 3 completed shifts:  In: 1495.6 (21 mL/kg) [P.O.:240; I.V.:1055.6 (14.8 mL/kg); IV Piggyback:200]  Out: 3900 (54.8 mL/kg) [Urine:3900 (1.5 mL/kg/hr)]  Dosing Weight: 71.2 kg     Past Cardiology Tests (Last 3 Years):  EKG:  Electrocardiogram, 12-lead PRN ACS symptoms 05/02/2024      ECG 12 lead 04/27/2024      ECG 12 lead 04/22/2024    Echo:  Transthoracic Echo (TTE) Complete 05/03/2024      Transthoracic Echo (TTE) Complete 04/23/2024    Ejection Fractions:  No results found for: \"EF\"  Cath:  No results found for this or any previous visit from the past 1095 days.    Stress Test:  No results found for this or any previous visit from the past 1095 days.    Cardiac Imaging:  No results found for this or any previous visit from the past 1095 " days.      Inpatient Medications:  Scheduled medications   Medication Dose Route Frequency    aspirin  81 mg oral Daily    [Held by provider] atorvastatin  10 mg oral Daily    donepezil  10 mg oral Daily    esomeprazole  40 mg nasoduodenal tube Daily before breakfast    finasteride  5 mg oral Daily    fluticasone furoate-vilanteroL  1 puff inhalation Daily    insulin lispro  0-10 Units subcutaneous TID with meals    memantine  10 mg oral BID    perflutren protein A microsphere  0.5 mL intravenous Once in imaging    piperacillin-tazobactam  2.25 g intravenous q6h    QUEtiapine  25 mg oral Nightly    sulfur hexafluoride microsphr  2 mL intravenous Once in imaging     PRN medications   Medication    alteplase    dextrose    dextrose    glucagon    glucagon    heparin    ipratropium-albuteroL    melatonin     Continuous Medications   Medication Dose Last Rate    heparin  0-4,000 Units/hr 1,200 Units/hr (05/06/24 1200)       Physical Exam:  Constitutional:       General: He is not in acute distress.     Appearance: Normal appearance. He is normal weight. He is not toxic-appearing.   HENT:      Head: Normocephalic and atraumatic.      Mouth/Throat:      Mouth: Mucous membranes are moist.      Pharynx: Oropharynx is clear.   Neck:      Comments: EJ flat on left, SGC present on right  Cardiovascular:      Rate and Rhythm: Normal rate. Rhythm irregular.      Pulses: Normal pulses.      Heart sounds: Normal heart sounds. No murmur heard.     No friction rub. No gallop.   Pulmonary:      Effort: Pulmonary effort is normal. No respiratory distress.      Comments: minimal crackles bibasilarly  Abdominal:      General: Abdomen is flat. Bowel sounds are normal. There is no distension.      Palpations: Abdomen is soft.      Tenderness: There is no abdominal tenderness.   Musculoskeletal:         General: Normal range of motion.      Cervical back: Normal range of motion and neck supple.      Right lower leg: No edema.      Left  lower leg: No edema.   Skin:     General: Skin is warm and dry.      Capillary Refill: Capillary refill takes less than 2 seconds.      Findings: No lesion.   Neurological:      Mental Status: He is alert.      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 He does well while admitted after stabilization and then shortly after discharge begins to develop abrupt symptoms (per the family taking care of him). He did have quite high rates on admission here, in the 150s and has responded to amiodarone well. At present, he does not appear shocky and is actually quite comfortable appearing on exam. Certainly there is evidence of volume overload on exam, but does not appear significantly decompensated today. Primary team has been checking mixed venous O2 through central line and have come back quite low in the 40s. Keeping in mind he has been agitated and delirious while in the ICU and this may confound the true MVO2 (agitation/frequent motion consumes more O2).      Recommendations:     - Off dobutamine this morning and MVO2 did drop to 51%. Lactate remained normal however.  - Leave [place] catheter in place for one more day and check mixed gas in the AM. Improved end-organ function noted which is reassuring as well.  - Agree with transition to oral amiodarone; rates have been well controlled and his dose-dependent exposure risk is favorable given his age compared to the potential benefit of preventing further admission with rapid AF.  - Echo today reviewed, LVEF appears slightly better ~35%.   - Measure accurate I/O data.  - Ongoing supportive care from primary ICU team.   - HF service will  see tomorrow. Plan discussed with primary team.     Pt seen and evaluated with HF attending, Dr. Diaz.      CVC 05/03/24 Triple lumen Right Internal jugular (Active)   Site Assessment Clean;Dry;Intact 05/04/24 1600   Proximal Lumen Status Flushed 05/04/24 1600   Medial 1 Lumen Status Flushed 05/04/24 1600   Distal Lumen Status Flushed 05/04/24 1600   Dressing Type Antimicrobial patch;Transparent 05/04/24 1600   Dressing Status Clean;Dry;Occlusive 05/04/24 1600   Number of days: 1       Peripheral IV 05/03/24 20 G Left Forearm (Active)   Site Assessment Clean;Dry;Intact 05/04/24 1600   Dressing Type Transparent 05/04/24 1600   Line Status Capped;Flushed;Blood return noted 05/04/24 1600   Dressing Status Clean;Dry;Occlusive 05/04/24 1600   Number of days: 1       Peripheral IV 05/03/24 20 G Right Forearm (Active)   Site Assessment Clean;Dry;Intact 05/04/24 1600   Dressing Type Transparent 05/04/24 1600   Line Status Infusing 05/04/24 1600   Dressing Status Clean;Dry;Occlusive 05/04/24 1600   Number of days: 1       Peripheral IV 05/03/24 20 G Right;Anterior Antecubital (Active)   Site Assessment Clean;Dry;Intact 05/04/24 1600   Dressing Type Transparent 05/04/24 1600   Line Status Infusing 05/04/24 1600   Dressing Status Clean;Dry;Occlusive 05/04/24 1600   Number of days: 1       Code Status:  Full Code    I spent 25 minutes in the professional and overall care of this patient.        Elijah Llanos DO

## 2024-05-06 NOTE — HOSPITAL COURSE
Patient is a 89-year-old male with medical history significant for heart failure with moderately reduced ejection fraction presenting to [facility] due to shortness of breath. It was noted that patient was discharged on the day prior to presentation from [facility]note 3] where he was admitted initially for CHF exacerbation. Within the last month this is patient's third hospital admission. Recently at [facility] for diuresis.  After he was back home, he developed shortness of breath and was brought back to the hospital for further evaluation. Suspect that patient was probably over diuresed and now became volume down into the of the volume overloaded. Additionally, CT imaging done on admission showed patient to have bilateral multifocal pneumonia in his lower lobes for which broad-spectrum antibiotics was started. Patient additionally was also found to be in A-fib with RVR on admission and received IV metoprolol IV however his blood pressure dropped significantly and patient was switched over to an amiodarone drip with improvement in his heart rate.  Heparin gtt started (on Eliquis at home).  Required ICU for septic shock versus cardiogenic shock in setting of his congestive heart failure requiring vasopressors.  He was treated for pneumonia for 7 days total with zosyn.  Heart failure team consulted for management of dobutamine gtt briefly but this was weaned off. Patient was briefly on digoxin while in ICU but this was discontinued when transferred to the floor for PO metoprolol. Heparin gtt discontinued and home dose of Eliquis started for Afib.     Patient transferred to floor on 5/7. While here, he was restarted on his GDMT medications (metop, jardiance, entresto). He was found to be stable on these medications and planned for discharge on 5/9. Patient was recommended to discharge to SNF however he and family declined and opted for home with home health.

## 2024-05-06 NOTE — CARE PLAN
Problem: Pain  Goal: My pain/discomfort is manageable  Outcome: Progressing     Problem: Daily Care  Goal: Daily care needs are met  Outcome: Progressing     Problem: Psychosocial Needs  Goal: Demonstrates ability to cope with hospitalization/illness  Outcome: Progressing  Goal: Collaborate with me, my family, and caregiver to identify my specific goals  Outcome: Progressing     Problem: Psychosocial Needs  Goal: Collaborate with me, my family, and caregiver to identify my specific goals  Outcome: Progressing     Problem: Discharge Barriers  Goal: My discharge needs are met  Outcome: Progressing     Problem: Fall/Injury  Goal: Verbalize understanding of personal risk factors for fall in the hospital  Outcome: Progressing  Goal: Verbalize understanding of risk factor reduction measures to prevent injury from fall in the home  Outcome: Progressing  Goal: Use assistive devices by end of the shift  Outcome: Progressing  Goal: Pace activities to prevent fatigue by end of the shift  Outcome: Progressing  Goal: Not fall by end of shift  Outcome: Progressing  Goal: Be free from injury by end of the shift  Outcome: Progressing     Problem: Skin  Goal: Decreased wound size/increased tissue granulation at next dressing change  Outcome: Progressing  Goal: Participates in plan/prevention/treatment measures  Outcome: Progressing  Goal: Prevent/manage excess moisture  Outcome: Progressing  Goal: Prevent/minimize sheer/friction injuries  Outcome: Progressing  Goal: Promote/optimize nutrition  Outcome: Progressing  Goal: Promote skin healing  Outcome: Progressing     Problem: Pain - Adult  Goal: Verbalizes/displays adequate comfort level or baseline comfort level  Outcome: Progressing     Problem: Safety - Adult  Goal: Free from fall injury  Outcome: Progressing     Problem: Discharge Planning  Goal: Discharge to home or other facility with appropriate resources  Outcome: Progressing   The patient's goals for the shift include       The clinical goals for the shift include Pt will remain safe and free of falls during shift    Over the shift, the patient did not make progress toward the following goals. Barriers to progression include altered mental status. Recommendations to address these barriers include increased rounding, reorient as necessary.

## 2024-05-06 NOTE — PROGRESS NOTES
Critical Care Daily Progress        Subjective   Jam POLLY Cox is a 89 y.o. male on day 3 of admission presenting with Cardiogenic shock (Multi).     Interval History: unable to sleep last night following melatonin and seroquel, precedex gtt started.     Complaints: has none.. Unable to participate in review of systems.    Scheduled Medications:   aspirin, 81 mg, oral, Daily  [Held by provider] atorvastatin, 10 mg, oral, Daily  donepezil, 10 mg, oral, Daily  esomeprazole, 40 mg, nasoduodenal tube, Daily before breakfast  finasteride, 5 mg, oral, Daily  fluticasone furoate-vilanteroL, 1 puff, inhalation, Daily  insulin lispro, 0-10 Units, subcutaneous, TID with meals  memantine, 10 mg, oral, BID  perflutren protein A microsphere, 0.5 mL, intravenous, Once in imaging  piperacillin-tazobactam, 2.25 g, intravenous, q6h  QUEtiapine, 25 mg, oral, Nightly  sulfur hexafluoride microsphr, 2 mL, intravenous, Once in imaging         Continuous Medications:   heparin, 0-4,000 Units/hr, Last Rate: 1,200 Units/hr (05/06/24 0800)         PRN Medications:   PRN medications: alteplase, dextrose, dextrose, glucagon, glucagon, heparin, ipratropium-albuteroL, melatonin    Objective   Vitals:  Most Recent:  Vitals:    05/06/24 1100   BP: 105/55   Pulse: 85   Resp: (!) 31   Temp:    SpO2: 91%       24hr Min/Max:  Temp  Min: 36.3 °C (97.3 °F)  Max: 37.3 °C (99.1 °F)  Pulse  Min: 73  Max: 118  BP  Min: 95/58  Max: 147/67  Resp  Min: 11  Max: 31  SpO2  Min: 88 %  Max: 98 %    LDA:  CVC 05/03/24 Triple lumen Right Internal jugular (Active)   Placement Date/Time: 05/03/24 0800   Lumen Type: Triple lumen  Orientation: Right  Location: Internal jugular   Number of days: 3       External Urinary Catheter Male (Active)   Placement Date/Time: 05/05/24 2000   Placed by: Adan Rodriguez RN  External Catheter Type: Male   Number of days: 0         Vent settings:       Hemodynamic parameters for last 24 hours:       I/O:    Intake/Output Summary (Last  24 hours) at 5/6/2024 1155  Last data filed at 5/6/2024 0919  Gross per 24 hour   Intake 867.29 ml   Output 2850 ml   Net -1982.71 ml       Physical Exam:   Constitutional: pt is lethargic but not in distress  Eyes: no icterus   ENMT: mucous membranes dry, no apparent injury, no lesions seen  Head/Neck: Neck supple, no apparent injury  Respiratory/Thorax: Lungs CTA bilaterally  Cardiovascular: Regular, rate and rhythm  Gastrointestinal: Nondistended, soft, non-tender, BS present x 4  Musculoskeletal: ROM intact, no joint swelling, normal strength  Extremities: normal extremities, no edema, contusions or wounds  Neurological: lethargic, moves ext spontaneously  Skin: Warm and dry, no lesions, no rashes    Lab/Radiology/Diagnostic Review:  Results for orders placed or performed during the hospital encounter of 05/03/24 (from the past 24 hour(s))   POCT GLUCOSE   Result Value Ref Range    POCT Glucose 123 (H) 74 - 99 mg/dL   Heparin Assay, UFH   Result Value Ref Range    Heparin Unfractionated 0.5 See Comment Below for Therapeutic Ranges IU/mL   POCT GLUCOSE   Result Value Ref Range    POCT Glucose 113 (H) 74 - 99 mg/dL   POCT GLUCOSE   Result Value Ref Range    POCT Glucose 129 (H) 74 - 99 mg/dL   Hepatic Function Panel   Result Value Ref Range    Albumin 2.8 (L) 3.4 - 5.0 g/dL    Bilirubin, Total 1.6 (H) 0.0 - 1.2 mg/dL    Bilirubin, Direct 0.6 (H) 0.0 - 0.3 mg/dL    Alkaline Phosphatase 75 33 - 136 U/L    ALT 63 (H) 10 - 52 U/L    AST 26 9 - 39 U/L    Total Protein 6.1 (L) 6.4 - 8.2 g/dL   Heparin Assay, UFH   Result Value Ref Range    Heparin Unfractionated 0.3 See Comment Below for Therapeutic Ranges IU/mL   POCT GLUCOSE   Result Value Ref Range    POCT Glucose 96 74 - 99 mg/dL   Transthoracic Echo (TTE) Limited   Result Value Ref Range    LV A4C EF 23.9    BLOOD GAS MIXED VENOUS   Result Value Ref Range    POCT pH, Mixed 7.43 7.33 - 7.43 pH    POCT pCO2, Mixed 48 41 - 51 mm Hg    POCT pO2, Mixed 41 35 - 45 mm Hg     POCT SO2, Mixed 64 45 - 75 %    POCT Oxy Hemoglobin, Mixed 62.3 45.0 - 75.0 %    POCT Base Excess, Mixed 6.4 (H) -2.0 - 3.0 mmol/L    POCT HCO3 Calculated, Mixed 31.9 (H) 22.0 - 26.0 mmol/L    Patient Temperature 37.0 degrees Celsius    FiO2 21 %   Blood Gas Venous   Result Value Ref Range    POCT pH, Venous 7.41 7.33 - 7.43 pH    POCT pCO2, Venous 49 41 - 51 mm Hg    POCT pO2, Venous 36 35 - 45 mm Hg    POCT SO2, Venous 51 45 - 75 %    POCT Oxy Hemoglobin, Venous 49.4 45.0 - 75.0 %    POCT Base Excess, Venous 5.3 (H) -2.0 - 3.0 mmol/L    POCT HCO3 Calculated, Venous 31.1 (H) 22.0 - 26.0 mmol/L    Patient Temperature 37.0 degrees Celsius    FiO2 21 %             This patient has a central line   Reason for the central line remaining today? Line unnecessary, will be removed today            Malnutrition Diagnosis Status: New  Malnutrition Diagnosis: Severe malnutrition related to chronic disease or condition  As Evidenced by: reports of decrease PO intake for at least the last 3 weeks along with a loss of 10% of weight in 3 months/15% from overall body weight; has severe fat and muscle losses on physical exam  I agree with the dietitian's malnutrition diagnosis.      Assessment/Plan   Principal Problem:    Cardiogenic shock (Multi)  Active Problems:    Multifocal pneumonia    Septic shock (Multi)    89-year-old male who presented with shock; septic vs. cardiogenic.       Neuro/Psych:  Hx of Dementia.  - cont. home donepezil and memantine    CV:  New reduced EF, cardiogenic shock vs. septic shock, resolved.  - off dobutamine  - stop amio  - cont. heparin gtt for now (home Eliquis)  - HF following  - holding home meds (jardiance, entresto, metoprolol)    Pulm:  Hx of COPD/asthma  -cont breo    GI: Dysphagia  - cont. Pureed 4; Mild Thick Liquids 2    /Renal:  YOSEF improving  - strict I/O  - every other day labs    Endo:  No active issues at this time    ID:  Bilateral lower lobe pneumonia  - 7 days Zosyn      Heme/Onc  - heparin gtt until safe to resume Eliquis        Vent/O2: 2L NC  Lines/Devices: Peripheral IV, right IJ CVC (will remove if can keep off dobutamine)  Drips: heparin  ATBx: Vancomycin (5/2 - 5/4), Zosyn (5/2 - 5/8 )  Fluids: None  Diet: Pureed 4; Mild Thick Liquids 2  PPX: - None  DVT PPX: Heparin drip  Code status: Full code discussed with patient's granddaughter who is the POA    NOK/POA: Carin Cox [phone]  Dispo: ICU     I spent 35 minutes in the professional and overall care of this patient.

## 2024-05-07 LAB
ALBUMIN SERPL BCP-MCNC: 2.6 G/DL (ref 3.4–5)
ALP SERPL-CCNC: 72 U/L (ref 33–136)
ALT SERPL W P-5'-P-CCNC: 44 U/L (ref 10–52)
ANION GAP SERPL CALC-SCNC: 11 MMOL/L (ref 10–20)
AST SERPL W P-5'-P-CCNC: 16 U/L (ref 9–39)
BACTERIA BLD CULT: NORMAL
BACTERIA BLD CULT: NORMAL
BASE EXCESS BLDV CALC-SCNC: 4.1 MMOL/L (ref -2–3)
BASOPHILS # BLD AUTO: 0.01 X10*3/UL (ref 0–0.1)
BASOPHILS NFR BLD AUTO: 0.1 %
BILIRUB DIRECT SERPL-MCNC: 0.5 MG/DL (ref 0–0.3)
BILIRUB SERPL-MCNC: 1.6 MG/DL (ref 0–1.2)
BODY TEMPERATURE: 37 DEGREES CELSIUS
BUN SERPL-MCNC: 13 MG/DL (ref 6–23)
CALCIUM SERPL-MCNC: 8.4 MG/DL (ref 8.6–10.6)
CHLORIDE SERPL-SCNC: 104 MMOL/L (ref 98–107)
CO2 SERPL-SCNC: 28 MMOL/L (ref 21–32)
CREAT SERPL-MCNC: 1.05 MG/DL (ref 0.5–1.3)
EGFRCR SERPLBLD CKD-EPI 2021: 66 ML/MIN/1.73M*2
EOSINOPHIL # BLD AUTO: 0.1 X10*3/UL (ref 0–0.4)
EOSINOPHIL NFR BLD AUTO: 1.4 %
ERYTHROCYTE [DISTWIDTH] IN BLOOD BY AUTOMATED COUNT: 14.3 % (ref 11.5–14.5)
ERYTHROCYTE [DISTWIDTH] IN BLOOD BY AUTOMATED COUNT: 14.4 % (ref 11.5–14.5)
GLUCOSE BLD MANUAL STRIP-MCNC: 103 MG/DL (ref 74–99)
GLUCOSE BLD MANUAL STRIP-MCNC: 51 MG/DL (ref 74–99)
GLUCOSE BLD MANUAL STRIP-MCNC: 80 MG/DL (ref 74–99)
GLUCOSE BLD MANUAL STRIP-MCNC: 97 MG/DL (ref 74–99)
GLUCOSE SERPL-MCNC: 86 MG/DL (ref 74–99)
HCO3 BLDV-SCNC: 27.4 MMOL/L (ref 22–26)
HCT VFR BLD AUTO: 36.3 % (ref 41–52)
HCT VFR BLD AUTO: 38.1 % (ref 41–52)
HGB BLD-MCNC: 11.8 G/DL (ref 13.5–17.5)
HGB BLD-MCNC: 12.2 G/DL (ref 13.5–17.5)
IMM GRANULOCYTES # BLD AUTO: 0.08 X10*3/UL (ref 0–0.5)
IMM GRANULOCYTES NFR BLD AUTO: 1.1 % (ref 0–0.9)
INHALED O2 CONCENTRATION: 21 %
LYMPHOCYTES # BLD AUTO: 1.52 X10*3/UL (ref 0.8–3)
LYMPHOCYTES NFR BLD AUTO: 20.9 %
MCH RBC QN AUTO: 29 PG (ref 26–34)
MCH RBC QN AUTO: 29.7 PG (ref 26–34)
MCHC RBC AUTO-ENTMCNC: 31 G/DL (ref 32–36)
MCHC RBC AUTO-ENTMCNC: 33.6 G/DL (ref 32–36)
MCV RBC AUTO: 88 FL (ref 80–100)
MCV RBC AUTO: 94 FL (ref 80–100)
MONOCYTES # BLD AUTO: 0.54 X10*3/UL (ref 0.05–0.8)
MONOCYTES NFR BLD AUTO: 7.4 %
NEUTROPHILS # BLD AUTO: 5.03 X10*3/UL (ref 1.6–5.5)
NEUTROPHILS NFR BLD AUTO: 69.1 %
NRBC BLD-RTO: 0 /100 WBCS (ref 0–0)
NRBC BLD-RTO: 0 /100 WBCS (ref 0–0)
OXYHGB MFR BLDV: 62.6 % (ref 45–75)
PCO2 BLDV: 36 MM HG (ref 41–51)
PH BLDV: 7.49 PH (ref 7.33–7.43)
PHOSPHATE SERPL-MCNC: 3.5 MG/DL (ref 2.5–4.9)
PLATELET # BLD AUTO: 233 X10*3/UL (ref 150–450)
PLATELET # BLD AUTO: 253 X10*3/UL (ref 150–450)
PO2 BLDV: 40 MM HG (ref 35–45)
POTASSIUM SERPL-SCNC: 4.4 MMOL/L (ref 3.5–5.3)
PROT SERPL-MCNC: 5.5 G/DL (ref 6.4–8.2)
RBC # BLD AUTO: 4.07 X10*6/UL (ref 4.5–5.9)
RBC # BLD AUTO: 4.11 X10*6/UL (ref 4.5–5.9)
SAO2 % BLDV: 64 % (ref 45–75)
SODIUM SERPL-SCNC: 139 MMOL/L (ref 136–145)
UFH PPP CHRO-ACNC: 0.2 IU/ML
UFH PPP CHRO-ACNC: 0.5 IU/ML
WBC # BLD AUTO: 7.3 X10*3/UL (ref 4.4–11.3)
WBC # BLD AUTO: 7.6 X10*3/UL (ref 4.4–11.3)

## 2024-05-07 PROCEDURE — 82947 ASSAY GLUCOSE BLOOD QUANT: CPT

## 2024-05-07 PROCEDURE — 1090000001 HH PPS REVENUE CREDIT

## 2024-05-07 PROCEDURE — 94640 AIRWAY INHALATION TREATMENT: CPT

## 2024-05-07 PROCEDURE — 2500000004 HC RX 250 GENERAL PHARMACY W/ HCPCS (ALT 636 FOR OP/ED)

## 2024-05-07 PROCEDURE — 1100000001 HC PRIVATE ROOM DAILY

## 2024-05-07 PROCEDURE — 37799 UNLISTED PX VASCULAR SURGERY: CPT

## 2024-05-07 PROCEDURE — 1090000002 HH PPS REVENUE DEBIT

## 2024-05-07 PROCEDURE — 2500000001 HC RX 250 WO HCPCS SELF ADMINISTERED DRUGS (ALT 637 FOR MEDICARE OP)

## 2024-05-07 PROCEDURE — 84100 ASSAY OF PHOSPHORUS: CPT

## 2024-05-07 PROCEDURE — 85025 COMPLETE CBC W/AUTO DIFF WBC: CPT

## 2024-05-07 PROCEDURE — 37799 UNLISTED PX VASCULAR SURGERY: CPT | Performed by: NURSE PRACTITIONER

## 2024-05-07 PROCEDURE — 82805 BLOOD GASES W/O2 SATURATION: CPT | Performed by: NURSE PRACTITIONER

## 2024-05-07 PROCEDURE — 82248 BILIRUBIN DIRECT: CPT

## 2024-05-07 PROCEDURE — 85520 HEPARIN ASSAY: CPT

## 2024-05-07 PROCEDURE — 80048 BASIC METABOLIC PNL TOTAL CA: CPT

## 2024-05-07 RX ORDER — QUETIAPINE FUMARATE 25 MG/1
25 TABLET, FILM COATED ORAL DAILY PRN
Status: DISCONTINUED | OUTPATIENT
Start: 2024-05-07 | End: 2024-05-09

## 2024-05-07 RX ADMIN — PIPERACILLIN SODIUM AND TAZOBACTAM SODIUM 2.25 G: 2; .25 INJECTION, SOLUTION INTRAVENOUS at 22:27

## 2024-05-07 RX ADMIN — PIPERACILLIN SODIUM AND TAZOBACTAM SODIUM 2.25 G: 2; .25 INJECTION, SOLUTION INTRAVENOUS at 09:00

## 2024-05-07 RX ADMIN — Medication 5 MG: at 23:04

## 2024-05-07 RX ADMIN — ASPIRIN 81 MG: 81 TABLET, COATED ORAL at 08:41

## 2024-05-07 RX ADMIN — APIXABAN 5 MG: 5 TABLET, FILM COATED ORAL at 11:13

## 2024-05-07 RX ADMIN — PIPERACILLIN SODIUM AND TAZOBACTAM SODIUM 2.25 G: 2; .25 INJECTION, SOLUTION INTRAVENOUS at 15:15

## 2024-05-07 RX ADMIN — FLUTICASONE FUROATE AND VILANTEROL TRIFENATATE 1 PUFF: 100; 25 POWDER RESPIRATORY (INHALATION) at 07:38

## 2024-05-07 RX ADMIN — MEMANTINE 10 MG: 10 TABLET ORAL at 23:06

## 2024-05-07 RX ADMIN — PIPERACILLIN SODIUM AND TAZOBACTAM SODIUM 2.25 G: 2; .25 INJECTION, SOLUTION INTRAVENOUS at 02:57

## 2024-05-07 RX ADMIN — DIGOXIN 125 MCG: 125 TABLET ORAL at 08:41

## 2024-05-07 RX ADMIN — APIXABAN 5 MG: 5 TABLET, FILM COATED ORAL at 23:06

## 2024-05-07 RX ADMIN — DONEPEZIL HYDROCHLORIDE 10 MG: 10 TABLET, FILM COATED ORAL at 08:41

## 2024-05-07 RX ADMIN — MEMANTINE 10 MG: 10 TABLET ORAL at 08:42

## 2024-05-07 ASSESSMENT — PAIN SCALES - PAIN ASSESSMENT IN ADVANCED DEMENTIA (PAINAD)
FACIALEXPRESSION: SMILING OR INEXPRESSIVE
TOTALSCORE: 0
FACIALEXPRESSION: SMILING OR INEXPRESSIVE
CONSOLABILITY: NO NEED TO CONSOLE
FACIALEXPRESSION: SMILING OR INEXPRESSIVE
BREATHING: NORMAL
FACIALEXPRESSION: SMILING OR INEXPRESSIVE
BODYLANGUAGE: RELAXED
BREATHING: NORMAL
CONSOLABILITY: NO NEED TO CONSOLE
BODYLANGUAGE: RELAXED
BODYLANGUAGE: RELAXED
TOTALSCORE: 0
TOTALSCORE: 0
FACIALEXPRESSION: SMILING OR INEXPRESSIVE
TOTALSCORE: 0
CONSOLABILITY: NO NEED TO CONSOLE
BREATHING: NORMAL
CONSOLABILITY: NO NEED TO CONSOLE
BREATHING: NORMAL
TOTALSCORE: 0
CONSOLABILITY: NO NEED TO CONSOLE
BREATHING: NORMAL

## 2024-05-07 ASSESSMENT — COGNITIVE AND FUNCTIONAL STATUS - GENERAL
CLIMB 3 TO 5 STEPS WITH RAILING: TOTAL
DRESSING REGULAR UPPER BODY CLOTHING: A LOT
MOVING FROM LYING ON BACK TO SITTING ON SIDE OF FLAT BED WITH BEDRAILS: A LITTLE
MOVING TO AND FROM BED TO CHAIR: A LITTLE
MOBILITY SCORE: 14
STANDING UP FROM CHAIR USING ARMS: A LOT
EATING MEALS: A LITTLE
DAILY ACTIVITIY SCORE: 15
TURNING FROM BACK TO SIDE WHILE IN FLAT BAD: A LITTLE
WALKING IN HOSPITAL ROOM: A LOT
PERSONAL GROOMING: A LITTLE
HELP NEEDED FOR BATHING: A LOT
TOILETING: A LITTLE
DRESSING REGULAR LOWER BODY CLOTHING: A LOT

## 2024-05-07 ASSESSMENT — PAIN SCALES - GENERAL
PAINLEVEL_OUTOF10: 0 - NO PAIN
PAINLEVEL_OUTOF10: 0 - NO PAIN

## 2024-05-07 ASSESSMENT — PAIN - FUNCTIONAL ASSESSMENT
PAIN_FUNCTIONAL_ASSESSMENT: 0-10
PAIN_FUNCTIONAL_ASSESSMENT: PAINAD (PAIN ASSESSMENT IN ADVANCED DEMENTIA SCALE)

## 2024-05-07 ASSESSMENT — ACTIVITIES OF DAILY LIVING (ADL): LACK_OF_TRANSPORTATION: NO

## 2024-05-07 NOTE — CARE PLAN
Problem: Skin  Goal: Promote skin healing  Flowsheets (Taken 5/6/2024 2043)  Promote skin healing:   Assess skin/pad under line(s)/device(s)   Protective dressings over bony prominences   Rotate device position/do not position patient on device   Turn/reposition every 2 hours/use positioning/transfer devices   The patient's goals for the shift include      The clinical goals for the shift include Pt will become more alert through out shift    Over the shift, the patient did not make progress toward the following goals. Barriers to progression include pt not alert enough to take meds. Recommendations to address these barriers include continue to reassess neuro status.

## 2024-05-07 NOTE — PROGRESS NOTES
Jam Cox is a 89 y.o. male on day 4 of admission presenting with Cardiogenic shock (Multi).    SW met with spouse and patient at bedside to complete assessment. Patient lives with daughter and spouse in single family home. 3 stairs to enter home. Bathroom on 2nd floor and basement. One fall within the last year. Taken to [facility] same day. Walker, cane, wheeled walker and standing shower with shower chair at home. Only uses wheeled walker at the grocery store. Skilled rehab in 2018, admitted for 3 days then discharged home. Pending PT/OT to assist with dc planning needs. SW will continue to follow for progress towards discharge.

## 2024-05-07 NOTE — PROGRESS NOTES
Jam Cox is a 89 y.o. male on day 4 of admission presenting with Cardiogenic shock (Multi).    Interval History/Hospital Course:  Patient is a 89-year-old male with medical history significant for heart failure with moderately reduced ejection fraction presenting to [facility] due to shortness of breath.  CT imaging done on admission showed patient to have bilateral multifocal pneumonia in his lower lobes for which broad-spectrum antibiotics was started. Patient additionally was also found to be in A-fib with RVR on admission and received IV metoprolol IV however his blood pressure dropped significantly and patient was switched over to an amiodarone drip with improvement in his heart rate.  Heparin gtt started (on Eliquis at home).  Required ICU for septic shock versus cardiogenic shock in setting of his congestive heart failure requiring vasopressors.  He is being treated for pneumonia for 7 days total.  Heart failure team consulted for management of  cardiogenic shock requiring dobutamine gtt.  Now off inotropic medication,  transitioning from Heparin gtt back to home dose of Eliquis.       Subjective   Patient doing well on exam this afternoon, accompanied by his daughter. He denies any shortness of breath or pain. His daughter reports he has had issues sleeping over the past several nights but has been doing better today. She reports he is near his baseline mentation today.       Objective   Physical Exam  General: awake, alert, conversant, appears stated age. Difficult to understand due to missing teeth and does not have his dentures  HEENT: pupils equal and round, no scleral icterus  Skin: no suspect lesions or rashes noted on visible skin  Chest: ctab, normal respiratory effort, not on supplemental oxygen  Cardiac: regular rate, normal s1, s2, no M/R/G  Abdomen: soft, ND, NT, no involuntary guarding  : no flank pain or indwelling urinary catheter  EXT: no peripheral edema, no asymmetry noted  MSK: no  focal joint swelling noted  Neuro: moving all limbs spontaneously, follows commands  Psych: coherent thought process, appropriate mood and affect    Last Recorded Vitals  Vitals:    05/07/24 1700   BP: 107/60   Pulse: 84   Resp: 20   Temp:    SpO2: 93%       Intake/Output:    Intake/Output Summary (Last 24 hours) at 5/7/2024 1802  Last data filed at 5/7/2024 1515  Gross per 24 hour   Intake 442.5 ml   Output 850 ml   Net -407.5 ml         Relevant Results  Results from last 7 days   Lab Units 05/07/24  0304 05/06/24  2317 05/05/24  0449   WBC AUTO x10*3/uL 7.3 7.6 11.3   HEMOGLOBIN g/dL 11.8* 12.2* 10.7*   HEMATOCRIT % 38.1* 36.3* 32.8*   PLATELETS AUTO x10*3/uL 233 253 210     Results from last 7 days   Lab Units 05/07/24  0304 05/06/24  1651 05/06/24  0237 05/05/24  0819 05/05/24  0449   SODIUM mmol/L 139 140  --  139 138   POTASSIUM mmol/L 4.4 4.8  --  4.3 4.2   CHLORIDE mmol/L 104 104  --  101 100   CO2 mmol/L 28 29  --  26 29   BUN mg/dL 13 13  --  20 21   CREATININE mg/dL 1.05 1.03  --  1.17 1.29   CALCIUM mg/dL 8.4* 8.3*  --  8.2* 7.9*   PROTEIN TOTAL g/dL 5.5*  --  6.1*  --  5.1*   BILIRUBIN TOTAL mg/dL 1.6*  --  1.6*  --  1.7*   ALK PHOS U/L 72  --  75  --  73   ALT U/L 44  --  63*  --  77*   AST U/L 16  --  26  --  45*   GLUCOSE mg/dL 86 101*  --  89 84       Assessment/Plan   Principal Problem:    Cardiogenic shock (Multi)  Active Problems:    Multifocal pneumonia    Septic shock (Multi)    Jam Young is a 89-year-old male with previous medical history of HFrEF, dementia, and COPD was admitted to the medical intensive care unit due to septic versus cardiogenic shock and multifocal left lower lobe pneumonia, transferred to regular nursing floor on 5/7.    #Bilateral lower lobe PNA  :: Micro: 5/3: Legionella, Streptococcus pneumoniae, MRSA negative, 5/3 blood culture x 2 no growth at 4 days final report  Plan:  -Continue with Zosyn for total of 7 days (5/2 - 5/8)    #HFrEF (newly reduced)  #Cardiogenic  versus septic shock (resolved)  #Afib w/RVR (resolved)  :: TTE 5/6: LV EF 30-35%, LA enlarged, reduced RV systolic fxn, global hypokinesis of LV   :: TTE 4/23/24: LV EF 40-45%  Plan:  - Transitioned to home Eliquis on 5/7  - Continue digoxin 125 MCG daily, will discuss further w HF  - Continue to hold home meds (Entresto, metoprolol, Jardiance) - held in setting of hypotension  - Heart failure following appreciate recs    #History of dementia  #Concern for insomnia/delirium  -Continue with Aricept and Namenda  -Trialed seroquel and precedex previously for insomnia but unclear if successful  - Will provide Seroquel PRN for delirium/agitation at night  -PT/OT    #COPD  -Currently on room air  -Continue with Breo    #Dysphagia  -Continue with purée 4; mild thick liquids  -Cont with PPI     F: Caution due to HFrEF  E: Replete PRN  N: Pureed diet, mild thick liquids  A: pIV    DVT PPX: Eliquis  GI PPX: PPI    Code Status: FULL  Surrogate/POA: Carin Cuellar [phone]     Shane Hall MD  PGY-1 Internal Medicine

## 2024-05-07 NOTE — PROGRESS NOTES
MICU to [facility] Transfer Summary     I:  ICU Admission Reason & Brief ICU Course:    Patient is a 89-year-old male with medical history significant for heart failure with moderately reduced ejection fraction presenting to [facility] due to shortness of breath.  CT imaging done on admission showed patient to have bilateral multifocal pneumonia in his lower lobes for which broad-spectrum antibiotics was started. Patient additionally was also found to be in A-fib with RVR on admission and received IV metoprolol IV however his blood pressure dropped significantly and patient was switched over to an amiodarone drip with improvement in his heart rate.  Heparin gtt started (on Eliquis at home).  Required ICU for septic shock versus cardiogenic shock in setting of his congestive heart failure requiring vasopressors.  He is being treated for pneumonia for 7 days total.  Heart failure team consulted for management of  cardiogenic shock requiring dobutamine gtt.  Now off inotropic medication,  transitioning from Heparin gtt back to home dose of Eliquis.         C: Code Status/DPOA Info/Goals of Care/ACP Note    Full Code  DPOA/Contact Number: Carin Cox [phone]     U: Unprescribing & Pertinent High-Risk Medications    Changes to home meds: Holding home GDMT     Anticoagulation: Eliquis 5 mg BID     Antibiotics:   [] N/A - no current planned antimicrobioals  [x]  Zosyn indication mutifocal PNA start date 5/3 planned duration 5/8    P: Pending Tests at the Time of Transfer   None       A: Active consultants, including Rehab:   [x]  Subspecialty Consultants: cardiology  [x]  PT  [x]  OT  []  SLP  []  Wound Care    U: Uncertainty Measure/Diagnostic Pause:    Working diagnosis at the time of transfer Septic vs cardiogenic shock     Diagnosis Degree of Certainty: 1. High degree of certainty about the clinical diagnosis.     S: Summary of Major Problems and To-Dos:   #Cardiogenic shock (resolved) follow up with HF to restart  GDMT (pt needs to follow up with established cardiologist o/p)   #COPD--. C/w breo  #B/L PNA--. C/w Zosyn 5/2 until 5/8; pt on RA     To-do list prior to transfer:  N/A     E: Exam, including Lines/Drains/Airways & Data Review:   Physical Exam:  Constitutional: Elderly male in NAD  Eyes: Anicteric  ENMT: mucous membranes moist, no apparent injury, no lesions seen  Head/Neck: Neck supple, no apparent injury; no JVD  Respiratory/Thorax: Lungs CTA bilaterally, non-labored breathing, no cough, on RA  Cardiovascular: Regular, rate and rhythm, no murmurs, normal S1 and S2  Gastrointestinal: Nondistended, soft, non-tender, BS present x 4  Extremities: no edema   Neurological: alert and oriented x 1-2, speech mumbled, follows commands appropriately, no focal deficits  Skin: Warm and dry    Difficult airway? N/A  Lines/drains assessed for removal? Yes, describe: Rt IJ  CVC    Within 30 minutes of the patient physically leaving the floor, a Floor Readiness Note needs to be placed with updated vitals.           Jam Cox is a 89 y.o. male on day 4 of admission presenting with Cardiogenic shock (Multi).    Subjective   No acute events o/n. Pt alert and answering questions appropriately this am.      ROS:  Pt denies any cp/sob, fevers/chills, NVD or abd pain     Objective   Physical Exam:  Constitutional: Elderly male in NAD  Eyes: Anicteric  ENMT: mucous membranes moist, no apparent injury, no lesions seen  Head/Neck: Neck supple, no apparent injury; no JVD  Respiratory/Thorax: Lungs CTA bilaterally, non-labored breathing, no cough, on RA  Cardiovascular: Regular, rate and rhythm, no murmurs, normal S1 and S2  Gastrointestinal: Nondistended, soft, non-tender, BS present x 4  Extremities: no edema   Neurological: alert and oriented x 1-2, speech mumbled, follows commands appropriately, no focal deficits  Skin: Warm and dry    Vital Signs  Blood pressure 125/55, pulse 87, temperature 37.1 °C (98.8 °F), temperature source  Temporal, resp. rate 14, height 1.829 m (6'), weight 72.3 kg (159 lb 6.3 oz), SpO2 93%.  Oxygen Therapy  SpO2: 93 %  Medical Gas Therapy: None (Room air)  O2 Delivery Method: Nasal cannula       Intake/Output last 3 Shifts:  I/O last 3 completed shifts:  In: 1117.4 (15.7 mL/kg) [I.V.:817.4 (11.5 mL/kg); IV Piggyback:300]  Out: 3550 (49.9 mL/kg) [Urine:3550 (1.4 mL/kg/hr)]  Dosing Weight: 71.2 kg     Lines and Tubes:  CVC 05/03/24 Triple lumen Right Internal jugular (Active)   Placement Date/Time: 05/03/24 0800   Lumen Type: Triple lumen  Orientation: Right  Location: Internal jugular   Number of days: 3       Peripheral IV 05/03/24 20 G Left Forearm (Active)   Placement Date/Time: 05/03/24 0212   Size (Gauge): 20 G  Orientation: Left  Location: Forearm   Number of days: 4       Peripheral IV 05/03/24 20 G Right Forearm (Active)   Placement Date/Time: 05/03/24 0150   Size (Gauge): 20 G  Orientation: Right  Location: Forearm   Number of days: 4       Peripheral IV 05/03/24 20 G Right;Anterior Antecubital (Active)   Placement Date/Time: 05/03/24 0500   Size (Gauge): 20 G  Orientation: Right;Anterior  Location: Antecubital  Site Prep: Chlorhexidine   Insertion attempts: 1  Patient Tolerance: Age appropriate   Number of days: 4       External Urinary Catheter Male (Active)   Placement Date/Time: 05/05/24 2000   Placed by: Adan Rodriguez RN  External Catheter Type: Male   Number of days: 1         Relevant Results    Scheduled medications  aspirin, 81 mg, oral, Daily  [Held by provider] atorvastatin, 10 mg, oral, Daily  digoxin, 125 mcg, oral, Daily  donepezil, 10 mg, oral, Daily  esomeprazole, 40 mg, nasoduodenal tube, Daily before breakfast  finasteride, 5 mg, oral, Daily  fluticasone furoate-vilanteroL, 1 puff, inhalation, Daily  insulin lispro, 0-10 Units, subcutaneous, TID with meals  memantine, 10 mg, oral, BID  perflutren protein A microsphere, 0.5 mL, intravenous, Once in imaging  piperacillin-tazobactam, 2.25 g,  intravenous, q6h  [Held by provider] QUEtiapine, 25 mg, oral, Nightly  sulfur hexafluoride microsphr, 2 mL, intravenous, Once in imaging      Continuous medications  heparin, 0-4,000 Units/hr, Last Rate: 1,400 Units/hr (05/07/24 0900)      PRN medications  PRN medications: alteplase, dextrose, dextrose, glucagon, glucagon, heparin, ipratropium-albuteroL, melatonin    Results for orders placed or performed during the hospital encounter of 05/03/24 (from the past 24 hour(s))   Transthoracic Echo (TTE) Limited   Result Value Ref Range    LV A4C EF 23.9    BLOOD GAS MIXED VENOUS   Result Value Ref Range    POCT pH, Mixed 7.43 7.33 - 7.43 pH    POCT pCO2, Mixed 48 41 - 51 mm Hg    POCT pO2, Mixed 41 35 - 45 mm Hg    POCT SO2, Mixed 64 45 - 75 %    POCT Oxy Hemoglobin, Mixed 62.3 45.0 - 75.0 %    POCT Base Excess, Mixed 6.4 (H) -2.0 - 3.0 mmol/L    POCT HCO3 Calculated, Mixed 31.9 (H) 22.0 - 26.0 mmol/L    Patient Temperature 37.0 degrees Celsius    FiO2 21 %   Blood Gas Venous   Result Value Ref Range    POCT pH, Venous 7.41 7.33 - 7.43 pH    POCT pCO2, Venous 49 41 - 51 mm Hg    POCT pO2, Venous 36 35 - 45 mm Hg    POCT SO2, Venous 51 45 - 75 %    POCT Oxy Hemoglobin, Venous 49.4 45.0 - 75.0 %    POCT Base Excess, Venous 5.3 (H) -2.0 - 3.0 mmol/L    POCT HCO3 Calculated, Venous 31.1 (H) 22.0 - 26.0 mmol/L    Patient Temperature 37.0 degrees Celsius    FiO2 21 %   POCT GLUCOSE   Result Value Ref Range    POCT Glucose 98 74 - 99 mg/dL   Lactate   Result Value Ref Range    Lactate 1.3 0.4 - 2.0 mmol/L   Renal function panel   Result Value Ref Range    Glucose 101 (H) 74 - 99 mg/dL    Sodium 140 136 - 145 mmol/L    Potassium 4.8 3.5 - 5.3 mmol/L    Chloride 104 98 - 107 mmol/L    Bicarbonate 29 21 - 32 mmol/L    Anion Gap 12 10 - 20 mmol/L    Urea Nitrogen 13 6 - 23 mg/dL    Creatinine 1.03 0.50 - 1.30 mg/dL    eGFR 68 >60 mL/min/1.73m*2    Calcium 8.3 (L) 8.6 - 10.6 mg/dL    Phosphorus 3.3 2.5 - 4.9 mg/dL    Albumin 2.6  (L) 3.4 - 5.0 g/dL   POCT GLUCOSE   Result Value Ref Range    POCT Glucose 80 74 - 99 mg/dL   CBC   Result Value Ref Range    WBC 7.6 4.4 - 11.3 x10*3/uL    nRBC 0.0 0.0 - 0.0 /100 WBCs    RBC 4.11 (L) 4.50 - 5.90 x10*6/uL    Hemoglobin 12.2 (L) 13.5 - 17.5 g/dL    Hematocrit 36.3 (L) 41.0 - 52.0 %    MCV 88 80 - 100 fL    MCH 29.7 26.0 - 34.0 pg    MCHC 33.6 32.0 - 36.0 g/dL    RDW 14.3 11.5 - 14.5 %    Platelets 253 150 - 450 x10*3/uL   Hepatic Function Panel   Result Value Ref Range    Albumin 2.6 (L) 3.4 - 5.0 g/dL    Bilirubin, Total 1.6 (H) 0.0 - 1.2 mg/dL    Bilirubin, Direct 0.5 (H) 0.0 - 0.3 mg/dL    Alkaline Phosphatase 72 33 - 136 U/L    ALT 44 10 - 52 U/L    AST 16 9 - 39 U/L    Total Protein 5.5 (L) 6.4 - 8.2 g/dL   Blood Gas Venous   Result Value Ref Range    POCT pH, Venous 7.49 (H) 7.33 - 7.43 pH    POCT pCO2, Venous 36 (L) 41 - 51 mm Hg    POCT pO2, Venous 40 35 - 45 mm Hg    POCT SO2, Venous 64 45 - 75 %    POCT Oxy Hemoglobin, Venous 62.6 45.0 - 75.0 %    POCT Base Excess, Venous 4.1 (H) -2.0 - 3.0 mmol/L    POCT HCO3 Calculated, Venous 27.4 (H) 22.0 - 26.0 mmol/L    Patient Temperature 37.0 degrees Celsius    FiO2 21 %   CBC and Auto Differential   Result Value Ref Range    WBC 7.3 4.4 - 11.3 x10*3/uL    nRBC 0.0 0.0 - 0.0 /100 WBCs    RBC 4.07 (L) 4.50 - 5.90 x10*6/uL    Hemoglobin 11.8 (L) 13.5 - 17.5 g/dL    Hematocrit 38.1 (L) 41.0 - 52.0 %    MCV 94 80 - 100 fL    MCH 29.0 26.0 - 34.0 pg    MCHC 31.0 (L) 32.0 - 36.0 g/dL    RDW 14.4 11.5 - 14.5 %    Platelets 233 150 - 450 x10*3/uL    Neutrophils % 69.1 40.0 - 80.0 %    Immature Granulocytes %, Automated 1.1 (H) 0.0 - 0.9 %    Lymphocytes % 20.9 13.0 - 44.0 %    Monocytes % 7.4 2.0 - 10.0 %    Eosinophils % 1.4 0.0 - 6.0 %    Basophils % 0.1 0.0 - 2.0 %    Neutrophils Absolute 5.03 1.60 - 5.50 x10*3/uL    Immature Granulocytes Absolute, Automated 0.08 0.00 - 0.50 x10*3/uL    Lymphocytes Absolute 1.52 0.80 - 3.00 x10*3/uL    Monocytes  Absolute 0.54 0.05 - 0.80 x10*3/uL    Eosinophils Absolute 0.10 0.00 - 0.40 x10*3/uL    Basophils Absolute 0.01 0.00 - 0.10 x10*3/uL   Basic Metabolic Panel   Result Value Ref Range    Glucose 86 74 - 99 mg/dL    Sodium 139 136 - 145 mmol/L    Potassium 4.4 3.5 - 5.3 mmol/L    Chloride 104 98 - 107 mmol/L    Bicarbonate 28 21 - 32 mmol/L    Anion Gap 11 10 - 20 mmol/L    Urea Nitrogen 13 6 - 23 mg/dL    Creatinine 1.05 0.50 - 1.30 mg/dL    eGFR 66 >60 mL/min/1.73m*2    Calcium 8.4 (L) 8.6 - 10.6 mg/dL   Phosphorus   Result Value Ref Range    Phosphorus 3.5 2.5 - 4.9 mg/dL   Heparin Assay, UFH   Result Value Ref Range    Heparin Unfractionated 0.2 See Comment Below for Therapeutic Ranges IU/mL   Heparin Assay, UFH   Result Value Ref Range    Heparin Unfractionated 0.5 See Comment Below for Therapeutic Ranges IU/mL   POCT GLUCOSE   Result Value Ref Range    POCT Glucose 80 74 - 99 mg/dL       Transthoracic Echo (TTE) Limited    Result Date: 5/6/2024   [facility], [address] [place] [address]                Tel [phone] and Fax [phone] TRANSTHORACIC ECHOCARDIOGRAM REPORT  Patient Name:      OSMANY Ordoñez Physician:    [id number] Sanjay Quintanilla MD Study Date:        5/6/2024             Ordering Provider:    [id number] MOHSEN YANG MRN/PID:           [id number]             Fellow: Accession#:        [id number]         Nurse: Date of Birth/Age: 11/13/1930 / 89      Sonographer:          Giulia hubbard RDCS Gender:            M                    Additional Staff: Height:            182.88 cm            Admit Date:           5/3/2024 Weight:            69.85 kg             Admission Status:     Inpatient -                                                               Routine BSA / BMI:         1.91  m2 / 20.89      Encounter#:           [id number]                    kg/m2                                         [address] Blood Pressure: 109 /53 mmHg Study Type:    TRANSTHORACIC ECHO (TTE) LIMITED Diagnosis/ICD: Chronic systolic (congestive) heart failure (CHF)-I50.22 Indication:    for improvement in LVEF after starting dobutamine CPT Code:      Echo Limited-19615 Patient History: Pertinent History: CHF, HTN, A-Fib, Hyperlipidemia and COPD. Study Detail: The following Echo studies were performed: 2D. Technically               challenging study due to body habitus, patient lying in supine               position, prominent lung artifact and poor acoustic windows.               Definity used as a contrast agent for endocardial border               definition. Total contrast used for this procedure was 2.0 mL via               IV push.  PHYSICIAN INTERPRETATION: Left Ventricle: The left ventricular systolic function is moderately decreased, with an estimated ejection fraction of 30-35%. There is global hypokinesis of the left ventricle with minor regional variations. The left ventricular cavity size is normal. Abnormal (paradoxical) septal motion, consistent with an intraventricular conduction delay. Left ventricular diastolic filling was not assessed. Left Atrium: The left atrium is enlarged. Right Ventricle: The right ventricle is normal in size. There is reduced right ventricular systolic function. Right Atrium: The right atrium is enlarged. Aortic Valve: The aortic valve is probably trileaflet. There is mild aortic valve cusp calcification. Aortic valve regurgitation was not assessed. Mitral Valve: The mitral valve is mildly thickened. There is mild mitral annular calcification. Mitral valve regurgitation was not assessed. Tricuspid Valve: The tricuspid valve is structurally normal. Tricuspid regurgitation was not assessed. The right ventricular systolic pressure is unable to be  estimated. Pulmonic Valve: The pulmonic valve was not assessed. Pulmonic valve regurgitation was not assessed. Pericardium: There is a trivial pericardial effusion. Aorta: The aortic root is normal. In comparison to the previous echocardiogram(s): Compared with study from 5/3/2024, there is a slight increase in LV EF (was 30%).  CONCLUSIONS:  1. Left ventricular systolic function is moderately decreased with a 30-35% estimated ejection fraction.  2. Poorly visualized anatomical structures due to suboptimal image quality.  3. There is reduced right ventricular systolic function.  4. The left atrium is enlarged.  5. There is global hypokinesis of the left ventricle with minor regional variations. QUANTITATIVE DATA SUMMARY: LV SYSTOLIC FUNCTION BY 2D PLANIMETRY (MOD):                     Normal Ranges: EF-A4C View: 23.9 % (>=55%)  [id number] Sanjay Quintanilla MD Electronically signed on 5/6/2024 at 10:15:13 AM  ** Final **     Electrocardiogram, 12-lead PRN ACS symptoms    Result Date: 5/4/2024  Atrial fibrillation with rapid ventricular response with premature ventricular or aberrantly conducted complexes Left bundle branch block Abnormal ECG Confirmed by Eric Hernandes ([id number]) on 5/4/2024 6:46:57 PM    XR chest 1 view    Result Date: 5/3/2024  Interpreted By:  Isael Guardado and Nakamoto Kent STUDY: XR CHEST 1 VIEW;  5/3/2024 10:02 am   INDICATION: Signs/Symptoms:central line location/placement.   COMPARISON: Chest radiograph 05/03/2024, 2:11 a.m. and chest CT 05/03/2024, 3:06 a.m.   ACCESSION NUMBER(S): [id number]   ORDERING CLINICIAN: MARCO ANTONIO KIM   FINDINGS: AP radiograph of the chest was provided. Interval placement of a right IJ approach central venous catheter, the tip of which overlies mid SVC.   CARDIOMEDIASTINAL SILHOUETTE: Cardiomediastinal silhouette is stable and enlarged in size and configuration. Aortic knob calcifications.   LUNGS: No pneumothorax. There is similar to worsened left basilar/retrocardiac opacity  that silhouettes the left hemidiaphragm. Additional subtle patchy right basilar airspace opacities are similar to prior. There is trace blunting of the left costophrenic angle. There is also right basilar streaky opacities that do not silhouettes the right hemidiaphragm.   ABDOMEN: Positive contrast material again seen within splenic flexure and visualized descending colon. Otherwise, no remarkable upper abdominal findings.   BONES: No acute osseous changes.       1. Persistent patchy and hazy bibasilar airspace opacities, left more than right, with interval slight worsening of left basilar lung aeration. Correlate with concern for infection/aspiration.. 2. New right IJ approach central venous catheter with tip projecting over mid SVC. No pneumothorax.   I personally reviewed the images/study and I agree with the findings as stated by Blane Elena MD. This study was interpreted at [facility], [place], [place].   MACRO: None   Signed by: Isael Guardado 5/3/2024 12:30 PM Dictation workstation:   [id number]    Transthoracic Echo (TTE) Complete    Result Date: 5/3/2024   [facility], [address] [place] [address]                Tel [phone] and Fax [phone] TRANSTHORACIC ECHOCARDIOGRAM REPORT  Patient Name:      OSMANY SILVERIO       Reading Physician:    [id number] Sanjay Quintanilla MD Study Date:        5/3/2024             Ordering Provider:    [id number] LURDES VELEZ MRN/PID:           [id number]             Fellow: Accession#:        [id number]         Nurse: Date of Birth/Age: 11/13/1930 / 89      Sonographer:          Bry hubbard                                      RDCS, RCS, FASE,                                                               ACS Gender:            M                    Additional Staff: Height:             182.88 cm            Admit Date:           5/3/2024 Weight:            70.76 kg             Admission Status:     Inpatient - STAT BSA / BMI:         1.92 m2 / 21.16      Encounter#:           [id number]                    kg/m2                                         [address]  [address] Blood Pressure: 90 /59 mmHg Study Type:    TRANSTHORACIC ECHO (TTE) COMPLETE Diagnosis/ICD: Chronic systolic (congestive) heart failure (CHF)-I50.22 Indication:    undifferentiated shock, suspected cardiogenic CPT Code:      Echo Complete w Full Doppler-[id number] Patient History: Pertinent History: HTN, systolic and diastolic HF, dementia, paroxysmal A-fib,                    Eliquis, HLD, COPD/asthma. Study Detail: The following Echo studies were performed: 2D, M-Mode, Doppler and               color flow. Technically challenging study due to poor acoustic               windows. Definity used as a contrast agent for endocardial border               definition. Total contrast used for this procedure was 3.0 mL via               IV push.  Critical Event Critical Event: Test was completed as per department protocol. Critical Finding: Lower EF compared to prior. Time Test was Completed: 11:30:53 AM Notified: Dr. Quintanilla. Attending notification time: 11:37:55 AM  PHYSICIAN INTERPRETATION: Left Ventricle: The left ventricular systolic function is moderately to severely decreased, with an estimated ejection fraction of 30%. There is global hypokinesis of the left ventricle with minor regional variations. The left ventricular cavity size is normal. There is paradoxical septal wall motion. Spectral Doppler shows a restrictive pattern of left ventricular diastolic filling. Left Atrium: The left atrium is mildly dilated. Right Ventricle: The right ventricle is normal in size. There is reduced right ventricular systolic function. TAPSE=7.4 mm. Right Atrium: The right atrium is mildly dilated. Aortic Valve: The aortic valve is  trileaflet. There is mild aortic valve cusp calcification. There is no evidence of reduced aortic valve leaflet excursion excursion. There is mild aortic valve regurgitation. The peak instantaneous gradient of the aortic valve is 6.2 mmHg. Mitral Valve: The mitral valve is normal in structure. There is tethering of the anterior mitral leaflet. There is mild mitral annular calcification. There is mild to moderate mitral valve regurgitation. Tricuspid Valve: The tricuspid valve is structurally normal. There is mild tricuspid regurgitation. The Doppler estimated RVSP is moderately elevated at 51.6 mmHg. Pulmonic Valve: The pulmonic valve is not well visualized. The pulmonic valve regurgitation was not well visualized. Pericardium: There is a small pericardial effusion. Aorta: The aortic root is normal. In comparison to the previous echocardiogram(s): Compared with study from 4/23/2024, the LV EF is decreased (was 40-45%) and there is a small pericardial effusion (was none).  CONCLUSIONS:  1. Left ventricular systolic function is moderately to severely decreased with a 30% estimated ejection fraction.  2. Spectral Doppler shows a restrictive pattern of left ventricular diastolic filling.  3. There is reduced right ventricular systolic function.  4. Mild to moderate mitral valve regurgitation.  5. Moderately elevated right ventricular systolic pressure.  6. Mild aortic valve regurgitation.  7. [facility].  8. There is global hypokinesis of the left ventricle with minor regional variations. QUANTITATIVE DATA SUMMARY: 2D MEASUREMENTS:                          Normal Ranges: Ao Root d:     3.20 cm   (2.0-3.7cm) LAs:           3.60 cm   (2.7-4.0cm) IVSd:          0.90 cm   (0.6-1.1cm) LVPWd:         0.60 cm   (0.6-1.1cm) LVIDd:         4.70 cm   (3.9-5.9cm) LVIDs:         4.60 cm LV Mass Index: 58.7 g/m2 LV % FS        2.1 % LA VOLUME:                               Normal Ranges: LA Vol A4C:        73.3 ml     (22+/-6mL/m2) LA Vol A2C:        75.4 ml LA Vol BP:         77.9 ml LA Vol Index A4C:  38.3ml/m2 LA Vol Index A2C:  39.3 ml/m2 LA Vol Index BP:   40.7 ml/m2 LA Area A4C:       24.4 cm2 LA Area A2C:       23.6 cm2 LA Major Axis A4C: 6.9 cm LA Major Axis A2C: 6.3 cm LA Volume Index:   37.7 ml/m2 RA VOLUME BY A/L METHOD:                       Normal Ranges: RA Area A4C: 20.7 cm2 AORTA MEASUREMENTS:                    Normal Ranges: Asc Ao, d: 3.10 cm (2.1-3.4cm) LV SYSTOLIC FUNCTION BY 2D PLANIMETRY (MOD):                     Normal Ranges: EF-A4C View: 24.2 % (>=55%) EF-A2C View: 40.7 % EF-Biplane:  32.4 % LV DIASTOLIC FUNCTION:                     Normal Ranges: MV Peak E: 1.15 m/s (0.7-1.2 m/s) MITRAL VALVE:                 Normal Ranges: MV DT: 171 msec (150-240msec) MITRAL INSUFFICIENCY:                           Normal Ranges: PISA Radius:  0.4 cm MR VTI:       120.00 cm MR Vmax:      421.00 cm/s MR Alias Juan: 38.5 cm/s MR Volume:    11.03 ml MR Flow Rt:   38.70 ml/s MR EROA:      0.09 cm2 AORTIC VALVE:                         Normal Ranges: AoV Vmax:      1.25 m/s (<=1.7m/s) AoV Peak P.2 mmHg (<20mmHg) LVOT Max Juan:  0.70 m/s (<=1.1m/s) LVOT VTI:      10.40 cm LVOT Diameter: 2.00 cm  (1.8-2.4cm) AoV Area,Vmax: 1.77 cm2 (2.5-4.5cm2) AORTIC INSUFFICIENCY: AI Vmax:       3.20 m/s AI Half-time:  591 msec AI Decel Rate: 158.00 cm/s2  RIGHT VENTRICLE: RV Basal 3.77 cm RV Mid   2.82 cm RV Major 6.6 cm TAPSE:   7.4 mm RV s'    0.07 m/s TRICUSPID VALVE/RVSP:                             Normal Ranges: Peak TR Velocity: 3.30 m/s RV Syst Pressure: 51.6 mmHg (< 30mmHg) PULMONIC VALVE:                      Normal Ranges: PV Max Juan: 0.7 m/s  (0.6-0.9m/s) PV Max P.1 mmHg  [id number] Sanjay Quintanilla MD Electronically signed on 5/3/2024 at 12:12:42 PM  ** Final **     CT angio chest for pulmonary embolism    Result Date: 5/3/2024  Interpreted By:  Ama Loera and Barbat Antonio STUDY: CT ANGIO CHEST FOR PULMONARY  EMBOLISM;  5/3/2024 3:16 am   INDICATION: Signs/Symptoms:afib RVR, hypoxic, Hx of COPD, r/o PE.   COMPARISON: CT chest 04/28/2024   ACCESSION NUMBER(S): [id number]   ORDERING CLINICIAN: CHRIS RIVAS   TECHNIQUE: Contiguous axial images of the chest were obtained after the intravenous administration of 80 mL of Omnipaque 350 using angiographic PE protocol. Coronal and sagittal reformatted images were reconstructed from the axial data. MIP images were created on an independent workstation and reviewed.   FINDINGS: Exam is somewhat degraded by motion and beam hardening artifact from the contrast bolus in the right subclavian artery in the SVC.   MEDIASTINUM AND LYMPH NODES: Esophagus is mildly patulous in its mid to distal portion. Multiple prominent, however nonenlarged intrathoracic nodes, similar to prior exam. No axillary lymphadenopathy. No pneumomediastinum.   VESSELS: Normal caliber aorta without dissection. Moderate-to-marked aortic atherosclerosis. No pulmonary embolism. Main pulmonary artery is not dilated.   HEART: Mild cardiomegaly. Moderate coronary artery calcifications are present.. No significant pericardial effusion.   LUNG, AIRWAYS, AND PLEURA: In the interim since prior exam on 04/20/2024, there are numerous patchy consolidative/infiltrative airspace opacities present predominantly in the left lower lobe, and to lesser extent lingula, right lower lobe and right middle lobe concerning for underlying pneumonia.   There may be a trace left-sided pleural effusion. No pneumothorax is evident.   OSSEOUS STRUCTURES: No acute osseous abnormality.   CHEST WALL SOFT TISSUES: No discernible abnormality.   UPPER ABDOMEN/OTHER: Cholelithiasis without cholecystitis. Marked atherosclerotic calcifications of the descending abdominal aorta and its visualized branches, including the celiac artery, superior mesenteric artery and bilateral renal arteries.       1. In the interim since prior exam on 04/20/2024, new  consolidative/infiltrative airspace opacities are present in the lower lobes bilaterally, lingula and right middle lobe with small left-sided pleural effusion most consistent with pneumonia. 2. No evidence of acute pulmonary embolism although evaluation of the smaller vessels in the lung bases is somewhat degraded by motion. 3. Esophagus is mildly patulous in its mid to distal portion and correlate with concern for gastro-esophageal reflux disease or motility disorder. 4. Moderate coronary artery calcifications. 5. Cholelithiasis without cholecystitis.   I personally reviewed the images/study and I agree with the findings as stated by Alistair Sampson MD. This study was interpreted at [facility], [place], [place]   MACRO: None.   Signed by: Ama Loera 5/3/2024 4:32 AM Dictation workstation:   [id number]    XR chest 1 view    Result Date: 5/3/2024  Interpreted By:  Ama Loera, STUDY: XR CHEST 1 VIEW;  5/3/2024 2:18 am   INDICATION: Signs/Symptoms:SOB.   COMPARISON: Radiographs of the chest dated 04/30/2024   ACCESSION NUMBER(S): [id number]   ORDERING CLINICIAN: CHRIS RIVAS   FINDINGS: AP radiograph of the chest was provided.       CARDIOMEDIASTINAL SILHOUETTE: Cardiomediastinal silhouette is normal in size and configuration.   LUNGS: New airspace opacity is present in the left lung base with suggestion of additional airspace opacity is present in the medial right lower lung. No sizable pleural effusion or pneumothorax. Persistent interstitial prominence bilaterally.   ABDOMEN: No remarkable upper abdominal findings.   BONES: No acute osseous changes.       1.  New airspace opacity is present in the left lung base compared to prior radiographs on 04/30/2024, with suggestion of the additional new airspace opacities present in the medial right lower lung. Correlate with clinical symptoms of pneumonia.       MACRO: None   Signed by: Ama Loera 5/3/2024  3:12 AM Dictation workstation:   [id number]    FL modified barium swallow study    Result Date: 5/2/2024  Interpreted By:  Carol Sanabria and Lovelace Elizabeth STUDY: FL MODIFIED BARIUM SWALLOW STUDY;; 4/30/2024 1:10 pm   INDICATION: Signs/Symptoms:post prandial cough.   COMPARISON: None.   ACCESSION NUMBER(S): [id number]   ORDERING CLINICIAN: MERY ARGUETA   TECHNIQUE: MBSS completed. Informed verbal consent obtained prior to completion of exam. Trials of thin, nectar thick, honey thick, puree, and solids given. Fluoroscopy time:  A new 80 seconds.   SLP: Hoda Medina MACTREVOR/SLP Phone/Pager: [phone]   SPEECH FINDINGS: Reason for referral: Post prandial cough Patient hx: CHF, COPD, HTN, Dementia. Spouse endorsing dysphagia. Respiratory status: Nasal cannula Previous diet: NPO   FINAL SPEECH RECOMMENDATIONS   Diet recommendations/feeding strategies: PUREE DIET WITH NECTAR/MILDLY THICKENED LIQUIDS 1. UPRIGHT AT 90 DEGREES FOR ALL PO 2. SLOW RATE AND SMALL BOLUS SIZE 3. MEDICATION CRUSHED IN PUREE CARRIER   Follow-up speech therapy recommended: Yes.   Education provided: Yes.   Treatment Provided today: No   Additional consult suggested: None   Repeat study/ dc plan: TBD   Mechanics of the swallow summary: *Oral phase: Reduced bolus formation; premature pharyngeal entry; oral residue with solids and thin liquids *Pharyngeal phase: Swallow delay; laryngeal penetration with thin and nectar liquids; aspiration with thin liquids *Esophageal phase: DNT 2/2 body habitus and positioning challenges   SLP impressions with severity rating: Pt. presenting with oropharyngeal dysphagia characterized by reduced bolus formation and swallow delay. Reduced bolus formation with solids exhibited by prolonged mastication with oral residue remaining post swallow. Premature pharyngeal entry viewed with all boluses. Thin/Nectar/Honey liquids extended to piriforms prior to swallow with purees and solids reaching valleculae.  Laryngeal penetration viewed with thin and intermittently with nectar liquids. Nectar liquids cleared upper laryngeal vestibule post swallow. Thin liquids remained in airway followed by silent aspiration. Delayed cough exhibited in response to aspiration. No aspiration viewed with nectar liquids. No airway entry viewed with honey liquids or purees/solids. Modification of diet required to maximize swallowing efficiency and safety. Unable to assess effectiveness of compensatory swallowing strategies due to cognitive impairment. Did not perform an esophageal sweep due to body habitus and positioning challenges   Thin Liquids (MBSS) Rosenbek's Penetration Aspiration Scale, Thin Liquids (MBSS): 8. SILENT ASPIRATION - contrast passes glottis, visible residue, NO pt response   Nectar Thick Liquids (MBSS) Rosenbek's Penetration Aspiration Scale, Nectar thick liquids (MBSS): 2. PENETRATION that CLEARS - contrast enter airway, above vocal cords, no residue   Honey Thick Liquids (MBSS) Rosenbek's Penetration Aspiration Scale, Honey thick liquids (MBSS): 1. NO ASPIRATION & NO PENETRATION - no aspiration, contrast does not enter airway   Purees (MBSS) Rosenbek's Penetration Aspiration Scale, Purees (MBSS): 1. NO ASPIRATION & NO PENETRATION - no aspiration, contrast does not enter airway   Solids (MBSS) Rosenbek's Penetration Aspiration Scale, Solids (MBSS): 1. NO ASPIRATION & NO PENETRATION - no aspiration, contrast does not enter airway   Speech Therapy section of this report signed by BONI Valerio/SLP on 4/30/2024 at 1:50 pm.   RADIOLOGY FINDINGS: Multilevel spondylosis with disc height narrowing and bulky anterior bridging osteophytes. No acute radiographic abnormalities.   Radiology section of this report signed by Dr. Carol Sanabria DO.       Modified barium swallow study as detailed within the report.   MACRO: None   Signed by: Carol Sanabria 5/2/2024 6:44 AM Dictation workstation:   [id number]    XR chest  1 view    Result Date: 4/30/2024  Interpreted By:  Eusebio Jesus, STUDY: XR CHEST 1 VIEW;  4/30/2024 10:28 am   INDICATION: Signs/Symptoms:aspiration, CHF, crackles.   COMPARISON: Chest x-ray from 04/27/2024. CT scan chest from 04/28/2024.   ACCESSION NUMBER(S): [id number]   ORDERING CLINICIAN: JOSE ELIAS VALLES   TECHNIQUE: Single AP portable view of the chest was obtained.   FINDINGS: MEDIASTINUM/ LUNGS/ REBECCA: There is cardiomegaly. There is pulmonary vascular congestion and development of mild interstitial edema. No blunting of the costophrenic sulci. No abnormal opacity in either lung worrisome for tumor or pneumonia. No pneumothorax. No tracheal deviation. No abnormal hilar fullness or gross mass on either side.   BONES: No lytic or blastic destructive bone lesion. Stable DJD in the thoracic spine.   UPPER ABDOMEN: Grossly intact.       Development of mild CHF.   MACRO: None   Signed by: Eusebio Jesus 4/30/2024 11:03 AM Dictation workstation:   [id number]    ECG 12 lead    Result Date: 4/29/2024  Atrial fibrillation with rapid ventricular response with premature ventricular or aberrantly conducted complexes Left axis deviation Left bundle branch block Abnormal ECG When compared with ECG of 22-APR-2024 11:54, Nonspecific T wave abnormality now evident in Lateral leads See ED provider note for full interpretation and clinical correlation Confirmed by Maci Pace ([id number]) on 4/29/2024 10:20:13 AM    CT chest wo IV contrast    Result Date: 4/29/2024  Interpreted By:  Eusebio Jesus, STUDY: CT CHEST WO IV CONTRAST;  4/28/2024 11:32 am   INDICATION: 89 y/o   M with  Signs/Symptoms:hypoxia.   LIMITATIONS: Imaging of the mediastinum and rebecca is limited without the use of IV contrast..   ACCESSION NUMBER(S): [id number]   ORDERING CLINICIAN: MERY ARGUETA   TECHNIQUE: Noncontrast Thin-section images were obtained  from the thoracic inlet down through the diaphragm. Sagittal and coronal reconstruction images were generated.  Mediastinal, lung, bone, and liver windows were reviewed.   COMPARISON: Chest x-ray from 04/27/2024. CT scan chest with contrast from 03/03/2018.   FINDINGS: CHEST WALL/BASE OF THE NECK: The chest wall was grossly intact. Mild thyromegaly. Suggestion of an anteromedial hypodense left thyroid lobe nodule measuring 15 mm AP on image 10, not clearly present previously.   LUNGS/ PLEURA/ AND TRACHEA: Hypoventilatory exam.  Mild hazy density in the posterior lower lobes. There are emphysematous changes with upper lobe predominance. Mild hazy parenchymal scarring in the posterolateral mid to lower right upper lobe centered on lung window image 128. No masslike density in either lung worrisome for tumor or pneumonia. No pleural effusion. No pneumothorax. The trachea was grossly intact.   MEDIASTINUM/REBECCA: Mild nonspecific mediastinal adenopathy with lymph nodes measuring 7 mm short axis or less. No gross masslike fullness in either hilum in this unenhanced exam. No axillary adenopathy. Mild cardiomegaly. Advanced four-vessel coronary artery calcifications. No pericardial effusion. No thoracic aortic aneurysm. Moderate to advanced mural calcifications throughout the thoracic aorta.   BONES: No destructive lytic or blastic bone lesion. Mild-to-moderate multilevel mid to distal thoracic spine endplate osteophytosis.   UPPER ABDOMEN: Mild cholelithiasis. Prominent arterial calcifications in the abdominal aorta with involvement of the proximal SMA and proximal left renal artery. The remainder of the imaged upper abdomen was grossly intact.       Hypoventilatory exam.   Mild hazy density in the posterior lower lobes could be atelectasis or scarring. There is mild localized but ill-defined parenchymal scarring in the posterolateral mid to lower right upper lobe.   No masslike density in either lung worrisome for tumor or pneumonia.   Mild nonspecific and grossly stable mediastinal adenopathy, most likely reactive.   Cardiomegaly.  Advanced four-vessel coronary artery calcifications.   Moderate to advanced mural calcifications in the thoracic aorta and advanced mural calcifications in the imaged proximal abdominal aorta.   Mild cholelithiasis.   MACRO: None   Signed by: Eusebio Jesus 4/29/2024 8:23 AM Dictation workstation:   [id number]    XR chest 1 view    Result Date: 4/27/2024  STUDY: Chest Radiograph;  04/27/2024 at 10:54 PM INDICATION: Reports of hypoxia on home pulse ox. COMPARISON: None available. ACCESSION NUMBER(S): [id number] ORDERING CLINICIAN: LALITHA CONN TECHNIQUE:  Frontal chest was obtained at 2254 hours. FINDINGS: CARDIOMEDIASTINAL SILHOUETTE: Cardiomediastinal silhouette is normal in size and configuration.  LUNGS: Lungs are clear.  ABDOMEN: No remarkable upper abdominal findings.  BONES: No acute osseous changes.    No acute pulmonary abnormality. Signed by Stanford Gaffney MD    ECG 12 lead    Result Date: 4/24/2024  Atrial fibrillation Left axis deviation Nonspecific intraventricular block Minimal voltage criteria for LVH, may be normal variant ( Rainbow product ) Possible Anterolateral infarct , age undetermined Abnormal ECG When compared with ECG of 19-DEC-2022 11:28, Atrial fibrillation has replaced Sinus rhythm QRS axis Shifted left Nonspecific T wave abnormality no longer evident in Inferior leads Nonspecific T wave abnormality no longer evident in Lateral leads See ED provider note for full interpretation and clinical correlation Confirmed by Tanya Weiss ([id number]) on 4/24/2024 1:50:54 PM    Transthoracic Echo (TTE) Complete    Result Date: 4/23/2024   [facility], [address] [place] [address]              Tel [phone] and Fax [phone] TRANSTHORACIC ECHOCARDIOGRAM REPORT  Patient Name:      OSMANY SILVERIO       Reading Physician:    [id number] Ramon Mcknight DO Study Date:        4/23/2024            Ordering Provider:     [id number] GRETA BEE MRN/PID:           [id number]             Fellow: Accession#:        [id number]         Nurse: Date of Birth/Age: 11/13/1930 / 89      Sonographer:          Yolanda Reynolds RDCS                    years Gender:            M                    Additional Staff: Height:            183.00 cm            Admit Date:           4/22/2024 Weight:            77.00 kg             Admission Status:     Inpatient -                                                               Routine BSA / BMI:         1.99 m2 / 22.99      Encounter#:           [id number]                    kg/m2                                         Department Location:  [facility] Non                                                               Invasive Blood Pressure: 148 /66 mmHg Study Type:    TRANSTHORACIC ECHO (TTE) COMPLETE Diagnosis/ICD: Acute on chronic combined systolic (congestive) and diastolic                (congestive) heart failure (CHF)-I50.43 Indication:    acute on chronic HF, HFpEF CPT Code:      Echo Complete w Full Doppler-[id number] Patient History: Pertinent History: PE, HTN, Hyperlipidemia, CHF, TIA and A-Fib. Study Detail: The following Echo studies were performed: 2D, M-Mode, Doppler and               color flow. Technically challenging study due to poor acoustic               windows and body habitus. Definity used as a contrast agent for               endocardial border definition. Total contrast used for this               procedure was 3 mL via IV push.  PHYSICIAN INTERPRETATION: Left Ventricle: The left ventricular systolic function is mildly decreased, with an estimated ejection fraction of 40-45%. The calculated ejection fraction is mildly decreased at 42 % using the Oropeza's Bi-plane MOD calculation. There is global hypokinesis of the left ventricle with minor regional variations. The left ventricular cavity size is normal. The left  ventricular septal wall thickness is normal. There is normal left ventricular posterior wall thickness. Left ventricular diastolic filling was indeterminate. There is an elevated mean left atrial pressure. There is no definite left ventricular thrombus visualized. Left Atrium: The left atrium is mildly dilated. Right Ventricle: The right ventricle is normal in size. There is reduced right ventricular systolic function. Right Atrium: The right atrium is mildly dilated. Aortic Valve: The aortic valve is probably trileaflet. There is moderate aortic valve regurgitation. The peak instantaneous gradient of the aortic valve is 2.8 mmHg. The mean gradient of the aortic valve is 1.0 mmHg. Mitral Valve: The mitral valve is mildly thickened. There is mild mitral annular calcification. There is mild mitral valve regurgitation. Tricuspid Valve: The tricuspid valve is structurally normal. There is mild tricuspid regurgitation. The Doppler estimated RVSP is mild to moderately elevated at 50.6 mmHg. Pulmonic Valve: The pulmonic valve is structurally normal. There is physiologic pulmonic valve regurgitation. Pericardium: There is no pericardial effusion noted. Aorta: The aortic root is normal. Systemic Veins: The inferior vena cava appears to be of normal size. In comparison to the previous echocardiogram(s): Compared with study from 6/8/2020,. There has been a decline in the calculated LVEF from 45-50% with Biplane 53% to currently 40-45% with Biplane 42%. Diastology is indeterminate. RVSP has increased to 51 mmHg.  CONCLUSIONS:  1. Left ventricular systolic function is mildly decreased with a 40-45% estimated ejection fraction.  2. No left ventricular thrombus visualized.  3. There is an elevated mean left atrial pressure.  4. There is reduced right ventricular systolic function.  5. Mild to moderately elevated right ventricular systolic pressure.  6. Moderate aortic valve regurgitation.  7. There is global hypokinesis of the  left ventricle with minor regional variations.  8. There has been a decline in the calculated LVEF from 45-50% with Biplane 53% to currently 40-45% with Biplane 42%. Diastology is indeterminate. RVSP has increased to 51 mmHg. QUANTITATIVE DATA SUMMARY: 2D MEASUREMENTS:                           Normal Ranges: LAs:           4.40 cm    (2.7-4.0cm) IVSd:          1.10 cm    (0.6-1.1cm) LVPWd:         0.90 cm    (0.6-1.1cm) LVIDd:         4.67 cm    (3.9-5.9cm) LVIDs:         3.95 cm LV Mass Index: 107.6 g/m2 LV % FS        15.4 % LA VOLUME:                               Normal Ranges: LA Vol A4C:        70.8 ml    (22+/-6mL/m2) LA Vol A2C:        71.7 ml LA Vol BP:         75.4 ml LA Vol Index A4C:  35.6ml/m2 LA Vol Index A2C:  36.1 ml/m2 LA Vol Index BP:   37.9 ml/m2 LA Area A4C:       22.1 cm2 LA Area A2C:       21.0 cm2 LA Major Axis A4C: 5.9 cm LA Major Axis A2C: 5.2 cm LA Volume Index:   37.9 ml/m2 RA VOLUME BY A/L METHOD:                       Normal Ranges: RA Area A4C: 15.3 cm2 M-MODE MEASUREMENTS:                  Normal Ranges: Ao Root: 3.30 cm (2.0-3.7cm) LAs:     4.80 cm (2.7-4.0cm) AORTA MEASUREMENTS:                      Normal Ranges: Ao Sinus, d: 2.49 cm (2.1-3.5cm) Ao STJ, d:   2.53 cm (1.7-3.4cm) Asc Ao, d:   3.05 cm (2.1-3.4cm) LV SYSTOLIC FUNCTION BY 2D PLANIMETRY (MOD):                     Normal Ranges: EF-A4C View: 43.6 % (>=55%) EF-A2C View: 38.3 % EF-Biplane:  42.1 % LV DIASTOLIC FUNCTION:                               Normal Ranges: MV Peak E:        1.16 m/s    (0.7-1.2 m/s) MV e'             0.07 m/s    (>8.0) MV lateral e'     0.07 m/s MV medial e'      0.06 m/s E/e' Ratio:       16.57       (<8.0) PulmV Sys Juan:    29.30 cm/s PulmV Tinoco Juan:   32.40 cm/s PulmV S/D Juan:    0.90 PulmV A Revs Juan: 14.50 cm/s PulmV A Revs Dur: 137.00 msec MITRAL VALVE:                 Normal Ranges: MV DT: 143 msec (150-240msec) MITRAL INSUFFICIENCY:                      Normal Ranges: MR VTI:  131.00 cm MR  Vmax: 448.00 cm/s AORTIC VALVE:                                   Normal Ranges: AoV Vmax:                0.84 m/s (<=1.7m/s) AoV Peak P.8 mmHg (<20mmHg) AoV Mean P.0 mmHg (1.7-11.5mmHg) LVOT Max Juan:            0.64 m/s (<=1.1m/s) AoV VTI:                 12.60 cm (18-25cm) LVOT VTI:                10.90 cm LVOT Diameter:           2.00 cm  (1.8-2.4cm) AoV Area, VTI:           2.72 cm2 (2.5-5.5cm2) AoV Area,Vmax:           2.40 cm2 (2.5-4.5cm2) AoV Dimensionless Index: 0.87 AORTIC INSUFFICIENCY: AI Vmax:       3.16 m/s AI Half-time:  370 msec AI Decel Rate: 250.00 cm/s2  RIGHT VENTRICLE: RV Basal 3.13 cm RV Mid   2.20 cm RV Major 7.7 cm TAPSE:   3.9 mm TRICUSPID VALVE/RVSP:                             Normal Ranges: Peak TR Velocity: 3.45 m/s Est. RA Pressure: 3 mmHg RV Syst Pressure: 50.6 mmHg (< 30mmHg) IVC Diam:         1.55 cm PULMONIC VALVE:                         Normal Ranges: PV Accel Time: 26 msec  (>120ms) PV Max Juan:    0.8 m/s  (0.6-0.9m/s) PV Max P.3 mmHg Pulmonary Veins: PulmV A Revs Dur: 137.00 msec PulmV A Revs Juan: 14.50 cm/s PulmV Tinoco Juan:   32.40 cm/s PulmV S/D Juan:    0.90 PulmV Sys Juan:    29.30 cm/s  [id number] Ramon Mcknight DO Electronically signed on 2024 at 7:01:56 PM  ** Final **     XR chest 2 views    Result Date: 2024  Interpreted By:  Matthieu Tierney, STUDY: XR CHEST 2 VIEWS; 2024 12:32 pm   INDICATION: Signs/Symptoms:sob.   COMPARISON: 2018   ACCESSION NUMBER(S): [id number]   ORDERING CLINICIAN: JEFFREY CARPINTERO-JUAN   TECHNIQUE: AP and lateral views of the chest were obtained.   FINDINGS: The cardiac silhouette is quite prominent suggesting cardiomegaly. There are hazy ground-glass infiltrates present bilaterally along with minimal thickening of the fissural markings. No consolidation is noted.       Probable cardiomegaly with a hazy bilateral ground-glass infiltrates suggesting pulmonary edema and CHF. Findings are  new compared to the prior study.   MACRO: none   Signed by: Matthieu Tierney 4/22/2024 12:48 PM Dictation workstation:   [id number]     This patient has a central line   Reason for the central line remaining today? Line unnecessary, will be removed today            Malnutrition Diagnosis Status: New  Malnutrition Diagnosis: Severe malnutrition related to chronic disease or condition  As Evidenced by: reports of decrease PO intake for at least the last 3 weeks along with a loss of 10% of weight in 3 months/15% from overall body weight; has severe fat and muscle losses on physical exam  I agree with the dietitian's malnutrition diagnosis.    Assessment/Plan   Principal Problem:    Cardiogenic shock (Multi)  Active Problems:    Multifocal pneumonia    Septic shock (Multi)    Jam Young is a 89-year-old male with previous medical history of HFrEF, dementia, and COPD was admitted to the medical intensive care unit due to septic versus cardiogenic shock and multifocal left lower lobe pneumonia.    Neuro/psych:  #History of dimension  -Continue with Aricept and Namenda  -Avoid Seroquel and or Precedex due to altered mood  -PT/OT    CV:  #HFrEF (newly reduced)  #Cardiogenic versus septic shock (resolved)  #Afib w/RVR (resolved)  -Plan to stop heparin GTT today and transition to home Eliquis   -Continue with digoxin 125 MCG daily  -Continue to hold home meds (Entresto, metoprolol, Jardiance)  -Heart failure following appreciate recs    Pulm:  #Previous medical history of COPD  -Currently on room air  -Continue with Breo    GI:  #Previous medical history of dysphagia  -Continue with purée 4; mild thick liquids to  -1 BM in the last 24 hours  -Cont with PPI     Renal:  #YOSEF (improving) baseline Cr ~ 0.7-0.9)   -Strict I/O's  -Labs  every other day    Endo: No active issues    ID:  #Bilateral lower lobe PNA  -Continue with Zosyn for total of 7 days (5/2 - 5/8)  Micro: 5/3: Legionella, Streptococcus pneumoniae, MRSA negative,  5/3 blood culture x 2 no growth at 4 days final report    Heme/Onc:  -Stopping heparin gtt. and will resume home Eliquis dose of 5 mg twice daily    Fluids: As needed  Electrolytes: K greater than 4, mag greater than 2  Nutrition: Puréed regular diet  DVT: Eliquis 5 mg twice daily  GI: PPI  CODE STATUS: Full code    NOK/POA: Carin Cuellar [phone]  Dispo: Transfer to the floor with telemetry with heart block following    Assessment and plan discussed with Dr. Mix  I spent 35 minutes in the professional and overall care of this patient.      Prosper Menezes, APRN-CNP

## 2024-05-07 NOTE — SIGNIFICANT EVENT
Floor Readiness Note       I, personally, evaluated Jam Cox prior to transfer to the floor, including reviewing all current laboratory and imaging studies. The patient remains appropriate for transfer to the floor. Bedside nurse and respiratory therapy are also in agreement of patient's readiness for the floor.     Brief summary:  Jam Cox is a 89 y.o. male who was admitted to the MICU on 5/3/24 for mixed cardiogenic and septic shock (2/2 multifocal pneumonia). Treated with broad spectrum antibiotics, now just on zosyn with end date 5/9 for 7 day course. Was on dobutamine and norepi but now weaned off and has stable blood pressures.     Updated focused Physical Exam:  Constitutional: No acute distress, resting comfortably in bed, cooperative  HEENT: Normocephalic, atraumatic, PERRLA, EOMI, moist mucous membranes  Cardiovascular: regular rate tachycardic, normal S1/S2, no murmurs noted  Pulmonary: Clear to auscultation b/l, no wheezes/crackles/rhonchi, no increased work of breathing, no supplemental oxygen  GI: Soft, non-tender, non-distended, normoactive bowel sounds  Lower extremities: Warm and well perfused, no lower extremity edema  Neuro: A&O x1 (self, can name other family members, not currently oriented to place/situation), able to move all 4 extremities spontaneously    Current Vital Signs:  Heart Rate: 84 (05/07/24 1700 : Briana Tobin, RN)  BP: 107/60 (05/07/24 1700 : Briana Tobin, RN)  Temp: 35.9 °C (96.6 °F) (05/07/24 1600 : Niya Saldana)  Resp: 20 (05/07/24 1700 : Briana Tobin, RN)  SpO2: 93 % (05/07/24 1700 : Briana Tobin, RN)    Relevant updates since rounds:  RIJ removed    Accepting team, Zoey, received verbal sign out and the Provider Care team/Attending has been updated. Bedside nurse will now call accepting nurse for report and patient will be transferred to [place] [id number].    Sherrell Perez MD

## 2024-05-08 LAB
ALBUMIN SERPL BCP-MCNC: 2.6 G/DL (ref 3.4–5)
ALBUMIN SERPL BCP-MCNC: 2.8 G/DL (ref 3.4–5)
ALP SERPL-CCNC: 71 U/L (ref 33–136)
ALT SERPL W P-5'-P-CCNC: 30 U/L (ref 10–52)
ANION GAP SERPL CALC-SCNC: 11 MMOL/L (ref 10–20)
ANION GAP SERPL CALC-SCNC: 12 MMOL/L (ref 10–20)
AST SERPL W P-5'-P-CCNC: 15 U/L (ref 9–39)
BASOPHILS # BLD AUTO: 0.01 X10*3/UL (ref 0–0.1)
BASOPHILS NFR BLD AUTO: 0.1 %
BILIRUB DIRECT SERPL-MCNC: 0.5 MG/DL (ref 0–0.3)
BILIRUB SERPL-MCNC: 1.3 MG/DL (ref 0–1.2)
BUN SERPL-MCNC: 12 MG/DL (ref 6–23)
BUN SERPL-MCNC: 13 MG/DL (ref 6–23)
CALCIUM SERPL-MCNC: 8.5 MG/DL (ref 8.6–10.6)
CALCIUM SERPL-MCNC: 8.8 MG/DL (ref 8.6–10.6)
CHLORIDE SERPL-SCNC: 104 MMOL/L (ref 98–107)
CHLORIDE SERPL-SCNC: 107 MMOL/L (ref 98–107)
CO2 SERPL-SCNC: 25 MMOL/L (ref 21–32)
CO2 SERPL-SCNC: 26 MMOL/L (ref 21–32)
CREAT SERPL-MCNC: 0.97 MG/DL (ref 0.5–1.3)
CREAT SERPL-MCNC: 1.02 MG/DL (ref 0.5–1.3)
EGFRCR SERPLBLD CKD-EPI 2021: 69 ML/MIN/1.73M*2
EGFRCR SERPLBLD CKD-EPI 2021: 73 ML/MIN/1.73M*2
EOSINOPHIL # BLD AUTO: 0.08 X10*3/UL (ref 0–0.4)
EOSINOPHIL NFR BLD AUTO: 1.1 %
ERYTHROCYTE [DISTWIDTH] IN BLOOD BY AUTOMATED COUNT: 14.4 % (ref 11.5–14.5)
GLUCOSE BLD MANUAL STRIP-MCNC: 110 MG/DL (ref 74–99)
GLUCOSE BLD MANUAL STRIP-MCNC: 92 MG/DL (ref 74–99)
GLUCOSE SERPL-MCNC: 101 MG/DL (ref 74–99)
GLUCOSE SERPL-MCNC: 115 MG/DL (ref 74–99)
HCT VFR BLD AUTO: 37.3 % (ref 41–52)
HGB BLD-MCNC: 12 G/DL (ref 13.5–17.5)
IMM GRANULOCYTES # BLD AUTO: 0.09 X10*3/UL (ref 0–0.5)
IMM GRANULOCYTES NFR BLD AUTO: 1.2 % (ref 0–0.9)
LYMPHOCYTES # BLD AUTO: 1.42 X10*3/UL (ref 0.8–3)
LYMPHOCYTES NFR BLD AUTO: 18.9 %
MAGNESIUM SERPL-MCNC: 2.07 MG/DL (ref 1.6–2.4)
MCH RBC QN AUTO: 29.9 PG (ref 26–34)
MCHC RBC AUTO-ENTMCNC: 32.2 G/DL (ref 32–36)
MCV RBC AUTO: 93 FL (ref 80–100)
MONOCYTES # BLD AUTO: 0.67 X10*3/UL (ref 0.05–0.8)
MONOCYTES NFR BLD AUTO: 8.9 %
NEUTROPHILS # BLD AUTO: 5.26 X10*3/UL (ref 1.6–5.5)
NEUTROPHILS NFR BLD AUTO: 69.8 %
NRBC BLD-RTO: 0 /100 WBCS (ref 0–0)
PHOSPHATE SERPL-MCNC: 3.2 MG/DL (ref 2.5–4.9)
PHOSPHATE SERPL-MCNC: 3.3 MG/DL (ref 2.5–4.9)
PLATELET # BLD AUTO: 246 X10*3/UL (ref 150–450)
POTASSIUM SERPL-SCNC: 3.9 MMOL/L (ref 3.5–5.3)
POTASSIUM SERPL-SCNC: 4.2 MMOL/L (ref 3.5–5.3)
PROT SERPL-MCNC: 6.3 G/DL (ref 6.4–8.2)
RBC # BLD AUTO: 4.02 X10*6/UL (ref 4.5–5.9)
SODIUM SERPL-SCNC: 138 MMOL/L (ref 136–145)
SODIUM SERPL-SCNC: 139 MMOL/L (ref 136–145)
WBC # BLD AUTO: 7.5 X10*3/UL (ref 4.4–11.3)

## 2024-05-08 PROCEDURE — 2500000004 HC RX 250 GENERAL PHARMACY W/ HCPCS (ALT 636 FOR OP/ED)

## 2024-05-08 PROCEDURE — 2500000001 HC RX 250 WO HCPCS SELF ADMINISTERED DRUGS (ALT 637 FOR MEDICARE OP)

## 2024-05-08 PROCEDURE — 1090000002 HH PPS REVENUE DEBIT

## 2024-05-08 PROCEDURE — 84100 ASSAY OF PHOSPHORUS: CPT

## 2024-05-08 PROCEDURE — 82947 ASSAY GLUCOSE BLOOD QUANT: CPT

## 2024-05-08 PROCEDURE — 2500000001 HC RX 250 WO HCPCS SELF ADMINISTERED DRUGS (ALT 637 FOR MEDICARE OP): Performed by: STUDENT IN AN ORGANIZED HEALTH CARE EDUCATION/TRAINING PROGRAM

## 2024-05-08 PROCEDURE — 36415 COLL VENOUS BLD VENIPUNCTURE: CPT

## 2024-05-08 PROCEDURE — 83735 ASSAY OF MAGNESIUM: CPT

## 2024-05-08 PROCEDURE — 1100000001 HC PRIVATE ROOM DAILY

## 2024-05-08 PROCEDURE — 80069 RENAL FUNCTION PANEL: CPT | Mod: CCI

## 2024-05-08 PROCEDURE — 85025 COMPLETE CBC W/AUTO DIFF WBC: CPT

## 2024-05-08 PROCEDURE — 82248 BILIRUBIN DIRECT: CPT

## 2024-05-08 PROCEDURE — 1090000001 HH PPS REVENUE CREDIT

## 2024-05-08 PROCEDURE — 2500000006 HC RX 250 W HCPCS SELF ADMINISTERED DRUGS (ALT 637 FOR ALL PAYERS)

## 2024-05-08 PROCEDURE — 80048 BASIC METABOLIC PNL TOTAL CA: CPT

## 2024-05-08 RX ORDER — METOPROLOL SUCCINATE 25 MG/1
12.5 TABLET, EXTENDED RELEASE ORAL DAILY
Status: DISCONTINUED | OUTPATIENT
Start: 2024-05-08 | End: 2024-05-09

## 2024-05-08 RX ADMIN — APIXABAN 5 MG: 5 TABLET, FILM COATED ORAL at 20:08

## 2024-05-08 RX ADMIN — EMPAGLIFLOZIN 10 MG: 10 TABLET, FILM COATED ORAL at 09:43

## 2024-05-08 RX ADMIN — ASPIRIN 81 MG: 81 TABLET, COATED ORAL at 09:42

## 2024-05-08 RX ADMIN — DONEPEZIL HYDROCHLORIDE 10 MG: 10 TABLET, FILM COATED ORAL at 09:42

## 2024-05-08 RX ADMIN — MEMANTINE 10 MG: 10 TABLET ORAL at 09:42

## 2024-05-08 RX ADMIN — APIXABAN 5 MG: 5 TABLET, FILM COATED ORAL at 09:42

## 2024-05-08 RX ADMIN — QUETIAPINE FUMARATE 25 MG: 25 TABLET ORAL at 20:07

## 2024-05-08 RX ADMIN — SACUBITRIL AND VALSARTAN 0.5 TABLET: 24; 26 TABLET, FILM COATED ORAL at 20:08

## 2024-05-08 RX ADMIN — Medication 5 MG: at 20:08

## 2024-05-08 RX ADMIN — FLUTICASONE FUROATE AND VILANTEROL TRIFENATATE 1 PUFF: 100; 25 POWDER RESPIRATORY (INHALATION) at 09:43

## 2024-05-08 RX ADMIN — ESOMEPRAZOLE MAGNESIUM 40 MG: 40 FOR SUSPENSION ORAL at 09:43

## 2024-05-08 RX ADMIN — PIPERACILLIN SODIUM AND TAZOBACTAM SODIUM 2.25 G: 2; .25 INJECTION, SOLUTION INTRAVENOUS at 09:43

## 2024-05-08 RX ADMIN — PIPERACILLIN SODIUM AND TAZOBACTAM SODIUM 2.25 G: 2; .25 INJECTION, SOLUTION INTRAVENOUS at 16:13

## 2024-05-08 RX ADMIN — FINASTERIDE 5 MG: 5 TABLET, FILM COATED ORAL at 09:42

## 2024-05-08 RX ADMIN — PIPERACILLIN SODIUM AND TAZOBACTAM SODIUM 2.25 G: 2; .25 INJECTION, SOLUTION INTRAVENOUS at 05:00

## 2024-05-08 RX ADMIN — MEMANTINE 10 MG: 10 TABLET ORAL at 20:09

## 2024-05-08 RX ADMIN — PIPERACILLIN SODIUM AND TAZOBACTAM SODIUM 2.25 G: 2; .25 INJECTION, SOLUTION INTRAVENOUS at 20:16

## 2024-05-08 RX ADMIN — SACUBITRIL AND VALSARTAN 0.5 TABLET: 24; 26 TABLET, FILM COATED ORAL at 09:42

## 2024-05-08 RX ADMIN — METOPROLOL SUCCINATE 12.5 MG: 25 TABLET, EXTENDED RELEASE ORAL at 09:42

## 2024-05-08 ASSESSMENT — COGNITIVE AND FUNCTIONAL STATUS - GENERAL
PERSONAL GROOMING: A LITTLE
STANDING UP FROM CHAIR USING ARMS: A LOT
EATING MEALS: A LITTLE
TOILETING: A LITTLE
MOBILITY SCORE: 14
DRESSING REGULAR LOWER BODY CLOTHING: A LOT
DRESSING REGULAR LOWER BODY CLOTHING: A LOT
MOVING FROM LYING ON BACK TO SITTING ON SIDE OF FLAT BED WITH BEDRAILS: A LITTLE
CLIMB 3 TO 5 STEPS WITH RAILING: TOTAL
TURNING FROM BACK TO SIDE WHILE IN FLAT BAD: A LITTLE
MOVING FROM LYING ON BACK TO SITTING ON SIDE OF FLAT BED WITH BEDRAILS: A LITTLE
WALKING IN HOSPITAL ROOM: A LOT
HELP NEEDED FOR BATHING: A LOT
MOVING TO AND FROM BED TO CHAIR: A LITTLE
CLIMB 3 TO 5 STEPS WITH RAILING: TOTAL
TOILETING: A LITTLE
EATING MEALS: A LITTLE
TURNING FROM BACK TO SIDE WHILE IN FLAT BAD: A LITTLE
MOVING TO AND FROM BED TO CHAIR: A LITTLE
WALKING IN HOSPITAL ROOM: A LOT
STANDING UP FROM CHAIR USING ARMS: A LOT
PERSONAL GROOMING: A LITTLE
DAILY ACTIVITIY SCORE: 15
DRESSING REGULAR UPPER BODY CLOTHING: A LOT
HELP NEEDED FOR BATHING: A LOT
DRESSING REGULAR UPPER BODY CLOTHING: A LOT
MOBILITY SCORE: 14
DAILY ACTIVITIY SCORE: 15

## 2024-05-08 ASSESSMENT — PAIN SCALES - PAIN ASSESSMENT IN ADVANCED DEMENTIA (PAINAD)
BODYLANGUAGE: TENSE, DISTRESSED PACING, FIDGETING
TOTALSCORE: 1
CONSOLABILITY: NO NEED TO CONSOLE
BREATHING: NORMAL
FACIALEXPRESSION: SMILING OR INEXPRESSIVE
TOTALSCORE: REPOSITIONED;MEDICATION (SEE MAR)

## 2024-05-08 ASSESSMENT — PAIN SCALES - GENERAL
PAINLEVEL_OUTOF10: 0 - NO PAIN
PAINLEVEL_OUTOF10: 0 - NO PAIN

## 2024-05-08 NOTE — CARE PLAN
The clinical goals for the shift include pt will remain safe and comfortable throughout the shift

## 2024-05-08 NOTE — PROGRESS NOTES
Jam Cox is a 89 y.o. male on day 5 of admission presenting with Cardiogenic shock (Multi).    Subjective   No acute events overnight, patient without acute complaints this morning.        Objective   Physical Exam  General: awake, alert, conversant, appears stated age. Difficult to understand due to missing teeth and does not have his dentures  HEENT: pupils equal and round, no scleral icterus  Skin: no suspect lesions or rashes noted on visible skin  Chest: ctab, normal respiratory effort, not on supplemental oxygen  Cardiac: regular rate, normal s1, s2, no M/R/G  Abdomen: soft, ND, NT, no involuntary guarding  : no flank pain or indwelling urinary catheter  EXT: no peripheral edema, no asymmetry noted  MSK: no focal joint swelling noted  Neuro: A&Ox1 (person), moving all limbs spontaneously, follows commands  Psych: coherent thought process, appropriate mood and affect    Last Recorded Vitals  Vitals:    05/08/24 0734   BP: 124/67   Pulse: 89   Resp: 20   Temp: 36.5 °C (97.7 °F)   SpO2: 93%       Intake/Output:    Intake/Output Summary (Last 24 hours) at 5/8/2024 0852  Last data filed at 5/8/2024 0030  Gross per 24 hour   Intake 363.1 ml   Output 500 ml   Net -136.9 ml         Relevant Results  Results from last 7 days   Lab Units 05/08/24  0714 05/07/24  0304 05/06/24  2317   WBC AUTO x10*3/uL 7.5 7.3 7.6   HEMOGLOBIN g/dL 12.0* 11.8* 12.2*   HEMATOCRIT % 37.3* 38.1* 36.3*   PLATELETS AUTO x10*3/uL 246 233 253     Results from last 7 days   Lab Units 05/08/24  0714 05/08/24  0533 05/07/24  0304 05/06/24  1651 05/06/24  0237   SODIUM mmol/L  --  139 139 140  --    POTASSIUM mmol/L  --  4.2 4.4 4.8  --    CHLORIDE mmol/L  --  107 104 104  --    CO2 mmol/L  --  25 28 29  --    BUN mg/dL  --  13 13 13  --    CREATININE mg/dL  --  0.97 1.05 1.03  --    CALCIUM mg/dL  --  8.5* 8.4* 8.3*  --    PROTEIN TOTAL g/dL 6.3*  --  5.5*  --  6.1*   BILIRUBIN TOTAL mg/dL 1.3*  --  1.6*  --  1.6*   ALK PHOS U/L 71  --  72   --  75   ALT U/L 30  --  44  --  63*   AST U/L 15  --  16  --  26   GLUCOSE mg/dL  --  101* 86 101*  --        Assessment/Plan   Principal Problem:    Cardiogenic shock (Multi)  Active Problems:    Multifocal pneumonia    Septic shock (Multi)    Jam Young is a 89-year-old male with previous medical history of HFrEF, dementia, and COPD was admitted to the medical intensive care unit due to septic versus cardiogenic shock and multifocal left lower lobe pneumonia, transferred to regular nursing floor on 5/7.    Updates 5/8:  - Will restart GDMT: metoprolol succ 12.5mg daily, home entresto half tablet BID, and jardiance 10 mg daily  - Discontinue digoxin    #Bilateral lower lobe PNA  :: Micro: 5/3: Legionella, Streptococcus pneumoniae, MRSA negative, 5/3 blood culture x 2 no growth at 4 days final report  Plan:  -Continue with Zosyn for total of 7 days (5/2 - 5/8)    #HFrEF (newly reduced)  #Cardiogenic versus septic shock (resolved)  #Afib w/RVR (resolved)  :: TTE 5/6: LV EF 30-35%, LA enlarged, reduced RV systolic fxn, global hypokinesis of LV   :: TTE 4/23/24: LV EF 40-45%  Plan:  - Transitioned to home Eliquis on 5/7  - Discontinue digoxin  - Will restart GDMT: metoprolol succ 12.5mg daily, home entresto half tablet BID, and jardiance 10 mg daily  - Heart failure following appreciate recs    #History of dementia  #Concern for insomnia/delirium  -Continue with Aricept and Namenda  -Trialed seroquel and precedex previously for insomnia but unclear if successful  - Will provide Seroquel PRN for delirium/agitation at night  -PT/OT    #COPD  -Currently on room air  -Continue with Breo    #Dysphagia  -Continue with purée 4; mild thick liquids  -Cont with PPI     F: Caution due to HFrEF  E: Replete PRN  N: Pureed diet, mild thick liquids  A: pIV    DVT PPX: Eliquis  GI PPX: PPI    Code Status: FULL  Surrogate/POA: Carin Cuellar [phone]     Shane Hall MD  PGY-1 Internal Medicine

## 2024-05-08 NOTE — CARE PLAN
The patient's goals for the shift include  Pt will sleep at least four hours    The clinical goals for the shift include pt will not fall during shift, and pt will remain HDS    Problem: Pain  Goal: My pain/discomfort is manageable  Outcome: Progressing     Problem: Psychosocial Needs  Goal: Demonstrates ability to cope with hospitalization/illness  Outcome: Progressing  Goal: Collaborate with me, my family, and caregiver to identify my specific goals  Outcome: Progressing     Problem: Fall/Injury  Goal: Verbalize understanding of personal risk factors for fall in the hospital  Outcome: Progressing  Goal: Verbalize understanding of risk factor reduction measures to prevent injury from fall in the home  Outcome: Progressing  Goal: Use assistive devices by end of the shift  Outcome: Progressing  Goal: Pace activities to prevent fatigue by end of the shift  Outcome: Progressing  Goal: Not fall by end of shift  Outcome: Progressing  Goal: Be free from injury by end of the shift  Outcome: Progressing

## 2024-05-08 NOTE — PROGRESS NOTES
5/8/2024 Care coordination  Jam Cox is a 89 y.o. male on day 5 of admission presenting with Cardiogenic shock (Multi).  Met with pt spouse and daughter.  Advised that PT/OT recs for Mod.  They declined SNF and would prefer [facility]. He has a cane and a rollator at home.

## 2024-05-09 ENCOUNTER — DOCUMENTATION (OUTPATIENT)
Dept: HOME HEALTH SERVICES | Facility: HOME HEALTH | Age: 89
End: 2024-05-09
Payer: MEDICARE

## 2024-05-09 ENCOUNTER — PHARMACY VISIT (OUTPATIENT)
Dept: PHARMACY | Facility: CLINIC | Age: 89
End: 2024-05-09
Payer: MEDICARE

## 2024-05-09 VITALS
TEMPERATURE: 97.9 F | HEART RATE: 54 BPM | RESPIRATION RATE: 20 BRPM | HEIGHT: 72 IN | DIASTOLIC BLOOD PRESSURE: 60 MMHG | OXYGEN SATURATION: 96 % | WEIGHT: 155.42 LBS | BODY MASS INDEX: 21.05 KG/M2 | SYSTOLIC BLOOD PRESSURE: 113 MMHG

## 2024-05-09 LAB
ALBUMIN SERPL BCP-MCNC: 3.5 G/DL (ref 3.4–5)
ALP SERPL-CCNC: 96 U/L (ref 33–136)
ALT SERPL W P-5'-P-CCNC: 30 U/L (ref 10–52)
AST SERPL W P-5'-P-CCNC: 22 U/L (ref 9–39)
ATRIAL RATE: 115 BPM
BASOPHILS # BLD AUTO: 0.03 X10*3/UL (ref 0–0.1)
BASOPHILS NFR BLD AUTO: 0.3 %
BILIRUB DIRECT SERPL-MCNC: 0.3 MG/DL (ref 0–0.3)
BILIRUB SERPL-MCNC: 1.4 MG/DL (ref 0–1.2)
EOSINOPHIL # BLD AUTO: 0.14 X10*3/UL (ref 0–0.4)
EOSINOPHIL NFR BLD AUTO: 1.5 %
ERYTHROCYTE [DISTWIDTH] IN BLOOD BY AUTOMATED COUNT: 14.5 % (ref 11.5–14.5)
GLUCOSE BLD MANUAL STRIP-MCNC: 91 MG/DL (ref 74–99)
HCT VFR BLD AUTO: 46.9 % (ref 41–52)
HGB BLD-MCNC: 14.6 G/DL (ref 13.5–17.5)
IMM GRANULOCYTES # BLD AUTO: 0.12 X10*3/UL (ref 0–0.5)
IMM GRANULOCYTES NFR BLD AUTO: 1.3 % (ref 0–0.9)
LYMPHOCYTES # BLD AUTO: 1.57 X10*3/UL (ref 0.8–3)
LYMPHOCYTES NFR BLD AUTO: 17.1 %
MAGNESIUM SERPL-MCNC: 2.44 MG/DL (ref 1.6–2.4)
MCH RBC QN AUTO: 28.8 PG (ref 26–34)
MCHC RBC AUTO-ENTMCNC: 31.1 G/DL (ref 32–36)
MCV RBC AUTO: 93 FL (ref 80–100)
MONOCYTES # BLD AUTO: 0.58 X10*3/UL (ref 0.05–0.8)
MONOCYTES NFR BLD AUTO: 6.3 %
NEUTROPHILS # BLD AUTO: 6.73 X10*3/UL (ref 1.6–5.5)
NEUTROPHILS NFR BLD AUTO: 73.5 %
NRBC BLD-RTO: 0 /100 WBCS (ref 0–0)
PLATELET # BLD AUTO: 288 X10*3/UL (ref 150–450)
PROT SERPL-MCNC: 8.7 G/DL (ref 6.4–8.2)
Q ONSET: 210 MS
QRS COUNT: 14 BEATS
QRS DURATION: 134 MS
QT INTERVAL: 392 MS
QTC CALCULATION(BAZETT): 469 MS
QTC FREDERICIA: 442 MS
R AXIS: -41 DEGREES
RBC # BLD AUTO: 5.07 X10*6/UL (ref 4.5–5.9)
T AXIS: 104 DEGREES
T OFFSET: 406 MS
VENTRICULAR RATE: 86 BPM
WBC # BLD AUTO: 9.2 X10*3/UL (ref 4.4–11.3)

## 2024-05-09 PROCEDURE — G0180 MD CERTIFICATION HHA PATIENT: HCPCS | Performed by: INTERNAL MEDICINE

## 2024-05-09 PROCEDURE — 85025 COMPLETE CBC W/AUTO DIFF WBC: CPT

## 2024-05-09 PROCEDURE — 97530 THERAPEUTIC ACTIVITIES: CPT | Mod: GP

## 2024-05-09 PROCEDURE — 2500000001 HC RX 250 WO HCPCS SELF ADMINISTERED DRUGS (ALT 637 FOR MEDICARE OP)

## 2024-05-09 PROCEDURE — 97110 THERAPEUTIC EXERCISES: CPT | Mod: GP

## 2024-05-09 PROCEDURE — 2500000004 HC RX 250 GENERAL PHARMACY W/ HCPCS (ALT 636 FOR OP/ED)

## 2024-05-09 PROCEDURE — 1090000001 HH PPS REVENUE CREDIT

## 2024-05-09 PROCEDURE — 84075 ASSAY ALKALINE PHOSPHATASE: CPT

## 2024-05-09 PROCEDURE — 82947 ASSAY GLUCOSE BLOOD QUANT: CPT

## 2024-05-09 PROCEDURE — 92526 ORAL FUNCTION THERAPY: CPT | Mod: GN

## 2024-05-09 PROCEDURE — 97530 THERAPEUTIC ACTIVITIES: CPT | Mod: GO

## 2024-05-09 PROCEDURE — 36415 COLL VENOUS BLD VENIPUNCTURE: CPT

## 2024-05-09 PROCEDURE — 97535 SELF CARE MNGMENT TRAINING: CPT | Mod: GO

## 2024-05-09 PROCEDURE — 1090000002 HH PPS REVENUE DEBIT

## 2024-05-09 PROCEDURE — 2500000001 HC RX 250 WO HCPCS SELF ADMINISTERED DRUGS (ALT 637 FOR MEDICARE OP): Performed by: STUDENT IN AN ORGANIZED HEALTH CARE EDUCATION/TRAINING PROGRAM

## 2024-05-09 PROCEDURE — RXMED WILLOW AMBULATORY MEDICATION CHARGE

## 2024-05-09 PROCEDURE — 83735 ASSAY OF MAGNESIUM: CPT

## 2024-05-09 RX ORDER — METOPROLOL SUCCINATE 25 MG/1
25 TABLET, EXTENDED RELEASE ORAL DAILY
Status: DISCONTINUED | OUTPATIENT
Start: 2024-05-09 | End: 2024-05-09 | Stop reason: HOSPADM

## 2024-05-09 RX ORDER — TAMSULOSIN HYDROCHLORIDE 0.4 MG/1
0.4 CAPSULE ORAL DAILY
Start: 2024-05-09

## 2024-05-09 RX ORDER — METOPROLOL SUCCINATE 25 MG/1
25 TABLET, EXTENDED RELEASE ORAL DAILY
Qty: 30 TABLET | Refills: 1 | Status: SHIPPED | OUTPATIENT
Start: 2024-05-10 | End: 2024-07-09

## 2024-05-09 RX ADMIN — ASPIRIN 81 MG: 81 TABLET, COATED ORAL at 10:03

## 2024-05-09 RX ADMIN — METOPROLOL SUCCINATE 25 MG: 25 TABLET, EXTENDED RELEASE ORAL at 10:01

## 2024-05-09 RX ADMIN — MEMANTINE 10 MG: 10 TABLET ORAL at 10:02

## 2024-05-09 RX ADMIN — DONEPEZIL HYDROCHLORIDE 10 MG: 10 TABLET, FILM COATED ORAL at 10:03

## 2024-05-09 RX ADMIN — APIXABAN 5 MG: 5 TABLET, FILM COATED ORAL at 10:02

## 2024-05-09 RX ADMIN — FINASTERIDE 5 MG: 5 TABLET, FILM COATED ORAL at 10:03

## 2024-05-09 RX ADMIN — SACUBITRIL AND VALSARTAN 0.5 TABLET: 24; 26 TABLET, FILM COATED ORAL at 10:02

## 2024-05-09 RX ADMIN — PIPERACILLIN SODIUM AND TAZOBACTAM SODIUM 2.25 G: 2; .25 INJECTION, SOLUTION INTRAVENOUS at 03:11

## 2024-05-09 RX ADMIN — EMPAGLIFLOZIN 10 MG: 10 TABLET, FILM COATED ORAL at 09:00

## 2024-05-09 ASSESSMENT — COGNITIVE AND FUNCTIONAL STATUS - GENERAL
EATING MEALS: A LITTLE
TURNING FROM BACK TO SIDE WHILE IN FLAT BAD: A LOT
MOVING TO AND FROM BED TO CHAIR: A LOT
PERSONAL GROOMING: A LITTLE
DRESSING REGULAR UPPER BODY CLOTHING: A LOT
DRESSING REGULAR LOWER BODY CLOTHING: A LOT
TOILETING: A LOT
CLIMB 3 TO 5 STEPS WITH RAILING: TOTAL
MOVING FROM LYING ON BACK TO SITTING ON SIDE OF FLAT BED WITH BEDRAILS: A LITTLE
WALKING IN HOSPITAL ROOM: A LOT
STANDING UP FROM CHAIR USING ARMS: A LOT
HELP NEEDED FOR BATHING: A LOT
DAILY ACTIVITIY SCORE: 14
MOBILITY SCORE: 12

## 2024-05-09 ASSESSMENT — PAIN - FUNCTIONAL ASSESSMENT
PAIN_FUNCTIONAL_ASSESSMENT: 0-10
PAIN_FUNCTIONAL_ASSESSMENT: 0-10

## 2024-05-09 ASSESSMENT — PAIN SCALES - GENERAL
PAINLEVEL_OUTOF10: 0 - NO PAIN
PAINLEVEL_OUTOF10: 0 - NO PAIN

## 2024-05-09 ASSESSMENT — ACTIVITIES OF DAILY LIVING (ADL): HOME_MANAGEMENT_TIME_ENTRY: 8

## 2024-05-09 NOTE — DISCHARGE SUMMARY
Discharge Diagnosis  Cardiogenic shock (Multi)    Issues Requiring Follow-Up      Test Results Pending At Discharge  Pending Labs       No current pending labs.            Hospital Course  Patient is a 89-year-old male with medical history significant for heart failure with moderately reduced ejection fraction presenting to [facility] due to shortness of breath. It was noted that patient was discharged on the day prior to presentation from [facility] where he was admitted initially for CHF exacerbation. Within the last month this is patient's third hospital admission. Recently at [facility] for diuresis.  After he was back home, he developed shortness of breath and was brought back to the hospital for further evaluation. Suspect that patient was probably over diuresed and now became volume down into the of the volume overloaded. Additionally, CT imaging done on admission showed patient to have bilateral multifocal pneumonia in his lower lobes for which broad-spectrum antibiotics was started. Patient additionally was also found to be in A-fib with RVR on admission and received IV metoprolol IV however his blood pressure dropped significantly and patient was switched over to an amiodarone drip with improvement in his heart rate.  Heparin gtt started (on Eliquis at home).  Required ICU for septic shock versus cardiogenic shock in setting of his congestive heart failure requiring vasopressors.  He was treated for pneumonia for 7 days total with zosyn.  Heart failure team consulted for management of dobutamine gtt briefly but this was weaned off. Patient was briefly on digoxin while in ICU but this was discontinued when transferred to the floor for PO metoprolol. Heparin gtt discontinued and home dose of Eliquis started for Afib.     Patient transferred to floor on 5/7. While here, he was restarted on his GDMT medications (metop, jardiance, entresto). He was found to be stable on these medications and planned for  discharge on 5/9. Patient was recommended to discharge to SNF however he and family declined and opted for home with home health.    Pertinent Physical Exam At Time of Discharge  Physical Exam    Home Medications     Medication List      START taking these medications     metoprolol succinate XL 25 mg 24 hr tablet; Commonly known as:   Toprol-XL; Take 1 tablet (25 mg) by mouth once daily. Do not crush or   chew.; Start taking on: May 10, 2024     CHANGE how you take these medications     tamsulosin 0.4 mg 24 hr capsule; Commonly known as: Flomax; Take 1   capsule (0.4 mg) by mouth once daily. Take at bedtime.; What changed:   additional instructions     CONTINUE taking these medications     apixaban 5 mg tablet; Commonly known as: Eliquis; Take 1 tablet (5 mg)   by mouth 2 times a day.   aspirin 81 mg EC tablet   atorvastatin 10 mg tablet; Commonly known as: Lipitor; Take 1 tablet (10   mg) by mouth once daily.   cholecalciferol 50 mcg (2,000 unit) capsule; Commonly known as: Vitamin   D-3   clindamycin 1 % lotion; Commonly known as: Cleocin T; Apply topically 2   times a day.   donepezil 10 mg tablet; Commonly known as: Aricept; Take 1 tablet (10   mg) by mouth once daily.   finasteride 5 mg tablet; Commonly known as: Proscar; Take 1 tablet (5   mg) by mouth once daily.   fluticasone propion-salmeteroL 250-50 mcg/dose diskus inhaler; Commonly   known as: Advair Diskus; Inhale 1 puff 2 times a day. Rinse mouth with   water after use to reduce aftertaste and incidence of candidiasis. Do not   swallow.   ipratropium-albuteroL 0.5-2.5 mg/3 mL nebulizer solution; Commonly known   as: Duo-Neb; Take 3 mL by nebulization every 4 hours if needed for   wheezing or shortness of breath.   Jardiance 10 mg; Generic drug: empagliflozin; Take 1 tablet (10 mg) by   mouth once daily.   memantine 10 mg tablet; Commonly known as: Namenda; Take 1 tablet (10   mg) by mouth 2 times a day.   sacubitriL-valsartan 24-26 mg tablet; Commonly  known as: Entresto   Ultra-Light Rollator misc; Generic drug: walker; 1 each once daily.   VITAMIN B COMPLEX ORAL     STOP taking these medications     furosemide 40 mg tablet; Commonly known as: Lasix   methylPREDNISolone 4 mg tablet; Commonly known as: Medrol   metoprolol tartrate 25 mg tablet; Commonly known as: Lopressor   potassium chloride CR 20 mEq ER tablet; Commonly known as: Klor-Con M20       Outpatient Follow-Up  Future Appointments   Date Time Provider Department Center   5/11/2024 To Be Determined Luna Arevalo RN [facility]   5/12/2024 To Be Determined Yomi Burks, PT [facility]   5/14/2024 To Be Determined Becky Nascimento OT [facility]   5/15/2024  1:30 PM [facility] [id number] ROOM [id number] [facility]   6/12/2024  3:00 PM Sanjay Black MD [id number] [facility]   6/20/2024  2:30 PM Malcolm Shen MD [id number] [facility]   6/27/2024  2:00 PM Agustina Diaz MD MPH [id number] [facility]   4/10/2025 10:45 AM Sherrell Malcolm MD [id number] [facility]       Ruperto Abreu MD

## 2024-05-09 NOTE — PROGRESS NOTES
Physical Therapy    Physical Therapy Treatment    Patient Name: Jam Cox  MRN: [id number]  Today's Date: 5/9/2024  Time Calculation  Start Time: 0952  Stop Time: 1015  Time Calculation (min): 23 min       Assessment/Plan   PT Assessment  End of Session Communication: Bedside nurse  End of Session Patient Position: Up in chair, Alarm on     PT Plan  Treatment/Interventions: Bed mobility, Transfer training, Gait training, Balance training, Strengthening, Therapeutic exercise, Therapeutic activity, Positioning, Postural re-education  PT Plan: Skilled PT  PT Frequency: 4 times per week  PT Discharge Recommendations: Moderate intensity level of continued care  PT Recommended Transfer Status: Assist x2  PT - OK to Discharge: Yes      General Visit Information:   PT  Visit  PT Received On: 05/09/24  Co-Treatment: OT  Co-Treatment Reason: to maximize pt mobility and expedite DC planning  Prior to Session Communication: Bedside nurse  Patient Position Received: Bed, 3 rail up, Alarm off, not on at start of session  Family/Caregiver Present: Yes  Caregiver Feedback: dtr and wife present and supportive  General Comment: pt supine. asleep but able to wake. agreeable for PT. pt remains appropriate for moderate intensity PT at DC. if homegoing could benefit having hospital bed, .     Subjective   Precautions:  Precautions  Medical Precautions: Fall precautions    Objective   Pain:  Pain Assessment  Pain Assessment: 0-10  Pain Score: 0 - No pain  Cognition:  Cognition  Orientation Level: Disoriented to time, Disoriented to situation  Following Commands: Follows one step commands with repetition  Lines/Tubes/Drains:  External Urinary Catheter Male (Active)   Number of days: 3       PT Treatments:  Therapeutic Exercise  Therapeutic Exercise Performed: Yes  Therapeutic Exercise Activity 1: 1x10 LAQs with cues  Therapeutic Exercise Activity 2: 1x10 seated marches  Therapeutic Activity  Therapeutic Activity Performed:  Yes  Therapeutic Activity 1: pt sat EOB ~7-8 mins with CGA-modA. cues for upright head and trunk positioning in midline. favors forward flexed head and rounded shoulders. min-Jaime with dynamic sitting 2/2 posterior lean     Bed Mobility  Bed Mobility: Yes  Bed Mobility 1  Bed Mobility 1: Supine to sitting  Level of Assistance 1: Minimum assistance (x2)  Bed Mobility Comments 1: HOB elevated; extended time to achieve upright positioning  Bed Mobility 2  Bed Mobility  2: Scooting  Level of Assistance 2: Minimum assistance  Bed Mobility Comments 2: scoot B hips towards EOB  Ambulation/Gait Training  Ambulation/Gait Training Performed: Yes  Ambulation/Gait Training 1  Surface 1: Level tile  Device 1:  (B arm and arm)  Assistance 1: Moderate assistance (x2)  Quality of Gait 1: Forward flexed posture  Comments/Distance (ft) 1: ~4 lateral stepsx1; ~6 lateral steps x1  Transfers  Transfer: Yes  Transfer 1  Technique 1: Sit to stand, Stand to sit  Transfer Device 1:  (B arm and arm)  Transfer Level of Assistance 1: Moderate assistance, Moderate verbal cues, +2  Trials/Comments 1: x2 trials; mod cues for safe hand placement and body mechanics  Transfers 2  Transfer From 2: Bed to  Transfer to 2: Chair with arms  Technique 2:  (lateral stps)  Transfer Device 2:  (B arm and arm)  Transfer Level of Assistance 2: Moderate assistance, Moderate verbal cues, +2  Trials/Comments 2: VC for body mechanics and hand palcement             Outcome Measures:  [facility] Basic Mobility  Turning from your back to your side while in a flat bed without using bedrails: A little  Moving from lying on your back to sitting on the side of a flat bed without using bedrails: A lot  Moving to and from bed to chair (including a wheelchair): A lot  Standing up from a chair using your arms (e.g. wheelchair or bedside chair): A lot  To walk in hospital room: A lot  Climbing 3-5 steps with railing: Total  Basic Mobility - Total Score: 12                             Education Documentation  Mobility Training, taught by Dede Michael, PT at 5/9/2024 12:15 PM.  Learner: Family, Patient  Readiness: Acceptance  Method: Explanation  Response: Needs Reinforcement    Education Comments  No comments found.          OP EDUCATION:       Encounter Problems       Encounter Problems (Active)       PT Problem       Patient will complete bed mobility with MINx1  (Progressing)       Start:  05/06/24    Expected End:  05/27/24            Patient will complete STS with MINx1 using LRAD without acute LOB   (Progressing)       Start:  05/06/24    Expected End:  05/27/24            Patient will ambulate >/=50' with LRAD with MINx1 without acute LOB  (Progressing)       Start:  05/06/24    Expected End:  05/27/24            Patient will complete static (contact guard assist) and dynamic (minimal assist) standing balance activities using LRAD without acute LOB.  (Progressing)       Start:  05/06/24    Expected End:  05/27/24            Patient will participate in BLE there-ex program in order to assist in improving strength and to assist with the completion of functional mobility tasks.  (Progressing)       Start:  05/06/24    Expected End:  05/27/24               Pain - Adult                05/09/24 at 12:16 PM   Dede Michael, PT   Rehab Office: [phone]

## 2024-05-09 NOTE — NURSING NOTE
Pt's IVs were removed, pt was dressed with family and help of nurse, Pt's family verbalized understanding of discharge instructions per printed AVS summary. Meds were deliverede to pt's family at bedside. Pt left via wheelchair transport with family for home.

## 2024-05-09 NOTE — PROGRESS NOTES
Occupational Therapy    OT Treatment    Patient Name: Jam Cox  MRN: [id number]  Today's Date: 5/9/2024  Time Calculation  Start Time: 0952  Stop Time: 1015  Time Calculation (min): 23 min         Assessment:  End of Session Communication: Bedside nurse  End of Session Patient Position: Up in chair, Alarm on (1:1 sitter and pt's family present bedside)  OT Assessment Results: Decreased ADL status, Decreased cognition, Decreased endurance, Decreased functional mobility, Decreased IADLs    Plan:  Treatment Interventions: ADL retraining, Functional transfer training, UE strengthening/ROM, Endurance training, Cognitive reorientation, Patient/family training, Equipment evaluation/education, Compensatory technique education  OT Frequency: 3 times per week  OT Discharge Recommendations: Moderate intensity level of continued care    Subjective   Previous Visit Info:  OT Last Visit  OT Received On: 05/09/24    General:  General  Reason for Referral: presents with SOB; found to have multifocal PNA, c/b Afib with RVR.  Past Medical History Relevant to Rehab: heart failure with moderately reduced ejection fraction, Afib, dementia; per family, patient has not had any falls within the past 6 months per family. patient was discharged yesterday [facility] where he was admitted initially for CHF exacerbation.  Within the last month this is patient's third hospital admission.  Family/Caregiver Present: Yes  Caregiver Feedback: Pt's wife and dtr bedside throughout; pleasant and supportive  Co-Treatment: PT  Co-Treatment Reason: to maximize pt safety and indep while targeting discipline specific tasks  Prior to Session Communication: Bedside nurse  Patient Position Received: Bed, 3 rail up, Alarm off, not on at start of session  General Comment: Pt supine in bed upon arrival, willing to participate in therapy-- demo's increased functional mobility this date, however, to still benefit from moderate intensity OT services.  Pt's family reports they'd like him to return home. OT provides education towards therapy POC along with max safety education if DC'd home. Pt and pt's family receptive.    Precautions:  Medical Precautions: Fall precautions    Pain:  Pain Assessment  Pain Assessment: 0-10  Pain Score: 0 - No pain    Objective    Cognition:  Cognition  Orientation Level: Disoriented to time, Disoriented to situation (unable to provide month /day /year despite VCs and options)  Following Commands: Follows one step commands with repetition    Activities of Daily Living:   Grooming  Grooming Comments: MAX A to wipe buttocks while in standing    LE Dressing  LE Dressing:  (MOD A to don /doff socks while seated EOB)    Bed Mobility/Transfers:   Bed Mobility 1  Bed Mobility 1: Supine to sitting  Level of Assistance 1: Minimum assistance (x2)  Bed Mobility Comments 1: HOB slightly elevated; VCs for body mechanics and safety    Bed Mobility 2  Bed Mobility  2: Scooting  Level of Assistance 2: Minimum assistance  Bed Mobility Comments 2: assist at B hips; pt BUE support on bed    Transfer 1  Technique 1: Sit to stand, Stand to sit  Transfer Device 1:  (B arm-arm)  Transfer Level of Assistance 1: Moderate assistance (x2)  Trials/Comments 1: 2x trials; VCs for body mechanics and upright posture at head /trunk    Transfers 2  Transfer From 2: Bed to  Transfer to 2: Chair with arms  Technique 2: Stand pivot  Transfer Device 2:  (B arm-arm)  Transfer Level of Assistance 2: Moderate assistance (x2)  Trials/Comments 2: VCs for body mechanics and overall sequencing    Sitting Balance:  Dynamic Sitting Balance  Dynamic Sitting-Comments: OT facilitates pt to reach in various planes  /across midline while seated EOB; requires CGA - MIN A at trunk    Therapy/Activity:  Therapeutic Activity  Therapeutic Activity 1: Pt sits EOB ~7 minutes to address functional balance, activity tolerance, and ADLs with CGA at trunk    Outcome Measures:  [facility] Daily  Activity  Putting on and taking off regular lower body clothing: A lot  Bathing (including washing, rinsing, drying): A lot  Putting on and taking off regular upper body clothing: A lot  Toileting, which includes using toilet, bedpan or urinal: A lot  Taking care of personal grooming such as brushing teeth: A little  Eating Meals: A little  Daily Activity - Total Score: 14    Education Documentation  Precautions, taught by Zelda Esposito OT at 5/9/2024 11:43 AM.  Learner: Patient  Readiness: Acceptance  Method: Explanation  Response: Needs Reinforcement    Body Mechanics, taught by Zelda Esposito OT at 5/9/2024 11:43 AM.  Learner: Patient  Readiness: Acceptance  Method: Explanation  Response: Needs Reinforcement    ADL Training, taught by Zelda Esposito OT at 5/9/2024 11:43 AM.  Learner: Patient  Readiness: Acceptance  Method: Explanation  Response: Needs Reinforcement    Education Comments  No comments found.      Goals:  Encounter Problems       Encounter Problems (Active)       ADLs       Patient with complete upper body dressing with minimal assist  level of assistance donning and doffing all UE clothes with no adaptive equipment. (Progressing)       Start:  05/06/24    Expected End:  05/20/24            Patient with complete lower body dressing with moderate assist level of assistance donning and doffing all LE clothes  with no adaptive equipment. (Progressing)       Start:  05/06/24    Expected End:  05/20/24            Patient will complete daily grooming tasks with minimal assist  level of assistance and PRN adaptive equipment. (Progressing)       Start:  05/06/24    Expected End:  05/20/24            Patient will complete toileting including hygiene clothing management/hygiene with moderate assist level of assistance. (Progressing)       Start:  05/06/24    Expected End:  05/20/24               BALANCE       Pt will maintain dynamic sitting balance during ADL task with contact guard assist  level of assistance in order to demonstrate decreased risk of falling and improved postural control. (Progressing)       Start:  05/06/24    Expected End:  05/20/24               EXERCISE/STRENGTHENING       Patient will complete BUE exercises in order to improve AROM, strength, coordination and activity tolerance for ADL performance.  (Progressing)       Start:  05/06/24    Expected End:  05/20/24               TRANSFERS       Patient will perform bed mobility moderate assist level of assistance in order to improve safety and independence with mobility (Progressing)       Start:  05/06/24    Expected End:  05/20/24            Patient will complete functional transfers with least restrictive device with moderate assist level of assistance. (Progressing)       Start:  05/06/24    Expected End:  05/20/24                  Encounter Problems (Resolved)       COGNITION/SAFETY       Patient will follow >50% Simple commands to allow improved ADL performance. (Met)       Start:  05/06/24    Expected End:  05/20/24    Resolved:  05/09/24         Patient will demonstrate orientation x >1. (Met)       Start:  05/06/24    Expected End:  05/20/24    Resolved:  05/09/24    ORIENTATION                  ---------------  Zelda Esposito (OTR/L, OTD)  Inpatient Occupational Therapist   Rehab Office: [phone]

## 2024-05-09 NOTE — PROGRESS NOTES
Inpatient SLP Swallow Treatment     Patient Name: Jam Cox  MRN: [id number]  Today's Date: 5/9/2024  Time Calculation  Start Time: 1050  Stop Time: 1109  Time Calculation (min): 19 min         Recommendations:   Solid Diet Recommendations : Pureed/extremely thick  (IDDSI Level 4)  Liquid Diet Recommendations: Nectar thick/mildly thick (IDDS Level 2)  Medication Administration Recommendations: Crushed, With Pureed  Frequent oral care  Direct feed assist  Slow rate  Repeat MBSS in 1-2 weeks pending return to baseline function    SLP Assessment:   Swallow therapy completed targeting assessment of tolerance of diet and education. Pt and family report that he has been eating well; no overt difficulty or s/s aspiration noted across trials during session. Continue current recommendations with repeat MBSS rec'd upon return to home/baseline function to inform swallow function/least restrictive diet.        Plan:  SLP Frequency: 1x per week  SLP Discharge Recommendations: Continue skilled SLP services at the next level of care  Discussed POC: Patient, Caregiver/family, Nursing, Physician  SLP - OK to Discharge: Yes    Goals:   Pt will tolerate least restrictive diet without s/s aspiration, respiratory compromise, or overt difficulty throughout admission.  Start: 05/03/24, End: 05/17/24     Patient/Family will verbalize/demonstrate comprehension of dysphagia education, strategies, recommendations and POC with 80% accuracy independently.              Start: 05/03/24, End: 05/17/24      Subjective   Current Presentation: Pt received upright and alert in chair with breakfast tray. Family at bedside. Pt noted to require min-mod cues to keep eyes open.     Breathing on room air.     Objective     Therapeutic Swallow Therapy:    Pt consumed ~2oz of mildly thick liquids and pureed solids via direct feed assist. Oral phase timely with mildly thick liquids and puree, non-significant residues noted between and following trials. No  s/s aspiration across trials. Pt denied overt difficulty.       Inpatient Education:  Pt and family educated on results of recent MBSS and recommendations to continue current diet with repeat MBSS in 1-2 weeks upon return to baseline functioning. Pt's wife asked appropriate follow-up questions and indicated understanding.

## 2024-05-09 NOTE — HH CARE COORDINATION
Home Care received a Referral for Nursing, Physical Therapy, and Occupational Therapy. We have processed the referral for a Start of Care on 24-48HRS.     If you have any questions or concerns, please feel free to contact us at [phone]. Follow the prompts, enter your five digit zip code, and you will be directed to your care team on [facility] 3.

## 2024-05-09 NOTE — DISCHARGE INSTRUCTIONS
Dear  Brooke,    You were recently admitted to [facility] for pneumonia. While here you were treated with antibiotics and also evaluated by our cardiologists to adjust your cardiac medications. While here, we restarted several of your home heart medications and monitored your response and were found stable on these medications. On 5/9/24, you were determined to be stable to return home.     A referral to home health was also provided to provide further assistance and physical therapy while at home. You are planned for follow up with your cardiologist on 5/15/24 and with your primary care provider within 1-3 weeks after you are discharged.     If you experience worsening shortness of breath, fevers/chills, chest pain, please return to the hospital to seek further medical evaluation.    Sincerely,  Your  Medicine Team    FOLLOW-UP:  Please call  [phone] if you have not heard back yet regarding scheduling your appointments.  [ ] Cardiology appointment on 5/15/24  [ ] Primary care follow up within 1-3 weeks

## 2024-05-09 NOTE — PROGRESS NOTES
Patient to discharge home today with [facility]. SOC 24-48 hours. RN, patient, family, and medical team aware. Arlette Lr RN, TCC

## 2024-05-10 PROCEDURE — 1090000001 HH PPS REVENUE CREDIT

## 2024-05-10 PROCEDURE — 1090000002 HH PPS REVENUE DEBIT

## 2024-05-11 ENCOUNTER — HOME CARE VISIT (OUTPATIENT)
Dept: HOME HEALTH SERVICES | Facility: HOME HEALTH | Age: 89
End: 2024-05-11
Payer: MEDICARE

## 2024-05-11 VITALS
RESPIRATION RATE: 16 BRPM | HEART RATE: 67 BPM | SYSTOLIC BLOOD PRESSURE: 102 MMHG | TEMPERATURE: 98.3 F | BODY MASS INDEX: 21.05 KG/M2 | HEIGHT: 70 IN | OXYGEN SATURATION: 94 % | DIASTOLIC BLOOD PRESSURE: 58 MMHG | WEIGHT: 147 LBS

## 2024-05-11 PROCEDURE — 1090000001 HH PPS REVENUE CREDIT

## 2024-05-11 PROCEDURE — 1090000002 HH PPS REVENUE DEBIT

## 2024-05-11 PROCEDURE — G0299 HHS/HOSPICE OF RN EA 15 MIN: HCPCS | Mod: HHH

## 2024-05-11 ASSESSMENT — ENCOUNTER SYMPTOMS
DESCRIPTION OF MEMORY LOSS: LONG TERM
SUBJECTIVE PAIN PROGRESSION: UNCHANGED
PAIN LOCATION - PAIN FREQUENCY: INTERMITTENT
TROUBLE SWALLOWING: 1
PAIN SEVERITY GOAL: 0/10
LOWEST PAIN SEVERITY IN PAST 24 HOURS: 0/10
PAIN: 1
HIGHEST PAIN SEVERITY IN PAST 24 HOURS: 2/10
PAIN LOCATION: LEFT FOOT
CHANGE IN APPETITE: UNCHANGED
PAIN LOCATION - PAIN SEVERITY: 2/10
APPETITE LEVEL: FAIR
PAIN LOCATION - PAIN QUALITY: ACHE
DESCRIPTION OF MEMORY LOSS: SHORT TERM
PERSON REPORTING PAIN: PATIENT

## 2024-05-11 ASSESSMENT — ACTIVITIES OF DAILY LIVING (ADL): ENTERING_EXITING_HOME: MAXIMUM ASSIST

## 2024-05-12 ENCOUNTER — TELEMEDICINE (OUTPATIENT)
Dept: CARE COORDINATION | Age: 89
End: 2024-05-12
Payer: MEDICARE

## 2024-05-12 ENCOUNTER — PATIENT OUTREACH (OUTPATIENT)
Dept: HOME HEALTH SERVICES | Age: 89
End: 2024-05-12
Payer: MEDICARE

## 2024-05-12 ENCOUNTER — PATIENT OUTREACH (OUTPATIENT)
Dept: HOME HEALTH SERVICES | Age: 89
End: 2024-05-12

## 2024-05-12 DIAGNOSIS — I50.42 CHRONIC COMBINED SYSTOLIC AND DIASTOLIC CONGESTIVE HEART FAILURE (MULTI): Primary | ICD-10-CM

## 2024-05-12 PROCEDURE — 1090000001 HH PPS REVENUE CREDIT

## 2024-05-12 PROCEDURE — 1090000002 HH PPS REVENUE DEBIT

## 2024-05-12 SDOH — HEALTH STABILITY: MENTAL HEALTH: HOW OFTEN DO YOU HAVE A DRINK CONTAINING ALCOHOL?: MONTHLY OR LESS

## 2024-05-12 SDOH — ECONOMIC STABILITY: HOUSING INSECURITY
IN THE LAST 12 MONTHS, WAS THERE A TIME WHEN YOU DID NOT HAVE A STEADY PLACE TO SLEEP OR SLEPT IN A SHELTER (INCLUDING NOW)?: NO

## 2024-05-12 SDOH — SOCIAL STABILITY: SOCIAL NETWORK: HOW OFTEN DO YOU ATTENT MEETINGS OF THE CLUB OR ORGANIZATION YOU BELONG TO?: NEVER

## 2024-05-12 SDOH — SOCIAL STABILITY: SOCIAL INSECURITY: WITHIN THE LAST YEAR, HAVE YOU BEEN AFRAID OF YOUR PARTNER OR EX-PARTNER?: NO

## 2024-05-12 SDOH — ECONOMIC STABILITY: INCOME INSECURITY: IN THE LAST 12 MONTHS, WAS THERE A TIME WHEN YOU WERE NOT ABLE TO PAY THE MORTGAGE OR RENT ON TIME?: NO

## 2024-05-12 SDOH — HEALTH STABILITY: MENTAL HEALTH: HOW MANY STANDARD DRINKS CONTAINING ALCOHOL DO YOU HAVE ON A TYPICAL DAY?: 1 OR 2

## 2024-05-12 SDOH — HEALTH STABILITY: MENTAL HEALTH
HOW OFTEN DO YOU NEED TO HAVE SOMEONE HELP YOU WHEN YOU READ INSTRUCTIONS, PAMPHLETS, OR OTHER WRITTEN MATERIAL FROM YOUR DOCTOR OR PHARMACY?: SOMETIMES

## 2024-05-12 SDOH — SOCIAL STABILITY: SOCIAL INSECURITY: WITHIN THE LAST YEAR, HAVE YOU BEEN HUMILIATED OR EMOTIONALLY ABUSED IN OTHER WAYS BY YOUR PARTNER OR EX-PARTNER?: NO

## 2024-05-12 SDOH — ECONOMIC STABILITY: FOOD INSECURITY: WITHIN THE PAST 12 MONTHS, YOU WORRIED THAT YOUR FOOD WOULD RUN OUT BEFORE YOU GOT MONEY TO BUY MORE.: NEVER TRUE

## 2024-05-12 SDOH — ECONOMIC STABILITY: FOOD INSECURITY: WITHIN THE PAST 12 MONTHS, THE FOOD YOU BOUGHT JUST DIDN'T LAST AND YOU DIDN'T HAVE MONEY TO GET MORE.: NEVER TRUE

## 2024-05-12 SDOH — HEALTH STABILITY: MENTAL HEALTH: HOW OFTEN DO YOU HAVE 6 OR MORE DRINKS ON ONE OCCASION?: NEVER

## 2024-05-12 SDOH — HEALTH STABILITY: PHYSICAL HEALTH: ON AVERAGE, HOW MANY DAYS PER WEEK DO YOU ENGAGE IN MODERATE TO STRENUOUS EXERCISE (LIKE A BRISK WALK)?: 0 DAYS

## 2024-05-12 SDOH — ECONOMIC STABILITY: INCOME INSECURITY: IN THE PAST 12 MONTHS, HAS THE ELECTRIC, GAS, OIL, OR WATER COMPANY THREATENED TO SHUT OFF SERVICE IN YOUR HOME?: NO

## 2024-05-12 SDOH — SOCIAL STABILITY: SOCIAL NETWORK: HOW OFTEN DO YOU GET TOGETHER WITH FRIENDS OR RELATIVES?: THREE TIMES A WEEK

## 2024-05-12 SDOH — SOCIAL STABILITY: SOCIAL NETWORK: ARE YOU MARRIED, WIDOWED, DIVORCED, SEPARATED, NEVER MARRIED, OR LIVING WITH A PARTNER?: MARRIED

## 2024-05-12 SDOH — SOCIAL STABILITY: SOCIAL NETWORK: IN A TYPICAL WEEK, HOW MANY TIMES DO YOU TALK ON THE PHONE WITH FAMILY, FRIENDS, OR NEIGHBORS?: THREE TIMES A WEEK

## 2024-05-12 SDOH — SOCIAL STABILITY: SOCIAL NETWORK: HOW OFTEN DO YOU ATTEND CHURCH OR RELIGIOUS SERVICES?: 1 TO 4 TIMES PER YEAR

## 2024-05-12 SDOH — ECONOMIC STABILITY: HOUSING INSECURITY: IN THE LAST 12 MONTHS, HOW MANY PLACES HAVE YOU LIVED?: 1

## 2024-05-12 SDOH — ECONOMIC STABILITY: INCOME INSECURITY: HOW HARD IS IT FOR YOU TO PAY FOR THE VERY BASICS LIKE FOOD, HOUSING, MEDICAL CARE, AND HEATING?: NOT HARD AT ALL

## 2024-05-12 SDOH — HEALTH STABILITY: PHYSICAL HEALTH: ON AVERAGE, HOW MANY MINUTES DO YOU ENGAGE IN EXERCISE AT THIS LEVEL?: 0 MIN

## 2024-05-12 ASSESSMENT — LIFESTYLE VARIABLES
SKIP TO QUESTIONS 9-10: 1
AUDIT-C TOTAL SCORE: 1

## 2024-05-12 NOTE — PROGRESS NOTES
Daily Call Note: [facility] Initial - on call is the patient's wife, patient's granddaughter Carin (POA) and Dr. Huey MD.    It was reported that the patient is breathing well, with O2 saturation of 96% on room air - no SOB -  no peripheral edema.    Carin stated the patient's has lost significant weight recently due to loss of appetite. The patient also completed course of diuresis during hospitalization. His lasix has been discontinued.  The patient is also on Lopressor - Dr. Arzate instructed them to hold the Lopressor for systolic reading of less than ninety and for a heart rate of less than 60.  Patient is taking nutritional supplement Ensure since the hospitalization and is tolerating them well - no GI upset or change in bowel habits.   Patient not sleeping at night - he sleeps all day, and is up all night. Dr. Arzate suggested that patient could take Mirtazepine to assist with appetite stimulation as well as sleep. The chose to hold off on that for now, and prefer to see if they can find something to occupy the patient during the day so that he might adjust his sleeping schedule back to normal.  They will keep a log of vital signs and weight daily.    Patient has PCP appointment on 6/20/24 but granddaughter will call to get an earlier appointment. She stated it will not be an issue.     Pt demonstrates clear understanding: Yes    Daily Weight:  There were no vitals filed for this visit.   Last 3 Weights:  Wt Readings from Last 7 Encounters:   05/11/24 66.7 kg (147 lb)   05/09/24 70.5 kg (155 lb 6.8 oz)   04/30/24 73.9 kg (163 lb)   04/25/24 71.4 kg (157 lb 6.5 oz)   04/22/24 75.4 kg (166 lb 3.2 oz)   02/09/24 78.9 kg (174 lb)   12/27/23 77.2 kg (170 lb 3.2 oz)       Masimo Device: No   Masimo Clinical Impression:     Virtual Visits--Scheduled (Most Recent Date at Top)  Follow up Appointments  Recent Visits  Date Type Provider Dept   04/22/24 Office Visit Malcolm Shen MD Do [id number]    Showing recent visits within past 30 days and meeting all other requirements  Future Appointments  Date Type Provider Dept   06/20/24 Appointment Malcolm Shen MD Do [id number]   Showing future appointments within next 90 days and meeting all other requirements       Frequency of RN Calls & Virtual Visits per Team Agreement: Healthy at Home Frequency: Daily    Medication issues Addressed (what was done): Eliquis cost is around $30 per month    Follow up appointments scheduled by [facility] Staff:   Referrals made by [facility] staff:

## 2024-05-12 NOTE — PROGRESS NOTES
Enrollment call  Patient agreeable to [facility]VC (Healthy at Home). Enrollment call completed and program overview explained to patient, with appropriate numbers/info given.   Initial visit is 5/12 @ 1400    RE Enrollment     89-year-old male with medical history significant for heart failure with moderately reduced ejection fraction presenting to [facility] due to shortness of breath   -It was noted that patient was discharged on the day prior to presentation from [facility] where he was admitted initially for CHF exacerbation. Within the last month this is patient's third hospital admission.   -Required ICU for septic shock versus cardiogenic shock in setting of his congestive heart failure requiring vasopressors. He was treated for pneumonia for 7 days total with zosyn     -Patient was recommended to discharge to SNF however he and family declined and opted for home with home health.                 -

## 2024-05-13 ENCOUNTER — PATIENT OUTREACH (OUTPATIENT)
Dept: CARE COORDINATION | Facility: CLINIC | Age: 89
End: 2024-05-13
Payer: MEDICARE

## 2024-05-13 ENCOUNTER — PATIENT OUTREACH (OUTPATIENT)
Dept: HOME HEALTH SERVICES | Age: 89
End: 2024-05-13
Payer: MEDICARE

## 2024-05-13 ENCOUNTER — HOME CARE VISIT (OUTPATIENT)
Dept: HOME HEALTH SERVICES | Facility: HOME HEALTH | Age: 89
End: 2024-05-13
Payer: MEDICARE

## 2024-05-13 VITALS
RESPIRATION RATE: 16 BRPM | SYSTOLIC BLOOD PRESSURE: 100 MMHG | DIASTOLIC BLOOD PRESSURE: 50 MMHG | TEMPERATURE: 97.8 F | HEART RATE: 71 BPM

## 2024-05-13 VITALS — BODY MASS INDEX: 20.29 KG/M2 | WEIGHT: 141.4 LBS

## 2024-05-13 PROCEDURE — 1090000001 HH PPS REVENUE CREDIT

## 2024-05-13 PROCEDURE — G0151 HHCP-SERV OF PT,EA 15 MIN: HCPCS | Mod: HHH

## 2024-05-13 PROCEDURE — 1090000002 HH PPS REVENUE DEBIT

## 2024-05-13 ASSESSMENT — ENCOUNTER SYMPTOMS
PERSON REPORTING PAIN: PATIENT
PAIN LOCATION - PAIN SEVERITY: 6/10
DEPRESSION: 0
LOWEST PAIN SEVERITY IN PAST 24 HOURS: 4/10
PAIN SEVERITY GOAL: 0/10
SUBJECTIVE PAIN PROGRESSION: WAXING AND WANING
LOSS OF SENSATION IN FEET: 0
PAIN: 1
HIGHEST PAIN SEVERITY IN PAST 24 HOURS: 7/10
OCCASIONAL FEELINGS OF UNSTEADINESS: 1
PAIN LOCATION: LEFT FOOT
PAIN LOCATION - PAIN QUALITY: ACHE
PAIN LOCATION - PAIN FREQUENCY: INTERMITTENT

## 2024-05-13 ASSESSMENT — ACTIVITIES OF DAILY LIVING (ADL): AMBULATION ASSISTANCE ON FLAT SURFACES: 1

## 2024-05-13 NOTE — PROGRESS NOTES
Discharge Facility: [facility]  Discharge Diagnosis: cardiogenic shock  Admission Date: 5/3/24  Discharge Date: 5/9/24    PCP Appointment Date: no appointment, message to office  Specialist Appointment Date: 5/15/24 cardiology  Hospital Encounter and Summary: Linked    Two attempts were made to reach patient within two business days after discharge. Voicemail left with contact information for patient to call back with any non-emergent questions or concerns.

## 2024-05-13 NOTE — PROGRESS NOTES
Daily Call Note:     Daily call completed.  Spoke with patient's wife who states he is doing okay. She states he is sleeping right now. PT did come out and worked with the patient, and took vitals which were WNL (see assessment).  Pt's weight today was 141.4 lb. She took his BP this morning and it was 105/44.  Aware of upcoming appts.  No questions or concerns during the call.     Pt Education:   Barriers:   Topics for Daily Review:   Pt demonstrates clear understanding:     Daily Weight:  There were no vitals filed for this visit.   Last 3 Weights:  Wt Readings from Last 7 Encounters:   05/11/24 66.7 kg (147 lb)   05/09/24 70.5 kg (155 lb 6.8 oz)   04/30/24 73.9 kg (163 lb)   04/25/24 71.4 kg (157 lb 6.5 oz)   04/22/24 75.4 kg (166 lb 3.2 oz)   02/09/24 78.9 kg (174 lb)   12/27/23 77.2 kg (170 lb 3.2 oz)       Masimo Device:    Masimo Clinical Impression:     Virtual Visits--Scheduled (Most Recent Date at Top)  Follow up Appointments  Recent Visits  Date Type Provider Dept   04/22/24 Office Visit Malcolm Shen MD [facility] [id number]   Showing recent visits within past 30 days and meeting all other requirements  Future Appointments  Date Type Provider Dept   06/20/24 Appointment Malcolm Shen MD Do [id number]   Showing future appointments within next 90 days and meeting all other requirements       Frequency of RN Calls & Virtual Visits per Team Agreement:     Medication issues Addressed (what was done):     Follow up appointments scheduled by [facility] Staff:   Referrals made by [facility] staff:

## 2024-05-14 ENCOUNTER — HOME CARE VISIT (OUTPATIENT)
Dept: HOME HEALTH SERVICES | Facility: HOME HEALTH | Age: 89
End: 2024-05-14
Payer: MEDICARE

## 2024-05-14 ENCOUNTER — PATIENT OUTREACH (OUTPATIENT)
Dept: HOME HEALTH SERVICES | Age: 89
End: 2024-05-14
Payer: MEDICARE

## 2024-05-14 VITALS
HEART RATE: 70 BPM | DIASTOLIC BLOOD PRESSURE: 58 MMHG | OXYGEN SATURATION: 92 % | SYSTOLIC BLOOD PRESSURE: 102 MMHG | TEMPERATURE: 97.4 F

## 2024-05-14 VITALS
SYSTOLIC BLOOD PRESSURE: 94 MMHG | TEMPERATURE: 97.6 F | HEART RATE: 72 BPM | DIASTOLIC BLOOD PRESSURE: 58 MMHG | RESPIRATION RATE: 16 BRPM

## 2024-05-14 PROCEDURE — G0300 HHS/HOSPICE OF LPN EA 15 MIN: HCPCS | Mod: HHH

## 2024-05-14 PROCEDURE — G0152 HHCP-SERV OF OT,EA 15 MIN: HCPCS | Mod: HHH

## 2024-05-14 PROCEDURE — 1090000001 HH PPS REVENUE CREDIT

## 2024-05-14 PROCEDURE — 1090000002 HH PPS REVENUE DEBIT

## 2024-05-14 ASSESSMENT — PAIN SCALES - PAIN ASSESSMENT IN ADVANCED DEMENTIA (PAINAD)
FACIALEXPRESSION: 0
TOTALSCORE: 0
BODYLANGUAGE: 0
BREATHING: 0
BODYLANGUAGE: 0 - RELAXED.
FACIALEXPRESSION: 0
TOTALSCORE: 0
BODYLANGUAGE: 0 - RELAXED.
NEGVOCALIZATION: 0
CONSOLABILITY: 0
FACIALEXPRESSION: 0 - SMILING OR INEXPRESSIVE.
CONSOLABILITY: 0
NEGVOCALIZATION: 0
CONSOLABILITY: 0 - NO NEED TO CONSOLE.
CONSOLABILITY: 0 - NO NEED TO CONSOLE.
NEGVOCALIZATION: 0 - NONE.
BREATHING: 0
NEGVOCALIZATION: 0 - NONE.
BODYLANGUAGE: 0
FACIALEXPRESSION: 0 - SMILING OR INEXPRESSIVE.

## 2024-05-14 ASSESSMENT — ACTIVITIES OF DAILY LIVING (ADL)
BATHING ASSESSED: 1
BATHING_CURRENT_FUNCTION: MAXIMUM ASSIST
BATHING_CURRENT_FUNCTION: ONE PERSON

## 2024-05-14 ASSESSMENT — ENCOUNTER SYMPTOMS
PAIN: 1
DESCRIPTION OF MEMORY LOSS: IMMEDIATE
PERSON REPORTING PAIN: PATIENT
DENIES PAIN: 1
PAIN LOCATION: LEFT FOOT
OCCASIONAL FEELINGS OF UNSTEADINESS: 1
PERSON REPORTING PAIN: PATIENT
APPETITE LEVEL: FAIR
DESCRIPTION OF MEMORY LOSS: SHORT TERM

## 2024-05-14 NOTE — PROGRESS NOTES
Daily Call Note:       Daily call completed with patient's wife.  She states he is doing okay.  The Nurse and OT saw the pt this morning.  She states the weight was 147 lb.  Vital signs were placed on the assessment tab and they are WNL.  He is complaining of left middle toe pain.  She states it looks like a blister, but isn't open.  She put some bacitracin ointment and on it and she will soak it in Epsom salt.  He does have a PCP appt on next Tues.  He has appt tomorrow to see the cardiologist.  His daughter takes him to his appt.  She doesn't need any assistance with making any of his appointments.  No add'l questions or concerns.  Aware of upcoming appts.       Pt Education:   Barriers:   Topics for Daily Review:   Pt demonstrates clear understanding:    Daily Weight:  There were no vitals filed for this visit.   Last 3 Weights:  Wt Readings from Last 7 Encounters:   05/13/24 64.1 kg (141 lb 6.4 oz)   05/11/24 66.7 kg (147 lb)   05/09/24 70.5 kg (155 lb 6.8 oz)   04/30/24 73.9 kg (163 lb)   04/25/24 71.4 kg (157 lb 6.5 oz)   04/22/24 75.4 kg (166 lb 3.2 oz)   02/09/24 78.9 kg (174 lb)       Masimo Device:    Masimo Clinical Impression:     Virtual Visits--Scheduled (Most Recent Date at Top)  Follow up Appointments  Recent Visits  Date Type Provider Dept   04/22/24 Office Visit Malcolm Shen MD Do [id number]   Showing recent visits within past 30 days and meeting all other requirements  Future Appointments  Date Type Provider Dept   05/21/24 Appointment Malcolm Shen MD Do [id number]   Showing future appointments within next 90 days and meeting all other requirements       Frequency of RN Calls & Virtual Visits per Team Agreement:    Medication issues Addressed (what was done):     Follow up appointments scheduled by [facility] Staff:   Referrals made by [facility] staff:

## 2024-05-15 ENCOUNTER — PATIENT OUTREACH (OUTPATIENT)
Dept: HOME HEALTH SERVICES | Age: 89
End: 2024-05-15

## 2024-05-15 ENCOUNTER — OFFICE VISIT (OUTPATIENT)
Dept: CARDIOLOGY | Facility: CLINIC | Age: 89
End: 2024-05-15
Payer: MEDICARE

## 2024-05-15 VITALS
BODY MASS INDEX: 20.72 KG/M2 | OXYGEN SATURATION: 93 % | HEART RATE: 76 BPM | DIASTOLIC BLOOD PRESSURE: 56 MMHG | WEIGHT: 148 LBS | SYSTOLIC BLOOD PRESSURE: 91 MMHG | HEIGHT: 71 IN | RESPIRATION RATE: 18 BRPM

## 2024-05-15 DIAGNOSIS — I50.42 CHRONIC COMBINED SYSTOLIC AND DIASTOLIC CONGESTIVE HEART FAILURE (MULTI): ICD-10-CM

## 2024-05-15 PROCEDURE — 1126F AMNT PAIN NOTED NONE PRSNT: CPT | Performed by: NURSE PRACTITIONER

## 2024-05-15 PROCEDURE — 99214 OFFICE O/P EST MOD 30 MIN: CPT | Performed by: NURSE PRACTITIONER

## 2024-05-15 PROCEDURE — 1090000001 HH PPS REVENUE CREDIT

## 2024-05-15 PROCEDURE — 3078F DIAST BP <80 MM HG: CPT | Performed by: NURSE PRACTITIONER

## 2024-05-15 PROCEDURE — 1160F RVW MEDS BY RX/DR IN RCRD: CPT | Performed by: NURSE PRACTITIONER

## 2024-05-15 PROCEDURE — 1159F MED LIST DOCD IN RCRD: CPT | Performed by: NURSE PRACTITIONER

## 2024-05-15 PROCEDURE — 3074F SYST BP LT 130 MM HG: CPT | Performed by: NURSE PRACTITIONER

## 2024-05-15 PROCEDURE — 1090000002 HH PPS REVENUE DEBIT

## 2024-05-15 PROCEDURE — 1111F DSCHRG MED/CURRENT MED MERGE: CPT | Performed by: NURSE PRACTITIONER

## 2024-05-15 ASSESSMENT — PAIN SCALES - GENERAL: PAINLEVEL: 0-NO PAIN

## 2024-05-15 NOTE — PROGRESS NOTES
Primary Care Physician: Malcolm Shen MD  Date of Visit: 05/15/2024  1:30 PM EDT  Location of visit: [facility]     Chief Complaint:   No chief complaint on file.      HPI / Summary:   Jam Cox is a 89 y.o. male with past medical history of  HFrEF (LVEF 30-35% on echo 5/4/2024), HTN, HLD, pAF on Eliquis, dementia, and frailty who presents to heart failure clinic for hospital follow up.     3 recent admissions for systolic heart failure in the past month:         -- Admitted to [facility] [place] [facility]/2024 - 5/9/2024 with systolic heart failure, pneumonia, and atrial fibrillation RVR. He required ICU admission for septic vs cardiogenic shock and was on vasopressors.    He was briefly on dobutamine gtt that was weaned off.  He was recommended to discharge to SNF, but family declined and opted for home care.      -- Admitted at [facility]2024 - 5/2/2023 for systolic heart failure and YOSEF - discharged home     -- Admitted at [facility] from 4/22/2024 - 4/25/2024 with systolic heart failure and atrial fibrillation.     He walked into the office from the parking lot with rollator. No shortness of breath at home, but wife reports he only wants to sleep and not do anything but sit.  No chest pain or angina.  No dizziness, or headaches.  No recent falls.  No nausea, vomiting, fever, or chills,  No blood in urine or stool.  Daughter said he sleeps all day, and is awake all night and he wants to talk all night. He is eating very little, but he is on puree diet and wants to eat more.  He sleeps propped up on 3 pillows.  Easily confused and forgetful.     Weight down to 148lbs today ( 156lbs on  5/3/2024)  Here today with his wife and daughter.  Lives at home with his wife, with daughters and granddaughters to help ( a few are nurses)      Last Cardiology Tests:  ECG:      Echo:  TTE  5/4/2024  CONCLUSIONS:   1. Left ventricular systolic function is moderately decreased with a 30-35% estimated ejection fraction.   2. Poorly visualized  anatomical structures due to suboptimal image quality.   3. There is reduced right ventricular systolic function.   4. The left atrium is enlarged.   5. There is global hypokinesis of the left ventricle with minor regional variations.    4/24/2024  CONCLUSIONS:   1. Left ventricular systolic function is mildly decreased with a 40-45% estimated ejection fraction.   2. No left ventricular thrombus visualized.   3. There is an elevated mean left atrial pressure.   4. There is reduced right ventricular systolic function.   5. Mild to moderately elevated right ventricular systolic pressure.   6. Moderate aortic valve regurgitation.   7. There is global hypokinesis of the left ventricle with minor regional variations.   8. There has been a decline in the calculated LVEF from 45-50% with Biplane 53% to currently 40-45% with Biplane 42%. Diastology is indeterminate. RVSP has increased to 51 mmHg.    Cath:      Stress Test:      Cardiac Imaging:    Past Medical History:  Past Medical History:   Diagnosis Date    Abnormality of plasma protein, unspecified 05/01/2017    Elevated blood protein    Benign prostatic hyperplasia without lower urinary tract symptoms 05/01/2017    BPH with elevated PSA    Disorder of prostate, unspecified 05/01/2017    Prostate disorder    Elevated prostate specific antigen (PSA) 05/01/2017    Elevated prostate specific antigen (PSA)    Elevated prostate specific antigen (PSA) 05/01/2017    Abnormal prostate specific antigen    Elevated prostate specific antigen (PSA) 05/01/2017    Abnormal PSA    Elevated prostate specific antigen (PSA) 05/01/2017    Abnormal prostate specific antigen test    Epistaxis 05/01/2017    Epistaxis, recurrent    Essential (primary) hypertension 12/19/2022    Hypertension    Frequency of micturition 05/01/2017    Urinary frequency    Hallucinations, unspecified 05/01/2017    Hallucinations    Headache, unspecified 05/01/2017    Bilateral headache    Hematuria, unspecified  05/01/2017    Hematuria    Ischemic cardiomyopathy 12/13/2021    Cardiomyopathy, ischemic    Other amnesia 05/01/2017    Memory loss    Other microscopic hematuria 05/01/2017    Hematuria, microscopic    Other seasonal allergic rhinitis 05/01/2017    Seasonal allergies    Other secondary cataract, right eye 05/31/2018    Posterior capsular opacification visually significant of right eye    Other specified disorders of the male genital organs 05/01/2017    Scrotal swelling    Personal history of other specified conditions 02/10/2020    History of epistaxis    Presence of intraocular lens 05/01/2017    Pseudophakia    Primary insomnia 05/01/2017    Primary insomnia    Primary open-angle glaucoma, unspecified eye, stage unspecified 05/01/2017    Open angle primary glaucoma    Unspecified atrial flutter (Multi) 05/01/2017    Atrial flutter    Unspecified glaucoma 05/01/2017    Glaucoma    Vitamin D deficiency, unspecified 05/01/2017    Vitamin D deficiency        Past Surgical History:  Past Surgical History:   Procedure Laterality Date    CATARACT EXTRACTION  10/07/2014    Cataract Surgery    MR HEAD ANGIO WO IV CONTRAST  3/3/2018    MR HEAD ANGIO WO IV CONTRAST 3/3/2018 [facility] CLINICAL LEGACY    MR NECK ANGIO WO IV CONTRAST  3/3/2018    MR NECK ANGIO WO IV CONTRAST 3/3/2018 [facility] CLINICAL LEGACY    OTHER SURGICAL HISTORY  10/07/2014    Dental Surgery          Social History:  He reports that he has quit smoking. His smoking use included cigarettes. He has never been exposed to tobacco smoke. He has never used smokeless tobacco. He reports that he does not currently use alcohol. He reports that he does not currently use drugs.    Family History:  family history includes No Known Problems in his father and mother.      Allergies:  Allergies   Allergen Reactions    Ace Inhibitors Unknown     ACE Inhibitors       Outpatient Medications:  Current Outpatient Medications   Medication Instructions    apixaban (ELIQUIS) 5 mg,  "oral, 2 times daily    aspirin 81 mg EC tablet 1 tablet, oral, Daily    atorvastatin (LIPITOR) 10 mg, oral, Daily    cholecalciferol (Vitamin D-3) 50 mcg (2,000 unit) capsule 1 capsule, oral, Daily    clindamycin (Cleocin T) 1 % lotion Topical, 2 times daily    donepezil (ARICEPT) 10 mg, oral, Daily    finasteride (PROSCAR) 5 mg, oral, Daily    fluticasone propion-salmeteroL (Advair Diskus) 250-50 mcg/dose diskus inhaler 1 puff, inhalation, 2 times daily RT, Rinse mouth with water after use to reduce aftertaste and incidence of candidiasis. Do not swallow.    ipratropium-albuteroL (Duo-Neb) 0.5-2.5 mg/3 mL nebulizer solution 3 mL, nebulization, Every 4 hours PRN    Jardiance 10 mg, oral, Daily    memantine (NAMENDA) 10 mg, oral, 2 times daily    metoprolol succinate XL (TOPROL-XL) 25 mg, oral, Daily, Do not crush or chew.    sacubitriL-valsartan (Entresto) 24-26 mg tablet 0.5 tablets, oral, 2 times daily, *Please note decreased dose    tamsulosin (FLOMAX) 0.4 mg, oral, Daily, Take at bedtime.    VITAMIN B COMPLEX ORAL 1 tablet, oral, Daily    walker (Ultra-Light Rollator) misc 1 each, miscellaneous, Daily       Physical Exam:    General:  Patient is elderly and frail. Patient is drowsy, easily to fall asleep, does not really talk.  Patient is in no acute distress.  HEENT:  Normocephalic.  Moist mucosa.    Neck:  Normal Jugular Venous Pressure.  Cardiovascular:  Regular rate and rhythm.  Normal S1 and S2, no murmurs, rubs or gallops  Pulmonary:  Clear to auscultation bilaterally.  Abdomen:  Soft. Non-tender.   Non-distended.  Positive bowel sounds.  Lower Extremities:  2+ pedal pulses. No LE edema.  Neurologic:  Confused, unable to answer any questions to assess orientation. \"I dont know\"  Skin: Skin warm and dry, normal skin turgor.   Psychiatric:  Quiet, confused, no distress      Vitals:    05/15/24 1335   BP: 91/56   Pulse: 76   Resp: 18   SpO2: 93%   Weight: 67.1 kg (148 lb)   Height: 1.803 m (5' 11\")     Wt " Readings from Last 5 Encounters:   05/15/24 67.1 kg (148 lb)   05/13/24 64.1 kg (141 lb 6.4 oz)   05/11/24 66.7 kg (147 lb)   05/09/24 70.5 kg (155 lb 6.8 oz)   04/30/24 73.9 kg (163 lb)     Body mass index is 20.64 kg/m².        Last Labs:  CMP:  Recent Labs     05/08/24  1602 05/08/24  0533 05/07/24  0304    139 139   K 3.9 4.2 4.4    107 104   CO2 26 25 28   ANIONGAP 12 11 11   BUN 12 13 13   CREATININE 1.02 0.97 1.05   EGFR 69 73 66   GLUCOSE 115* 101* 86     Recent Labs     05/09/24  0725 05/08/24  1602 05/08/24  0714 05/07/24  0304 03/02/18  2111 10/05/17  1039   ALBUMIN 3.5 2.8* 2.6* 2.6*   < > 3.9   ALKPHOS 96  --  71 72   < > 96   ALT 30  --  30 44   < > 24   AST 22  --  15 16   < > 44*   BILITOT 1.4*  --  1.3* 1.6*   < > 1.3*   LIPASE  --   --   --   --   --  38    < > = values in this interval not displayed.     CBC:  Recent Labs     05/09/24  0725 05/08/24  0714 05/07/24  0304   WBC 9.2 7.5 7.3   HGB 14.6 12.0* 11.8*   HCT 46.9 37.3* 38.1*    246 233   MCV 93 93 94     COAG:   Recent Labs     05/03/24  1033 05/03/24  0201 04/27/24  2254   INR 1.8* 1.7* 2.2*     ENDO:  Recent Labs     04/29/24  0646 05/23/23  1213 08/26/22  1312 07/30/21  1417   TSH 0.40* 0.52 0.89 0.66   HGBA1C 5.4  --   --   --       CARDIAC:   Recent Labs     05/04/24  0435 05/03/24  1033 05/03/24  0201 04/28/24  0027 04/27/24  2320 04/27/24  2254   TROPHS  --   --  23 21*  --  27*   * 1,225* 488*  --    < >  --     < > = values in this interval not displayed.     Recent Labs     03/05/18  0540   CHOL 104   LDLF 57   HDL 32.7*   TRIG 73     No data recorded    Assessment/Plan     Jam Cox is a 89 y.o. male with past medical history of  HFrEF (LVEF 30-35% on echo 5/4/2024), HTN, HLD, pAF on Eliquis, dementia, and frailty who presents to heart failure clinic for hospital follow up.     3 recent admissions for systolic heart failure in the past month:         -- Admitted to [facility] [place] 53/2024 - 5/9/2024 with  systolic heart failure, pneumonia, and atrial fibrillation RVR. He required ICU admission for septic vs cardiogenic shock and was on vasopressors.    He was briefly on dobutamine gtt that was weaned off.  He was recommended to discharge to SNF, but family declined and opted for home care.      -- [facility] 5/2/20[facility] for systolic heart failure and YOSEF - discharged home     -- Admitted at [facility] from 4/22/2024 - 4/25/2024 with systolic heart failure and atrial fibrillation.     He walked into the office from the parking lot with rollator. No shortness of breath at home, but wife reports he only wants to sleep and not do anything but sit.  No chest pain or angina.  No dizziness, or headaches.  No recent falls.  No nausea, vomiting, fever, or chills,  No blood in urine or stool, but does have an occasional nosebleed. .  Daughter said he sleeps all day, and is awake all night and he wants to talk all night. He is eating very little, but he is on puree diet and wants to eat more.  He sleeps propped up on 3 pillows.  Easily confused and forgetful.     3 recent admissions for systolic heart failure in the past month:         [facility] - 5/9/2024 with systolic heart failure, pneumonia, and atrial fibrillation RVR. He required ICU admission for septic vs cardiogenic shock and was on vasopressors.    He was briefly on dobutamine gtt that was weaned off.  He was recommended to discharge to SNF, but family declined and opted for home care.      [facility] 5/2/2023 for systolic heart failure and YOSEF - discharged home     Admitted at [facility] from 4/22/2024 - 4/25/2024 with systolic heart failure and atrial fibrillation.     Doing well since discharge.  A  little hypotensive at 91/56, but tolerating small doses of GDMT.  Volume status is acceptable.  HR well controlled.       PLAN:  - GDMT:  continue Entresto 12-12.5mg BID, Toprol 25mg daily, and Jardiance 10mg daily  - He can remain off  furosemide and potassium at this time,  but will continue to watch closely  - Continue Eliquis 5mg BID, and atorvastatin 10mg daily,    Plan to follow up with advanced heart failure, Dr. Diaz on 6/27/2024 as previously scheduled.   He previously followed  with Dr. Menezes, but family wants follow up with advanced heart failure after recent admission to heart failure ICU at .  Patient is following up with Dr. Shen next week for PCP    Followup Appts:  Future Appointments   Date Time Provider Department Center   5/16/2024  1:00 PM Meghan D. Sturges, [facility]   5/17/2024 To Be Determined [facility]   5/17/2024  5:30 PM [facility] None   5/20/2024 10:00 AM Becky Nascimento, [facility]   5/21/2024 12:00 PM Malcolm Shen MD [id number] [facility]   5/21/2024 To Be Determined [facility]   5/21/2024 To Be Determined Meghan D. Sturges, [facility]   5/22/2024 10:00 AM Becky Nascimento, [facility]   5/23/2024 To Be Determined Rachel Mcgee RN [facility]   5/24/2024 To Be Determined Meghan D. Sturges, [facility]   5/27/2024 To Be Determined Becky G Cristea, [facility]   5/27/2024 To Be Determined [facility]   5/28/2024 To Be Determined Meghan D. Sturges, [facility]   5/29/2024 To Be Determined Becky G Cristea, [facility]   5/30/2024 To Be Determined Meghan D. Sturges, [facility]   6/3/2024 To Be Determined Becky G Cristea, [facility]   6/5/2024 To Be Determined Becky G Cristea, [facility]   6/11/2024 To Be Determined Matt Mcintyre, PT [facility]   6/12/2024  3:00 PM Sanjay Black MD [id number] [facility]   6/27/2024  2:00 PM Agustina Diaz MD MPH [id number] [facility]   4/10/2025 10:45 AM Sherrell Malcolm MD [id number] [facility]     __________________________________________________________  Marta Shin CNP  [facility]  Congestive Heart Failure Clinic - [facility]  [facility]

## 2024-05-15 NOTE — PROGRESS NOTES
Daily Call Note:   5/15/24 Daily call completed. Spoke to Mrs. Cox, patient's spouse. Spouse reported that patient is doing well. Patient still have some left middle toe discomfort but is improving. Blister has burst. Spouse has been applying Bacitracin on it daily. Today patient had a cardiologist appointment. Cardiologist discontinued daily Aspirin regimen. Patient continues daily dose of Eliquis. Vitals taken during doctor's visit   BP 91/56  HR 76  RR 18    Pt Education: Toe pain  Barriers: None (caregiver)  Topics for Daily Review: Toe pain, cardiologist appointment  Pt demonstrates clear understanding: Yes    Daily Weight:  There were no vitals filed for this visit.   Last 3 Weights:  Wt Readings from Last 7 Encounters:   05/15/24 67.1 kg (148 lb)   05/13/24 64.1 kg (141 lb 6.4 oz)   05/11/24 66.7 kg (147 lb)   05/09/24 70.5 kg (155 lb 6.8 oz)   04/30/24 73.9 kg (163 lb)   04/25/24 71.4 kg (157 lb 6.5 oz)   04/22/24 75.4 kg (166 lb 3.2 oz)       Masimo Device: No   Masimo Clinical Impression: N/A    Virtual Visits--Scheduled (Most Recent Date at Top)  Follow up Appointments  Recent Visits  Date Type Provider Dept   04/22/24 Office Visit Malcolm Shen MD Do [id number] [facility]   Showing recent visits within past 30 days and meeting all other requirements  Future Appointments  Date Type Provider Dept   05/21/24 Appointment Malcolm Shen MD Do [id number] [facility]   Showing future appointments within next 90 days and meeting all other requirements       Frequency of RN Calls & Virtual Visits per Team Agreement: [facility] Home Frequency: Daily    Medication issues Addressed (what was done): None    Follow up appointments scheduled by [facility] Staff: None  Referrals made by [facility] staff: None

## 2024-05-16 ENCOUNTER — PATIENT OUTREACH (OUTPATIENT)
Dept: HOME HEALTH SERVICES | Age: 89
End: 2024-05-16
Payer: MEDICARE

## 2024-05-16 ENCOUNTER — HOME CARE VISIT (OUTPATIENT)
Dept: HOME HEALTH SERVICES | Facility: HOME HEALTH | Age: 89
End: 2024-05-16
Payer: MEDICARE

## 2024-05-16 VITALS
RESPIRATION RATE: 14 BRPM | DIASTOLIC BLOOD PRESSURE: 50 MMHG | SYSTOLIC BLOOD PRESSURE: 96 MMHG | HEART RATE: 68 BPM | OXYGEN SATURATION: 98 %

## 2024-05-16 LAB
ATRIAL RATE: 141 BPM
Q ONSET: 215 MS
QRS COUNT: 12 BEATS
QRS DURATION: 134 MS
QT INTERVAL: 402 MS
QTC CALCULATION(BAZETT): 507 MS
QTC FREDERICIA: 470 MS
R AXIS: -37 DEGREES
T AXIS: 98 DEGREES
T OFFSET: 416 MS
VENTRICULAR RATE: 96 BPM

## 2024-05-16 PROCEDURE — G0157 HHC PT ASSISTANT EA 15: HCPCS | Mod: CQ,HHH

## 2024-05-16 PROCEDURE — 1090000002 HH PPS REVENUE DEBIT

## 2024-05-16 PROCEDURE — 1090000001 HH PPS REVENUE CREDIT

## 2024-05-16 ASSESSMENT — ENCOUNTER SYMPTOMS: DENIES PAIN: 1

## 2024-05-16 NOTE — PROGRESS NOTES
Daily Call Note:     Daily call completed with the patient's wife, Mrs. Cox.  She states he is doing okay. He had therapy today and it went pretty good.  PT recommended that they try to get him up and walk short distances every hour. She states it is going well.  PT put vitals in during the visit. : BP 96/50, HR 68, Pox 98% on RA.  She states his weight was 148 lbs.  No question or concerns during the call.  Aware of upcoming appts.       Pt Education:   Barriers:   Topics for Daily Review:   Pt demonstrates clear understanding:     Daily Weight:  There were no vitals filed for this visit.   Last 3 Weights:  Wt Readings from Last 7 Encounters:   05/15/24 67.1 kg (148 lb)   05/13/24 64.1 kg (141 lb 6.4 oz)   05/11/24 66.7 kg (147 lb)   05/09/24 70.5 kg (155 lb 6.8 oz)   04/30/24 73.9 kg (163 lb)   04/25/24 71.4 kg (157 lb 6.5 oz)   04/22/24 75.4 kg (166 lb 3.2 oz)       Masimo Device:    Masimo Clinical Impression:     Virtual Visits--Scheduled (Most Recent Date at Top)  Follow up Appointments  Recent Visits  Date Type Provider Dept   04/22/24 Office Visit Malcolm Shen MD Do [id number] [facility]   Showing recent visits within past 30 days and meeting all other requirements  Future Appointments  Date Type Provider Dept   05/21/24 Appointment Malcolm Shen MD Do [id number] [facility]   Showing future appointments within next 90 days and meeting all other requirements       Frequency of RN Calls & Virtual Visits per Team Agreement:     Medication issues Addressed (what was done):     Follow up appointments scheduled by [facility] Staff:   Referrals made by [facility] staff:

## 2024-05-17 ENCOUNTER — HOME CARE VISIT (OUTPATIENT)
Dept: HOME HEALTH SERVICES | Facility: HOME HEALTH | Age: 89
End: 2024-05-17
Payer: MEDICARE

## 2024-05-17 ENCOUNTER — TELEMEDICINE (OUTPATIENT)
Dept: PHARMACY | Facility: HOSPITAL | Age: 89
End: 2024-05-17
Payer: MEDICARE

## 2024-05-17 ENCOUNTER — PATIENT OUTREACH (OUTPATIENT)
Dept: HOME HEALTH SERVICES | Age: 89
End: 2024-05-17

## 2024-05-17 DIAGNOSIS — I48.11 LONGSTANDING PERSISTENT ATRIAL FIBRILLATION (MULTI): Primary | ICD-10-CM

## 2024-05-17 DIAGNOSIS — I50.42 CHRONIC COMBINED SYSTOLIC AND DIASTOLIC CONGESTIVE HEART FAILURE (MULTI): ICD-10-CM

## 2024-05-17 DIAGNOSIS — J42 CHRONIC BRONCHITIS, UNSPECIFIED CHRONIC BRONCHITIS TYPE (MULTI): ICD-10-CM

## 2024-05-17 DIAGNOSIS — I48.11 LONGSTANDING PERSISTENT ATRIAL FIBRILLATION (MULTI): ICD-10-CM

## 2024-05-17 PROCEDURE — G0300 HHS/HOSPICE OF LPN EA 15 MIN: HCPCS | Mod: HHH

## 2024-05-17 PROCEDURE — 1090000002 HH PPS REVENUE DEBIT

## 2024-05-17 PROCEDURE — 1090000001 HH PPS REVENUE CREDIT

## 2024-05-17 SDOH — HEALTH STABILITY: PHYSICAL HEALTH
EXERCISE COMMENTS: PATIENT WAS PROVIDED WITH HEP FOR SEATED THERAPEUTIC EXERCISES. PATIENT PERFORMED WITH NO ADVERSE RESPONSE. PATIENT REQUIRED VERBAL CUES, TACTILE CUES, AND VISUAL DEMONSTRATION FOR PROPER TECHNIQUE. PATIENT WAS PROVIDED WITH WRITTEN HEP.

## 2024-05-17 ASSESSMENT — ACTIVITIES OF DAILY LIVING (ADL)
AMBULATION ASSISTANCE ON FLAT SURFACES: 1
AMBULATION_DISTANCE/DURATION_TOLERATED: 100 FEET

## 2024-05-17 NOTE — PROGRESS NOTES
Pharmacy Post-Discharge Visit    Jam Cox is a 89 y.o. male was referred to Clinical Pharmacy Team to complete a post-discharge medication optimization and monitoring visit.  The patient was referred for their Afib, COPD and CHF management. He is also currently enrolled in our [facility].     Admission Date: 5/3/24  Discharge Date: 5/9/24    Referring Provider: Briana King APRN*  PCP: Malcolm Shen MD - next visit: 5/21      Subjective   Allergies   Allergen Reactions    Ace Inhibitors Unknown     ACE Inhibitors       [id number] - [place], [place] - [address] AT [facility] [address]  [address]  [facility] [address]  Phone: [phone] Fax: [phone]     [facility]  [address]  [facility] [address]  Phone: [phone] Fax: [phone]    [facility]  [address]  [facility] [address]  Phone: [phone] Fax: [phone]      Medication System Management:  Affordability/Accessibility: try to enroll into PAP   Adherence/Organization: no concerns       Social History     Social History Narrative    Not on file        Notable Medication changes following discharge:  Start: metoprolol succinate   Stop: metoprolol tartrate, lasix and potassium   Change: flomax     HPI  CHF ASSESSMENT  Staging:  Most recent ejection fraction: 30-35%  NYHA Stage: II  ACC/AHA Stage: C    Symptom Assessment:  Weight changes/edema?: Yes - down 10 lbs from admission   Dyspnea?: None  Dizziness/syncope/palpitations?: No    Current Regimen:  ARNI/ACEi/ARB: Yes - entresto  Beta Blocker: Yes - metoprolol  MRA: No  SGLT2i: Yes - jardiance     Other therapy:  Eliquis     Secondary Prevention:  The ASCVD Risk score (Brittanie CLEANING, et al., 2019) failed to calculate for the following reasons:    The 2019 ASCVD risk score is only valid for ages 40 to 79    Aspirin 81mg? yes  Statin?: Yes - atorvastatin   HTN?: No      PULMONARY ASSESSMENT  Patient has been diagnosed with: COPD  does not see a pulmonologist  Last visit: n/a    Current Regimen:  Advair   Duo nebs     Appropriate Inhaler Technique? yes  How often do you use your rescue inhaler? N/a    Symptom Assessment:  Current symptoms: dyspnea, fatigue, and weight loss  Symptoms are remain unchanged    Immunization History:  Influenza: Date [9/19/2023]  PCV13: Date [2/3/2017]  PPSV23: Date [10/4/2012]  PCV20: Date [n/a]  COVID: Date [10/16/2023]  RSV: Date [11/16/2023]    Smoking history:  He has never smoked.     Review of Systems        Objective     There were no vitals taken for this visit.   BP Readings from Last 4 Encounters:   05/17/24 102/76   05/16/24 96/50   05/15/24 91/56   05/14/24 102/58      There were no vitals filed for this visit.     LAB  Lab Results   Component Value Date    BILITOT 1.4 (H) 05/09/2024    CALCIUM 8.8 05/08/2024    CO2 26 05/08/2024     05/08/2024    CREATININE 1.02 05/08/2024    GLUCOSE 115 (H) 05/08/2024    ALKPHOS 96 05/09/2024    K 3.9 05/08/2024    PROT 8.7 (H) 05/09/2024     05/08/2024    AST 22 05/09/2024    ALT 30 05/09/2024    BUN 12 05/08/2024    ANIONGAP 12 05/08/2024    MG 2.44 (H) 05/09/2024    PHOS 3.2 05/08/2024    ALBUMIN 3.5 05/09/2024    LIPASE 38 10/05/2017    GFRMALE 79 05/23/2023     Lab Results   Component Value Date    TRIG 73 03/05/2018    CHOL 104 03/05/2018    HDL 32.7 (A) 03/05/2018     Lab Results   Component Value Date    HGBA1C 5.4 04/29/2024         Current Outpatient Medications   Medication Instructions    apixaban (ELIQUIS) 5 mg, oral, 2 times daily    aspirin 81 mg EC tablet 1 tablet, oral, Daily    atorvastatin (LIPITOR) 10 mg, oral, Daily    cholecalciferol (Vitamin D-3) 50 mcg (2,000 unit) capsule 1 capsule, oral, Daily    clindamycin (Cleocin T) 1 % lotion Topical, 2 times daily    donepezil (ARICEPT) 10 mg, oral, Daily    finasteride (PROSCAR) 5 mg, oral, Daily    fluticasone  propion-salmeteroL (Advair Diskus) 250-50 mcg/dose diskus inhaler 1 puff, inhalation, 2 times daily RT, Rinse mouth with water after use to reduce aftertaste and incidence of candidiasis. Do not swallow.    ipratropium-albuteroL (Duo-Neb) 0.5-2.5 mg/3 mL nebulizer solution 3 mL, nebulization, Every 4 hours PRN    Jardiance 10 mg, oral, Daily    memantine (NAMENDA) 10 mg, oral, 2 times daily    metoprolol succinate XL (TOPROL-XL) 25 mg, oral, Daily, Do not crush or chew.    sacubitriL-valsartan (Entresto) 24-26 mg tablet 0.5 tablets, oral, 2 times daily, *Please note decreased dose    tamsulosin (FLOMAX) 0.4 mg, oral, Daily, Take at bedtime.    VITAMIN B COMPLEX ORAL 1 tablet, oral, Daily    walker (Ultra-Light Rollator) misc 1 each, miscellaneous, Daily        HISTORICAL PHARMACOTHERAPY  N/a    DRUG INTERACTIONS  None at time of review      Assessment/Plan   Problem List Items Addressed This Visit       Atrial fibrillation (Multi)    CHF (congestive heart failure) (Multi)    Chronic obstructive pulmonary disease (Multi)     Afib  Metoprolol and eliquis   CHF  Most recent EF was 30-35% while admitted   Current GDMT --> metoprolol, entresto and jardiance   Lasix and potassium were stopped at discharged   Monitoring weights and BP's daily   COPD  No major concerns with breathing  Some SOB with exertion  Continue with advair bid   Duo nebs as needed     Will reach back out to Carin, his granddaughter and POA about info to try and enroll into PAP for a lot of his brand name meds.     Follow Up: as needed     Continue all meds under the continuation of care with the referring provider and clinical pharmacy team.    Eliel Sosa, Selina     Verbal consent to manage patient's drug therapy was obtained from an individual authorized to act on behalf of a patient. They were informed they may decline to participate or withdraw from participation in pharmacy services at any time.

## 2024-05-17 NOTE — PROGRESS NOTES
[facility] Weekly Provider Appointment/Time: 1730  Attendees: Patient, Family: daughter Toñito/Provider: Briana King/Pharmacist: Eliel Sosa/Zena Kruse RN  Identified Patient via Full Name and Date of Birth  Per daughter Toñito/Granddaughter is POA and is working (will schedule the the next [facility] at 2030, so Carin can attend the appointment)  Feeling a lot better since he has been home   Back to usual routine and talking more, taking medications  Did well with PT  Has Home Care Skilled Nurse, PT, OT  Up and walking with someone walking with him  His BP is up and down  Denied lower extremity edema  Left toe is feeling better and looks better per spouse  Using Albuteral nebulizer treatment three times a week, incentive spirometer every 3 hours x 10 or more puffs  He is normally calm unless he has to get up to  bathroom  Spouse is soaking feet in water and hydrogen peroxide, wipes left middle toe with alcohol swab and appiles Clindamycin ointment prescribed for his hand skin issue in February; advised Briana King and Eliel Sosa pharmacist to ensure they supported that plan.: Briana King CNP noted it should be fine via telephone call   Sleeping during day and up at night, they are trying to keep him awake with activities during day, so he can resume a nighttime sleep schedule - no medications for sleep at this time  Appetite is down a bit, better when up and walking, drank 2 ensure today, so far and ate dinner, 8 oz water  Voiding and is anxioius when he has to get up to bedside commode or bathroom and is fine once completed  Scheduled next [facility] appointment on 05.24.2024 at 2030 so his granddaughter Carin, who is POA can attend  Declined education re: relaxation and stress management techniques  Gave Toño Mckenna Carin's phone number to call and assess patient's medications  Advised Briana King CNP that weight on 05.13.2024: 141.4, 05.15.2024: 148, 5/17: 147.2 pounds  5/13  141.4  2223: Briana King recommended that they weigh him first in the morning after he uses restroom-added this to the tasks to address during the [facility] Weekly and daily outreach calls  Heart Rate BP POX Weight   69 102/75 96% 147.2 pounds/66.76 kg     Future Appointments:  [facility] Weekly Provider Appointment scheduled: 05.24.2024@2030  RN Outreach: daily     Pt Education: No needs today  Barriers: none, daughter and granddaughter are present on calls  Topics for Daily Review: none today  Pt demonstrates clear understanding: N/A    Daily Weight:  There were no vitals filed for this visit.   Last 3 Weights:  Wt Readings from Last 7 Encounters:   05/15/24 67.1 kg (148 lb)   05/13/24 64.1 kg (141 lb 6.4 oz)   05/11/24 66.7 kg (147 lb)   05/09/24 70.5 kg (155 lb 6.8 oz)   04/30/24 73.9 kg (163 lb)   04/25/24 71.4 kg (157 lb 6.5 oz)   04/22/24 75.4 kg (166 lb 3.2 oz)       Masimo Device: No   Masimo Clinical Impression: N/A    Virtual Visits--Scheduled (Most Recent Date at Top)  Follow up Appointments  Recent Visits  Date Type Provider Dept   04/22/24 Office Visit Malcolm Shen MD Do [id number] [facility]   Showing recent visits within past 30 days and meeting all other requirements  Future Appointments  Date Type Provider Dept   05/21/24 Appointment Malcolm Shen MD Do [id number] [facility]   Showing future appointments within next 90 days and meeting all other requirements       Frequency of RN Calls: daily & Virtual Visits per Team Agreement: 05.24.2024 2030: granddaughter is POA and wants to attend    Medication issues Addressed (what was done): none    Follow up appointments scheduled by [facility] Staff: none    Referrals made by [facility] staff: none

## 2024-05-18 ENCOUNTER — PATIENT OUTREACH (OUTPATIENT)
Dept: HOME HEALTH SERVICES | Age: 89
End: 2024-05-18
Payer: MEDICARE

## 2024-05-18 VITALS
SYSTOLIC BLOOD PRESSURE: 102 MMHG | RESPIRATION RATE: 16 BRPM | OXYGEN SATURATION: 96 % | TEMPERATURE: 97.5 F | DIASTOLIC BLOOD PRESSURE: 76 MMHG | HEART RATE: 64 BPM

## 2024-05-18 VITALS
WEIGHT: 148.8 LBS | DIASTOLIC BLOOD PRESSURE: 66 MMHG | HEART RATE: 96 BPM | BODY MASS INDEX: 20.75 KG/M2 | SYSTOLIC BLOOD PRESSURE: 123 MMHG | OXYGEN SATURATION: 94 %

## 2024-05-18 PROCEDURE — 1090000002 HH PPS REVENUE DEBIT

## 2024-05-18 PROCEDURE — 1090000001 HH PPS REVENUE CREDIT

## 2024-05-18 ASSESSMENT — PAIN SCALES - PAIN ASSESSMENT IN ADVANCED DEMENTIA (PAINAD)
CONSOLABILITY: 0 - NO NEED TO CONSOLE.
CONSOLABILITY: 0
TOTALSCORE: 0
BODYLANGUAGE: 0
BODYLANGUAGE: 0 - RELAXED.
BREATHING: 0
FACIALEXPRESSION: 0 - SMILING OR INEXPRESSIVE.
NEGVOCALIZATION: 0 - NONE.
FACIALEXPRESSION: 0
NEGVOCALIZATION: 0

## 2024-05-18 ASSESSMENT — ENCOUNTER SYMPTOMS
DESCRIPTION OF MEMORY LOSS: SHORT TERM
PERSON REPORTING PAIN: PATIENT
LAST BOWEL MOVEMENT: 66977
APPETITE LEVEL: GOOD
DENIES PAIN: 1

## 2024-05-18 NOTE — PROGRESS NOTES
Pt has been steadily improving since leaving the hospitals. Has been doing his pt therapy excise as recommended and family is getting patient up and walking around the house for excise. Wife stated she was taking him outside on the front porch to excise today in a few hours. Pt has a good supports systems that is encouraging him to do the things he needs to, to move in the right direction. Pt has no issues with eating/drinking, bathroom, cp, edema. Pt has a little bit of sob but takes frequent rest and then resume activities. Vitals are recorded in flow sheet. Family and pt have no further concerns. Next [facility] appt confirmed.

## 2024-05-19 PROCEDURE — 1090000001 HH PPS REVENUE CREDIT

## 2024-05-19 PROCEDURE — 1090000002 HH PPS REVENUE DEBIT

## 2024-05-20 ENCOUNTER — HOME CARE VISIT (OUTPATIENT)
Dept: HOME HEALTH SERVICES | Facility: HOME HEALTH | Age: 89
End: 2024-05-20
Payer: MEDICARE

## 2024-05-20 VITALS
RESPIRATION RATE: 14 BRPM | HEART RATE: 50 BPM | OXYGEN SATURATION: 91 % | SYSTOLIC BLOOD PRESSURE: 90 MMHG | DIASTOLIC BLOOD PRESSURE: 46 MMHG

## 2024-05-20 VITALS
BODY MASS INDEX: 19.8 KG/M2 | HEART RATE: 97 BPM | OXYGEN SATURATION: 97 % | WEIGHT: 142 LBS | SYSTOLIC BLOOD PRESSURE: 122 MMHG | DIASTOLIC BLOOD PRESSURE: 57 MMHG

## 2024-05-20 VITALS — RESPIRATION RATE: 18 BRPM | OXYGEN SATURATION: 91 % | TEMPERATURE: 98.1 F | HEART RATE: 60 BPM

## 2024-05-20 PROCEDURE — 1090000002 HH PPS REVENUE DEBIT

## 2024-05-20 PROCEDURE — 1090000001 HH PPS REVENUE CREDIT

## 2024-05-20 PROCEDURE — G0152 HHCP-SERV OF OT,EA 15 MIN: HCPCS | Mod: HHH

## 2024-05-20 PROCEDURE — G0157 HHC PT ASSISTANT EA 15: HCPCS | Mod: CQ,HHH

## 2024-05-20 SDOH — HEALTH STABILITY: PHYSICAL HEALTH
EXERCISE COMMENTS: PATIENT PERFORMED LOW REPETITIONS OF SEATED THERAPEUTIC EXERCISES. PATIENT REQUIRED VERBAL AND TACTILE CUES AS WELL AS VISUAL DEMONSTRATION FOR PROPER TEHNIQE. PATIENT HAD DIFFICULTY UNDERSTANDING AND WAS HARD TO KEEP ON TASK.

## 2024-05-20 ASSESSMENT — ACTIVITIES OF DAILY LIVING (ADL)
OASIS_M1830: 05
AMBULATION ASSISTANCE ON FLAT SURFACES: 1
AMBULATION_DISTANCE/DURATION_TOLERATED: 150 FEET

## 2024-05-20 ASSESSMENT — ENCOUNTER SYMPTOMS
DENIES PAIN: 1
PERSON REPORTING PAIN: PATIENT
DENIES PAIN: 1
PAIN LOCATION: LEFT FOOT
PAIN LOCATION - PAIN SEVERITY: 0/10

## 2024-05-20 NOTE — PROGRESS NOTES
Daily Call Note: Daily call complete, spoke with daughter, Mariah. She states he is doing ok. She denies swelling and SOB. His appetite is ok, she encourages him to drink water and gives atleast 1 ensure/day. Next [facility] 5/24/24 @ 2030. No other questions at this time.     /57   Pulse 97   Wt 64.4 kg (142 lb)   SpO2 97%   BMI 19.80 kg/m²       Pt Education: per POC  Barriers: none  Topics for Daily Review: BP, weight  Pt demonstrates clear understanding: Yes    Daily Weight:  There were no vitals filed for this visit.   Last 3 Weights:  Wt Readings from Last 7 Encounters:   05/18/24 67.5 kg (148 lb 12.8 oz)   05/15/24 67.1 kg (148 lb)   05/13/24 64.1 kg (141 lb 6.4 oz)   05/11/24 66.7 kg (147 lb)   05/09/24 70.5 kg (155 lb 6.8 oz)   04/30/24 73.9 kg (163 lb)   04/25/24 71.4 kg (157 lb 6.5 oz)       Masimo Device: No   Masimo Clinical Impression: n/a    Virtual Visits--Scheduled (Most Recent Date at Top)  Follow up Appointments  Recent Visits  Date Type Provider Dept   04/22/24 Office Visit Malcolm Shen MD Do [id number] [facility]   Showing recent visits within past 30 days and meeting all other requirements  Future Appointments  Date Type Provider Dept   05/21/24 Appointment Malcolm Shen MD Do [id number] [facility]   Showing future appointments within next 90 days and meeting all other requirements       Frequency of RN Calls & Virtual Visits per Team Agreement: [facility] Frequency: Daily    Medication issues Addressed (what was done): none    Follow up appointments scheduled by [facility] Staff: none  Referrals made by [facility] staff: none

## 2024-05-21 ENCOUNTER — HOME CARE VISIT (OUTPATIENT)
Dept: HOME HEALTH SERVICES | Facility: HOME HEALTH | Age: 89
End: 2024-05-21
Payer: MEDICARE

## 2024-05-21 ENCOUNTER — OFFICE VISIT (OUTPATIENT)
Dept: PRIMARY CARE | Facility: CLINIC | Age: 89
End: 2024-05-21
Payer: MEDICARE

## 2024-05-21 VITALS
TEMPERATURE: 97.9 F | DIASTOLIC BLOOD PRESSURE: 60 MMHG | SYSTOLIC BLOOD PRESSURE: 100 MMHG | RESPIRATION RATE: 16 BRPM | BODY MASS INDEX: 19.94 KG/M2 | HEART RATE: 70 BPM | WEIGHT: 143 LBS

## 2024-05-21 VITALS
HEART RATE: 72 BPM | RESPIRATION RATE: 16 BRPM | DIASTOLIC BLOOD PRESSURE: 62 MMHG | TEMPERATURE: 97.7 F | SYSTOLIC BLOOD PRESSURE: 102 MMHG

## 2024-05-21 DIAGNOSIS — I10 PRIMARY HYPERTENSION: Primary | ICD-10-CM

## 2024-05-21 DIAGNOSIS — E13.69 OTHER SPECIFIED DIABETES MELLITUS WITH OTHER SPECIFIED COMPLICATION, UNSPECIFIED WHETHER LONG TERM INSULIN USE (MULTI): ICD-10-CM

## 2024-05-21 DIAGNOSIS — I50.42 CHRONIC COMBINED SYSTOLIC AND DIASTOLIC CONGESTIVE HEART FAILURE (MULTI): ICD-10-CM

## 2024-05-21 DIAGNOSIS — L03.116 CELLULITIS OF LEFT LOWER EXTREMITY: ICD-10-CM

## 2024-05-21 LAB — POC FINGERSTICK BLOOD GLUCOSE: 127 MG/DL (ref 70–100)

## 2024-05-21 PROCEDURE — 1160F RVW MEDS BY RX/DR IN RCRD: CPT | Performed by: INTERNAL MEDICINE

## 2024-05-21 PROCEDURE — 1111F DSCHRG MED/CURRENT MED MERGE: CPT | Performed by: INTERNAL MEDICINE

## 2024-05-21 PROCEDURE — 3074F SYST BP LT 130 MM HG: CPT | Performed by: INTERNAL MEDICINE

## 2024-05-21 PROCEDURE — 82962 GLUCOSE BLOOD TEST: CPT | Performed by: INTERNAL MEDICINE

## 2024-05-21 PROCEDURE — 1159F MED LIST DOCD IN RCRD: CPT | Performed by: INTERNAL MEDICINE

## 2024-05-21 PROCEDURE — 3078F DIAST BP <80 MM HG: CPT | Performed by: INTERNAL MEDICINE

## 2024-05-21 PROCEDURE — 1036F TOBACCO NON-USER: CPT | Performed by: INTERNAL MEDICINE

## 2024-05-21 PROCEDURE — 1090000001 HH PPS REVENUE CREDIT

## 2024-05-21 PROCEDURE — G0300 HHS/HOSPICE OF LPN EA 15 MIN: HCPCS | Mod: HHH

## 2024-05-21 PROCEDURE — 99495 TRANSJ CARE MGMT MOD F2F 14D: CPT | Performed by: INTERNAL MEDICINE

## 2024-05-21 PROCEDURE — 1090000002 HH PPS REVENUE DEBIT

## 2024-05-21 RX ORDER — DOXYCYCLINE 100 MG/1
100 CAPSULE ORAL 2 TIMES DAILY
Qty: 14 CAPSULE | Refills: 0 | Status: SHIPPED | OUTPATIENT
Start: 2024-05-21 | End: 2024-05-28

## 2024-05-21 ASSESSMENT — PAIN SCALES - PAIN ASSESSMENT IN ADVANCED DEMENTIA (PAINAD)
NEGVOCALIZATION: 0
FACIALEXPRESSION: 0 - SMILING OR INEXPRESSIVE.
NEGVOCALIZATION: 0 - NONE.
FACIALEXPRESSION: 0
CONSOLABILITY: 0 - NO NEED TO CONSOLE.
BODYLANGUAGE: 0
BODYLANGUAGE: 0 - RELAXED.
TOTALSCORE: 0
BREATHING: 0
CONSOLABILITY: 0

## 2024-05-21 ASSESSMENT — ENCOUNTER SYMPTOMS
PERSON REPORTING PAIN: PATIENT
DESCRIPTION OF MEMORY LOSS: SHORT TERM
LAST BOWEL MOVEMENT: 66980
DENIES PAIN: 1
APPETITE LEVEL: GOOD

## 2024-05-21 NOTE — PROGRESS NOTES
Jam Cox is a 89 y.o. male    Patient with a past medical history significant for combined systolic diastolic congestive heart failure hypertension dyslipidemia paroxysmal atrial fibrillation COPD and dementia     /03   Admitted to [facility] from ED (Observation) 0609 05/07   Transferred out of [facility] 2004 05/09   Discharged 1507           Review of Systems     Constitutional: Weight loss  ENT: no earache, no hearing loss, no nosebleeds, no nasal discharge, no sore throat and no hoarseness.   Cardiovascular: the heart rate was not slow, the heart rate was not fast, no chest pain, no palpitations, no intermittent leg claudication and no lower extremity edema.   Respiratory: no cough, wheezing or shortness of breath at rest or exertion  Gastrointestinal: no abdominal pain, no constipation, no melena, no nausea, no diarrhea, no vomiting and no blood in stools.   Musculoskeletal: no arthralgias, no myalgias, no back pain, no joint swelling, no joint stiffness, no limb pain and no limb swelling.   Integumentary: no skin rashes, no skin lesions, no itching, no skin wound and no dry skin.   Neurological: Dementia  All other systems have been reviewed and are negative for complaint.       There were no vitals filed for this visit.     Current Outpatient Medications on File Prior to Visit   Medication Sig Dispense Refill    apixaban (Eliquis) 5 mg tablet Take 1 tablet (5 mg) by mouth 2 times a day. 90 tablet 3    aspirin 81 mg EC tablet Take 1 tablet (81 mg) by mouth once daily.      atorvastatin (Lipitor) 10 mg tablet Take 1 tablet (10 mg) by mouth once daily. 90 tablet 1    cholecalciferol (Vitamin D-3) 50 mcg (2,000 unit) capsule Take 1 capsule (50 mcg) by mouth once daily.      clindamycin (Cleocin T) 1 % lotion Apply topically 2 times a day. (Patient taking differently: Apply 1 Application topically 2 times a  day. Apply to affected area twice daily) 60 mL 0    donepezil (Aricept) 10 mg tablet Take 1 tablet (10 mg) by mouth once daily. 90 tablet 0    empagliflozin (Jardiance) 10 mg Take 1 tablet (10 mg) by mouth once daily. 30 tablet 0    finasteride (Proscar) 5 mg tablet Take 1 tablet (5 mg) by mouth once daily. 90 tablet 1    fluticasone propion-salmeteroL (Advair Diskus) 250-50 mcg/dose diskus inhaler Inhale 1 puff 2 times a day. Rinse mouth with water after use to reduce aftertaste and incidence of candidiasis. Do not swallow. 60 each 0    ipratropium-albuteroL (Duo-Neb) 0.5-2.5 mg/3 mL nebulizer solution Take 3 mL by nebulization every 4 hours if needed for wheezing or shortness of breath. 180 mL 0    memantine (Namenda) 10 mg tablet Take 1 tablet (10 mg) by mouth 2 times a day. 90 tablet 0    metoprolol succinate XL (Toprol-XL) 25 mg 24 hr tablet Take 1 tablet (25 mg) by mouth once daily. Do not crush or chew. 30 tablet 1    sacubitriL-valsartan (Entresto) 24-26 mg tablet Take 0.5 tablets by mouth 2 times a day. *Please note decreased dose      tamsulosin (Flomax) 0.4 mg 24 hr capsule Take 1 capsule (0.4 mg) by mouth once daily. Take at bedtime.      VITAMIN B COMPLEX ORAL Take 1 tablet by mouth once daily.      walker (Ultra-Light Rollator) misc 1 each once daily. 1 each 0    [DISCONTINUED] furosemide (Lasix) 40 mg tablet Take 1 tablet (40 mg) by mouth once daily. 30 tablet 0    [DISCONTINUED] methylPREDNISolone (Medrol) 4 mg tablet Take 4 tablets (16 mg) by mouth once daily for 1 day, THEN 3 tablets (12 mg) once daily for 1 day, THEN 2 tablets (8 mg) once daily for 1 day, THEN 1 tablet (4 mg) once daily for 1 day. Do not fill before May 3, 2024. 10 tablet 0    [DISCONTINUED] metoprolol tartrate (Lopressor) 25 mg tablet Take 1 tablet (25 mg) by mouth 2 times a day. 90 tablet 0     No current facility-administered medications on file prior to visit.               Physical Exam     Constitutional   General  appearance:  tired appearaing    Pulmonary   Respiratory assessment: No respiratory distress, normal respiratory rhythm and effort.    Auscultation of Lungs: Clear bilateral breath sounds.   Cardiovascular   Auscultation of heart: Apical pulse normal, heart rate and rhythm normal, normal S1 and S2, no murmurs and no pericardial rub.    Exam for edema: No peripheral edema.   Abdomen   Abdominal Exam: No bruits, normal bowel sounds, soft, non-tender, no abdominal mass palpated.    Liver and Spleen exam: No hepato-splenomegaly.   Musculoskeletal   Examination of gait:  using a rollator  Inspection of digits and nails: No clubbing or cyanosis of the fingernails.    Inspection/palpation of joints, bones and muscles: No joint swelling. Normal movement of all extremities.   Skin   Skin inspection: Normal skin color and pigmentation, normal skin turgor and no visible rash.   Neurologic   Cranial nerves: Nerves 2-12 were intact, no focal neuro defects.     Assessment/Plan           Patient with a past medical history significant for combined systolic diastolic congestive heart failure hypertension dyslipidemia paroxysmal atrial fibrillation COPD and dementia      # HTN  Low readings  Continue Entresto   Decrease  toprol  XL to 12.5 mg     # Dementia  Increase lethargy/ sleepiness in AM  DC AM Memantine  If persists will DC evening dose too  Continue ARICEPT    # Cellulitis of left 3rd toe  Doxy 100 BID x 7

## 2024-05-22 ENCOUNTER — HOME CARE VISIT (OUTPATIENT)
Dept: HOME HEALTH SERVICES | Facility: HOME HEALTH | Age: 89
End: 2024-05-22
Payer: MEDICARE

## 2024-05-22 PROCEDURE — 1090000001 HH PPS REVENUE CREDIT

## 2024-05-22 PROCEDURE — 1090000002 HH PPS REVENUE DEBIT

## 2024-05-22 PROCEDURE — G0152 HHCP-SERV OF OT,EA 15 MIN: HCPCS | Mod: HHH

## 2024-05-22 ASSESSMENT — ACTIVITIES OF DAILY LIVING (ADL)
FEEDING ASSESSED: 1
FEEDING: STAND BY ASSIST
TOILETING: 1
BATHING ASSESSED: 1

## 2024-05-23 ENCOUNTER — PATIENT OUTREACH (OUTPATIENT)
Dept: HOME HEALTH SERVICES | Age: 89
End: 2024-05-23
Payer: MEDICARE

## 2024-05-23 ENCOUNTER — HOME CARE VISIT (OUTPATIENT)
Dept: HOME HEALTH SERVICES | Facility: HOME HEALTH | Age: 89
End: 2024-05-23
Payer: MEDICARE

## 2024-05-23 VITALS
TEMPERATURE: 97.2 F | RESPIRATION RATE: 18 BRPM | OXYGEN SATURATION: 98 % | WEIGHT: 146 LBS | DIASTOLIC BLOOD PRESSURE: 76 MMHG | BODY MASS INDEX: 20.36 KG/M2 | HEART RATE: 80 BPM | SYSTOLIC BLOOD PRESSURE: 117 MMHG

## 2024-05-23 PROBLEM — T45.515A ANTICOAGULANT ADVERSE REACTION: Status: ACTIVE | Noted: 2024-05-23

## 2024-05-23 PROBLEM — R44.1 VISUAL HALLUCINATIONS: Status: ACTIVE | Noted: 2024-05-23

## 2024-05-23 PROBLEM — H91.90 HEARING DIFFICULTY: Status: ACTIVE | Noted: 2023-05-16

## 2024-05-23 PROBLEM — J34.89 NASAL DISCHARGE: Status: RESOLVED | Noted: 2024-05-23 | Resolved: 2024-05-23

## 2024-05-23 PROBLEM — A49.9 URINARY TRACT BACTERIAL INFECTIONS: Status: ACTIVE | Noted: 2024-05-23

## 2024-05-23 PROBLEM — H26.493 BILATERAL POSTERIOR CAPSULAR OPACIFICATION: Status: ACTIVE | Noted: 2024-05-23

## 2024-05-23 PROBLEM — I50.9 CHF (CONGESTIVE HEART FAILURE) (MULTI): Status: ACTIVE | Noted: 2017-12-22

## 2024-05-23 PROBLEM — N39.0 URINARY TRACT BACTERIAL INFECTIONS: Status: ACTIVE | Noted: 2024-05-23

## 2024-05-23 PROBLEM — F51.01 PRIMARY INSOMNIA: Status: ACTIVE | Noted: 2017-05-01

## 2024-05-23 PROBLEM — J34.89 NASAL MUCOSA DRY: Status: RESOLVED | Noted: 2024-05-23 | Resolved: 2024-05-23

## 2024-05-23 PROBLEM — H93.8X9 ABNORMAL EAR SENSATION: Status: ACTIVE | Noted: 2024-05-23

## 2024-05-23 PROBLEM — H61.20 IMPACTED CERUMEN: Status: RESOLVED | Noted: 2024-05-23 | Resolved: 2024-05-23

## 2024-05-23 PROBLEM — N40.0 BENIGN PROSTATIC HYPERPLASIA: Status: ACTIVE | Noted: 2017-05-01

## 2024-05-23 PROBLEM — E88.09 HYPERPROTEINEMIA: Status: ACTIVE | Noted: 2017-05-01

## 2024-05-23 PROBLEM — Z98.49 HISTORY OF YAG LASER CAPSULOTOMY OF LENS: Status: RESOLVED | Noted: 2024-05-23 | Resolved: 2024-05-23

## 2024-05-23 PROBLEM — E55.9 VITAMIN D DEFICIENCY: Status: ACTIVE | Noted: 2017-05-01

## 2024-05-23 PROBLEM — J30.2 SEASONAL ALLERGIES: Status: ACTIVE | Noted: 2017-05-01

## 2024-05-23 PROBLEM — I25.5 ISCHEMIC MYOCARDIAL DYSFUNCTION: Status: ACTIVE | Noted: 2024-05-23

## 2024-05-23 PROBLEM — I48.92 ATRIAL FLUTTER (MULTI): Status: ACTIVE | Noted: 2017-05-01

## 2024-05-23 PROBLEM — M79.676 PAIN OF TOE: Status: ACTIVE | Noted: 2024-05-23

## 2024-05-23 PROBLEM — R41.3 AMNESIA: Status: ACTIVE | Noted: 2017-05-01

## 2024-05-23 PROCEDURE — 1090000001 HH PPS REVENUE CREDIT

## 2024-05-23 PROCEDURE — G0299 HHS/HOSPICE OF RN EA 15 MIN: HCPCS | Mod: HHH

## 2024-05-23 PROCEDURE — 1090000002 HH PPS REVENUE DEBIT

## 2024-05-23 NOTE — PROGRESS NOTES
Daily Call Note:     Daily call completed with the patient's spouse.  The HC Nurse came today.  Wife states that he seems to be more alert today.   She states he has been sitting up all day. His weight was 146 lbs.  The HC nurse placed vitals in /76, HR 80, RR 18, Temp 36.2, Pox 98% RA.  She also states she has been getting him up and walking him with the walker.  No questions or concerns.  Reminded of [facility] tomorrow.      Pt Education:   Barriers:   Topics for Daily Review:   Pt demonstrates clear understanding:     Daily Weight:  There were no vitals filed for this visit.   Last 3 Weights:  Wt Readings from Last 7 Encounters:   05/23/24 66.2 kg (146 lb)   05/21/24 64.9 kg (143 lb)   05/18/24 67.5 kg (148 lb 12.8 oz)   05/20/24 64.4 kg (142 lb)   05/15/24 67.1 kg (148 lb)   05/13/24 64.1 kg (141 lb 6.4 oz)   05/11/24 66.7 kg (147 lb)       Masimo Device:   Masimo Clinical Impression:    Virtual Visits--Scheduled (Most Recent Date at Top)  Follow up Appointments  Recent Visits  Date Type Provider Dept   05/21/24 Office Visit Malcolm Shen MD Do [id number] [facility]   Showing recent visits within past 30 days and meeting all other requirements  Future Appointments  No visits were found meeting these conditions.  Showing future appointments within next 90 days and meeting all other requirements       Frequency of RN Calls & Virtual Visits per Team Agreement:     Medication issues Addressed (what was done):     Follow up appointments scheduled by [facility] Staff:   Referrals made by [facility] staff:

## 2024-05-24 ENCOUNTER — HOME CARE VISIT (OUTPATIENT)
Dept: HOME HEALTH SERVICES | Facility: HOME HEALTH | Age: 89
End: 2024-05-24
Payer: MEDICARE

## 2024-05-24 ENCOUNTER — PATIENT OUTREACH (OUTPATIENT)
Dept: HOME HEALTH SERVICES | Age: 89
End: 2024-05-24

## 2024-05-24 VITALS — DIASTOLIC BLOOD PRESSURE: 50 MMHG | SYSTOLIC BLOOD PRESSURE: 90 MMHG | RESPIRATION RATE: 14 BRPM

## 2024-05-24 PROCEDURE — 1090000002 HH PPS REVENUE DEBIT

## 2024-05-24 PROCEDURE — 1090000001 HH PPS REVENUE CREDIT

## 2024-05-24 PROCEDURE — G0157 HHC PT ASSISTANT EA 15: HCPCS | Mod: CQ,HHH

## 2024-05-24 SDOH — HEALTH STABILITY: PHYSICAL HEALTH
EXERCISE COMMENTS: PATIENT PROGRESSED TO LOW REPS OF SELECT STANDING THERAPEUTIC EXERCISES WITH NOADVERSE RESPONSE. PATIENT REQUIRED VERBAL CUES AND VISUAL DEMONSTRATION FOR PROPER TECHNIQUE. PATIENT WAS PROVIDED WITH UPDATED WRITTEN HEP.

## 2024-05-24 ASSESSMENT — ACTIVITIES OF DAILY LIVING (ADL)
AMBULATION ASSISTANCE ON FLAT SURFACES: 1
AMBULATION_DISTANCE/DURATION_TOLERATED: 200 FEET

## 2024-05-24 ASSESSMENT — ENCOUNTER SYMPTOMS: DENIES PAIN: 1

## 2024-05-25 PROCEDURE — 1090000001 HH PPS REVENUE CREDIT

## 2024-05-25 PROCEDURE — 1090000002 HH PPS REVENUE DEBIT

## 2024-05-25 ASSESSMENT — ENCOUNTER SYMPTOMS
APPETITE LEVEL: FAIR
DENIES PAIN: 1
CHANGE IN APPETITE: VARYING

## 2024-05-25 NOTE — PROGRESS NOTES
"[facility] Weekly Provider Appointment/Time: 2030  Attendees: Patient, Family: Granddaughter: Carin Polo/Provider: Dr. Zepeda/Zena Kruse RN  Identified Patient via Full Name and Date of Birth  He is better every day, more active and using the walker and they go out to the yard in nature and she noted he is walking faster  He had been sleeping and lethargic: the PCP discontinued the am dose of the \"psych\" medication, and this is day 3 and he is more alert, more active now, eating well  Participating in PT/OT/Skilled RN  The left toe was noted to be about the same and prescribed an antibiotic a few days ago, no fever or chills  Noted the PCP refilled prescriptions  Carin noted he does not like early morning  Drinking more water than usual: 4 glasses per day, as well as ensure  He eats pureed food  Carin asked about a referral for a follow up swallow study to assess if he can progress the diet: Dr. Brown to place a referral for speech therapy/swallow study  And his spouse is well  She asked for [facility] phone number: advised [phone], option 1    Heart Rate BP Weight   84 127/55 147.8 pounds/67.0kg     Asked how his caregivers are coping and Carin noted they have a supportive, large family and one family member is at home each day and support each other  Future Appointments:  06.12.2024: Neurology  06.27.2024: Cardiology  They will call PCP for a follow up appointment and if they experience any issues, they will call Rns for assistance  [facility] Weekly Provider Appointment scheduled 05.31.2024 1730  RN Outreach: CRISTIAN [id number]    Pt Education: how to contact [facility]  Barriers: none, Carin is the POA and was a good historian  Topics for Daily Review: as questions arise  Granddaughter demonstrates clear understanding: Yes    Daily Weight:  There were no vitals filed for this visit.   Last 3 Weights:  Wt Readings from Last 7 Encounters:   05/23/24 66.2 kg (146 lb)   05/21/24 64.9 kg " (143 lb)   05/18/24 67.5 kg (148 lb 12.8 oz)   05/20/24 64.4 kg (142 lb)   05/15/24 67.1 kg (148 lb)   05/13/24 64.1 kg (141 lb 6.4 oz)   05/11/24 66.7 kg (147 lb)       Masimo Device: No   Masimo Clinical Impression: N/A    Virtual Visits--Scheduled (Most Recent Date at Top)  Follow up Appointments  Recent Visits  Date Type Provider Dept   05/21/24 Office Visit Malcolm Shen MD Do [id number] [facility]   Showing recent visits within past 30 days and meeting all other requirements  Future Appointments  No visits were found meeting these conditions.  Showing future appointments within next 90 days and meeting all other requirements       Frequency of RN Calls changed to M-W-F at 1730 & Virtual Visits per Team Agreement: 05.31.2024 1730    Medication issues Addressed (what was done): none    Follow up appointments scheduled by [facility] Staff: none    Referrals made by [facility] staff: speech therapy

## 2024-05-26 PROCEDURE — 1090000001 HH PPS REVENUE CREDIT

## 2024-05-26 PROCEDURE — 1090000002 HH PPS REVENUE DEBIT

## 2024-05-27 PROCEDURE — 1090000002 HH PPS REVENUE DEBIT

## 2024-05-27 PROCEDURE — 1090000001 HH PPS REVENUE CREDIT

## 2024-05-28 ENCOUNTER — HOME CARE VISIT (OUTPATIENT)
Dept: HOME HEALTH SERVICES | Facility: HOME HEALTH | Age: 89
End: 2024-05-28
Payer: MEDICARE

## 2024-05-28 VITALS
HEART RATE: 56 BPM | SYSTOLIC BLOOD PRESSURE: 96 MMHG | OXYGEN SATURATION: 99 % | RESPIRATION RATE: 15 BRPM | DIASTOLIC BLOOD PRESSURE: 65 MMHG | TEMPERATURE: 98 F

## 2024-05-28 VITALS
SYSTOLIC BLOOD PRESSURE: 90 MMHG | OXYGEN SATURATION: 96 % | RESPIRATION RATE: 16 BRPM | DIASTOLIC BLOOD PRESSURE: 50 MMHG | HEART RATE: 52 BPM

## 2024-05-28 PROCEDURE — 0023 HH SOC

## 2024-05-28 PROCEDURE — 1090000002 HH PPS REVENUE DEBIT

## 2024-05-28 PROCEDURE — G0157 HHC PT ASSISTANT EA 15: HCPCS | Mod: CQ,HHH

## 2024-05-28 PROCEDURE — 1090000001 HH PPS REVENUE CREDIT

## 2024-05-28 PROCEDURE — G0300 HHS/HOSPICE OF LPN EA 15 MIN: HCPCS | Mod: HHH

## 2024-05-28 SDOH — HEALTH STABILITY: PHYSICAL HEALTH
EXERCISE COMMENTS: PATIENT PROGRESSED TO ALL STANDING THERAPEUTIC EXERCISES. PATIENT PERFORMED 10 REPETITIONS OF EXERCISES WITH MAX VERBAL AND TACTILE CUES FOR PROPER TECHNIQUE. PATIENT REQUIRED 3 SEATED REST BREAK IN BETWEEN EXERCISES TO RECOVER FROM FATIGUE. PATIENT

## 2024-05-28 SDOH — HEALTH STABILITY: PHYSICAL HEALTH: EXERCISE TYPE: STANDING THERAPEUTIC EXERCISES

## 2024-05-28 SDOH — HEALTH STABILITY: PHYSICAL HEALTH: EXERCISE COMMENTS: WAS PROVIDED WITH UPDATED WRITTEN HEP.

## 2024-05-28 ASSESSMENT — ACTIVITIES OF DAILY LIVING (ADL)
AMBULATION_DISTANCE/DURATION_TOLERATED: 150 FEET
AMBULATION ASSISTANCE ON FLAT SURFACES: 1

## 2024-05-28 ASSESSMENT — ENCOUNTER SYMPTOMS: DENIES PAIN: 1

## 2024-05-29 ENCOUNTER — HOME CARE VISIT (OUTPATIENT)
Dept: HOME HEALTH SERVICES | Facility: HOME HEALTH | Age: 89
End: 2024-05-29
Payer: MEDICARE

## 2024-05-29 ENCOUNTER — PATIENT OUTREACH (OUTPATIENT)
Dept: HOME HEALTH SERVICES | Age: 89
End: 2024-05-29
Payer: MEDICARE

## 2024-05-29 VITALS
SYSTOLIC BLOOD PRESSURE: 90 MMHG | TEMPERATURE: 98.3 F | OXYGEN SATURATION: 90 % | DIASTOLIC BLOOD PRESSURE: 42 MMHG | HEART RATE: 68 BPM

## 2024-05-29 VITALS
BODY MASS INDEX: 20.5 KG/M2 | DIASTOLIC BLOOD PRESSURE: 55 MMHG | HEART RATE: 86 BPM | WEIGHT: 147 LBS | SYSTOLIC BLOOD PRESSURE: 90 MMHG

## 2024-05-29 PROCEDURE — 1090000002 HH PPS REVENUE DEBIT

## 2024-05-29 PROCEDURE — 1090000001 HH PPS REVENUE CREDIT

## 2024-05-29 PROCEDURE — G0152 HHCP-SERV OF OT,EA 15 MIN: HCPCS | Mod: HHH

## 2024-05-29 NOTE — CASE COMMUNICATION
Pt. seen in his home for OT HH. BP is 100/46 on manual cuff and 5 minutes later 90/42 on auto cuff.  I am told by the daughter that you office is at lunch right now therefore this message is being utilized. Please call the family with instructions [phone]. Wife did not give the metopropol this morning because she took BP earlier today and it was low per her report, 90/55 at approximately 10:30am. He was encouraged to drink fluids  at this OT HH appointment.

## 2024-05-30 ENCOUNTER — HOME CARE VISIT (OUTPATIENT)
Dept: HOME HEALTH SERVICES | Facility: HOME HEALTH | Age: 89
End: 2024-05-30
Payer: MEDICARE

## 2024-05-30 PROCEDURE — 1090000002 HH PPS REVENUE DEBIT

## 2024-05-30 PROCEDURE — 1090000001 HH PPS REVENUE CREDIT

## 2024-05-30 SDOH — ECONOMIC STABILITY: GENERAL

## 2024-05-30 ASSESSMENT — ENCOUNTER SYMPTOMS
PERSON REPORTING PAIN: PATIENT
LOSS OF SENSATION IN FEET: 0
PAIN SEVERITY GOAL: 0/10
LOWEST PAIN SEVERITY IN PAST 24 HOURS: 3/10
OCCASIONAL FEELINGS OF UNSTEADINESS: 0
APPETITE LEVEL: GOOD
PAIN: 1
SUBJECTIVE PAIN PROGRESSION: GRADUALLY IMPROVING
DEPRESSION: 0
CHANGE IN APPETITE: UNCHANGED
HIGHEST PAIN SEVERITY IN PAST 24 HOURS: 5/10

## 2024-05-30 ASSESSMENT — ACTIVITIES OF DAILY LIVING (ADL): MONEY MANAGEMENT (EXPENSES/BILLS): NEEDS ASSISTANCE

## 2024-05-30 NOTE — PROGRESS NOTES
Daily Call Note:   5/29/24 Daily call completed. Ms. Cox stated that patient is doing well. Continues to have some dyspnea on exertion. Denies any new or worsening symptoms at this time. Patient continues to have low BP 90/55 this morning. Patient is asymptomatic. PCP aware. Ordered patient to hold Metoprolol for a systolic less than 90. Metoprolol dose held today per Mrs. Cox. Patient continues to participate with OT and PT.    BP 90/55  HR 86  Pt Education: Monitor BP daily  Barriers: No  Topics for Daily Review: BP and Metoprolol  Pt demonstrates clear understanding: Yes    Daily Weight:  There were no vitals filed for this visit.   Last 3 Weights:  Wt Readings from Last 7 Encounters:   05/23/24 66.2 kg (146 lb)   05/21/24 64.9 kg (143 lb)   05/18/24 67.5 kg (148 lb 12.8 oz)   05/20/24 64.4 kg (142 lb)   05/15/24 67.1 kg (148 lb)   05/13/24 64.1 kg (141 lb 6.4 oz)   05/11/24 66.7 kg (147 lb)       Masimo Device: No   Masimo Clinical Impression: N/A    Virtual Visits--Scheduled (Most Recent Date at Top)  Follow up Appointments  Recent Visits  Date Type Provider Dept   05/21/24 Office Visit Malcolm Shen MD Do [id number] [facility]   Showing recent visits within past 30 days and meeting all other requirements  Future Appointments  No visits were found meeting these conditions.  Showing future appointments within next 90 days and meeting all other requirements       Frequency of RN Calls & Virtual Visits per Team Agreement: [facility] Frequency: MWF    Medication issues Addressed (what was done): None    Follow up appointments scheduled by [facility] Staff: None  Referrals made by [facility] staff: None

## 2024-05-31 ENCOUNTER — PATIENT OUTREACH (OUTPATIENT)
Dept: HOME HEALTH SERVICES | Age: 89
End: 2024-05-31

## 2024-05-31 ENCOUNTER — HOME CARE VISIT (OUTPATIENT)
Dept: HOME HEALTH SERVICES | Facility: HOME HEALTH | Age: 89
End: 2024-05-31
Payer: MEDICARE

## 2024-05-31 VITALS — TEMPERATURE: 98.2 F

## 2024-05-31 PROCEDURE — 1090000002 HH PPS REVENUE DEBIT

## 2024-05-31 PROCEDURE — 1090000001 HH PPS REVENUE CREDIT

## 2024-05-31 PROCEDURE — G0152 HHCP-SERV OF OT,EA 15 MIN: HCPCS | Mod: HHH

## 2024-05-31 ASSESSMENT — ENCOUNTER SYMPTOMS
DEPRESSION: 0
OCCASIONAL FEELINGS OF UNSTEADINESS: 1
LOSS OF SENSATION IN FEET: 0

## 2024-05-31 NOTE — PROGRESS NOTES
[facility] Call [facility] along with NP cris King, and this nurse , wife  states patient is doing ok, reports BP low again today @ 98/62, appetite fair wife is encouraging fluids , ensure throughout the day, continues to holds Toprolol. [facility] scheduled for 6/7@1600    Pt Education: poc  Barriers:   Topics for Daily Review: self care b/p  Pt demonstrates clear understanding: Yes    Daily Weight:  There were no vitals filed for this visit.   Last 3 Weights:  Wt Readings from Last 7 Encounters:   05/29/24 66.7 kg (147 lb)   05/23/24 66.2 kg (146 lb)   05/21/24 64.9 kg (143 lb)   05/18/24 67.5 kg (148 lb 12.8 oz)   05/20/24 64.4 kg (142 lb)   05/15/24 67.1 kg (148 lb)   05/13/24 64.1 kg (141 lb 6.4 oz)       Masimo Device: No   Masimo Clinical Impression:     Virtual Visits--Scheduled (Most Recent Date at Top)  Follow up Appointments  Recent Visits  Date Type Provider Dept   05/21/24 Office Visit Malcolm Shen MD Do [id number] [facility]   Showing recent visits within past 30 days and meeting all other requirements  Future Appointments  No visits were found meeting these conditions.  Showing future appointments within next 90 days and meeting all other requirements       Frequency of RN Calls & Virtual Visits per Team Agreement: [facility] Frequency: Bi-Weekly    Medication issues Addressed (hold Toprolol for systolic <90

## 2024-06-01 PROCEDURE — 1090000002 HH PPS REVENUE DEBIT

## 2024-06-01 PROCEDURE — 1090000001 HH PPS REVENUE CREDIT

## 2024-06-02 ENCOUNTER — HOSPITAL ENCOUNTER (EMERGENCY)
Facility: HOSPITAL | Age: 89
Discharge: HOME | End: 2024-06-02
Attending: EMERGENCY MEDICINE
Payer: MEDICARE

## 2024-06-02 ENCOUNTER — APPOINTMENT (OUTPATIENT)
Dept: RADIOLOGY | Facility: HOSPITAL | Age: 89
End: 2024-06-02
Payer: MEDICARE

## 2024-06-02 VITALS
DIASTOLIC BLOOD PRESSURE: 68 MMHG | RESPIRATION RATE: 18 BRPM | HEART RATE: 100 BPM | WEIGHT: 149 LBS | BODY MASS INDEX: 21.33 KG/M2 | OXYGEN SATURATION: 98 % | TEMPERATURE: 97.7 F | SYSTOLIC BLOOD PRESSURE: 123 MMHG | HEIGHT: 70 IN
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
## 2024-06-02 PROCEDURE — 70450 CT HEAD/BRAIN W/O DYE: CPT

## 2024-06-02 PROCEDURE — 1090000002 HH PPS REVENUE DEBIT

## 2024-06-02 PROCEDURE — 70450 CT HEAD/BRAIN W/O DYE: CPT | Performed by: RADIOLOGY

## 2024-06-02 PROCEDURE — 99285 EMERGENCY DEPT VISIT HI MDM: CPT | Mod: 25

## 2024-06-02 PROCEDURE — 73630 X-RAY EXAM OF FOOT: CPT | Mod: RT

## 2024-06-02 PROCEDURE — 71045 X-RAY EXAM CHEST 1 VIEW: CPT | Performed by: RADIOLOGY

## 2024-06-02 PROCEDURE — 72125 CT NECK SPINE W/O DYE: CPT | Performed by: RADIOLOGY

## 2024-06-02 PROCEDURE — 1090000001 HH PPS REVENUE CREDIT

## 2024-06-02 PROCEDURE — 71045 X-RAY EXAM CHEST 1 VIEW: CPT

## 2024-06-02 PROCEDURE — 72125 CT NECK SPINE W/O DYE: CPT

## 2024-06-02 PROCEDURE — 73630 X-RAY EXAM OF FOOT: CPT | Mod: RIGHT SIDE | Performed by: RADIOLOGY

## 2024-06-02 NOTE — ED PROVIDER NOTES
HPI   Chief Complaint   Patient presents with    Fall       This is a 89-year-old male who presents to the emergency department with his family after a fall.  The patient fell from bed around 1 AM.  His wife was with him and he was immediately awake.  She does not believe he lost consciousness.  His daughter heard the fall and came to check on him.  He denied any pain.  He was helped up and was able to walk normally.  They kept him awake so that they could monitor him.  He continues to remain asymptomatic.  He, however, is on Eliquis and they were told to bring him to the hospital if he has any falls.  They also report that the patient was recently treated for pneumonia.  He still has some mild cough.    Past medical history: Dementia, A-fib on Eliquis, CHF                          No data recorded                   Patient History   Past Medical History:   Diagnosis Date    Abnormality of plasma protein, unspecified 05/01/2017    Elevated blood protein    Benign prostatic hyperplasia without lower urinary tract symptoms 05/01/2017    BPH with elevated PSA    Disorder of prostate, unspecified 05/01/2017    Prostate disorder    Elevated prostate specific antigen (PSA) 05/01/2017    Elevated prostate specific antigen (PSA)    Elevated prostate specific antigen (PSA) 05/01/2017    Abnormal prostate specific antigen    Elevated prostate specific antigen (PSA) 05/01/2017    Abnormal PSA    Elevated prostate specific antigen (PSA) 05/01/2017    Abnormal prostate specific antigen test    Epistaxis 05/01/2017    Epistaxis, recurrent    Essential (primary) hypertension 12/19/2022    Hypertension    Frequency of micturition 05/01/2017    Urinary frequency    Hallucinations, unspecified 05/01/2017    Hallucinations    Headache, unspecified 05/01/2017    Bilateral headache    Hematuria, unspecified 05/01/2017    Hematuria    Impacted cerumen 05/23/2024    Ischemic cardiomyopathy 12/13/2021    Cardiomyopathy, ischemic    Nasal  discharge 05/23/2024    Nasal mucosa dry 05/23/2024    Other amnesia 05/01/2017    Memory loss    Other microscopic hematuria 05/01/2017    Hematuria, microscopic    Other seasonal allergic rhinitis 05/01/2017    Seasonal allergies    Other secondary cataract, right eye 05/31/2018    Posterior capsular opacification visually significant of right eye    Other specified disorders of the male genital organs 05/01/2017    Scrotal swelling    Personal history of other specified conditions 02/10/2020    History of epistaxis    Presence of intraocular lens 05/01/2017    Pseudophakia    Primary insomnia 05/01/2017    Primary insomnia    Primary open-angle glaucoma, unspecified eye, stage unspecified 05/01/2017    Open angle primary glaucoma    Unspecified atrial flutter (Multi) 05/01/2017    Atrial flutter    Unspecified glaucoma 05/01/2017    Glaucoma    Vitamin D deficiency, unspecified 05/01/2017    Vitamin D deficiency     Past Surgical History:   Procedure Laterality Date    CATARACT EXTRACTION  10/07/2014    Cataract Surgery    MR HEAD ANGIO WO IV CONTRAST  3/3/2018    MR HEAD ANGIO WO IV CONTRAST 3/3/2018 [facility]    MR NECK ANGIO WO IV CONTRAST  3/3/2018    MR NECK ANGIO WO IV CONTRAST 3/3/2018 [facility] CLINICAL LEGAC[facility]    OTHER SURGICAL HISTORY  10/07/2014    Dental Surgery     Family History   Problem Relation Name Age of Onset    No Known Problems Mother      No Known Problems Father       Social History     Tobacco Use    Smoking status: Former     Types: Cigarettes     Passive exposure: Never    Smokeless tobacco: Never   Vaping Use    Vaping status: Never Used   Substance Use Topics    Alcohol use: Not Currently    Drug use: Not Currently       Physical Exam   ED Triage Vitals [06/02/24 1533]   Temperature Heart Rate Respirations BP   36.5 °C (97.7 °F) 87 16 108/67      Pulse Ox Temp Source Heart Rate Source Patient Position   94 % Oral Monitor Sitting      BP Location FiO2 (%)     Left arm --        Physical Exam  Vitals and nursing note reviewed.   HENT:      Head: Normocephalic and atraumatic.      Nose: Nose normal.   Eyes:      Conjunctiva/sclera: Conjunctivae normal.   Cardiovascular:      Rate and Rhythm: Normal rate and regular rhythm.      Pulses: Normal pulses.      Heart sounds: Normal heart sounds.   Pulmonary:      Effort: Pulmonary effort is normal.      Breath sounds: Normal breath sounds.   Abdominal:      General: Bowel sounds are normal.      Palpations: Abdomen is soft.   Musculoskeletal:         General: Tenderness present. Normal range of motion.      Cervical back: Normal range of motion and neck supple.      Comments: Right lateral foot tenderness   Skin:     Findings: No rash.   Neurological:      General: No focal deficit present.      Mental Status: He is alert and oriented to person, place, and time.   Psychiatric:         Mood and Affect: Mood normal.         ED Course & MDM   Diagnoses as of 06/02/24 2117   Fall, initial encounter       Medical Decision Making  Differential diagnosis considered: Intracranial hemorrhage, cervical spine fracture, contusion, sprain, foot fracture, pneumonia    This is a 89-year-old male who presents to the emergency department after a fall.  He is on anticoagulation.  Exam is benign.  He will be evaluated with chest x-ray, right foot x-ray, head CT, CT C-spine.  The patient's imaging did not show any acute traumatic injuries.  Chest x-ray shows residual left-sided infiltrate.  On my personal review of his x-ray from 1 month ago.  This is improved and likely residual from his pneumonia.  The patient's CT scan also showed thyromegaly and nodules.  Recent free T4 was normal.  Family is comfortable taking the patient home.  He is stable for discharge and outpatient follow-up.        Procedure  Procedures     Judah Anderson MD  06/02/24 2118

## 2024-06-02 NOTE — ED TRIAGE NOTES
Pt rolled and fell out of his bed around 1am. Unwitnessed fall. Denies LOC. Positive blood thinners(Eliquis). Pt denies pain but has hx of dementia.

## 2024-06-03 ENCOUNTER — HOME CARE VISIT (OUTPATIENT)
Dept: HOME HEALTH SERVICES | Facility: HOME HEALTH | Age: 89
End: 2024-06-03
Payer: MEDICARE

## 2024-06-03 ENCOUNTER — PATIENT OUTREACH (OUTPATIENT)
Dept: CARE COORDINATION | Age: 89
End: 2024-06-03
Payer: MEDICARE

## 2024-06-03 VITALS
BODY MASS INDEX: 21.24 KG/M2 | WEIGHT: 148 LBS | SYSTOLIC BLOOD PRESSURE: 100 MMHG | HEART RATE: 106 BPM | DIASTOLIC BLOOD PRESSURE: 50 MMHG | OXYGEN SATURATION: 95 %

## 2024-06-03 PROCEDURE — 1090000002 HH PPS REVENUE DEBIT

## 2024-06-03 PROCEDURE — G0152 HHCP-SERV OF OT,EA 15 MIN: HCPCS | Mod: HHH

## 2024-06-03 PROCEDURE — 1090000001 HH PPS REVENUE CREDIT

## 2024-06-03 PROCEDURE — G0157 HHC PT ASSISTANT EA 15: HCPCS | Mod: CQ,HHH

## 2024-06-03 SDOH — HEALTH STABILITY: PHYSICAL HEALTH
EXERCISE COMMENTS: PATIENT'S EXERCISE TOLERANCE WAS LIMITED TODAY DUE TO FATIGUE FROM OT SESSION IMMEDIATELY PRIOR TO PTA ARRIVAL. PATIENT PERFORMED LOW REPS OF STANDING MARCHING IN PLACE. PATIENT ATTEMPTED STANDING HIP ABDUCTION BUT WAS TOO FATIGUED TO PERFORM PROPERL

## 2024-06-03 SDOH — HEALTH STABILITY: PHYSICAL HEALTH
EXERCISE COMMENTS: Y AND REQUIRED A LONG SEATED REST BREAK. PATIENT PERFORMED 10 REPS OF ALL SEATED THERAPEUTIC EXERCISES WITH VERBAL CUES AND VISUAL DEMONSTRATION FOR PROPER TECHNIQUE.

## 2024-06-03 ASSESSMENT — PAIN SCALES - PAIN ASSESSMENT IN ADVANCED DEMENTIA (PAINAD)
BREATHING: 0
FACIALEXPRESSION: 0
TOTALSCORE: 0
CONSOLABILITY: 0
FACIALEXPRESSION: 0 - SMILING OR INEXPRESSIVE.
NEGVOCALIZATION: 0
BODYLANGUAGE: 0 - RELAXED.
NEGVOCALIZATION: 0 - NONE.
BODYLANGUAGE: 0
CONSOLABILITY: 0 - NO NEED TO CONSOLE.

## 2024-06-03 ASSESSMENT — ACTIVITIES OF DAILY LIVING (ADL)
AMBULATION_DISTANCE/DURATION_TOLERATED: 75 FEET X 2
AMBULATION ASSISTANCE ON FLAT SURFACES: 1
BATHING_CURRENT_FUNCTION: MINIMUM ASSIST
BATHING ASSESSED: 1

## 2024-06-03 ASSESSMENT — ENCOUNTER SYMPTOMS
DENIES PAIN: 1
UNABLE TO COMMUNICATE PAIN: 1

## 2024-06-03 NOTE — PROGRESS NOTES
"Daily Call Note:   Spoke with pt's wife. Pt is back from the hospital, had a fall yesterday in the home. Pt discharged to home, no apparent related injuries. Pt's /50, Metoprolol 12.5 mg is the dose now, PCP told pt to cut the 25 mg tablet in half. Wife instructed to take BP in AM before giving, and is systolic BP below 100, to hold, and call H@H to notify. If it was above 100 systolic, give and re-check in 1 hour and keep a log and call H@H if below 100. Wife stated she would. Pt denies symptoms at this time such as dizziness, or lightheadedness. Wife stated pt is feeling better and eating well, he is just \"fidgety.\"PT and OT at home today per wife. No further questions or concerns.     Pt Education: Check BP before med and 1 hour after, call nurse if below 100.  Topics for Daily Review: BP  Pt demonstrates clear understanding: Yes    Daily VS:  /50   Pulse 106   Wt 67.1 kg (148 lb)   SpO2 95%   BMI 21.24 kg/m²        Last 3 Weights:  Wt Readings from Last 7 Encounters:   06/02/24 67.6 kg (149 lb)   05/29/24 66.7 kg (147 lb)   05/23/24 66.2 kg (146 lb)   05/21/24 64.9 kg (143 lb)   05/18/24 67.5 kg (148 lb 12.8 oz)   05/20/24 64.4 kg (142 lb)   05/15/24 67.1 kg (148 lb)       Masimo Device: No    Virtual Visits--Scheduled (Most Recent Date at Top)  Follow up Appointments  Recent Visits  Date Type Provider Dept   05/21/24 Office Visit Malcolm Shen MD [id number]   05/12/24 Telemedicine Aniket Arzate DO [facility]   Showing recent visits within past 30 days and meeting all other requirements  Future Appointments  Date Type Provider Dept   06/07/24 Appointment [facility]   Showing future appointments within next 90 days and meeting all other requirements       Frequency of RN Calls & Virtual Visits per Team Agreement: [facility] Frequency: Bi-Weekly    Medication issues Addressed (what was done):     Follow up appointments scheduled by [facility] Staff: "   Referrals made by [facility] staff:

## 2024-06-04 ENCOUNTER — HOME CARE VISIT (OUTPATIENT)
Dept: HOME HEALTH SERVICES | Facility: HOME HEALTH | Age: 89
End: 2024-06-04
Payer: MEDICARE

## 2024-06-04 VITALS
HEART RATE: 54 BPM | TEMPERATURE: 98 F | RESPIRATION RATE: 18 BRPM | SYSTOLIC BLOOD PRESSURE: 108 MMHG | DIASTOLIC BLOOD PRESSURE: 66 MMHG

## 2024-06-04 PROCEDURE — 1090000001 HH PPS REVENUE CREDIT

## 2024-06-04 PROCEDURE — 1090000002 HH PPS REVENUE DEBIT

## 2024-06-04 PROCEDURE — G0300 HHS/HOSPICE OF LPN EA 15 MIN: HCPCS | Mod: HHH

## 2024-06-04 ASSESSMENT — ENCOUNTER SYMPTOMS
APPETITE LEVEL: FAIR
DESCRIPTION OF MEMORY LOSS: SHORT TERM
PERSON REPORTING PAIN: PATIENT
DENIES PAIN: 1

## 2024-06-04 ASSESSMENT — PAIN SCALES - PAIN ASSESSMENT IN ADVANCED DEMENTIA (PAINAD)
BODYLANGUAGE: 0
NEGVOCALIZATION: 0
FACIALEXPRESSION: 0 - SMILING OR INEXPRESSIVE.
TOTALSCORE: 0
BODYLANGUAGE: 0 - RELAXED.
CONSOLABILITY: 0 - NO NEED TO CONSOLE.
NEGVOCALIZATION: 0 - NONE.
FACIALEXPRESSION: 0
BREATHING: 0
CONSOLABILITY: 0

## 2024-06-05 ENCOUNTER — HOME CARE VISIT (OUTPATIENT)
Dept: HOME HEALTH SERVICES | Facility: HOME HEALTH | Age: 89
End: 2024-06-05
Payer: MEDICARE

## 2024-06-05 ENCOUNTER — PATIENT OUTREACH (OUTPATIENT)
Dept: HOME HEALTH SERVICES | Age: 89
End: 2024-06-05
Payer: MEDICARE

## 2024-06-05 VITALS
SYSTOLIC BLOOD PRESSURE: 86 MMHG | DIASTOLIC BLOOD PRESSURE: 50 MMHG | RESPIRATION RATE: 14 BRPM | HEART RATE: 66 BPM | OXYGEN SATURATION: 90 %

## 2024-06-05 PROCEDURE — 1090000001 HH PPS REVENUE CREDIT

## 2024-06-05 PROCEDURE — 1090000002 HH PPS REVENUE DEBIT

## 2024-06-05 PROCEDURE — G0152 HHCP-SERV OF OT,EA 15 MIN: HCPCS | Mod: HHH

## 2024-06-05 PROCEDURE — G0157 HHC PT ASSISTANT EA 15: HCPCS | Mod: CQ,HHH

## 2024-06-05 SDOH — HEALTH STABILITY: PHYSICAL HEALTH: EXERCISE COMMENTS: E NO QUESTIONS AT THIS TIME.

## 2024-06-05 SDOH — HEALTH STABILITY: PHYSICAL HEALTH
EXERCISE COMMENTS: PATIENT PERFORMED 10 REPS OF STANDING THERAPEUTIC EXERCISES AT KITCHEBN SINK. PATIENT REQUIRES FREQUENT VERBAL CUES AND VISUAL DEMONSTRATION FOR PROPER TECHNIQUE. PATIENT'S CAREGIVERS ARE INDEPENDENT WITH HEP TO ASSIST PATIENT IN THE FUTURE. THEY HAV

## 2024-06-05 ASSESSMENT — ACTIVITIES OF DAILY LIVING (ADL)
AMBULATION_DISTANCE/DURATION_TOLERATED: 100 FEET
AMBULATION ASSISTANCE ON FLAT SURFACES: 1

## 2024-06-05 ASSESSMENT — PAIN SCALES - PAIN ASSESSMENT IN ADVANCED DEMENTIA (PAINAD)
NEGVOCALIZATION: 0 - NONE.
BREATHING: 0
FACIALEXPRESSION: 0 - SMILING OR INEXPRESSIVE.
CONSOLABILITY: 0 - NO NEED TO CONSOLE.
CONSOLABILITY: 0
BODYLANGUAGE: 0
BODYLANGUAGE: 0 - RELAXED.
FACIALEXPRESSION: 0
TOTALSCORE: 0
NEGVOCALIZATION: 0

## 2024-06-05 ASSESSMENT — ENCOUNTER SYMPTOMS: DENIES PAIN: 1

## 2024-06-06 ENCOUNTER — HOME CARE VISIT (OUTPATIENT)
Dept: HOME HEALTH SERVICES | Facility: HOME HEALTH | Age: 89
End: 2024-06-06
Payer: MEDICARE

## 2024-06-06 DIAGNOSIS — I50.43 ACUTE ON CHRONIC COMBINED SYSTOLIC (CONGESTIVE) AND DIASTOLIC (CONGESTIVE) HEART FAILURE (MULTI): ICD-10-CM

## 2024-06-06 PROCEDURE — G0153 HHCP-SVS OF S/L PATH,EA 15MN: HCPCS | Mod: HHH

## 2024-06-06 PROCEDURE — 1090000001 HH PPS REVENUE CREDIT

## 2024-06-06 PROCEDURE — 1090000002 HH PPS REVENUE DEBIT

## 2024-06-06 NOTE — PROGRESS NOTES
Daily Call Note:   6/5/24 Daily call completed. Patient's wife stated that patient he is doing well post fall x 3 days ago. Denies any pain or discomfort at this time. Continues to participate in PT and OT Continues to monitor vitals and diet daily. Reminded of next [facility] on Friday @1600.    /56  HR 98  Pt Education: Fall precautions  Barriers: None  Topics for Daily Review: Falls and Vitals  Pt demonstrates clear understanding: Yes    Daily Weight:  There were no vitals filed for this visit.   Last 3 Weights:  Wt Readings from Last 7 Encounters:   06/03/24 67.1 kg (148 lb)   06/02/24 67.6 kg (149 lb)   05/29/24 66.7 kg (147 lb)   05/23/24 66.2 kg (146 lb)   05/21/24 64.9 kg (143 lb)   05/18/24 67.5 kg (148 lb 12.8 oz)   05/20/24 64.4 kg (142 lb)       Masimo Device: No   Masimo Clinical Impression: N/A    Virtual Visits--Scheduled (Most Recent Date at Top)  Follow up Appointments  Recent Visits  Date Type Provider Dept   05/21/24 Office Visit Malcolm Shen MD [id number]   Showing recent visits within past 30 days and meeting all other requirements  Future Appointments  No visits were found meeting these conditions.  Showing future appointments within next 90 days and meeting all other requirements       Frequency of RN Calls & Virtual Visits per Team Agreement: [facility] Frequency: MWF    Medication issues Addressed (what was done): None    Follow up appointments scheduled by [facility] Staff: None  Referrals made by [facility] staff: No

## 2024-06-07 ENCOUNTER — APPOINTMENT (OUTPATIENT)
Dept: PHARMACY | Facility: HOSPITAL | Age: 89
End: 2024-06-07
Payer: MEDICARE

## 2024-06-07 ENCOUNTER — APPOINTMENT (OUTPATIENT)
Dept: CARE COORDINATION | Age: 89
End: 2024-06-07
Payer: MEDICARE

## 2024-06-07 ENCOUNTER — PATIENT OUTREACH (OUTPATIENT)
Dept: HOME HEALTH SERVICES | Age: 89
End: 2024-06-07

## 2024-06-07 PROCEDURE — 1090000002 HH PPS REVENUE DEBIT

## 2024-06-07 PROCEDURE — 1090000001 HH PPS REVENUE CREDIT

## 2024-06-07 ASSESSMENT — ENCOUNTER SYMPTOMS
AGGRESSION WITHIN DEFINED LIMITS: 1
ANGER WITHIN DEFINED LIMITS: 1
DENIES PAIN: 1
SUBJECTIVE PAIN PROGRESSION: UNCHANGED
PERSON REPORTING PAIN: PATIENT

## 2024-06-07 NOTE — PROGRESS NOTES
Spoke with pt daughter, Fahad. Pt was not home, he was out with his wife and other daughter. They ask if [facility]1500 so everyone may be involved in the call.           Patient's Address:   [address] [place], [place] [address]  **  If this is not the address patient will receive services - alert team and address in EMR**       Patient Contacts:  Extended Emergency Contact Information  Primary Emergency Contact: FORTINO SILVERIO  Address: [facility] [address] [address] [facility]  Home Phone: [phone]  Mobile Phone: [phone]  Relation: Spouse  Secondary Emergency Contact: FAHAD SILVERIO   [place]  Mobile Phone: [phone]  Relation: Daughter                                Patient's Preferred Phone: [phone]  Patient's E-mail: [email or web address]

## 2024-06-08 PROCEDURE — 1090000001 HH PPS REVENUE CREDIT

## 2024-06-08 PROCEDURE — 1090000002 HH PPS REVENUE DEBIT

## 2024-06-09 PROCEDURE — 1090000001 HH PPS REVENUE CREDIT

## 2024-06-09 PROCEDURE — 1090000002 HH PPS REVENUE DEBIT

## 2024-06-10 ENCOUNTER — PATIENT OUTREACH (OUTPATIENT)
Dept: HOME HEALTH SERVICES | Age: 89
End: 2024-06-10

## 2024-06-10 ENCOUNTER — TELEMEDICINE (OUTPATIENT)
Dept: PHARMACY | Facility: HOSPITAL | Age: 89
End: 2024-06-10
Payer: MEDICARE

## 2024-06-10 DIAGNOSIS — J42 CHRONIC BRONCHITIS, UNSPECIFIED CHRONIC BRONCHITIS TYPE (MULTI): ICD-10-CM

## 2024-06-10 DIAGNOSIS — I50.42 CHRONIC COMBINED SYSTOLIC AND DIASTOLIC CONGESTIVE HEART FAILURE (MULTI): ICD-10-CM

## 2024-06-10 DIAGNOSIS — I50.43 ACUTE ON CHRONIC COMBINED SYSTOLIC (CONGESTIVE) AND DIASTOLIC (CONGESTIVE) HEART FAILURE (MULTI): ICD-10-CM

## 2024-06-10 DIAGNOSIS — I48.0 PAROXYSMAL ATRIAL FIBRILLATION (MULTI): ICD-10-CM

## 2024-06-10 DIAGNOSIS — I48.11 LONGSTANDING PERSISTENT ATRIAL FIBRILLATION (MULTI): Primary | ICD-10-CM

## 2024-06-10 PROCEDURE — 1090000001 HH PPS REVENUE CREDIT

## 2024-06-10 PROCEDURE — 1090000002 HH PPS REVENUE DEBIT

## 2024-06-10 NOTE — PROGRESS NOTES
Pharmacy Post-Discharge Visit - Follow Up     Jam Cox is a 89 y.o. male was referred to Clinical Pharmacy Team to complete a post-discharge medication optimization and monitoring visit.  The patient was referred for their Afib, COPD and CHF management while also enrolled in [facility].     Referring Provider: Aniket Arzate DO  PCP: Malcolm Shen MD - last visit: 5/21/24, next visit: not scheduled       Subjective   Allergies   Allergen Reactions    Ace Inhibitors Unknown     ACE Inhibitors       [id number] - [place], [place] - [address] AT [facility] [address]  [address]  [facility] [address]  Phone: [phone] Fax: [phone]     [facility]  [address]  [facility] [address]  Phone: [phone] Fax: [phone]    [facility]  [address], [address]  [place] [id number]  Phone: [phone] Fax: [phone]      Medication System Management:  Affordability/Accessibility: try to enroll into PAP  Adherence/Organization: no concerns - granddaughter helps with organizing       Social History     Social History Narrative    Not on file          HPI  CHF ASSESSMENT  Staging:  Most recent ejection fraction: 30-35%  NYHA Stage: II  ACC/AHA Stage: C     Symptom Assessment:  Weight changes/edema?: Yes - up 2 lbs from last visit   Dyspnea?: None  Dizziness/syncope/palpitations?: No     Current Regimen:  ARNI/ACEi/ARB: Yes - entresto  Beta Blocker: Yes - metoprolol  MRA: No  SGLT2i: Yes - jardiance      Other therapy:  Eliquis      Secondary Prevention:  The ASCVD Risk score (Brittanie CLEAINNG, et al., 2019) failed to calculate for the following reasons:    The 2019 ASCVD risk score is only valid for ages 40 to 79     Aspirin 81mg? yes  Statin?: Yes - atorvastatin   HTN?: No      PULMONARY ASSESSMENT  Patient has been diagnosed with: COPD  does not see a pulmonologist  Last visit: n/a     Current Regimen:  Advair   Duo nebs       Appropriate Inhaler Technique? yes  How often do you use your rescue inhaler? N/a     Symptom Assessment:  Current symptoms: dyspnea, fatigue, and weight loss  Symptoms are remain unchanged     Immunization History:  Influenza: Date [9/19/2023]  PCV13: Date [2/3/2017]  PPSV23: Date [10/4/2012]  PCV20: Date [n/a]  COVID: Date [10/16/2023]  RSV: Date [11/16/2023]     Smoking history:  He has never smoked.     Review of Systems        Objective     There were no vitals taken for this visit.   BP Readings from Last 4 Encounters:   06/05/24 86/50   06/04/24 108/66   06/03/24 100/50   06/02/24 123/68      There were no vitals filed for this visit.     LAB  Lab Results   Component Value Date    BILITOT 1.4 (H) 05/09/2024    CALCIUM 8.8 05/08/2024    CO2 26 05/08/2024     05/08/2024    CREATININE 1.02 05/08/2024    GLUCOSE 115 (H) 05/08/2024    ALKPHOS 96 05/09/2024    K 3.9 05/08/2024    PROT 8.7 (H) 05/09/2024     05/08/2024    AST 22 05/09/2024    ALT 30 05/09/2024    BUN 12 05/08/2024    ANIONGAP 12 05/08/2024    MG 2.44 (H) 05/09/2024    PHOS 3.2 05/08/2024    ALBUMIN 3.5 05/09/2024    LIPASE 38 10/05/2017    GFRMALE 79 05/23/2023     Lab Results   Component Value Date    TRIG 73 03/05/2018    CHOL 104 03/05/2018    HDL 32.7 (A) 03/05/2018     Lab Results   Component Value Date    HGBA1C 5.4 04/29/2024         Current Outpatient Medications   Medication Instructions    apixaban (ELIQUIS) 5 mg, oral, 2 times daily    aspirin 81 mg EC tablet 1 tablet, oral, Daily    atorvastatin (LIPITOR) 10 mg, oral, Daily    cholecalciferol (Vitamin D-3) 50 mcg (2,000 unit) capsule 1 capsule, oral, Daily    clindamycin (Cleocin T) 1 % lotion Topical, 2 times daily    donepezil (ARICEPT) 10 mg, oral, Daily    empagliflozin (JARDIANCE) 10 mg, oral, Daily    finasteride (PROSCAR) 5 mg, oral, Daily    fluticasone propion-salmeteroL (Advair Diskus) 250-50 mcg/dose diskus inhaler 1 puff, inhalation, 2 times daily RT, Rinse  mouth with water after use to reduce aftertaste and incidence of candidiasis. Do not swallow.    ipratropium-albuteroL (Duo-Neb) 0.5-2.5 mg/3 mL nebulizer solution 3 mL, nebulization, Every 4 hours PRN    memantine (NAMENDA) 10 mg, oral, 2 times daily    metoprolol succinate XL (TOPROL-XL) 25 mg, oral, Daily, Do not crush or chew.    sacubitriL-valsartan (Entresto) 24-26 mg tablet 0.5 tablets, oral, 2 times daily, *Please note decreased dose    tamsulosin (FLOMAX) 0.4 mg, oral, Daily, Take at bedtime.    VITAMIN B COMPLEX ORAL 1 tablet, oral, Daily    walker (Ultra-Light Rollator) misc 1 each, miscellaneous, Daily            Assessment/Plan   Problem List Items Addressed This Visit    None    Afib  No chest pain or feelings of palpitations  Metoprolol and eliquis   Will try to enroll into PAP for eliquis   CHF  Most recent EF was 30-35% while admitted   Current GDMT --> metoprolol, entresto and jardiance   Try to enroll into PAP for entresto and jardiance   Lasix and potassium were stopped at discharged   Monitoring weights and BP's daily   COPD  No major concerns with breathing  Some SOB with exertion  Continue with advair bid   Try to enroll into PAP  Duo nebs as needed     He has completed his PT/OT visits and has been getting around well with his walker.     PAP:  -confirmed he does only receive SS for annual income   -discussed with granddaughter (Carin) and she is going to get both his and spouse SS forms emailed back to me to submit for enrollment     Follow Up: as needed     Continue all meds under the continuation of care with the referring provider and clinical pharmacy team.    Eliel Sosa, Selina     Verbal consent to manage patient's drug therapy was obtained from an individual authorized to act on behalf of a patient. They were informed they may decline to participate or withdraw from participation in pharmacy services at any time.

## 2024-06-10 NOTE — PROGRESS NOTES
Daily Call Note: [facility] Completed with Dr. Trent and Eliel.   SPOKE WITH Granddaughter, Razia.  Carin states Mr. Cox is doing fine.  Patient fell out of the bed last week and was seen in the ED.  Family bought bed rails to the bed.    Blood pressure in the 150's.    Carin states OT is completed, Home care visit changed to monthly and he has a few more visits with PT.   Patient is walking around with the walker   Carin states patient is getting back to baseline.     Eliel spoke with Carin about the PAP program.      Patient had an appointment with Speech Therapy    Patient GRADUATED FROM [facility]     Pt Education:   Barriers:   Topics for Daily Review:   Pt demonstrates clear understanding:     Daily Weight:  There were no vitals filed for this visit.   Last 3 Weights:  Wt Readings from Last 7 Encounters:   06/03/24 67.1 kg (148 lb)   06/02/24 67.6 kg (149 lb)   05/29/24 66.7 kg (147 lb)   05/23/24 66.2 kg (146 lb)   05/21/24 64.9 kg (143 lb)   05/18/24 67.5 kg (148 lb 12.8 oz)   05/20/24 64.4 kg (142 lb)       Masimo Device:    Masimo Clinical Impression:     Virtual Visits--Scheduled (Most Recent Date at Top)  Follow up Appointments  Recent Visits  Date Type Provider Dept   05/21/24 Office Visit Malcolm Shen MD Do [id number]   Showing recent visits within past 30 days and meeting all other requirements  Future Appointments  No visits were found meeting these conditions.  Showing future appointments within next 90 days and meeting all other requirements       Frequency of RN Calls & Virtual Visits per Team Agreement:     Medication issues Addressed (what was done):     Follow up appointments scheduled by [facility] Staff:   Referrals made by [facility] staff:

## 2024-06-11 ENCOUNTER — HOME CARE VISIT (OUTPATIENT)
Dept: HOME HEALTH SERVICES | Facility: HOME HEALTH | Age: 89
End: 2024-06-11
Payer: MEDICARE

## 2024-06-11 VITALS
HEART RATE: 44 BPM | OXYGEN SATURATION: 96 % | TEMPERATURE: 98.1 F | RESPIRATION RATE: 16 BRPM | SYSTOLIC BLOOD PRESSURE: 110 MMHG | DIASTOLIC BLOOD PRESSURE: 50 MMHG

## 2024-06-11 PROCEDURE — G0299 HHS/HOSPICE OF RN EA 15 MIN: HCPCS | Mod: HHH

## 2024-06-11 RX ORDER — FLUTICASONE PROPIONATE AND SALMETEROL 250; 50 UG/1; UG/1
1 POWDER RESPIRATORY (INHALATION)
Qty: 180 EACH | Refills: 3 | Status: SHIPPED | OUTPATIENT
Start: 2024-06-11 | End: 2025-06-06

## 2024-06-11 ASSESSMENT — ENCOUNTER SYMPTOMS
PAIN LOCATION - PAIN SEVERITY: 5/10
APPETITE LEVEL: GOOD
CHANGE IN APPETITE: UNCHANGED
PAIN: 1
PERSON REPORTING PAIN: PATIENT

## 2024-06-12 ENCOUNTER — HOME CARE VISIT (OUTPATIENT)
Dept: HOME HEALTH SERVICES | Facility: HOME HEALTH | Age: 89
End: 2024-06-12
Payer: MEDICARE

## 2024-06-12 ENCOUNTER — SPECIALTY PHARMACY (OUTPATIENT)
Dept: PHARMACY | Facility: CLINIC | Age: 89
End: 2024-06-12

## 2024-06-12 ENCOUNTER — OFFICE VISIT (OUTPATIENT)
Dept: NEUROLOGY | Facility: CLINIC | Age: 89
End: 2024-06-12
Payer: MEDICARE

## 2024-06-12 VITALS
DIASTOLIC BLOOD PRESSURE: 42 MMHG | TEMPERATURE: 98.2 F | RESPIRATION RATE: 14 BRPM | HEART RATE: 56 BPM | SYSTOLIC BLOOD PRESSURE: 100 MMHG | OXYGEN SATURATION: 95 %

## 2024-06-12 VITALS
BODY MASS INDEX: 21.57 KG/M2 | WEIGHT: 150.3 LBS | DIASTOLIC BLOOD PRESSURE: 47 MMHG | SYSTOLIC BLOOD PRESSURE: 101 MMHG | TEMPERATURE: 95.2 F | RESPIRATION RATE: 16 BRPM | HEART RATE: 67 BPM

## 2024-06-12 DIAGNOSIS — G47.00 INSOMNIA, UNSPECIFIED TYPE: ICD-10-CM

## 2024-06-12 DIAGNOSIS — F02.80 MIXED ALZHEIMER'S AND VASCULAR DEMENTIA (MULTI): Primary | ICD-10-CM

## 2024-06-12 DIAGNOSIS — F01.50 MIXED ALZHEIMER'S AND VASCULAR DEMENTIA (MULTI): Primary | ICD-10-CM

## 2024-06-12 DIAGNOSIS — G30.9 MIXED ALZHEIMER'S AND VASCULAR DEMENTIA (MULTI): Primary | ICD-10-CM

## 2024-06-12 PROCEDURE — 1160F RVW MEDS BY RX/DR IN RCRD: CPT | Performed by: PSYCHIATRY & NEUROLOGY

## 2024-06-12 PROCEDURE — 99214 OFFICE O/P EST MOD 30 MIN: CPT | Performed by: PSYCHIATRY & NEUROLOGY

## 2024-06-12 PROCEDURE — G0151 HHCP-SERV OF PT,EA 15 MIN: HCPCS | Mod: HHH

## 2024-06-12 PROCEDURE — 1159F MED LIST DOCD IN RCRD: CPT | Performed by: PSYCHIATRY & NEUROLOGY

## 2024-06-12 PROCEDURE — 1126F AMNT PAIN NOTED NONE PRSNT: CPT | Performed by: PSYCHIATRY & NEUROLOGY

## 2024-06-12 PROCEDURE — 3074F SYST BP LT 130 MM HG: CPT | Performed by: PSYCHIATRY & NEUROLOGY

## 2024-06-12 PROCEDURE — 3078F DIAST BP <80 MM HG: CPT | Performed by: PSYCHIATRY & NEUROLOGY

## 2024-06-12 RX ORDER — TALC
3 POWDER (GRAM) TOPICAL NIGHTLY
Start: 2024-06-12

## 2024-06-12 SDOH — HEALTH STABILITY: PHYSICAL HEALTH
EXERCISE COMMENTS: PERFORMED 15 REPS EACH OF FOLLOWING: LAQ, STANDING HIP ABD, FLEX AND EXTENSION, STANDING HEEL RAISES, MINI SQUATS AND STANDING KNEE FLEXION.

## 2024-06-12 ASSESSMENT — ENCOUNTER SYMPTOMS
LOSS OF SENSATION IN FEET: 0
DEPRESSION: 0
LOSS OF SENSATION IN FEET: 0
DEPRESSION: 0
OCCASIONAL FEELINGS OF UNSTEADINESS: 1
PERSON REPORTING PAIN: PATIENT
OCCASIONAL FEELINGS OF UNSTEADINESS: 0
DENIES PAIN: 1

## 2024-06-12 ASSESSMENT — PAIN SCALES - GENERAL: PAINLEVEL: 0-NO PAIN

## 2024-06-12 ASSESSMENT — PATIENT HEALTH QUESTIONNAIRE - PHQ9
2. FEELING DOWN, DEPRESSED OR HOPELESS: NOT AT ALL
1. LITTLE INTEREST OR PLEASURE IN DOING THINGS: NOT AT ALL
SUM OF ALL RESPONSES TO PHQ9 QUESTIONS 1 AND 2: 0

## 2024-06-12 ASSESSMENT — ACTIVITIES OF DAILY LIVING (ADL)
AMBULATION ASSISTANCE ON FLAT SURFACES: 1
HOME_HEALTH_OASIS: 01
OASIS_M1830: 01

## 2024-06-12 NOTE — PROGRESS NOTES
Start Time:3:05pm  Stop Time:3:30pm    Chart reviewed, including physician notes and diagnostic studies, for 3 minutes prior to this appointment.      Accompanied by: wife and daughter    Formulation: Mr. Cox is a very-pleasant 89 y.o. year old, ,  male with a past personal history of multifactorial dementia (vascular and Alzheimer's disease), cerebrovascular disease, vitamin B6 and folate deficiency, and remitted psychosis who is presenting today for a follow-up visit.  His cognition and level of functioning have declined as expected for his diagnosis.  Behavior is no longer an issue, but his sleep/wake cycles are reversed.      Diagnosis:  Multifactorial dementia (vascular and Alzheimer's disease), moderate severity  Psychosis, not otherwise specified, in remission  cerebrovascular disease   Vitamin B6 deficiency  Folate deficiency      Plan:  - attend a day program at least twice daily  - start melatonin 3mg, take one pill by mouth at bedtime and can increase it by 3mg increments up to 9mg at bedtime (can get over the counter)  - continue other medications  - follow-up with me in about 6 months  --------------------------------------------------------------------------------------------------------------------------------------------------    History of Present Illness:  I last saw the patient in 2020 and he was last seen by Briana Reed CNP in August 2023.  Sometimes more easily frustrated after his recent discharge from the hospital.  Sleep-wake cycle is reversed.  He has dropped weight and says he's not hungry but he eats quite well when given food.  No psychosis.  Significant more confusion.    Allergies   Allergen Reactions    Ace Inhibitors Unknown     ACE Inhibitors         Current Outpatient Medications:     apixaban (Eliquis) 5 mg tablet, Take 1 tablet (5 mg) by mouth 2 times a day., Disp: 180 tablet, Rfl: 3    aspirin 81 mg EC tablet, Take 1 tablet (81 mg) by mouth once  daily., Disp: , Rfl:     atorvastatin (Lipitor) 10 mg tablet, Take 1 tablet (10 mg) by mouth once daily., Disp: 90 tablet, Rfl: 1    cholecalciferol (Vitamin D-3) 50 mcg (2,000 unit) capsule, Take 1 capsule (50 mcg) by mouth once daily., Disp: , Rfl:     clindamycin (Cleocin T) 1 % lotion, Apply topically 2 times a day. (Patient taking differently: Apply 1 Application topically 2 times a day. Apply to affected area twice daily), Disp: 60 mL, Rfl: 0    donepezil (Aricept) 10 mg tablet, Take 1 tablet (10 mg) by mouth once daily., Disp: 90 tablet, Rfl: 0    empagliflozin (Jardiance) 10 mg, Take 1 tablet (10 mg) by mouth once daily., Disp: 90 tablet, Rfl: 3    finasteride (Proscar) 5 mg tablet, Take 1 tablet (5 mg) by mouth once daily., Disp: 90 tablet, Rfl: 1    fluticasone propion-salmeteroL (Advair Diskus) 250-50 mcg/dose diskus inhaler, Inhale 1 puff 2 times a day. Rinse mouth with water after use to reduce aftertaste and incidence of candidiasis. Do not swallow., Disp: 180 each, Rfl: 3    ipratropium-albuteroL (Duo-Neb) 0.5-2.5 mg/3 mL nebulizer solution, Take 3 mL by nebulization every 4 hours if needed for wheezing or shortness of breath., Disp: 180 mL, Rfl: 0    melatonin 3 mg tablet, Take 1 tablet (3 mg) by mouth once daily at bedtime., Disp: , Rfl:     memantine (Namenda) 10 mg tablet, Take 1 tablet (10 mg) by mouth 2 times a day., Disp: 90 tablet, Rfl: 0    metoprolol succinate XL (Toprol-XL) 25 mg 24 hr tablet, Take 1 tablet (25 mg) by mouth once daily. Do not crush or chew., Disp: 30 tablet, Rfl: 1    sacubitriL-valsartan (Entresto) 24-26 mg tablet, Take 0.5 tablets by mouth 2 times a day., Disp: 90 tablet, Rfl: 3    tamsulosin (Flomax) 0.4 mg 24 hr capsule, Take 1 capsule (0.4 mg) by mouth once daily. Take at bedtime., Disp: , Rfl:     VITAMIN B COMPLEX ORAL, Take 1 tablet by mouth once daily., Disp: , Rfl:     walker (Ultra-Light Rollator) misc, 1 each once daily., Disp: 1 each, Rfl: 0    Review of  "Systems  As per HPI, otherwise all other systems have been reviewed are negative for complaint.      Objective   BP (!) 101/47   Pulse 67   Temp 35.1 °C (95.2 °F) (Tympanic)   Resp 16   Wt 68.2 kg (150 lb 4.8 oz)   BMI 21.57 kg/m²     EXAMINATION  Mental Status Examination:  General: Alert and oriented to person and partially to place   Motor: psychomotor retardation, steady gait with use of a walker  Speech: little spontaneous speech, word finding difficulties  Mood: \"good\"  Affect: blunted with brightening, and mood congruent  Passive death wish: denies  Suicidal ideation: denies  Thought process: repetitive and amnestic, without loosening of associations  Thought content: no hallucinations, delusions, and not responding to internal stimuli  Insight/Judgment: poor/poor    Reg: 3/3    Season: winter x  Year: 92 x    President: ?    State: [place]  City: ?    Recall: 0/3 (1/3 with cues)    [facility] ( 07/10/2019): 8/30      MMSE ( 07/19/2018): 12/30     Scales Jun 29, 2017 :  MMSE 14/30 (knows the day of the week, knows the state, the city, and [place]. Is able to register the three words, 1/3 delayed recall, correctly names, pen and eyeglasses, is able to repeat, comprehension is preserved)   "

## 2024-06-12 NOTE — PATIENT INSTRUCTIONS
- attend a day program at least twice daily  - start melatonin 3mg, take one pill by mouth at bedtime and can increase it by 3mg increments up to 9mg at bedtime (can get over the counter)  - continue other medications  - follow-up with me in about 6 months    General brain health guidelines:  - make sure your other medical conditions are well controlled (e.g., high blood pressure, high cholesterol, diabetes, sleep apnea etc)  - do not smoke or chew tobacco  - address any sensory deficits (e.g., proper glasses for poor eyesight, hearing aids for hearing loss)  - use a weekly pill box  - eat a heart healthy diet (e.g., lots of fruits and vegetables, low fat and cholesterol)  - exercise at least four days per week, 30 minutes at a time at an intensity that would make it difficult to converse with someone   - make sure you are getting at least 7 hours of quality sleep per night  - keep yourself mentally active daily by reading, playing cards, doing word searches, puzzles, etc.  - stay socially active by being part of a group or organization    Please note that the above may not be an entirely accurate or complete list of your medications, allergies, past medical history, past surgical history, or active problems as the document is completed following your visit.

## 2024-06-20 ENCOUNTER — APPOINTMENT (OUTPATIENT)
Dept: PRIMARY CARE | Facility: CLINIC | Age: 89
End: 2024-06-20
Payer: MEDICARE

## 2024-06-20 ENCOUNTER — PATIENT OUTREACH (OUTPATIENT)
Dept: CARE COORDINATION | Facility: CLINIC | Age: 89
End: 2024-06-20

## 2024-06-27 ENCOUNTER — OFFICE VISIT (OUTPATIENT)
Dept: CARDIOLOGY | Facility: HOSPITAL | Age: 89
End: 2024-06-27
Payer: MEDICARE

## 2024-06-27 VITALS
SYSTOLIC BLOOD PRESSURE: 104 MMHG | WEIGHT: 148.2 LBS | BODY MASS INDEX: 21.22 KG/M2 | HEIGHT: 70 IN | DIASTOLIC BLOOD PRESSURE: 52 MMHG | OXYGEN SATURATION: 97 % | HEART RATE: 115 BPM

## 2024-06-27 DIAGNOSIS — I48.0 PAF (PAROXYSMAL ATRIAL FIBRILLATION) (MULTI): ICD-10-CM

## 2024-06-27 DIAGNOSIS — I50.42 CHRONIC COMBINED SYSTOLIC AND DIASTOLIC CONGESTIVE HEART FAILURE (MULTI): Primary | ICD-10-CM

## 2024-06-27 DIAGNOSIS — I50.42 CHRONIC COMBINED SYSTOLIC AND DIASTOLIC CONGESTIVE HEART FAILURE (MULTI): ICD-10-CM

## 2024-06-27 PROCEDURE — 3078F DIAST BP <80 MM HG: CPT | Performed by: INTERNAL MEDICINE

## 2024-06-27 PROCEDURE — 1036F TOBACCO NON-USER: CPT | Performed by: INTERNAL MEDICINE

## 2024-06-27 PROCEDURE — 1159F MED LIST DOCD IN RCRD: CPT | Performed by: INTERNAL MEDICINE

## 2024-06-27 PROCEDURE — 99214 OFFICE O/P EST MOD 30 MIN: CPT | Performed by: INTERNAL MEDICINE

## 2024-06-27 PROCEDURE — 3074F SYST BP LT 130 MM HG: CPT | Performed by: INTERNAL MEDICINE

## 2024-06-27 RX ORDER — SPIRONOLACTONE 25 MG/1
12.5 TABLET ORAL DAILY
Qty: 15 TABLET | Refills: 11 | Status: SHIPPED | OUTPATIENT
Start: 2024-06-27 | End: 2024-06-27 | Stop reason: SDUPTHER

## 2024-06-27 RX ORDER — SPIRONOLACTONE 25 MG/1
12.5 TABLET ORAL DAILY
Qty: 45 TABLET | Refills: 3 | Status: SHIPPED | OUTPATIENT
Start: 2024-06-27

## 2024-06-27 NOTE — PATIENT INSTRUCTIONS
To reach Dr. Diaz's office please call [phone] ([facility]). Fax [phone]. Call [phone] to schedule an appointment. You may also contact the HF RNs at [email or web address]    Thank you for coming to your appointment today. If you have any questions or need cardiac medication refills, please call the Heart Failure Office at [phone] option [phone].     START Spironolactone 12.5mg daily (this will be half of a 25mg tablet)  Send us an EPIC My Chart message to let us know how he is doing   Follow up with Dr. Diaz in 6 months

## 2024-06-27 NOTE — PROGRESS NOTES
"Advanced Heart Failure and Cardiac Transplantation Cardiology    Jam Cox is a 89 y.o. male from [facility], here with his daughter (JEAN) and wife.     Mr. Cox has dementia.    He had a series of hospitalizations in April through May 2024, for acutely decompensated heart failure in setting of AF/RVR, and multifocal pneumonia. During last admission he was admitted to MICU for management of shock requiring vasopressors. His condition improved and he is now home. He is actually tolerating triple therapy for HFrEF.    mponent  Ref Range & Units 1 mo ago  (5/4/24) 1 mo ago  (5/3/24) 1 mo ago  (5/3/24) 2 mo ago  (4/27/24) 2 mo ago  (4/22/24) 6 yr ago  (3/5/18) 6 yr ago  (10/5/17)   BNP  0 - 99 pg/mL 346 High  1,225 High  488 High  425 High  1,015 High  399 High   High  CM     Echocardiogram 5/6/2024   1. Left ventricular systolic function is moderately decreased with a 30-35% estimated ejection fraction.   2. Poorly visualized anatomical structures due to suboptimal image quality.   3. There is reduced right ventricular systolic function.   4. The left atrium is enlarged.   5. There is global hypokinesis of the left ventricle with minor regional variations.    Exam: /52 (BP Location: Right arm)   Pulse (!) 115   Ht 1.778 m (5' 10\")   Wt 67.2 kg (148 lb 3.2 oz)   SpO2 97%   BMI 21.26 kg/m²   No JVD  Irregular, rapid  No LE edema    Current Outpatient Medications   Medication Instructions    apixaban (ELIQUIS) 5 mg, oral, 2 times daily    atorvastatin (LIPITOR) 10 mg, oral, Daily    cholecalciferol (Vitamin D-3) 50 mcg (2,000 unit) capsule 1 capsule, oral, Daily    clindamycin (Cleocin T) 1 % lotion Topical, 2 times daily    donepezil (ARICEPT) 10 mg, oral, Daily    empagliflozin (JARDIANCE) 10 mg, oral, Daily    finasteride (PROSCAR) 5 mg, oral, Daily    fluticasone propion-salmeteroL (Advair Diskus) 250-50 mcg/dose diskus inhaler 1 puff, inhalation, 2 times daily RT, Rinse mouth with water " after use to reduce aftertaste and incidence of candidiasis. Do not swallow.    ipratropium-albuteroL (Duo-Neb) 0.5-2.5 mg/3 mL nebulizer solution 3 mL, nebulization, Every 4 hours PRN    melatonin 3 mg, oral, Nightly    memantine (NAMENDA) 10 mg, oral, 2 times daily    metoprolol succinate XL (TOPROL-XL) 25 mg, oral, Daily, Do not crush or chew.    sacubitriL-valsartan (Entresto) 24-26 mg tablet 0.5 tablets, oral, 2 times daily    tamsulosin (FLOMAX) 0.4 mg, oral, Daily, Take at bedtime.    VITAMIN B COMPLEX ORAL 1 tablet, oral, Daily    walker (Ultra-Light Rollator) misc 1 each, miscellaneous, Daily     Past medical history (non-cardiac):  -- Dementia  -- COPD, prior exacerbations requiring steroids    Cardiovascular history:  -- Paroxsymal atrial fibrillation (on DOAC)  -- HFrEF, Stage C, unable to assess NYHA due to dementia    Assessment: He appears stable today, without evidence of fluid overload.    Plan:  -- To further reduce risk of recurrent acutely decompensated heart failure we discussed introducing MRA at a low-dose (Spironolactone 12.5mg QD)    Agustina Diaz MD, MPH  Advanced Heart Failure and Transplant Cardiology  [facility]  [facility]

## 2024-07-01 PROCEDURE — RXMED WILLOW AMBULATORY MEDICATION CHARGE

## 2024-07-02 PROCEDURE — RXMED WILLOW AMBULATORY MEDICATION CHARGE

## 2024-07-05 ENCOUNTER — PHARMACY VISIT (OUTPATIENT)
Dept: PHARMACY | Facility: CLINIC | Age: 89
End: 2024-07-05
Payer: MEDICARE

## 2024-07-16 PROCEDURE — RXMED WILLOW AMBULATORY MEDICATION CHARGE

## 2024-07-18 ENCOUNTER — PHARMACY VISIT (OUTPATIENT)
Dept: PHARMACY | Facility: CLINIC | Age: 89
End: 2024-07-18
Payer: MEDICARE

## 2024-07-22 ENCOUNTER — TELEPHONE (OUTPATIENT)
Dept: PRIMARY CARE | Facility: CLINIC | Age: 89
End: 2024-07-22
Payer: MEDICARE

## 2024-07-22 DIAGNOSIS — I10 PRIMARY HYPERTENSION: ICD-10-CM

## 2024-07-22 PROCEDURE — RXMED WILLOW AMBULATORY MEDICATION CHARGE

## 2024-07-22 RX ORDER — TAMSULOSIN HYDROCHLORIDE 0.4 MG/1
0.4 CAPSULE ORAL DAILY
Qty: 90 CAPSULE | Refills: 0 | Status: SHIPPED | OUTPATIENT
Start: 2024-07-22 | End: 2024-10-20

## 2024-07-23 ENCOUNTER — PHARMACY VISIT (OUTPATIENT)
Dept: PHARMACY | Facility: CLINIC | Age: 89
End: 2024-07-23
Payer: MEDICARE

## 2024-07-24 DIAGNOSIS — I50.42 CHRONIC COMBINED SYSTOLIC AND DIASTOLIC CONGESTIVE HEART FAILURE (MULTI): ICD-10-CM

## 2024-07-24 RX ORDER — METOPROLOL SUCCINATE 25 MG/1
25 TABLET, EXTENDED RELEASE ORAL DAILY
Qty: 30 TABLET | Refills: 1 | Status: SHIPPED | OUTPATIENT
Start: 2024-07-24 | End: 2024-09-22

## 2024-07-25 ENCOUNTER — APPOINTMENT (OUTPATIENT)
Dept: PRIMARY CARE | Facility: CLINIC | Age: 89
End: 2024-07-25
Payer: MEDICARE

## 2024-08-12 ENCOUNTER — TELEPHONE (OUTPATIENT)
Dept: PRIMARY CARE | Facility: CLINIC | Age: 89
End: 2024-08-12
Payer: MEDICARE

## 2024-08-12 DIAGNOSIS — N40.0 BENIGN PROSTATIC HYPERPLASIA, UNSPECIFIED WHETHER LOWER URINARY TRACT SYMPTOMS PRESENT: ICD-10-CM

## 2024-08-12 RX ORDER — FINASTERIDE 5 MG/1
5 TABLET, FILM COATED ORAL DAILY
Qty: 90 TABLET | Refills: 1 | Status: SHIPPED | OUTPATIENT
Start: 2024-08-12

## 2024-08-21 DIAGNOSIS — F02.80 MIXED ALZHEIMER'S AND VASCULAR DEMENTIA (MULTI): ICD-10-CM

## 2024-08-21 DIAGNOSIS — F01.50 MIXED ALZHEIMER'S AND VASCULAR DEMENTIA (MULTI): ICD-10-CM

## 2024-08-21 DIAGNOSIS — L73.9 FOLLICULITIS: ICD-10-CM

## 2024-08-21 DIAGNOSIS — G30.9 MIXED ALZHEIMER'S AND VASCULAR DEMENTIA (MULTI): ICD-10-CM

## 2024-08-22 RX ORDER — CLINDAMYCIN PHOSPHATE 10 UG/ML
1 LOTION TOPICAL 2 TIMES DAILY
Qty: 60 ML | Refills: 1 | Status: SHIPPED | OUTPATIENT
Start: 2024-08-22 | End: 2025-08-22

## 2024-08-22 RX ORDER — DONEPEZIL HYDROCHLORIDE 10 MG/1
10 TABLET, FILM COATED ORAL DAILY
Qty: 90 TABLET | Refills: 0 | Status: SHIPPED | OUTPATIENT
Start: 2024-08-22

## 2024-08-25 ENCOUNTER — APPOINTMENT (OUTPATIENT)
Dept: RADIOLOGY | Facility: HOSPITAL | Age: 89
End: 2024-08-25
Payer: MEDICARE

## 2024-08-25 ENCOUNTER — APPOINTMENT (OUTPATIENT)
Dept: CARDIOLOGY | Facility: HOSPITAL | Age: 89
End: 2024-08-25
Payer: MEDICARE

## 2024-08-25 ENCOUNTER — HOSPITAL ENCOUNTER (INPATIENT)
Facility: HOSPITAL | Age: 89
LOS: 5 days | Discharge: HOME HEALTH | End: 2024-08-30
Attending: EMERGENCY MEDICINE | Admitting: FAMILY MEDICINE
Payer: MEDICARE

## 2024-08-25 DIAGNOSIS — I50.9 ACUTE ON CHRONIC CONGESTIVE HEART FAILURE, UNSPECIFIED HEART FAILURE TYPE (MULTI): ICD-10-CM

## 2024-08-25 DIAGNOSIS — K55.1 MESENTERIC ISCHEMIA, CHRONIC (MULTI): ICD-10-CM

## 2024-08-25 DIAGNOSIS — K55.9 MESENTERIC ISCHEMIA (MULTI): ICD-10-CM

## 2024-08-25 DIAGNOSIS — E78.49 OTHER HYPERLIPIDEMIA: ICD-10-CM

## 2024-08-25 DIAGNOSIS — R79.89 ABNORMAL LFTS (LIVER FUNCTION TESTS): ICD-10-CM

## 2024-08-25 DIAGNOSIS — I50.23 ACUTE ON CHRONIC SYSTOLIC HEART FAILURE (MULTI): ICD-10-CM

## 2024-08-25 DIAGNOSIS — R79.89 ELEVATED LFTS: Primary | ICD-10-CM

## 2024-08-25 DIAGNOSIS — K55.1: ICD-10-CM

## 2024-08-25 DIAGNOSIS — N17.9 ACUTE KIDNEY INJURY (CMS-HCC): ICD-10-CM

## 2024-08-25 DIAGNOSIS — R06.00 DYSPNEA, UNSPECIFIED TYPE: ICD-10-CM

## 2024-08-25 DIAGNOSIS — I50.43 ACUTE ON CHRONIC COMBINED SYSTOLIC AND DIASTOLIC CONGESTIVE HEART FAILURE (MULTI): ICD-10-CM

## 2024-08-25 DIAGNOSIS — I50.42 CHRONIC COMBINED SYSTOLIC AND DIASTOLIC CONGESTIVE HEART FAILURE (MULTI): ICD-10-CM

## 2024-08-25 LAB
ALBUMIN SERPL BCP-MCNC: 3.7 G/DL (ref 3.4–5)
ALBUMIN SERPL BCP-MCNC: 3.7 G/DL (ref 3.4–5)
ALP SERPL-CCNC: 106 U/L (ref 33–136)
ALP SERPL-CCNC: 112 U/L (ref 33–136)
ALT SERPL W P-5'-P-CCNC: 135 U/L (ref 10–52)
ALT SERPL W P-5'-P-CCNC: 150 U/L (ref 10–52)
ANION GAP BLDV CALCULATED.4IONS-SCNC: 21 MMOL/L (ref 10–25)
ANION GAP SERPL CALC-SCNC: 23 MMOL/L (ref 10–20)
APAP SERPL-MCNC: <10 UG/ML
APPEARANCE UR: CLEAR
AST SERPL W P-5'-P-CCNC: 143 U/L (ref 9–39)
AST SERPL W P-5'-P-CCNC: 155 U/L (ref 9–39)
BASE EXCESS BLDV CALC-SCNC: -5.2 MMOL/L (ref -2–3)
BASOPHILS # BLD AUTO: 0 X10*3/UL (ref 0–0.1)
BASOPHILS NFR BLD AUTO: 0 %
BILIRUB DIRECT SERPL-MCNC: 1.6 MG/DL (ref 0–0.3)
BILIRUB SERPL-MCNC: 5.2 MG/DL (ref 0–1.2)
BILIRUB SERPL-MCNC: 5.3 MG/DL (ref 0–1.2)
BILIRUB UR STRIP.AUTO-MCNC: NEGATIVE MG/DL
BNP SERPL-MCNC: 1796 PG/ML (ref 0–99)
BODY TEMPERATURE: 37 DEGREES CELSIUS
BUN SERPL-MCNC: 21 MG/DL (ref 6–23)
CA-I BLDV-SCNC: 1.2 MMOL/L (ref 1.1–1.33)
CALCIUM SERPL-MCNC: 9.8 MG/DL (ref 8.6–10.3)
CARDIAC TROPONIN I PNL SERPL HS: 26 NG/L (ref 0–20)
CARDIAC TROPONIN I PNL SERPL HS: 28 NG/L (ref 0–20)
CHLORIDE BLDV-SCNC: 95 MMOL/L (ref 98–107)
CHLORIDE SERPL-SCNC: 95 MMOL/L (ref 98–107)
CO2 SERPL-SCNC: 18 MMOL/L (ref 21–32)
COLOR UR: YELLOW
CREAT SERPL-MCNC: 1.71 MG/DL (ref 0.5–1.3)
D DIMER PPP FEU-MCNC: 600 NG/ML FEU
EGFRCR SERPLBLD CKD-EPI 2021: 37 ML/MIN/1.73M*2
EOSINOPHIL # BLD AUTO: 0 X10*3/UL (ref 0–0.4)
EOSINOPHIL NFR BLD AUTO: 0 %
ERYTHROCYTE [DISTWIDTH] IN BLOOD BY AUTOMATED COUNT: 17.1 % (ref 11.5–14.5)
ETHANOL SERPL-MCNC: <10 MG/DL
GLUCOSE BLDV-MCNC: 161 MG/DL (ref 74–99)
GLUCOSE SERPL-MCNC: 146 MG/DL (ref 74–99)
GLUCOSE UR STRIP.AUTO-MCNC: ABNORMAL MG/DL
HCO3 BLDV-SCNC: 20.6 MMOL/L (ref 22–26)
HCT VFR BLD AUTO: 39 % (ref 41–52)
HCT VFR BLD EST: 40 % (ref 41–52)
HGB BLD-MCNC: 12.6 G/DL (ref 13.5–17.5)
HGB BLDV-MCNC: 13.3 G/DL (ref 13.5–17.5)
HOLD SPECIMEN: NORMAL
IMM GRANULOCYTES # BLD AUTO: 0.03 X10*3/UL (ref 0–0.5)
IMM GRANULOCYTES NFR BLD AUTO: 0.4 % (ref 0–0.9)
INHALED O2 CONCENTRATION: 21 %
KETONES UR STRIP.AUTO-MCNC: NEGATIVE MG/DL
LACTATE BLDV-SCNC: 5.5 MMOL/L (ref 0.4–2)
LACTATE BLDV-SCNC: 7.6 MMOL/L (ref 0.4–2)
LACTATE SERPL-SCNC: 4.9 MMOL/L (ref 0.4–2)
LACTATE SERPL-SCNC: 5.4 MMOL/L (ref 0.4–2)
LEUKOCYTE ESTERASE UR QL STRIP.AUTO: NEGATIVE
LYMPHOCYTES # BLD AUTO: 0.88 X10*3/UL (ref 0.8–3)
LYMPHOCYTES NFR BLD AUTO: 12.8 %
MAGNESIUM SERPL-MCNC: 2.2 MG/DL (ref 1.6–2.4)
MCH RBC QN AUTO: 29.2 PG (ref 26–34)
MCHC RBC AUTO-ENTMCNC: 32.3 G/DL (ref 32–36)
MCV RBC AUTO: 90 FL (ref 80–100)
MONOCYTES # BLD AUTO: 0.55 X10*3/UL (ref 0.05–0.8)
MONOCYTES NFR BLD AUTO: 8 %
NEUTROPHILS # BLD AUTO: 5.44 X10*3/UL (ref 1.6–5.5)
NEUTROPHILS NFR BLD AUTO: 78.8 %
NITRITE UR QL STRIP.AUTO: NEGATIVE
NRBC BLD-RTO: 0 /100 WBCS (ref 0–0)
OXYHGB MFR BLDV: 32.7 % (ref 45–75)
PCO2 BLDV: 40 MM HG (ref 41–51)
PH BLDV: 7.32 PH (ref 7.33–7.43)
PH UR STRIP.AUTO: 5 [PH]
PLATELET # BLD AUTO: 147 X10*3/UL (ref 150–450)
PO2 BLDV: 29 MM HG (ref 35–45)
POTASSIUM BLDV-SCNC: 6 MMOL/L (ref 3.5–5.3)
POTASSIUM SERPL-SCNC: 4.9 MMOL/L (ref 3.5–5.3)
POTASSIUM SERPL-SCNC: 6.1 MMOL/L (ref 3.5–5.3)
PROT SERPL-MCNC: 7.2 G/DL (ref 6.4–8.2)
PROT SERPL-MCNC: 7.3 G/DL (ref 6.4–8.2)
PROT UR STRIP.AUTO-MCNC: NEGATIVE MG/DL
RBC # BLD AUTO: 4.32 X10*6/UL (ref 4.5–5.9)
RBC # UR STRIP.AUTO: NEGATIVE /UL
SALICYLATES SERPL-MCNC: <3 MG/DL
SAO2 % BLDV: 33 % (ref 45–75)
SARS-COV-2 RNA RESP QL NAA+PROBE: NOT DETECTED
SODIUM BLDV-SCNC: 131 MMOL/L (ref 136–145)
SODIUM SERPL-SCNC: 130 MMOL/L (ref 136–145)
SP GR UR STRIP.AUTO: 1.02
UROBILINOGEN UR STRIP.AUTO-MCNC: NORMAL MG/DL
WBC # BLD AUTO: 6.9 X10*3/UL (ref 4.4–11.3)

## 2024-08-25 PROCEDURE — 74176 CT ABD & PELVIS W/O CONTRAST: CPT | Mod: FOREIGN READ | Performed by: RADIOLOGY

## 2024-08-25 PROCEDURE — 83735 ASSAY OF MAGNESIUM: CPT | Performed by: EMERGENCY MEDICINE

## 2024-08-25 PROCEDURE — 36415 COLL VENOUS BLD VENIPUNCTURE: CPT | Performed by: EMERGENCY MEDICINE

## 2024-08-25 PROCEDURE — 2500000001 HC RX 250 WO HCPCS SELF ADMINISTERED DRUGS (ALT 637 FOR MEDICARE OP): Performed by: EMERGENCY MEDICINE

## 2024-08-25 PROCEDURE — 82330 ASSAY OF CALCIUM: CPT | Performed by: EMERGENCY MEDICINE

## 2024-08-25 PROCEDURE — 93005 ELECTROCARDIOGRAM TRACING: CPT

## 2024-08-25 PROCEDURE — 87635 SARS-COV-2 COVID-19 AMP PRB: CPT | Performed by: EMERGENCY MEDICINE

## 2024-08-25 PROCEDURE — 84484 ASSAY OF TROPONIN QUANT: CPT | Performed by: EMERGENCY MEDICINE

## 2024-08-25 PROCEDURE — 83605 ASSAY OF LACTIC ACID: CPT | Performed by: EMERGENCY MEDICINE

## 2024-08-25 PROCEDURE — 85379 FIBRIN DEGRADATION QUANT: CPT | Performed by: EMERGENCY MEDICINE

## 2024-08-25 PROCEDURE — 2500000002 HC RX 250 W HCPCS SELF ADMINISTERED DRUGS (ALT 637 FOR MEDICARE OP, ALT 636 FOR OP/ED): Performed by: EMERGENCY MEDICINE

## 2024-08-25 PROCEDURE — 84132 ASSAY OF SERUM POTASSIUM: CPT | Performed by: EMERGENCY MEDICINE

## 2024-08-25 PROCEDURE — 96375 TX/PRO/DX INJ NEW DRUG ADDON: CPT

## 2024-08-25 PROCEDURE — 2500000004 HC RX 250 GENERAL PHARMACY W/ HCPCS (ALT 636 FOR OP/ED): Performed by: EMERGENCY MEDICINE

## 2024-08-25 PROCEDURE — 99285 EMERGENCY DEPT VISIT HI MDM: CPT | Mod: 25

## 2024-08-25 PROCEDURE — 71250 CT THORAX DX C-: CPT | Mod: FOREIGN READ | Performed by: RADIOLOGY

## 2024-08-25 PROCEDURE — 70450 CT HEAD/BRAIN W/O DYE: CPT

## 2024-08-25 PROCEDURE — 74176 CT ABD & PELVIS W/O CONTRAST: CPT

## 2024-08-25 PROCEDURE — 83880 ASSAY OF NATRIURETIC PEPTIDE: CPT | Performed by: EMERGENCY MEDICINE

## 2024-08-25 PROCEDURE — 2500000005 HC RX 250 GENERAL PHARMACY W/O HCPCS: Performed by: EMERGENCY MEDICINE

## 2024-08-25 PROCEDURE — 87040 BLOOD CULTURE FOR BACTERIA: CPT | Mod: AHULAB | Performed by: EMERGENCY MEDICINE

## 2024-08-25 PROCEDURE — 2060000001 HC INTERMEDIATE ICU ROOM DAILY

## 2024-08-25 PROCEDURE — 85025 COMPLETE CBC W/AUTO DIFF WBC: CPT | Performed by: EMERGENCY MEDICINE

## 2024-08-25 PROCEDURE — 82435 ASSAY OF BLOOD CHLORIDE: CPT | Performed by: EMERGENCY MEDICINE

## 2024-08-25 PROCEDURE — 80076 HEPATIC FUNCTION PANEL: CPT | Mod: CCI | Performed by: EMERGENCY MEDICINE

## 2024-08-25 PROCEDURE — 80320 DRUG SCREEN QUANTALCOHOLS: CPT | Performed by: EMERGENCY MEDICINE

## 2024-08-25 PROCEDURE — 70450 CT HEAD/BRAIN W/O DYE: CPT | Performed by: RADIOLOGY

## 2024-08-25 PROCEDURE — 71046 X-RAY EXAM CHEST 2 VIEWS: CPT | Mod: FOREIGN READ | Performed by: RADIOLOGY

## 2024-08-25 PROCEDURE — 99291 CRITICAL CARE FIRST HOUR: CPT | Performed by: EMERGENCY MEDICINE

## 2024-08-25 PROCEDURE — 94640 AIRWAY INHALATION TREATMENT: CPT

## 2024-08-25 PROCEDURE — 81003 URINALYSIS AUTO W/O SCOPE: CPT | Performed by: EMERGENCY MEDICINE

## 2024-08-25 PROCEDURE — 71046 X-RAY EXAM CHEST 2 VIEWS: CPT

## 2024-08-25 PROCEDURE — 96365 THER/PROPH/DIAG IV INF INIT: CPT

## 2024-08-25 RX ORDER — ALBUTEROL SULFATE 0.83 MG/ML
2.5 SOLUTION RESPIRATORY (INHALATION) ONCE
Status: COMPLETED | OUTPATIENT
Start: 2024-08-25 | End: 2024-08-25

## 2024-08-25 RX ORDER — VANCOMYCIN HYDROCHLORIDE 1 G/200ML
1000 INJECTION, SOLUTION INTRAVENOUS ONCE
Status: COMPLETED | OUTPATIENT
Start: 2024-08-25 | End: 2024-08-25

## 2024-08-25 RX ORDER — METOPROLOL TARTRATE 1 MG/ML
5 INJECTION, SOLUTION INTRAVENOUS ONCE
Status: COMPLETED | OUTPATIENT
Start: 2024-08-25 | End: 2024-08-25

## 2024-08-25 RX ORDER — METOPROLOL TARTRATE 25 MG/1
50 TABLET, FILM COATED ORAL ONCE
Status: COMPLETED | OUTPATIENT
Start: 2024-08-25 | End: 2024-08-25

## 2024-08-25 RX ORDER — CALCIUM GLUCONATE 20 MG/ML
2 INJECTION, SOLUTION INTRAVENOUS ONCE
Status: COMPLETED | OUTPATIENT
Start: 2024-08-25 | End: 2024-08-25

## 2024-08-25 RX ORDER — FUROSEMIDE 10 MG/ML
40 INJECTION INTRAMUSCULAR; INTRAVENOUS ONCE
Status: COMPLETED | OUTPATIENT
Start: 2024-08-25 | End: 2024-08-25

## 2024-08-25 ASSESSMENT — PAIN SCALES - GENERAL
PAINLEVEL_OUTOF10: 0 - NO PAIN
PAINLEVEL_OUTOF10: 0 - NO PAIN

## 2024-08-25 ASSESSMENT — COLUMBIA-SUICIDE SEVERITY RATING SCALE - C-SSRS
6. HAVE YOU EVER DONE ANYTHING, STARTED TO DO ANYTHING, OR PREPARED TO DO ANYTHING TO END YOUR LIFE?: NO
1. IN THE PAST MONTH, HAVE YOU WISHED YOU WERE DEAD OR WISHED YOU COULD GO TO SLEEP AND NOT WAKE UP?: NO
2. HAVE YOU ACTUALLY HAD ANY THOUGHTS OF KILLING YOURSELF?: NO

## 2024-08-25 ASSESSMENT — PAIN - FUNCTIONAL ASSESSMENT: PAIN_FUNCTIONAL_ASSESSMENT: 0-10

## 2024-08-25 NOTE — ED NOTES
Pt resting pure wick in place and draining without difficulty. 550cc's drained in container. Family at bedside and updated in case.      Steffi Jain RN  08/25/24 7766     yes

## 2024-08-25 NOTE — SIGNIFICANT EVENT
Patient has been identified as having an emergent need for administration of iodinated contrast for CT scan prior to result of laboratory studies OR despite known elevated GFR due to possibility of life and/or limb threatening pathology.     I acknowledge the risks and benefits of emergently proceeding with contrast administration including that, at present, it is the position of the [employer] College [employer] that contrast induced nephropathy (MARINA) is a rare but possible consequence. At this time the benefits of proceeding with contrast administration outweigh the risks.     Attempts will be made to mitigate possible MARINA risk with IV fluid hydration if able.

## 2024-08-25 NOTE — ED PROVIDER NOTES
HPI   Chief Complaint   Patient presents with    Shortness of Breath     Patient came in by ambulance with sob that started yesterday and is worse with exertion. Patient went to urgent care today to be evaluated and EKG showed A fib, patient has chronic a fib. Patient denies taking his medications at this time        89-year-old male with history of dementia, A-fib on Eliquis, heart failure with reduced ejection fraction who is presenting with shortness of breath and cough for the last few days.  Urgent care noted that he was in A-fib which is chronic for him, and sent him to the ER.  On arrival, history is limited by his dementia, he says he is having shortness of breath with no chest pain.  Denies abdominal pain, leg swelling.  Intermittent cough, no fevers or chills.  No dysuria.      History provided by:  Relative and medical records  History limited by:  Dementia          Patient History   Past Medical History:   Diagnosis Date    Abnormality of plasma protein, unspecified 05/01/2017    Elevated blood protein    Benign prostatic hyperplasia without lower urinary tract symptoms 05/01/2017    BPH with elevated PSA    Disorder of prostate, unspecified 05/01/2017    Prostate disorder    Elevated prostate specific antigen (PSA) 05/01/2017    Elevated prostate specific antigen (PSA)    Elevated prostate specific antigen (PSA) 05/01/2017    Abnormal prostate specific antigen    Elevated prostate specific antigen (PSA) 05/01/2017    Abnormal PSA    Elevated prostate specific antigen (PSA) 05/01/2017    Abnormal prostate specific antigen test    Epistaxis 05/01/2017    Epistaxis, recurrent    Essential (primary) hypertension 12/19/2022    Hypertension    Frequency of micturition 05/01/2017    Urinary frequency    Hallucinations, unspecified 05/01/2017    Hallucinations    Headache, unspecified 05/01/2017    Bilateral headache    Hematuria, unspecified 05/01/2017    Hematuria    Impacted cerumen 05/23/2024    Ischemic  cardiomyopathy 12/13/2021    Cardiomyopathy, ischemic    Nasal discharge 05/23/2024    Nasal mucosa dry 05/23/2024    Other amnesia 05/01/2017    Memory loss    Other microscopic hematuria 05/01/2017    Hematuria, microscopic    Other seasonal allergic rhinitis 05/01/2017    Seasonal allergies    Other secondary cataract, right eye 05/31/2018    Posterior capsular opacification visually significant of right eye    Other specified disorders of the male genital organs 05/01/2017    Scrotal swelling    Personal history of other specified conditions 02/10/2020    History of epistaxis    Presence of intraocular lens 05/01/2017    Pseudophakia    Primary insomnia 05/01/2017    Primary insomnia    Primary open-angle glaucoma, unspecified eye, stage unspecified 05/01/2017    Open angle primary glaucoma    Unspecified atrial flutter (Multi) 05/01/2017    Atrial flutter    Unspecified glaucoma 05/01/2017    Glaucoma    Vitamin D deficiency, unspecified 05/01/2017    Vitamin D deficiency     Past Surgical History:   Procedure Laterality Date    CATARACT EXTRACTION  10/07/2014    Cataract Surgery    MR HEAD ANGIO WO IV CONTRAST  3/3/2018    MR HEAD ANGIO WO IV CONTRAST 3/3/2018 [facility] CLINICAL LEGACY    MR NECK ANGIO WO IV CONTRAST  3/3/2018    MR NECK ANGIO WO IV CONTRAST 3/3/2018 [facility] CLINICAL LEGACY    OTHER SURGICAL HISTORY  10/07/2014    Dental Surgery     Family History   Problem Relation Name Age of Onset    No Known Problems Mother      No Known Problems Father       Social History     Tobacco Use    Smoking status: Former     Types: Cigarettes     Passive exposure: Never    Smokeless tobacco: Never   Vaping Use    Vaping status: Never Used   Substance Use Topics    Alcohol use: Not Currently    Drug use: Not Currently       Physical Exam   ED Triage Vitals   Temp Pulse Resp BP   -- -- -- --      SpO2 Temp src Heart Rate Source Patient Position   -- -- -- --      BP Location FiO2 (%)     -- --       Physical Exam  Vitals  and nursing note reviewed.   Constitutional:       Comments: Thin   HENT:      Head: Normocephalic and atraumatic.   Eyes:      Pupils: Pupils are equal, round, and reactive to light.   Cardiovascular:      Rate and Rhythm: Tachycardia present. Rhythm irregular.   Pulmonary:      Effort: Pulmonary effort is normal. No tachypnea.      Breath sounds: Normal breath sounds. No decreased breath sounds.   Chest:      Chest wall: No mass or tenderness.   Abdominal:      Palpations: Abdomen is soft.   Musculoskeletal:         General: Normal range of motion.      Cervical back: Normal range of motion.      Right lower leg: No edema.      Left lower leg: No edema.   Skin:     General: Skin is warm.      Capillary Refill: Capillary refill takes less than 2 seconds.   Neurological:      Mental Status: He is alert. He is disoriented.           ED Course & MDM   ED Course as of 08/25/24 2247   Sun Aug 25, 2024   1646 Patient has elevated BNP, elevated liver function test, acute kidney injury with mild hyperkalemia although hemolyzed, bedside ultrasound shows B-lines, plethoric IVC, concern for heart failure exacerbation and will diurese with Lasix.  Continue broad-spectrum antibiotics.  D-dimer 600, age-adjusted negative and with out significant elevation of troponin will hold on CT PE. [AM]   1918 500 cc urine output [AM]      ED Course User Index  [AM] Han Ferrera MD         Diagnoses as of 08/25/24 2247   Elevated LFTs   Acute kidney injury (CMS-HCC)   Dyspnea, unspecified type   Acute on chronic congestive heart failure, unspecified heart failure type (Multi)                 No data recorded                                 Medical Decision Making  89-year-old male presenting with shortness of breath, has no wheezing on exam and no lower extremity edema.  Differential includes COVID, pneumonia, potential heart failure though does not appear hypervolemic, I have lower suspicion for PE at this time.     Initial VBG shows a  lactate of 7, elevated BNP to 1700, troponin is at his baseline, EKG shows A-fib with RVR, elevated liver function test with elevated bilirubin.  I did a bedside ultrasound which showed bilateral B-lines, plethoric IVC, and ejection fraction around his baseline of 30%.  He has an acute kidney injury with creatinine of 1.7.  The chest x-ray did not show significant pneumonia or pulmonary edema, therefore sent a D-dimer which was 600.  This is age-adjusted negative.  With the ultrasound I do have an alternative explanation of his dyspnea, will defer CT PE at this time.  I sent him for CT of the chest abdomen pelvis without contrast to assess for pulmonary edema versus pneumonia versus potential intra-abdominal infection given the elevated LFTs.  This did show pulmonary edema, did show colitis with significant atherosclerotic calcifications.  Despite elevated lactate, patient has no abdominal tenderness on serial exams and denies abdominal pain, I have low suspicion for mesenteric ischemia at this time.  Given on his ultrasound he had intravascular hyper bulimia with B-lines, his elevated LFTs I suspect are from pulmonary congestion not an intra-abdominal pathology, and elevated BNP, I gave him Lasix, he put out 800 cc of urine with significant improvement in his dyspnea.  I gave him his home metoprolol with an IV push for rate control with good effect.  The patient is significantly more comfortable, is not in pain.  I gave him broad-spectrum antibiotics for concern for potential sepsis given the elevated lactate, again did not fluid bolus and as I suspect that he was hypervolemic from heart failure.  He was admitted in stable condition to the stepdown unit.    Amount and/or Complexity of Data Reviewed  ECG/medicine tests: independent interpretation performed.     Details: EKG shows A-fib with a heart rate of 138, no discordant ST segment elevations or depressions, no T wave inversions        Procedure  Procedures      Han Ferrera MD  08/25/24 2979

## 2024-08-26 LAB
ANION GAP SERPL CALC-SCNC: 23 MMOL/L (ref 10–20)
BUN SERPL-MCNC: 30 MG/DL (ref 6–23)
CALCIUM SERPL-MCNC: 9.7 MG/DL (ref 8.6–10.3)
CHLORIDE SERPL-SCNC: 95 MMOL/L (ref 98–107)
CO2 SERPL-SCNC: 17 MMOL/L (ref 21–32)
CREAT SERPL-MCNC: 1.99 MG/DL (ref 0.5–1.3)
EGFRCR SERPLBLD CKD-EPI 2021: 31 ML/MIN/1.73M*2
ERYTHROCYTE [DISTWIDTH] IN BLOOD BY AUTOMATED COUNT: 17.2 % (ref 11.5–14.5)
GLUCOSE SERPL-MCNC: 128 MG/DL (ref 74–99)
HBV SURFACE AG SERPL QL IA: NONREACTIVE
HCT VFR BLD AUTO: 39.8 % (ref 41–52)
HGB BLD-MCNC: 12.3 G/DL (ref 13.5–17.5)
HOLD SPECIMEN: NORMAL
HOLD SPECIMEN: NORMAL
LACTATE SERPL-SCNC: 3.6 MMOL/L (ref 0.4–2)
LACTATE SERPL-SCNC: 4.5 MMOL/L (ref 0.4–2)
MCH RBC QN AUTO: 28.4 PG (ref 26–34)
MCHC RBC AUTO-ENTMCNC: 30.9 G/DL (ref 32–36)
MCV RBC AUTO: 92 FL (ref 80–100)
NRBC BLD-RTO: 0.4 /100 WBCS (ref 0–0)
PLATELET # BLD AUTO: 167 X10*3/UL (ref 150–450)
POTASSIUM SERPL-SCNC: 5.3 MMOL/L (ref 3.5–5.3)
Q ONSET: 217 MS
QRS COUNT: 22 BEATS
QRS DURATION: 132 MS
QT INTERVAL: 356 MS
QTC CALCULATION(BAZETT): 539 MS
QTC FREDERICIA: 469 MS
R AXIS: 264 DEGREES
RBC # BLD AUTO: 4.33 X10*6/UL (ref 4.5–5.9)
SODIUM SERPL-SCNC: 130 MMOL/L (ref 136–145)
T AXIS: 90 DEGREES
T OFFSET: 395 MS
TSH SERPL-ACNC: 0.34 MIU/L (ref 0.44–3.98)
VENTRICULAR RATE: 138 BPM
WBC # BLD AUTO: 8 X10*3/UL (ref 4.4–11.3)

## 2024-08-26 PROCEDURE — 99221 1ST HOSP IP/OBS SF/LOW 40: CPT | Performed by: STUDENT IN AN ORGANIZED HEALTH CARE EDUCATION/TRAINING PROGRAM

## 2024-08-26 PROCEDURE — 2500000004 HC RX 250 GENERAL PHARMACY W/ HCPCS (ALT 636 FOR OP/ED): Performed by: FAMILY MEDICINE

## 2024-08-26 PROCEDURE — 2500000001 HC RX 250 WO HCPCS SELF ADMINISTERED DRUGS (ALT 637 FOR MEDICARE OP): Performed by: FAMILY MEDICINE

## 2024-08-26 PROCEDURE — 86708 HEPATITIS A ANTIBODY: CPT | Mod: AHULAB

## 2024-08-26 PROCEDURE — 99223 1ST HOSP IP/OBS HIGH 75: CPT | Performed by: INTERNAL MEDICINE

## 2024-08-26 PROCEDURE — 2500000002 HC RX 250 W HCPCS SELF ADMINISTERED DRUGS (ALT 637 FOR MEDICARE OP, ALT 636 FOR OP/ED): Performed by: FAMILY MEDICINE

## 2024-08-26 PROCEDURE — 84443 ASSAY THYROID STIM HORMONE: CPT

## 2024-08-26 PROCEDURE — 80048 BASIC METABOLIC PNL TOTAL CA: CPT | Performed by: FAMILY MEDICINE

## 2024-08-26 PROCEDURE — 86803 HEPATITIS C AB TEST: CPT | Mod: AHULAB

## 2024-08-26 PROCEDURE — 94664 DEMO&/EVAL PT USE INHALER: CPT

## 2024-08-26 PROCEDURE — 2500000002 HC RX 250 W HCPCS SELF ADMINISTERED DRUGS (ALT 637 FOR MEDICARE OP, ALT 636 FOR OP/ED): Performed by: PHARMACIST

## 2024-08-26 PROCEDURE — 94640 AIRWAY INHALATION TREATMENT: CPT

## 2024-08-26 PROCEDURE — 83605 ASSAY OF LACTIC ACID: CPT

## 2024-08-26 PROCEDURE — 2500000002 HC RX 250 W HCPCS SELF ADMINISTERED DRUGS (ALT 637 FOR MEDICARE OP, ALT 636 FOR OP/ED): Performed by: EMERGENCY MEDICINE

## 2024-08-26 PROCEDURE — 86706 HEP B SURFACE ANTIBODY: CPT | Mod: AHULAB

## 2024-08-26 PROCEDURE — 36415 COLL VENOUS BLD VENIPUNCTURE: CPT

## 2024-08-26 PROCEDURE — 85027 COMPLETE CBC AUTOMATED: CPT | Performed by: FAMILY MEDICINE

## 2024-08-26 PROCEDURE — 36415 COLL VENOUS BLD VENIPUNCTURE: CPT | Performed by: FAMILY MEDICINE

## 2024-08-26 PROCEDURE — 2060000001 HC INTERMEDIATE ICU ROOM DAILY

## 2024-08-26 PROCEDURE — 87340 HEPATITIS B SURFACE AG IA: CPT | Mod: AHULAB

## 2024-08-26 RX ORDER — ACETAMINOPHEN 325 MG/1
650 TABLET ORAL EVERY 4 HOURS PRN
Status: DISCONTINUED | OUTPATIENT
Start: 2024-08-26 | End: 2024-08-30 | Stop reason: HOSPADM

## 2024-08-26 RX ORDER — TAMSULOSIN HYDROCHLORIDE 0.4 MG/1
0.4 CAPSULE ORAL DAILY
Status: DISCONTINUED | OUTPATIENT
Start: 2024-08-26 | End: 2024-08-30 | Stop reason: HOSPADM

## 2024-08-26 RX ORDER — OLANZAPINE 2.5 MG/1
2.5 TABLET ORAL NIGHTLY
Status: COMPLETED | OUTPATIENT
Start: 2024-08-26 | End: 2024-08-28

## 2024-08-26 RX ORDER — ATORVASTATIN CALCIUM 10 MG/1
10 TABLET, FILM COATED ORAL DAILY
Status: DISCONTINUED | OUTPATIENT
Start: 2024-08-26 | End: 2024-08-30 | Stop reason: HOSPADM

## 2024-08-26 RX ORDER — PANTOPRAZOLE SODIUM 40 MG/10ML
40 INJECTION, POWDER, LYOPHILIZED, FOR SOLUTION INTRAVENOUS
Status: DISCONTINUED | OUTPATIENT
Start: 2024-08-26 | End: 2024-08-27

## 2024-08-26 RX ORDER — ACETAMINOPHEN 160 MG/5ML
650 SOLUTION ORAL EVERY 4 HOURS PRN
Status: DISCONTINUED | OUTPATIENT
Start: 2024-08-26 | End: 2024-08-30 | Stop reason: HOSPADM

## 2024-08-26 RX ORDER — TALC
3 POWDER (GRAM) TOPICAL NIGHTLY
Status: DISCONTINUED | OUTPATIENT
Start: 2024-08-26 | End: 2024-08-30 | Stop reason: HOSPADM

## 2024-08-26 RX ORDER — METOPROLOL SUCCINATE 25 MG/1
25 TABLET, EXTENDED RELEASE ORAL DAILY
Status: DISCONTINUED | OUTPATIENT
Start: 2024-08-26 | End: 2024-08-30 | Stop reason: HOSPADM

## 2024-08-26 RX ORDER — IPRATROPIUM BROMIDE AND ALBUTEROL SULFATE 2.5; .5 MG/3ML; MG/3ML
3 SOLUTION RESPIRATORY (INHALATION) ONCE
Status: COMPLETED | OUTPATIENT
Start: 2024-08-26 | End: 2024-08-26

## 2024-08-26 RX ORDER — HALOPERIDOL 5 MG/ML
2 INJECTION INTRAMUSCULAR ONCE
Status: COMPLETED | OUTPATIENT
Start: 2024-08-26 | End: 2024-08-26

## 2024-08-26 RX ORDER — GUAIFENESIN 600 MG/1
600 TABLET, EXTENDED RELEASE ORAL EVERY 12 HOURS PRN
Status: DISCONTINUED | OUTPATIENT
Start: 2024-08-26 | End: 2024-08-30 | Stop reason: HOSPADM

## 2024-08-26 RX ORDER — METOPROLOL TARTRATE 1 MG/ML
5 INJECTION, SOLUTION INTRAVENOUS EVERY 6 HOURS PRN
Status: DISCONTINUED | OUTPATIENT
Start: 2024-08-26 | End: 2024-08-30 | Stop reason: HOSPADM

## 2024-08-26 RX ORDER — FLUTICASONE FUROATE AND VILANTEROL 200; 25 UG/1; UG/1
1 POWDER RESPIRATORY (INHALATION)
Status: DISCONTINUED | OUTPATIENT
Start: 2024-08-26 | End: 2024-08-26 | Stop reason: CLARIF

## 2024-08-26 RX ORDER — FORMOTEROL FUMARATE DIHYDRATE 20 UG/2ML
20 SOLUTION RESPIRATORY (INHALATION)
Status: DISCONTINUED | OUTPATIENT
Start: 2024-08-26 | End: 2024-08-30 | Stop reason: HOSPADM

## 2024-08-26 RX ORDER — MEMANTINE HYDROCHLORIDE 5 MG/1
10 TABLET ORAL DAILY
Status: DISCONTINUED | OUTPATIENT
Start: 2024-08-26 | End: 2024-08-30 | Stop reason: HOSPADM

## 2024-08-26 RX ORDER — DONEPEZIL HYDROCHLORIDE 5 MG/1
10 TABLET, FILM COATED ORAL DAILY
Status: DISCONTINUED | OUTPATIENT
Start: 2024-08-26 | End: 2024-08-30 | Stop reason: HOSPADM

## 2024-08-26 RX ORDER — SPIRONOLACTONE 25 MG/1
12.5 TABLET ORAL DAILY
Status: DISCONTINUED | OUTPATIENT
Start: 2024-08-26 | End: 2024-08-30 | Stop reason: HOSPADM

## 2024-08-26 RX ORDER — POLYETHYLENE GLYCOL 3350 17 G/17G
17 POWDER, FOR SOLUTION ORAL DAILY
Status: DISCONTINUED | OUTPATIENT
Start: 2024-08-26 | End: 2024-08-30 | Stop reason: HOSPADM

## 2024-08-26 RX ORDER — PANTOPRAZOLE SODIUM 40 MG/1
40 TABLET, DELAYED RELEASE ORAL
Status: DISCONTINUED | OUTPATIENT
Start: 2024-08-26 | End: 2024-08-30 | Stop reason: HOSPADM

## 2024-08-26 RX ORDER — IPRATROPIUM BROMIDE AND ALBUTEROL SULFATE 2.5; .5 MG/3ML; MG/3ML
3 SOLUTION RESPIRATORY (INHALATION) EVERY 4 HOURS PRN
Status: DISCONTINUED | OUTPATIENT
Start: 2024-08-26 | End: 2024-08-28

## 2024-08-26 RX ORDER — FINASTERIDE 5 MG/1
5 TABLET, FILM COATED ORAL DAILY
Status: DISCONTINUED | OUTPATIENT
Start: 2024-08-26 | End: 2024-08-30 | Stop reason: HOSPADM

## 2024-08-26 RX ORDER — CHOLECALCIFEROL (VITAMIN D3) 25 MCG
2000 TABLET ORAL DAILY
Status: DISCONTINUED | OUTPATIENT
Start: 2024-08-26 | End: 2024-08-30 | Stop reason: HOSPADM

## 2024-08-26 RX ORDER — BUDESONIDE 0.5 MG/2ML
0.5 INHALANT ORAL
Status: DISCONTINUED | OUTPATIENT
Start: 2024-08-26 | End: 2024-08-30 | Stop reason: HOSPADM

## 2024-08-26 SDOH — ECONOMIC STABILITY: HOUSING INSECURITY: IN THE PAST 12 MONTHS, HOW MANY TIMES HAVE YOU MOVED WHERE YOU WERE LIVING?: 0

## 2024-08-26 SDOH — SOCIAL STABILITY: SOCIAL INSECURITY: ARE THERE ANY APPARENT SIGNS OF INJURIES/BEHAVIORS THAT COULD BE RELATED TO ABUSE/NEGLECT?: NO

## 2024-08-26 SDOH — ECONOMIC STABILITY: INCOME INSECURITY
HOW HARD IS IT FOR YOU TO PAY FOR THE VERY BASICS LIKE FOOD, HOUSING, MEDICAL CARE, AND HEATING?: PATIENT UNABLE TO ANSWER

## 2024-08-26 SDOH — SOCIAL STABILITY: SOCIAL INSECURITY: ARE YOU OR HAVE YOU BEEN THREATENED OR ABUSED PHYSICALLY, EMOTIONALLY, OR SEXUALLY BY ANYONE?: NO

## 2024-08-26 SDOH — HEALTH STABILITY: PHYSICAL HEALTH: ON AVERAGE, HOW MANY DAYS PER WEEK DO YOU ENGAGE IN MODERATE TO STRENUOUS EXERCISE (LIKE A BRISK WALK)?: 0 DAYS

## 2024-08-26 SDOH — SOCIAL STABILITY: SOCIAL INSECURITY: DO YOU FEEL ANYONE HAS EXPLOITED OR TAKEN ADVANTAGE OF YOU FINANCIALLY OR OF YOUR PERSONAL PROPERTY?: NO

## 2024-08-26 SDOH — ECONOMIC STABILITY: TRANSPORTATION INSECURITY
IN THE PAST 12 MONTHS, HAS LACK OF TRANSPORTATION KEPT YOU FROM MEETINGS, WORK, OR FROM GETTING THINGS NEEDED FOR DAILY LIVING?: PATIENT UNABLE TO ANSWER

## 2024-08-26 SDOH — SOCIAL STABILITY: SOCIAL INSECURITY: HAVE YOU HAD THOUGHTS OF HARMING ANYONE ELSE?: NO

## 2024-08-26 SDOH — SOCIAL STABILITY: SOCIAL INSECURITY: WERE YOU ABLE TO COMPLETE ALL THE BEHAVIORAL HEALTH SCREENINGS?: YES

## 2024-08-26 SDOH — ECONOMIC STABILITY: INCOME INSECURITY: IN THE LAST 12 MONTHS, WAS THERE A TIME WHEN YOU WERE NOT ABLE TO PAY THE MORTGAGE OR RENT ON TIME?: NO

## 2024-08-26 SDOH — ECONOMIC STABILITY: HOUSING INSECURITY: AT ANY TIME IN THE PAST 12 MONTHS, WERE YOU HOMELESS OR LIVING IN A SHELTER (INCLUDING NOW)?: NO

## 2024-08-26 SDOH — ECONOMIC STABILITY: HOUSING INSECURITY: AT ANY TIME IN THE PAST 12 MONTHS, WERE YOU HOMELESS OR LIVING IN A SHELTER (INCLUDING NOW)?: PATIENT UNABLE TO ANSWER

## 2024-08-26 SDOH — ECONOMIC STABILITY: INCOME INSECURITY
IN THE LAST 12 MONTHS, WAS THERE A TIME WHEN YOU WERE NOT ABLE TO PAY THE MORTGAGE OR RENT ON TIME?: PATIENT UNABLE TO ANSWER

## 2024-08-26 SDOH — ECONOMIC STABILITY: TRANSPORTATION INSECURITY
IN THE PAST 12 MONTHS, HAS THE LACK OF TRANSPORTATION KEPT YOU FROM MEDICAL APPOINTMENTS OR FROM GETTING MEDICATIONS?: PATIENT UNABLE TO ANSWER

## 2024-08-26 SDOH — ECONOMIC STABILITY: INCOME INSECURITY: HOW HARD IS IT FOR YOU TO PAY FOR THE VERY BASICS LIKE FOOD, HOUSING, MEDICAL CARE, AND HEATING?: NOT HARD AT ALL

## 2024-08-26 SDOH — SOCIAL STABILITY: SOCIAL INSECURITY: DOES ANYONE TRY TO KEEP YOU FROM HAVING/CONTACTING OTHER FRIENDS OR DOING THINGS OUTSIDE YOUR HOME?: NO

## 2024-08-26 SDOH — HEALTH STABILITY: PHYSICAL HEALTH: ON AVERAGE, HOW MANY MINUTES DO YOU ENGAGE IN EXERCISE AT THIS LEVEL?: 0 MIN

## 2024-08-26 SDOH — SOCIAL STABILITY: SOCIAL INSECURITY: DO YOU FEEL UNSAFE GOING BACK TO THE PLACE WHERE YOU ARE LIVING?: NO

## 2024-08-26 SDOH — HEALTH STABILITY: PHYSICAL HEALTH: ON AVERAGE, HOW MANY DAYS PER WEEK DO YOU ENGAGE IN MODERATE TO STRENUOUS EXERCISE (LIKE A BRISK WALK)?: 2 DAYS

## 2024-08-26 SDOH — SOCIAL STABILITY: SOCIAL INSECURITY: ABUSE: ADULT

## 2024-08-26 SDOH — SOCIAL STABILITY: SOCIAL INSECURITY: HAS ANYONE EVER THREATENED TO HURT YOUR FAMILY OR YOUR PETS?: NO

## 2024-08-26 SDOH — HEALTH STABILITY: PHYSICAL HEALTH: ON AVERAGE, HOW MANY MINUTES DO YOU ENGAGE IN EXERCISE AT THIS LEVEL?: 10 MIN

## 2024-08-26 SDOH — SOCIAL STABILITY: SOCIAL INSECURITY: HAVE YOU HAD ANY THOUGHTS OF HARMING ANYONE ELSE?: NO

## 2024-08-26 ASSESSMENT — PAIN - FUNCTIONAL ASSESSMENT: PAIN_FUNCTIONAL_ASSESSMENT: 0-10

## 2024-08-26 ASSESSMENT — COGNITIVE AND FUNCTIONAL STATUS - GENERAL
DAILY ACTIVITIY SCORE: 21
MOVING TO AND FROM BED TO CHAIR: A LITTLE
CLIMB 3 TO 5 STEPS WITH RAILING: A LITTLE
PERSONAL GROOMING: A LITTLE
PATIENT BASELINE BEDBOUND: NO
MOBILITY SCORE: 18
TOILETING: A LITTLE
STANDING UP FROM CHAIR USING ARMS: A LITTLE
CLIMB 3 TO 5 STEPS WITH RAILING: A LOT
TURNING FROM BACK TO SIDE WHILE IN FLAT BAD: A LITTLE
TOILETING: A LITTLE
MOVING FROM LYING ON BACK TO SITTING ON SIDE OF FLAT BED WITH BEDRAILS: A LITTLE
WALKING IN HOSPITAL ROOM: A LITTLE
MOVING TO AND FROM BED TO CHAIR: A LITTLE
HELP NEEDED FOR BATHING: A LITTLE
WALKING IN HOSPITAL ROOM: A LITTLE
STANDING UP FROM CHAIR USING ARMS: A LITTLE
TURNING FROM BACK TO SIDE WHILE IN FLAT BAD: A LITTLE
MOBILITY SCORE: 17
DAILY ACTIVITIY SCORE: 18
MOVING FROM LYING ON BACK TO SITTING ON SIDE OF FLAT BED WITH BEDRAILS: A LITTLE
WALKING IN HOSPITAL ROOM: A LITTLE
MOBILITY SCORE: 18
DRESSING REGULAR LOWER BODY CLOTHING: A LITTLE
TOILETING: A LITTLE
PERSONAL GROOMING: A LITTLE
EATING MEALS: A LITTLE
DRESSING REGULAR UPPER BODY CLOTHING: A LITTLE
DRESSING REGULAR LOWER BODY CLOTHING: A LITTLE
EATING MEALS: A LITTLE
DAILY ACTIVITIY SCORE: 18
TURNING FROM BACK TO SIDE WHILE IN FLAT BAD: A LITTLE
DRESSING REGULAR UPPER BODY CLOTHING: A LITTLE
MOVING FROM LYING ON BACK TO SITTING ON SIDE OF FLAT BED WITH BEDRAILS: A LITTLE
MOVING TO AND FROM BED TO CHAIR: A LITTLE
CLIMB 3 TO 5 STEPS WITH RAILING: A LITTLE
DRESSING REGULAR LOWER BODY CLOTHING: A LITTLE
STANDING UP FROM CHAIR USING ARMS: A LITTLE
HELP NEEDED FOR BATHING: A LITTLE
DRESSING REGULAR UPPER BODY CLOTHING: A LITTLE

## 2024-08-26 ASSESSMENT — PAIN SCALES - GENERAL
PAINLEVEL_OUTOF10: 0 - NO PAIN

## 2024-08-26 ASSESSMENT — LIFESTYLE VARIABLES
HOW OFTEN DO YOU HAVE A DRINK CONTAINING ALCOHOL: NEVER
HOW OFTEN DO YOU HAVE 6 OR MORE DRINKS ON ONE OCCASION: NEVER
AUDIT-C TOTAL SCORE: 0
SKIP TO QUESTIONS 9-10: 1
HOW MANY STANDARD DRINKS CONTAINING ALCOHOL DO YOU HAVE ON A TYPICAL DAY: PATIENT DOES NOT DRINK
AUDIT-C TOTAL SCORE: 0

## 2024-08-26 ASSESSMENT — ACTIVITIES OF DAILY LIVING (ADL)
TOILETING: NEEDS ASSISTANCE
LACK_OF_TRANSPORTATION: NO
ASSISTIVE_DEVICE: WALKER
PATIENT'S MEMORY ADEQUATE TO SAFELY COMPLETE DAILY ACTIVITIES?: NO
FEEDING YOURSELF: INDEPENDENT
WALKS IN HOME: NEEDS ASSISTANCE
JUDGMENT_ADEQUATE_SAFELY_COMPLETE_DAILY_ACTIVITIES: NO
LACK_OF_TRANSPORTATION: NO
ADEQUATE_TO_COMPLETE_ADL: YES
HEARING - RIGHT EAR: HEARING AID
BATHING: INDEPENDENT
DRESSING YOURSELF: NEEDS ASSISTANCE
HEARING - LEFT EAR: HEARING AID
GROOMING: INDEPENDENT

## 2024-08-26 ASSESSMENT — PATIENT HEALTH QUESTIONNAIRE - PHQ9
2. FEELING DOWN, DEPRESSED OR HOPELESS: NOT AT ALL
1. LITTLE INTEREST OR PLEASURE IN DOING THINGS: NOT AT ALL
SUM OF ALL RESPONSES TO PHQ9 QUESTIONS 1 & 2: 0

## 2024-08-26 NOTE — PROGRESS NOTES
Emergency Department Transition of Care Note       Signout   I received Jam Cox in signout from Dr. Ferrera.  Please see the ED Provider Note for all HPI, PE and MDM up to the time of signout which is documented in the ED course.  This is in addition to the primary record.    See ED course of sign out comments.    ED Course & Medical Decision Making   ED Course:  ED Course as of 08/26/24 0113   Sun Aug 25, 2024   1646 Patient has elevated BNP, elevated liver function test, acute kidney injury with mild hyperkalemia although hemolyzed, bedside ultrasound shows B-lines, plethoric IVC, concern for heart failure exacerbation and will diurese with Lasix.  Continue broad-spectrum antibiotics.  D-dimer 600, age-adjusted negative and with out significant elevation of troponin will hold on CT PE. [AM]   1918 500 cc urine output [AM]   Mon Aug 26, 2024   0040 SS for AM    Hx HFrEF recent admit for multifocal PNA. Here with SOB, A fib RVR here. Lactate elevated but downtrending. Hypervolemic, got lasix. Got abx. CT pulm edema but nothing else acute. Atherosclerotic vessels rads rec consider mesenteric ischemia but no abd pain. Admitted med SDU [SS]      ED Course User Index  [AM] Han Ferrera MD  [SS] Steffen Simerlink, MD         Diagnoses as of 08/26/24 0113   Elevated LFTs   Acute kidney injury (CMS-HCC)   Dyspnea, unspecified type   Acute on chronic congestive heart failure, unspecified heart failure type (Multi)       Medical Decision Making:  Under my care, no acute events. Patient admitted to med SDU by prior provider.    Procedures   Procedures    Steffen Simerlink, MD

## 2024-08-26 NOTE — PROGRESS NOTES
08/26/24 1432   Discharge Planning   Living Arrangements Spouse/significant other;Children   Support Systems Spouse/significant other;Children   Assistance Needed Independent   Type of Residence Private residence   Number of Stairs to Enter Residence 5   Number of Stairs Within Residence 12   Do you have animals or pets at home? No   Who is requesting discharge planning? Provider   Home or Post Acute Services In home services   Type of Home Care Services Home nursing visits;Home OT;Home PT   Expected Discharge Disposition HH Services   Does the patient need discharge transport arranged? Yes   RoundTrip coordination needed? Yes   Has discharge transport been arranged? No   Financial Resource Strain   How hard is it for you to pay for the very basics like food, housing, medical care, and heating? Not hard   Housing Stability   In the last 12 months, was there a time when you were not able to pay the mortgage or rent on time? N   In the past 12 months, how many times have you moved where you were living? 0   At any time in the past 12 months, were you homeless or living in a shelter (including now)? N   Transportation Needs   In the past 12 months, has lack of transportation kept you from medical appointments or from getting medications? no   In the past 12 months, has lack of transportation kept you from meetings, work, or from getting things needed for daily living? No   Patient Choice   Provider Choice list and CMS website (https://medicare.gov/care-compare#search) for post-acute Quality and Resource Measure Data were provided and reviewed with: Family   Patient / Family choosing to utilize agency / facility established prior to hospitalization No        Visited patient at bedside. He is sleeping and has a sitter. Patient's daughter Mariah called and spoke with her. Explained my role as care coordinator. He lives with his wife and daughter in the house. He is independent with his care at home. He uses a  walker. No oxygen in use at home, no HD. His PCP is Dr. Malcolm Shen and he seen him 3 months ago. Pharmacy he uses is [facility] in [place]. Daughter stated she would like to have C for her father and choice is [facility]. Requested from Dr. Cooley to place [facility] referral. Will continue to follow for discharge needs.

## 2024-08-26 NOTE — CONSULTS
"INFECTIOUS DISEASE INPATIENT INITIAL CONSULTATION    Referred By: Toby Cooley    Reason For Consult: eval for antibiotics with elevated lactate, elevated lfts     HPI:  This is a 89 y.o. male with PMH of dementia, A. Fib on Eliquis, HFrEF who presented with shortness of breath and cough.    Few days of symptoms. No fevers/chills. No abd pain, n/v/c/d, urinary issues.    Currently denying any symptoms. Breathing is OK. No abd pain. No diarrhea.    Afebrile. WBC normal. Tachy. Has acute renal failure. BNP elevated. Lactate elevated around 5. COVID negative. UA without signs of infection. AST/ALT initially normal but neida to about 150 each and Tbili went from 1.4 to 5.3. Dbili 1.6 so this is mostly indirect. CT is showing signs of mesenteric ischemia. Cholelithiasis without cholecystitis and no biliary obstruction.     Allergies:  Ace inhibitors     Vitals (Last 24 Hours):  Heart Rate:  []   Temp:  [36.3 °C (97.3 °F)-36.9 °C (98.4 °F)]   Resp:  [18-20]   BP: ()/(55-94)   Height:  [175.3 cm (5' 9\")-177.8 cm (5' 10\")]   Weight:  [63.5 kg (140 lb)-65.4 kg (144 lb 1.6 oz)]   SpO2:  [85 %-100 %]      PHYSICAL EXAM:  Gen - NAD, sitting up in bed, not on O2 support  Heart - tachy, no murmurs  Lungs - clear bilaterally, no wheezing  Abd - soft, no ttp, BS present  Ext - no LE edema but all 4 extremities are cool to touch  Skin - no rash    MEDS:    Current Facility-Administered Medications:     acetaminophen (Tylenol) oral liquid 650 mg, 650 mg, oral, q4h PRN **OR** acetaminophen (Tylenol) tablet 650 mg, 650 mg, oral, q4h PRN, Toby Cooley MD    apixaban (Eliquis) tablet 5 mg, 5 mg, oral, BID, Toby Cooley MD    [Held by provider] atorvastatin (Lipitor) tablet 10 mg, 10 mg, oral, Daily, Toby Cooley MD    budesonide (Pulmicort) 0.5 mg/2 mL nebulizer solution 0.5 mg, 0.5 mg, nebulization, BID, Jessica Lynn PharmD    cholecalciferol (Vitamin D-3) tablet 2,000 Units, 2,000 Units, oral, Daily, Toby " MD Lenora, 2,000 Units at 08/26/24 0648    donepezil (Aricept) tablet 10 mg, 10 mg, oral, Daily, Toby Cooley MD    empagliflozin (Jardiance) tablet 10 mg, 10 mg, oral, Daily, Toby Cooley MD    finasteride (Proscar) tablet 5 mg, 5 mg, oral, Daily, Toby Cooley MD    formoterol (Perforomist) 20 mcg/2 mL nebulizer solution 20 mcg, 20 mcg, nebulization, BID, Jessica Lynn PharmD    guaiFENesin (Mucinex) 12 hr tablet 600 mg, 600 mg, oral, q12h PRN, Toby Cooley MD    ipratropium-albuteroL (Duo-Neb) 0.5-2.5 mg/3 mL nebulizer solution 3 mL, 3 mL, nebulization, q4h PRN, Toby Cooley MD    melatonin tablet 3 mg, 3 mg, oral, Nightly, Toby Cooley MD    memantine (Namenda) tablet 10 mg, 10 mg, oral, Daily, Toby Cooley MD    metoprolol succinate XL (Toprol-XL) 24 hr tablet 25 mg, 25 mg, oral, Daily, Toby Cooley MD    pantoprazole (ProtoNix) EC tablet 40 mg, 40 mg, oral, Daily before breakfast, 40 mg at 08/26/24 0648 **OR** pantoprazole (ProtoNix) injection 40 mg, 40 mg, intravenous, Daily before breakfast, Toby Cooley MD    polyethylene glycol (Glycolax, Miralax) packet 17 g, 17 g, oral, Daily, Toby Cooley MD    [Held by provider] sacubitriL-valsartan (Entresto) 24-26 mg per tablet 0.5 tablet, 0.5 tablet, oral, BID, Toby Cooley MD    spironolactone (Aldactone) tablet 12.5 mg, 12.5 mg, oral, Daily, Toby Cooley MD    tamsulosin (Flomax) 24 hr capsule 0.4 mg, 0.4 mg, oral, Daily, Toby Cooley MD     LABS:  Lab Results   Component Value Date    WBC 8.0 08/26/2024    HGB 12.3 (L) 08/26/2024    HCT 39.8 (L) 08/26/2024    MCV 92 08/26/2024     08/26/2024      Results from last 72 hours   Lab Units 08/26/24  0705   SODIUM mmol/L 130*   POTASSIUM mmol/L 5.3   CHLORIDE mmol/L 95*   CO2 mmol/L 17*   BUN mg/dL 30*   CREATININE mg/dL 1.99*   GLUCOSE mg/dL 128*   CALCIUM mg/dL 9.7   ANION GAP mmol/L 23*   EGFR mL/min/1.73m*2 31*     Results from last 72 hours   Lab Units 08/25/24  1716   ALK  PHOS U/L 106   BILIRUBIN TOTAL mg/dL 5.3*   BILIRUBIN DIRECT mg/dL 1.6*   PROTEIN TOTAL g/dL 7.3   ALT U/L 150*   AST U/L 155*   ALBUMIN g/dL 3.7     Estimated Creatinine Clearance: 21.5 mL/min (A) (by C-G formula based on SCr of 1.99 mg/dL (H)).    C-Reactive Protein   Date/Time Value Ref Range Status   05/03/2024 10:33 AM 12.56 (H) <1.00 mg/dL Final     Sedimentation Rate   Date/Time Value Ref Range Status   05/03/2024 10:33 AM 37 (H) 0 - 20 mm/h Final     IMAGING:  CT C/A/P 8/25  Impression:     1.Scattered inflammatory change of the small bowel, the hepatic  flexure of the colon and the adjacent gastric antrum concerning for  nonspecific gastroenteritis/colitis.  Also considered would be  mesenteric ischemia given the extensive amount of atherosclerotic  calcification.  2.Trace amount of free fluid in the right upper quadrant deep  pelvis is nonspecific and may be secondary to GI tract  inflammation/infection.  Lack of oral contrast material limits  evaluation for perforation.  No evidence of free air.  3.Cholelithiasis without specific findings of acute  cholecystitis.  4.Markedly enlarged prostate gland.  5.Massive right hydrocele extending up into the right inguinal  canal.  6.A 6 mm subpleural left lower lobe pulmonary nodule nonspecific  and may be postinfectious.  7.Septal thickening in the lung bases which may reflect mild  pulmonary edema residual changes of prior pneumonia.  8.Several calcifications in both kidneys measure up to  approximately 6 mm on the right and 5 mm on the left. Unclear if these  are vascular calcifications or urinary tract calculi. No  hydronephrosis.       ASSESSMENT/PLAN:    Acute on Chronic Systolic Heart Failure Exacerbation - cold/dry on exam  Mesenteric Ischemia  Hepatitis - likely passive congestion, would expect AST/ALT to be much more elevated if there was ischemia  Lactic Acidosis - I suspect this is from ischemia and not sepsis/infection. No fever/leukocytosis  present.    OK to remain off abx. Blood cx are pending. Cardiology consultation pending.    Will follow and monitor for signs of infection. Thanks!    Pete Rdz MD  [facility]  Office #[phone]

## 2024-08-26 NOTE — CONSULTS
History Of Present Illness:    Jam Cox is a 89 y.o. male with a past medical history of paroxysmal Atrial Fibrillation on Eliquis, Systolic HF, cardiomyopathy NOS LVEF 30-35% (TTE 5/24), COPD, dementia, reportedly presents with complaints of shortness of breath and a cough for the last few days. Cardiology consulted for further evaluation of HF in setting of elevated LFT's and probable ischemic Bowel.    Evaluated patient today here at [facility] [place], patient currently is drowsy/sedated after receiving Haldol 2 mg IV for reported agitated behavior and trying to leave the hospital; sitter at bedside. He appears to be comfortable at rest with no respiratory distress noted or supplemental oxygen requirements.  Telemetry reveals atrial fibrillation rates 100-110.   Patient is followed outpt by Heart Failure specialist Dr. Diaz.     ED course:  Initial EKG: Atrial fibrillation RVR,   Arrival vital signs: Afebrile 97.8, , /73, 95% on room air  Current vital signs: /77, , 99% on room air  Pertinent imaging/Labs: BNP 1796 (prev 346 as of 5/24), HS trop 26/28, WBC 8.0, Hgb 12.3,  lactate 7.6/5.5, K4.9, , CR 1.71, ,  bili 5.2, mag 2.20, D-dimer 600; CT chest abdomen and pelvis without IV contrast reveals no pleural effusions or pneumothorax, scattered inflammatory changes of the small bowel; concern for nonspecific gastroenteritis/colitis, also considered mesenteric ischemia given the extensive amount of atherosclerotic calcifications> see imaging for full report  ED medications: IV antibiotic therapy, Lopressor 50 mg p.o. x 1, Lopressor 5 mg IV x 1, furosemide 40 mg IV x 1, bronchodilator therapy    Home CV meds:  Eliquis 5 mg p.o. twice daily, atorvastatin 10 mg p.o. daily, Jardiance 10 mg p.o. daily, metoprolol XL 25 mg p.o. daily, Entresto 24/26 mg p.o. twice daily, Spironolactone 12.5 mg Po daily     All other systems reviewed and negative unless as mentioned  in HPI.  Unable to get information from patient due to current sedated state and known history of dementia.  Obtain information from medical chart.       Past Medical/ Surgical History:  Paroxysmal Atrial Fibrillation on Eliquis  Systolic HF  Cardiomyopathy (NOS)  COPD  Dementia      Social History:  Former smoker  Denies alcohol or illicit drug use     Family History:  Reviewed, not pertinent to presenting problem      Past Cardiology Tests (Last 3 Years):  Echocardiogram 5/6/2024:  CONCLUSIONS:   1. Left ventricular systolic function is moderately decreased with a 30-35% estimated ejection fraction.   2. Poorly visualized anatomical structures due to suboptimal image quality.   3. There is reduced right ventricular systolic function.   4. The left atrium is enlarged.   5. There is global hypokinesis of the left ventricle with minor regional variations.    Echocardiogram 4/23/24  CONCLUSIONS:   1. Left ventricular systolic function is mildly decreased with a 40-45% estimated ejection fraction.   2. No left ventricular thrombus visualized.   3. There is an elevated mean left atrial pressure.   4. There is reduced right ventricular systolic function.   5. Mild to moderately elevated right ventricular systolic pressure.   6. Moderate aortic valve regurgitation.   7. There is global hypokinesis of the left ventricle with minor regional variations.   8. There has been a decline in the calculated LVEF from 45-50% with Biplane 53% to currently 40-45% with Biplane 42%. Diastology is indeterminate. RVSP has increased to 51 mmHg.    Echocardiogram 6/8/2020:  CONCLUSIONS:   1. Left ventricular systolic function is mildly decreased with a 40-45% estimated ejection fraction.   2. No left ventricular thrombus visualized.   3. There is an elevated mean left atrial pressure.   4. There is reduced right ventricular systolic function.   5. Mild to moderately elevated right ventricular systolic pressure.   6. Moderate aortic valve  "regurgitation.   7. There is global hypokinesis of the left ventricle with minor regional variations.   8. There has been a decline in the calculated LVEF from 45-50% with Biplane 53% to currently 40-45% with Biplane 42%. Diastology is indeterminate. RVSP has increased to 51 mmHg.    Allergies:  Ace inhibitors    ROS:  10 point review of systems including (Constitutional, Eyes, ENMT, Respiratory, Cardiac, Gastrointestinal, Neurological, Psychiatric, and Hematologic) was performed and is otherwise negative.    Objective Data:  Last Recorded Vitals:  Vitals:    24 0415 24 0448 24 0756 24 0842   BP:  102/55 113/77    BP Location:  Left arm Left arm    Patient Position:   Lying    Pulse: (!) 101 100     Resp:  18 19    Temp:  36.3 °C (97.3 °F) 36.9 °C (98.4 °F)    TempSrc:  Oral Oral    SpO2: 100% 97% 99% 91%   Weight:  65.4 kg (144 lb 1.6 oz)     Height:  1.778 m (5' 10\")       Medical Gas Therapy: None (Room air)  Weight  Av.4 kg (142 lb 0.8 oz)  Min: 63.5 kg (140 lb)  Max: 65.4 kg (144 lb 1.6 oz)      LABS:  CMP:  Results from last 7 days   Lab Units 24  0724  1716 24  1533   SODIUM mmol/L 130*  --  130*   POTASSIUM mmol/L 5.3 4.9 6.1*   CHLORIDE mmol/L 95*  --  95*   CO2 mmol/L 17*  --  18*   ANION GAP mmol/L 23*  --  23*   BUN mg/dL 30*  --  21   CREATININE mg/dL 1.99*  --  1.71*   EGFR mL/min/1.73m*2 31*  --  37*   MAGNESIUM mg/dL  --   --  2.20   ALBUMIN g/dL  --  3.7 3.7   ALT U/L  --  150* 135*   AST U/L  --  155* 143*   BILIRUBIN TOTAL mg/dL  --  5.3* 5.2*     CBC:  Results from last 7 days   Lab Units 24  0724  1537   WBC AUTO x10*3/uL 8.0 6.9   HEMOGLOBIN g/dL 12.3* 12.6*   HEMATOCRIT % 39.8* 39.0*   PLATELETS AUTO x10*3/uL 167 147*   MCV fL 92 90     COAG:     ABO: No results found for: \"ABO\"  HEME/ENDO:     CARDIAC:   Results from last 7 days   Lab Units 24  1716 24  1533   TROPHS ng/L 28* 26*   BNP pg/mL  --  1,796*        "     Inpatient Medications:  Scheduled medications   Medication Dose Route Frequency    apixaban  5 mg oral BID    [Held by provider] atorvastatin  10 mg oral Daily    budesonide  0.5 mg nebulization BID    cholecalciferol  2,000 Units oral Daily    donepezil  10 mg oral Daily    empagliflozin  10 mg oral Daily    finasteride  5 mg oral Daily    formoterol  20 mcg nebulization BID    melatonin  3 mg oral Nightly    memantine  10 mg oral Daily    metoprolol succinate XL  25 mg oral Daily    pantoprazole  40 mg oral Daily before breakfast    Or    pantoprazole  40 mg intravenous Daily before breakfast    polyethylene glycol  17 g oral Daily    [Held by provider] sacubitriL-valsartan  0.5 tablet oral BID    spironolactone  12.5 mg oral Daily    tamsulosin  0.4 mg oral Daily     PRN medications   Medication    acetaminophen    Or    acetaminophen    guaiFENesin    ipratropium-albuteroL     Continuous Medications   Medication Dose Last Rate       Inpatient Medications:  Scheduled medications   Medication Dose Route Frequency    apixaban  5 mg oral BID    [Held by provider] atorvastatin  10 mg oral Daily    budesonide  0.5 mg nebulization BID    cholecalciferol  2,000 Units oral Daily    donepezil  10 mg oral Daily    empagliflozin  10 mg oral Daily    finasteride  5 mg oral Daily    formoterol  20 mcg nebulization BID    melatonin  3 mg oral Nightly    memantine  10 mg oral Daily    metoprolol succinate XL  25 mg oral Daily    pantoprazole  40 mg oral Daily before breakfast    Or    pantoprazole  40 mg intravenous Daily before breakfast    polyethylene glycol  17 g oral Daily    [Held by provider] sacubitriL-valsartan  0.5 tablet oral BID    spironolactone  12.5 mg oral Daily    tamsulosin  0.4 mg oral Daily     PRN medications   Medication    acetaminophen    Or    acetaminophen    guaiFENesin    ipratropium-albuteroL     Continuous Medications   Medication Dose Last Rate     Outpatient Medications:  Current Outpatient  Medications   Medication Instructions    atorvastatin (LIPITOR) 10 mg, oral, Daily    cholecalciferol (Vitamin D-3) 50 mcg (2,000 unit) capsule 1 capsule, oral, Daily    clindamycin (Cleocin T) 1 % lotion 1 Application, Topical, 2 times daily, Apply to affected area twice daily    donepezil (ARICEPT) 10 mg, oral, Daily    Eliquis 5 mg, oral, 2 times daily    finasteride (PROSCAR) 5 mg, oral, Daily    fluticasone propion-salmeteroL (Advair Diskus) 250-50 mcg/dose diskus inhaler 1 puff, inhalation, 2 times daily RT, Rinse mouth with water after use to reduce aftertaste and incidence of candidiasis. Do not swallow.    ipratropium-albuteroL (Duo-Neb) 0.5-2.5 mg/3 mL nebulizer solution 3 mL, nebulization, Every 4 hours PRN    Jardiance 10 mg, oral, Daily    melatonin 3 mg, oral, Nightly    memantine (NAMENDA) 10 mg, oral, 2 times daily    metoprolol succinate XL (TOPROL-XL) 25 mg, oral, Daily, Do not crush or chew.    sacubitriL-valsartan (Entresto) 24-26 mg tablet 0.5 tablets, oral, 2 times daily    spironolactone (ALDACTONE) 12.5 mg, oral, Daily    tamsulosin (FLOMAX) 0.4 mg, oral, Daily, Take at bedtime.    VITAMIN B COMPLEX ORAL 1 tablet, oral, Daily    walker (Ultra-Light Rollator) misc 1 each, miscellaneous, Daily       Physical Exam:  General: Elderly male, Currently drowsy/sedated after receiving Haldol  HEENT:  Pupils equal and reactive.  Normocephalic.  Moist mucosa.    Neck:  No thyromegaly.  Normal Jugular Venous Pressure.  Cardiovascular:  Irregularly , Irregular rhythm.    Pulmonary:  Clear to auscultation bilaterally. No supp oxygen  Abdomen:  Soft. Non-tender.   Non-distended.  Positive bowel sounds.  Lower Extremities:  2+ pedal pulses. No LE edema.  Neurologic:  Cranial nerves intact.  No focal deficit.   Skin: Skin warm and dry, normal skin turgor.   Psychiatric: Drowsy/ sedated     Assessment/Plan   Jam Cox is a 89 y.o. male with a past medical history of paroxysmal Atrial Fibrillation on  Eliquis, Systolic HF, cardiomyopathy NOS LVEF 30-35% (TTE 5/24), COPD, dementia, reportedly presents with complaints of shortness of breath and a cough for the last few days. Cardiology consulted for further evaluation of HF in setting of elevated LFT's and probable ischemic Bowel.    Home CV meds:  Eliquis 5 mg p.o. twice daily, atorvastatin 10 mg p.o. daily, Jardiance 10 mg p.o. daily, metoprolol XL 25 mg p.o. daily, Entresto 24/26 mg p.o. twice daily, Spironolactone 12.5 mg Po daily     I reviewed EKGs, labs and all imaging reports  I reviewed telemetry, Atrial Fibrillation rates 's     1.  Acute on chronic systolic heart failure  Arrival BNP 1796  CT of chest abdomen and pelvis revealed no pleural effusions    2. Cardiomyopathy (NOS)  On Jardiance, Entresto, BB, spironolactone 12.5 daily    3.  Paroxysmal atrial fibrillation.  On Eliquis and metoprolol XL    4. YOSEF ( Cr baseline is normal) Arrival Cr 1.71, now 1.99>management per primary    5. Probable Ischemic Bowel> followed by ID and GI    Recommendations:  Restart Entresto, Jardiance when renal function improves  Minimal excess volume on imaging status post diuresis>now appears euvolemic,  will hold further diuresis at this time in the setting of YOSEF  C/w beta-blockade as BP allows  Reduce Eliquis to 2.5 mg p.o. twice daily d/t age and elevated Cr> can resume 5 mg p.o. twice daily when creatinine improves        Code Status:  Full Code          SLOANE FeldmanInpatient consult to Cardiology  Consult performed by: SLOANE Feldman  Consult ordered by: Toby Cooley MD  Reason for consult: HF

## 2024-08-26 NOTE — H&P
History Of Present Illness  Jam Cox is a 89 y.o. male presenting with weakness and difficulty walking, at baseline he is independent in adls, and does have dementia ,   Wife noted that he was more tired and hence brought to ED   He is unable to provide any hx and did have agitation earlier today and needed haldol  He remains confused  In ER pt was noted to have elevated Lactate, and CT abdo was concerning for bowel ischemia, and did have elevated liver enzymes  Also there was concern for CHF,   Pt admitted  He has been seen by infectious disease  Cardio  And GI      Past Medical History  He has a past medical history of Abnormality of plasma protein, unspecified (05/01/2017), Benign prostatic hyperplasia without lower urinary tract symptoms (05/01/2017), Disorder of prostate, unspecified (05/01/2017), Elevated prostate specific antigen (PSA) (05/01/2017), Elevated prostate specific antigen (PSA) (05/01/2017), Elevated prostate specific antigen (PSA) (05/01/2017), Elevated prostate specific antigen (PSA) (05/01/2017), Epistaxis (05/01/2017), Essential (primary) hypertension (12/19/2022), Frequency of micturition (05/01/2017), Hallucinations, unspecified (05/01/2017), Headache, unspecified (05/01/2017), Hematuria, unspecified (05/01/2017), Impacted cerumen (05/23/2024), Ischemic cardiomyopathy (12/13/2021), Nasal discharge (05/23/2024), Nasal mucosa dry (05/23/2024), Other amnesia (05/01/2017), Other microscopic hematuria (05/01/2017), Other seasonal allergic rhinitis (05/01/2017), Other secondary cataract, right eye (05/31/2018), Other specified disorders of the male genital organs (05/01/2017), Personal history of other specified conditions (02/10/2020), Presence of intraocular lens (05/01/2017), Primary insomnia (05/01/2017), Primary open-angle glaucoma, unspecified eye, stage unspecified (05/01/2017), Unspecified atrial flutter (Multi) (05/01/2017), Unspecified glaucoma (05/01/2017), and Vitamin D  deficiency, unspecified (05/01/2017).    Surgical History  He has a past surgical history that includes Cataract extraction (10/07/2014); Other surgical history (10/07/2014); MR angio head wo IV contrast (3/3/2018); and MR angio neck wo IV contrast (3/3/2018).     Social History  He reports that he has quit smoking. His smoking use included cigarettes. He has never been exposed to tobacco smoke. He has never used smokeless tobacco. He reports that he does not currently use alcohol. He reports that he does not currently use drugs.    Family History  Family History   Problem Relation Name Age of Onset    No Known Problems Mother      No Known Problems Father          Allergies  Ace inhibitors    Review of Systems     Unable to get from pt  Per wife no abdo pain  No nausea vomiting   No fever  N ochills    Physical Exam     Well developed well nurished, thin, no resp distress  No distress  Ao 0  Face symmetrical   Neck no jvd no bruit  Chest mild wheezing  CVS regular  Ext no edema  Abdo soft nontender bs active, no masses  Cns resting, confused does not follow commands  Skin intact  Psych cnfused    Last Recorded Vitals  /72 (BP Location: Left arm, Patient Position: Lying)   Pulse 110   Temp 36.3 °C (97.4 °F) (Axillary)   Resp 19   Wt 65.4 kg (144 lb 1.6 oz)   SpO2 97%     Relevant Results     Scheduled medications  apixaban, 2.5 mg, oral, BID  [Held by provider] atorvastatin, 10 mg, oral, Daily  budesonide, 0.5 mg, nebulization, BID  cholecalciferol, 2,000 Units, oral, Daily  donepezil, 10 mg, oral, Daily  [Held by provider] empagliflozin, 10 mg, oral, Daily  finasteride, 5 mg, oral, Daily  formoterol, 20 mcg, nebulization, BID  melatonin, 3 mg, oral, Nightly  memantine, 10 mg, oral, Daily  metoprolol succinate XL, 25 mg, oral, Daily  OLANZapine, 2.5 mg, oral, Nightly  pantoprazole, 40 mg, oral, Daily before breakfast   Or  pantoprazole, 40 mg, intravenous, Daily before breakfast  polyethylene glycol, 17  g, oral, Daily  [Held by provider] sacubitriL-valsartan, 0.5 tablet, oral, BID  [Held by provider] spironolactone, 12.5 mg, oral, Daily  tamsulosin, 0.4 mg, oral, Daily      Continuous medications     PRN medications  PRN medications: acetaminophen **OR** acetaminophen, guaiFENesin, ipratropium-albuteroL, metoprolol  Results for orders placed or performed during the hospital encounter of 08/25/24 (from the past 96 hour(s))   Electrocardiogram, 12-lead PRN ACS symptoms   Result Value Ref Range    Ventricular Rate 138 BPM    QRS Duration 132 ms    QT Interval 356 ms    QTC Calculation(Bazett) 539 ms    R Axis 264 degrees    T Axis 90 degrees    QRS Count 22 beats    Q Onset 217 ms    T Offset 395 ms    QTC Fredericia 469 ms   Comprehensive metabolic panel   Result Value Ref Range    Glucose 146 (H) 74 - 99 mg/dL    Sodium 130 (L) 136 - 145 mmol/L    Potassium 6.1 (HH) 3.5 - 5.3 mmol/L    Chloride 95 (L) 98 - 107 mmol/L    Bicarbonate 18 (L) 21 - 32 mmol/L    Anion Gap 23 (H) 10 - 20 mmol/L    Urea Nitrogen 21 6 - 23 mg/dL    Creatinine 1.71 (H) 0.50 - 1.30 mg/dL    eGFR 37 (L) >60 mL/min/1.73m*2    Calcium 9.8 8.6 - 10.3 mg/dL    Albumin 3.7 3.4 - 5.0 g/dL    Alkaline Phosphatase 112 33 - 136 U/L    Total Protein 7.2 6.4 - 8.2 g/dL     (H) 9 - 39 U/L    Bilirubin, Total 5.2 (H) 0.0 - 1.2 mg/dL     (H) 10 - 52 U/L   Magnesium   Result Value Ref Range    Magnesium 2.20 1.60 - 2.40 mg/dL   B-Type Natriuretic Peptide   Result Value Ref Range    BNP 1,796 (H) 0 - 99 pg/mL   Troponin I, High Sensitivity, Initial   Result Value Ref Range    Troponin I, High Sensitivity 26 (H) 0 - 20 ng/L   Blood Gas Venous Full Panel   Result Value Ref Range    POCT pH, Venous 7.32 (L) 7.33 - 7.43 pH    POCT pCO2, Venous 40 (L) 41 - 51 mm Hg    POCT pO2, Venous 29 (L) 35 - 45 mm Hg    POCT SO2, Venous 33 (L) 45 - 75 %    POCT Oxy Hemoglobin, Venous 32.7 (L) 45.0 - 75.0 %    POCT Hematocrit Calculated, Venous 40.0 (L) 41.0 -  52.0 %    POCT Sodium, Venous 131 (L) 136 - 145 mmol/L    POCT Potassium, Venous 6.0 (H) 3.5 - 5.3 mmol/L    POCT Chloride, Venous 95 (L) 98 - 107 mmol/L    POCT Ionized Calicum, Venous 1.20 1.10 - 1.33 mmol/L    POCT Glucose, Venous 161 (H) 74 - 99 mg/dL    POCT Lactate, Venous 7.6 (HH) 0.4 - 2.0 mmol/L    POCT Base Excess, Venous -5.2 (L) -2.0 - 3.0 mmol/L    POCT HCO3 Calculated, Venous 20.6 (L) 22.0 - 26.0 mmol/L    POCT Hemoglobin, Venous 13.3 (L) 13.5 - 17.5 g/dL    POCT Anion Gap, Venous 21.0 10.0 - 25.0 mmol/L    Patient Temperature 37.0 degrees Celsius    FiO2 21 %   Sars-CoV-2 PCR   Result Value Ref Range    Coronavirus 2019, PCR Not Detected Not Detected   D-dimer, quantitative   Result Value Ref Range    D-Dimer Non VTE, Quant (ng/mL FEU) 600 (H) <=500 ng/mL FEU   Acute Toxicology Panel, Blood   Result Value Ref Range    Acetaminophen <10.0 10.0 - 30.0 ug/mL    Salicylate  <3 4 - 20 mg/dL    Alcohol <10 <=10 mg/dL   CBC and Auto Differential   Result Value Ref Range    WBC 6.9 4.4 - 11.3 x10*3/uL    nRBC 0.0 0.0 - 0.0 /100 WBCs    RBC 4.32 (L) 4.50 - 5.90 x10*6/uL    Hemoglobin 12.6 (L) 13.5 - 17.5 g/dL    Hematocrit 39.0 (L) 41.0 - 52.0 %    MCV 90 80 - 100 fL    MCH 29.2 26.0 - 34.0 pg    MCHC 32.3 32.0 - 36.0 g/dL    RDW 17.1 (H) 11.5 - 14.5 %    Platelets 147 (L) 150 - 450 x10*3/uL    Neutrophils % 78.8 40.0 - 80.0 %    Immature Granulocytes %, Automated 0.4 0.0 - 0.9 %    Lymphocytes % 12.8 13.0 - 44.0 %    Monocytes % 8.0 2.0 - 10.0 %    Eosinophils % 0.0 0.0 - 6.0 %    Basophils % 0.0 0.0 - 2.0 %    Neutrophils Absolute 5.44 1.60 - 5.50 x10*3/uL    Immature Granulocytes Absolute, Automated 0.03 0.00 - 0.50 x10*3/uL    Lymphocytes Absolute 0.88 0.80 - 3.00 x10*3/uL    Monocytes Absolute 0.55 0.05 - 0.80 x10*3/uL    Eosinophils Absolute 0.00 0.00 - 0.40 x10*3/uL    Basophils Absolute 0.00 0.00 - 0.10 x10*3/uL   Lavender Top   Result Value Ref Range    Extra Tube Hold for add-ons.    Lactate   Result  Value Ref Range    Lactate 5.4 (HH) 0.4 - 2.0 mmol/L   Blood Culture    Specimen: Peripheral Venipuncture; Blood culture   Result Value Ref Range    Blood Culture No growth at 1 day    Troponin, High Sensitivity, 1 Hour   Result Value Ref Range    Troponin I, High Sensitivity 28 (H) 0 - 20 ng/L   Blood Gas Lactic Acid, Venous   Result Value Ref Range    POCT Lactate, Venous 5.5 (HH) 0.4 - 2.0 mmol/L   Potassium   Result Value Ref Range    Potassium 4.9 3.5 - 5.3 mmol/L   Hepatic function panel   Result Value Ref Range    Albumin 3.7 3.4 - 5.0 g/dL    Bilirubin, Total 5.3 (H) 0.0 - 1.2 mg/dL    Bilirubin, Direct 1.6 (H) 0.0 - 0.3 mg/dL    Alkaline Phosphatase 106 33 - 136 U/L     (H) 10 - 52 U/L     (H) 9 - 39 U/L    Total Protein 7.3 6.4 - 8.2 g/dL   Blood Culture    Specimen: Peripheral Venipuncture; Blood culture   Result Value Ref Range    Blood Culture No growth at 1 day    Urinalysis with Reflex Culture and Microscopic   Result Value Ref Range    Color, Urine Yellow Light-Yellow, Yellow, Dark-Yellow    Appearance, Urine Clear Clear    Specific Gravity, Urine 1.018 1.005 - 1.035    pH, Urine 5.0 5.0, 5.5, 6.0, 6.5, 7.0, 7.5, 8.0    Protein, Urine NEGATIVE NEGATIVE, 10 (TRACE), 20 (TRACE) mg/dL    Glucose, Urine OVER (4+) (A) Normal mg/dL    Blood, Urine NEGATIVE NEGATIVE    Ketones, Urine NEGATIVE NEGATIVE mg/dL    Bilirubin, Urine NEGATIVE NEGATIVE    Urobilinogen, Urine Normal Normal mg/dL    Nitrite, Urine NEGATIVE NEGATIVE    Leukocyte Esterase, Urine NEGATIVE NEGATIVE   Extra Urine Gray Tube   Result Value Ref Range    Extra Tube Hold for add-ons.    Lactate   Result Value Ref Range    Lactate 4.9 (HH) 0.4 - 2.0 mmol/L   Basic metabolic panel   Result Value Ref Range    Glucose 128 (H) 74 - 99 mg/dL    Sodium 130 (L) 136 - 145 mmol/L    Potassium 5.3 3.5 - 5.3 mmol/L    Chloride 95 (L) 98 - 107 mmol/L    Bicarbonate 17 (L) 21 - 32 mmol/L    Anion Gap 23 (H) 10 - 20 mmol/L    Urea Nitrogen 30  (H) 6 - 23 mg/dL    Creatinine 1.99 (H) 0.50 - 1.30 mg/dL    eGFR 31 (L) >60 mL/min/1.73m*2    Calcium 9.7 8.6 - 10.3 mg/dL   CBC   Result Value Ref Range    WBC 8.0 4.4 - 11.3 x10*3/uL    nRBC 0.4 (H) 0.0 - 0.0 /100 WBCs    RBC 4.33 (L) 4.50 - 5.90 x10*6/uL    Hemoglobin 12.3 (L) 13.5 - 17.5 g/dL    Hematocrit 39.8 (L) 41.0 - 52.0 %    MCV 92 80 - 100 fL    MCH 28.4 26.0 - 34.0 pg    MCHC 30.9 (L) 32.0 - 36.0 g/dL    RDW 17.2 (H) 11.5 - 14.5 %    Platelets 167 150 - 450 x10*3/uL   Hepatitis B Surface Antibody   Result Value Ref Range    Hepatitis B Surface AB <3.1 <10.0 mIU/mL   Hepatitis C Antibody   Result Value Ref Range    Hepatitis C AB Nonreactive Nonreactive   Hepatitis A Antibody, Total   Result Value Ref Range    Hepatitis A  AB-Total Reactive (A) Nonreactive   Hepatitis B Surface Antigen   Result Value Ref Range    Hepatitis B Surface AG Nonreactive Nonreactive   Thyroid Stimulating Hormone   Result Value Ref Range    Thyroid Stimulating Hormone 0.34 (L) 0.44 - 3.98 mIU/L   Lactate   Result Value Ref Range    Lactate 4.5 (HH) 0.4 - 2.0 mmol/L   Lavender Top   Result Value Ref Range    Extra Tube Hold for add-ons.    Lactate   Result Value Ref Range    Lactate 3.6 (H) 0.4 - 2.0 mmol/L   Basic metabolic panel   Result Value Ref Range    Glucose 111 (H) 74 - 99 mg/dL    Sodium 132 (L) 136 - 145 mmol/L    Potassium 5.0 3.5 - 5.3 mmol/L    Chloride 97 (L) 98 - 107 mmol/L    Bicarbonate 23 21 - 32 mmol/L    Anion Gap 17 10 - 20 mmol/L    Urea Nitrogen 39 (H) 6 - 23 mg/dL    Creatinine 1.76 (H) 0.50 - 1.30 mg/dL    eGFR 36 (L) >60 mL/min/1.73m*2    Calcium 9.2 8.6 - 10.3 mg/dL   CBC   Result Value Ref Range    WBC 8.6 4.4 - 11.3 x10*3/uL    nRBC 0.2 (H) 0.0 - 0.0 /100 WBCs    RBC 4.56 4.50 - 5.90 x10*6/uL    Hemoglobin 13.2 (L) 13.5 - 17.5 g/dL    Hematocrit 40.1 (L) 41.0 - 52.0 %    MCV 88 80 - 100 fL    MCH 28.9 26.0 - 34.0 pg    MCHC 32.9 32.0 - 36.0 g/dL    RDW 17.3 (H) 11.5 - 14.5 %    Platelets 163 150 -  450 x10*3/uL       Assessment/Plan   Assessment & Plan  Elevated LFTs    Apathy    Benign prostatic hyperplasia    Cardiomyopathy, ischemic    Mixed Alzheimer's and vascular dementia (Multi)    CHF (congestive heart failure) (Multi)    Chronic obstructive pulmonary disease (Multi)    Noted input from consults  Holding off on diuresisi  Stopped antibiotics  Clinically does not have acute abdo resume diet  Does have delerium and will cont with melatonin  Per family pt does not sleep and has not been sleeping for past 2 moran  EKG noted   Add olanzapine  Will follow   Code status dw wife and no cpr         Toby Cooley MD

## 2024-08-26 NOTE — CONSULTS
Reason For Consult  Elevated LFTs, ischemic bowel    History Of Present Illness  Jam Cox is a 89 y.o. male with a past medical history of paroxysmal Atrial Fibrillation on Eliquis, systolic HF, last LVEF 30-35% (TTE 5/24), COPD, dementia, hyperlipidemia, hypertension presenting with c/o SOB and a cough for the last few days.  Patient is a poor historian and unable to provide history.  He is A&O x 1 and falling asleep during conversation.  A sitter is present at bedside.  Most of the history obtained from the chart.     ED labs remarkable for K 6.1, Na 130, CO2 18, BUN/creatinine 1.71/37, , , bili total 5.2, BNP 1796, troponin 26, D-dimer 600, H&H 12.6/39, platelets 147, lactate 7.6.  CT head did not show any acute intracranial bleed or focal mass effect.  It did show mild to moderate chronic white matter ischemic disease in the deep periventricular regions and old lacunar infarcts in the left thalamus and left basal ganglia along with moderate volume loss.  CT A/P showed scattered inflammatory changes of the small bowel, the hepatic flexure of the colon and the adjacent gastric antrum concerning for nonspecific gastroenteritis/colitis or mesenteric ischemia given the extensive amount of atherosclerotic calcifications.  Trace amount of free fluid in the right upper quadrant deep pelvis is nonspecific may be secondary to GI tract inflammation/infection.  Cholelithiasis without specific findings of acute cholecystitis.  No recorded history of EGD/colonoscopy.  On exam,  he denies abdominal pain.  His lactate went down to 4.9.  LFTs slightly uptrending with bili total 5.3 and direct bili 1.6.  No leukocytosis.  YOSEF worsened (BUN/creatinine 30/199). VSS.  GI was consulted for elevated LFTs, ischemic bowel.      Past Medical History  He has a past medical history of Abnormality of plasma protein, unspecified (05/01/2017), Benign prostatic hyperplasia without lower urinary tract symptoms (05/01/2017),  Disorder of prostate, unspecified (05/01/2017), Elevated prostate specific antigen (PSA) (05/01/2017), Elevated prostate specific antigen (PSA) (05/01/2017), Elevated prostate specific antigen (PSA) (05/01/2017), Elevated prostate specific antigen (PSA) (05/01/2017), Epistaxis (05/01/2017), Essential (primary) hypertension (12/19/2022), Frequency of micturition (05/01/2017), Hallucinations, unspecified (05/01/2017), Headache, unspecified (05/01/2017), Hematuria, unspecified (05/01/2017), Impacted cerumen (05/23/2024), Ischemic cardiomyopathy (12/13/2021), Nasal discharge (05/23/2024), Nasal mucosa dry (05/23/2024), Other amnesia (05/01/2017), Other microscopic hematuria (05/01/2017), Other seasonal allergic rhinitis (05/01/2017), Other secondary cataract, right eye (05/31/2018), Other specified disorders of the male genital organs (05/01/2017), Personal history of other specified conditions (02/10/2020), Presence of intraocular lens (05/01/2017), Primary insomnia (05/01/2017), Primary open-angle glaucoma, unspecified eye, stage unspecified (05/01/2017), Unspecified atrial flutter (Multi) (05/01/2017), Unspecified glaucoma (05/01/2017), and Vitamin D deficiency, unspecified (05/01/2017).    Surgical History  He has a past surgical history that includes Cataract extraction (10/07/2014); Other surgical history (10/07/2014); MR angio head wo IV contrast (3/3/2018); and MR angio neck wo IV contrast (3/3/2018).     Social History  He reports that he has quit smoking. His smoking use included cigarettes. He has never been exposed to tobacco smoke. He has never used smokeless tobacco. He reports that he does not currently use alcohol. He reports that he does not currently use drugs.    Family History  Family History   Problem Relation Name Age of Onset    No Known Problems Mother      No Known Problems Father          Allergies  Ace inhibitors    Review of Systems  Review of Systems   Unable to perform ROS: Acuity of  "condition   and baseline dementia     Physical Exam  Physical Exam  Vitals reviewed.   Constitutional:       Appearance: He is toxic-appearing.   HENT:      Head: Normocephalic and atraumatic.   Cardiovascular:      Rate and Rhythm: Rhythm irregularly irregular.   Pulmonary:      Effort: Pulmonary effort is normal.      Breath sounds: Normal breath sounds.   Abdominal:      General: There is no distension.      Palpations: There is no mass.      Tenderness: There is no abdominal tenderness. There is no guarding or rebound.      Hernia: No hernia is present.   Musculoskeletal:         General: Normal range of motion.      Cervical back: Neck supple.   Skin:     General: Skin is warm and dry.   Neurological:      General: No focal deficit present.      Mental Status: He is oriented to person, place, and time. He is lethargic.   Psychiatric:         Behavior: Behavior is uncooperative.           Last Recorded Vitals  Blood pressure 113/77, pulse 100, temperature 36.9 °C (98.4 °F), temperature source Oral, resp. rate 19, height 1.778 m (5' 10\"), weight 65.4 kg (144 lb 1.6 oz), SpO2 99%.    Relevant Results      Scheduled medications  apixaban, 2.5 mg, oral, BID  [Held by provider] atorvastatin, 10 mg, oral, Daily  budesonide, 0.5 mg, nebulization, BID  cholecalciferol, 2,000 Units, oral, Daily  donepezil, 10 mg, oral, Daily  [Held by provider] empagliflozin, 10 mg, oral, Daily  finasteride, 5 mg, oral, Daily  formoterol, 20 mcg, nebulization, BID  melatonin, 3 mg, oral, Nightly  memantine, 10 mg, oral, Daily  metoprolol succinate XL, 25 mg, oral, Daily  pantoprazole, 40 mg, oral, Daily before breakfast   Or  pantoprazole, 40 mg, intravenous, Daily before breakfast  polyethylene glycol, 17 g, oral, Daily  [Held by provider] sacubitriL-valsartan, 0.5 tablet, oral, BID  spironolactone, 12.5 mg, oral, Daily  tamsulosin, 0.4 mg, oral, Daily      Continuous medications     PRN medications  PRN medications: acetaminophen " **OR** acetaminophen, guaiFENesin, ipratropium-albuteroL      Results for orders placed or performed during the hospital encounter of 08/25/24 (from the past 24 hour(s))   Electrocardiogram, 12-lead PRN ACS symptoms   Result Value Ref Range    Ventricular Rate 138 BPM    QRS Duration 132 ms    QT Interval 356 ms    QTC Calculation(Bazett) 539 ms    R Axis 264 degrees    T Axis 90 degrees    QRS Count 22 beats    Q Onset 217 ms    T Offset 395 ms    QTC Fredericia 469 ms   Comprehensive metabolic panel   Result Value Ref Range    Glucose 146 (H) 74 - 99 mg/dL    Sodium 130 (L) 136 - 145 mmol/L    Potassium 6.1 (HH) 3.5 - 5.3 mmol/L    Chloride 95 (L) 98 - 107 mmol/L    Bicarbonate 18 (L) 21 - 32 mmol/L    Anion Gap 23 (H) 10 - 20 mmol/L    Urea Nitrogen 21 6 - 23 mg/dL    Creatinine 1.71 (H) 0.50 - 1.30 mg/dL    eGFR 37 (L) >60 mL/min/1.73m*2    Calcium 9.8 8.6 - 10.3 mg/dL    Albumin 3.7 3.4 - 5.0 g/dL    Alkaline Phosphatase 112 33 - 136 U/L    Total Protein 7.2 6.4 - 8.2 g/dL     (H) 9 - 39 U/L    Bilirubin, Total 5.2 (H) 0.0 - 1.2 mg/dL     (H) 10 - 52 U/L   Magnesium   Result Value Ref Range    Magnesium 2.20 1.60 - 2.40 mg/dL   B-Type Natriuretic Peptide   Result Value Ref Range    BNP 1,796 (H) 0 - 99 pg/mL   Troponin I, High Sensitivity, Initial   Result Value Ref Range    Troponin I, High Sensitivity 26 (H) 0 - 20 ng/L   Blood Gas Venous Full Panel   Result Value Ref Range    POCT pH, Venous 7.32 (L) 7.33 - 7.43 pH    POCT pCO2, Venous 40 (L) 41 - 51 mm Hg    POCT pO2, Venous 29 (L) 35 - 45 mm Hg    POCT SO2, Venous 33 (L) 45 - 75 %    POCT Oxy Hemoglobin, Venous 32.7 (L) 45.0 - 75.0 %    POCT Hematocrit Calculated, Venous 40.0 (L) 41.0 - 52.0 %    POCT Sodium, Venous 131 (L) 136 - 145 mmol/L    POCT Potassium, Venous 6.0 (H) 3.5 - 5.3 mmol/L    POCT Chloride, Venous 95 (L) 98 - 107 mmol/L    POCT Ionized Calicum, Venous 1.20 1.10 - 1.33 mmol/L    POCT Glucose, Venous 161 (H) 74 - 99 mg/dL     POCT Lactate, Venous 7.6 (HH) 0.4 - 2.0 mmol/L    POCT Base Excess, Venous -5.2 (L) -2.0 - 3.0 mmol/L    POCT HCO3 Calculated, Venous 20.6 (L) 22.0 - 26.0 mmol/L    POCT Hemoglobin, Venous 13.3 (L) 13.5 - 17.5 g/dL    POCT Anion Gap, Venous 21.0 10.0 - 25.0 mmol/L    Patient Temperature 37.0 degrees Celsius    FiO2 21 %   Sars-CoV-2 PCR   Result Value Ref Range    Coronavirus 2019, PCR Not Detected Not Detected   D-dimer, quantitative   Result Value Ref Range    D-Dimer Non VTE, Quant (ng/mL FEU) 600 (H) <=500 ng/mL FEU   Acute Toxicology Panel, Blood   Result Value Ref Range    Acetaminophen <10.0 10.0 - 30.0 ug/mL    Salicylate  <3 4 - 20 mg/dL    Alcohol <10 <=10 mg/dL   CBC and Auto Differential   Result Value Ref Range    WBC 6.9 4.4 - 11.3 x10*3/uL    nRBC 0.0 0.0 - 0.0 /100 WBCs    RBC 4.32 (L) 4.50 - 5.90 x10*6/uL    Hemoglobin 12.6 (L) 13.5 - 17.5 g/dL    Hematocrit 39.0 (L) 41.0 - 52.0 %    MCV 90 80 - 100 fL    MCH 29.2 26.0 - 34.0 pg    MCHC 32.3 32.0 - 36.0 g/dL    RDW 17.1 (H) 11.5 - 14.5 %    Platelets 147 (L) 150 - 450 x10*3/uL    Neutrophils % 78.8 40.0 - 80.0 %    Immature Granulocytes %, Automated 0.4 0.0 - 0.9 %    Lymphocytes % 12.8 13.0 - 44.0 %    Monocytes % 8.0 2.0 - 10.0 %    Eosinophils % 0.0 0.0 - 6.0 %    Basophils % 0.0 0.0 - 2.0 %    Neutrophils Absolute 5.44 1.60 - 5.50 x10*3/uL    Immature Granulocytes Absolute, Automated 0.03 0.00 - 0.50 x10*3/uL    Lymphocytes Absolute 0.88 0.80 - 3.00 x10*3/uL    Monocytes Absolute 0.55 0.05 - 0.80 x10*3/uL    Eosinophils Absolute 0.00 0.00 - 0.40 x10*3/uL    Basophils Absolute 0.00 0.00 - 0.10 x10*3/uL   Lavender Top   Result Value Ref Range    Extra Tube Hold for add-ons.    Lactate   Result Value Ref Range    Lactate 5.4 (HH) 0.4 - 2.0 mmol/L   Blood Culture    Specimen: Peripheral Venipuncture; Blood culture   Result Value Ref Range    Blood Culture Loaded on Instrument - Culture in progress    Troponin, High Sensitivity, 1 Hour   Result Value  Ref Range    Troponin I, High Sensitivity 28 (H) 0 - 20 ng/L   Blood Gas Lactic Acid, Venous   Result Value Ref Range    POCT Lactate, Venous 5.5 (HH) 0.4 - 2.0 mmol/L   Potassium   Result Value Ref Range    Potassium 4.9 3.5 - 5.3 mmol/L   Hepatic function panel   Result Value Ref Range    Albumin 3.7 3.4 - 5.0 g/dL    Bilirubin, Total 5.3 (H) 0.0 - 1.2 mg/dL    Bilirubin, Direct 1.6 (H) 0.0 - 0.3 mg/dL    Alkaline Phosphatase 106 33 - 136 U/L     (H) 10 - 52 U/L     (H) 9 - 39 U/L    Total Protein 7.3 6.4 - 8.2 g/dL   Blood Culture    Specimen: Peripheral Venipuncture; Blood culture   Result Value Ref Range    Blood Culture Loaded on Instrument - Culture in progress    Urinalysis with Reflex Culture and Microscopic   Result Value Ref Range    Color, Urine Yellow Light-Yellow, Yellow, Dark-Yellow    Appearance, Urine Clear Clear    Specific Gravity, Urine 1.018 1.005 - 1.035    pH, Urine 5.0 5.0, 5.5, 6.0, 6.5, 7.0, 7.5, 8.0    Protein, Urine NEGATIVE NEGATIVE, 10 (TRACE), 20 (TRACE) mg/dL    Glucose, Urine OVER (4+) (A) Normal mg/dL    Blood, Urine NEGATIVE NEGATIVE    Ketones, Urine NEGATIVE NEGATIVE mg/dL    Bilirubin, Urine NEGATIVE NEGATIVE    Urobilinogen, Urine Normal Normal mg/dL    Nitrite, Urine NEGATIVE NEGATIVE    Leukocyte Esterase, Urine NEGATIVE NEGATIVE   Extra Urine Gray Tube   Result Value Ref Range    Extra Tube Hold for add-ons.    Lactate   Result Value Ref Range    Lactate 4.9 (HH) 0.4 - 2.0 mmol/L   Basic metabolic panel   Result Value Ref Range    Glucose 128 (H) 74 - 99 mg/dL    Sodium 130 (L) 136 - 145 mmol/L    Potassium 5.3 3.5 - 5.3 mmol/L    Chloride 95 (L) 98 - 107 mmol/L    Bicarbonate 17 (L) 21 - 32 mmol/L    Anion Gap 23 (H) 10 - 20 mmol/L    Urea Nitrogen 30 (H) 6 - 23 mg/dL    Creatinine 1.99 (H) 0.50 - 1.30 mg/dL    eGFR 31 (L) >60 mL/min/1.73m*2    Calcium 9.7 8.6 - 10.3 mg/dL   CBC   Result Value Ref Range    WBC 8.0 4.4 - 11.3 x10*3/uL    nRBC 0.4 (H) 0.0 - 0.0  /100 WBCs    RBC 4.33 (L) 4.50 - 5.90 x10*6/uL    Hemoglobin 12.3 (L) 13.5 - 17.5 g/dL    Hematocrit 39.8 (L) 41.0 - 52.0 %    MCV 92 80 - 100 fL    MCH 28.4 26.0 - 34.0 pg    MCHC 30.9 (L) 32.0 - 36.0 g/dL    RDW 17.2 (H) 11.5 - 14.5 %    Platelets 167 150 - 450 x10*3/uL    Electrocardiogram, 12-lead PRN ACS symptoms    Result Date: 8/26/2024  Atrial fibrillation with rapid ventricular response Right superior axis deviation Nonspecific intraventricular block Cannot rule out Anterior infarct , age undetermined Abnormal ECG When compared with ECG of 06-MAY-2024 05:15, Previous ECG has undetermined rhythm, needs review Minimal criteria for Anterior infarct are now Present    CT chest abdomen pelvis wo IV contrast    Result Date: 8/25/2024  STUDY: CT Chest, Abdomen, and Pelvis without IV Contrast; 8/25/2024 5:37 PM INDICATION: Lft elevation.  Concern for sepsis.  Mild right upper quadrant pain. COMPARISON: CXR 8/25/2024.  CTA chest 5/3/2024. ACCESSION NUMBER(S): [id number] ORDERING CLINICIAN: RUDI FAY TECHNIQUE: CT of the chest, abdomen, and pelvis was performed.  Contiguous axial images were obtained at 3 mm slice thickness through the chest, abdomen, and pelvis.  Coronal and sagittal reconstructions at 3 mm slice thickness were performed.  No intravenous contrast was administered.  FINDINGS: Please note that the evaluation of vessels, lymph nodes and organs is limited without intravenous contrast. CHEST: MEDIASTINUM: A 1.4 cm left thyroid nodule. Several mildly prominent mediastinal lymph nodes. The heart is mildly enlarged.  Extensive coronary artery calcification. Extensive systemic vascular calcification.  Thoracic aorta is at the upper limit of normal for size at the distal descending arch measuring 3.8 cm. LUNGS/PLEURA: A 6 cm subpleural left lower lobe nodule on image 143.  Septal thickening in the lung bases.  Next centrilobular and paraseptal emphysema. No pleural effusion or pneumothorax. ABDOMEN:   LIVER: Normal size and overall density.  Mildly nodular contour.  No evidence of focal mass.  BILE DUCTS: No intrahepatic or extrahepatic biliary ductal dilatation.  GALLBLADDER: The gallbladder contains several small stones.  No specific findings of acute cholecystitis. STOMACH: Thickening of the gastric antrum with surrounding inflammatory change.  Mild thickening of the adjacent duodenum and adjacent hepatic flexure.  PANCREAS: No masses or ductal dilatation.  SPLEEN: No splenomegaly or focal splenic lesion.  ADRENAL GLANDS: No thickening or nodules.  KIDNEYS AND URETERS: Several calcifications in both kidneys measure up to approximately 6 mm on the right and 5 mm on the left.  No hydronephrosis.  Unclear if these are vascular calcifications or urinary tract calculi.  Otherwise normal noncontrast appearance.  PELVIS:  BLADDER: No abnormalities identified.  REPRODUCTIVE ORGANS: Prostate gland is markedly enlarged at 7.6 x 6.5 x 7.2 cm.  Massive (15 x 9 x 9 cm) right hydrocele extending up into the right inguinal canal.  BOWEL: Thickened areas of small bowel and large bowel, particularly at the hepatic flexure of the colon and loops of jejunum in the left mid abdomen.  No obstruction.  Scattered diverticula.  VESSELS: Extensive systemic vascular calcification.  No abdominal aortic aneurysm. PERITONEUM/RETROPERITONEUM/LYMPH NODES: Trace amount of free fluid in the right upper quadrant and deep pelvis.  No free air. No lymphadenopathy.  ABDOMINAL WALL: No abnormalities identified. SOFT TISSUES: No abnormalities identified.  BONES: No acute fracture or aggressive osseous lesion.  Severe degenerative changes of the spine.    1.Scattered inflammatory change of the small bowel, the hepatic flexure of the colon and the adjacent gastric antrum concerning for nonspecific gastroenteritis/colitis.  Also considered would be mesenteric ischemia given the extensive amount of atherosclerotic calcification. 2.Trace amount of free  fluid in the right upper quadrant deep pelvis is nonspecific and may be secondary to GI tract inflammation/infection.  Lack of oral contrast material limits evaluation for perforation.  No evidence of free air. 3.Cholelithiasis without specific findings of acute cholecystitis. 4.Markedly enlarged prostate gland. 5.Massive right hydrocele extending up into the right inguinal canal. 6.A 6 mm subpleural left lower lobe pulmonary nodule nonspecific and may be postinfectious. 7.Septal thickening in the lung bases which may reflect mild pulmonary edema residual changes of prior pneumonia. 8.Several calcifications in both kidneys measure up to approximately 6 mm on the right and 5 mm on the left. Unclear if these are vascular calcifications or urinary tract calculi. No hydronephrosis. Signed by Judah Harley MD    XR chest 2 views    Result Date: 8/25/2024  STUDY: Chest Radiographs;  8/25/2024 4:11 PM INDICATION: Shortness of breath.  History of pneumonia and heart failure. COMPARISON: CXR 6/2/2024. ACCESSION NUMBER(S): [id number] ORDERING CLINICIAN: RUDI FAY TECHNIQUE:  Frontal and lateral chest. FINDINGS: CARDIOMEDIASTINAL SILHOUETTE: Cardiomediastinal silhouette is normal in size and configuration.  LUNGS: There is left basilar atelectasis. There is no focal consolidation.  ABDOMEN: No remarkable upper abdominal findings.  BONES: No acute osseous changes.    1. Left basilar atelectasis. 2. No acute cardiopulmonary findings. Signed by Ricardo Way MD    CT head wo IV contrast    Result : 8/25/2024  Interpreted By:  Eusebio Jesus, STUDY: CT HEAD WO IV CONTRAST;  8/25/2024 3:52 pm   INDICATION: Signs/Symptoms:assess for ICH, altered on eliquis.   COMPARISON: Comparison study is from 06/02/2024..   ACCESSION NUMBER(S): [id number]   ORDERING CLINICIAN: RUDI FAY   TECHNIQUE: Routine axial images were obtained from the skull base through the vertex.  Sagittal and coronal reconstruction images were  generated. Brain, subdural, and bone windows were reviewed. N/A Unavailable   FINDINGS: INTRACRANIAL: Moderate prominence of ventricles and sulci. There is mild-to-moderate patchy hypodensity throughout the deep periventricular white matter. Tiny old lacunar infarcts in the left basal ganglia and left thalamus. No acute intracranial bleed, midline shift, or focal mass effect. No destructive bone lesion. No depressed skull fracture. Skullbase arterial calcifications in the carotid siphons and vertebral arteries.   EXTRACRANIAL: Visualized paranasal sinuses were clear. Visualized mastoid air cells were clear.       No depressed skull fracture. No acute intracranial bleed or focal mass effect.   Tiny old lacunar infarcts in the left thalamus and left basal ganglia.   Moderate volume loss.   Mild-to-moderate chronic white matter ischemic disease in the deep periventricular regions.   MACRO: None   Signed by: Eusebio Jesus 8/25/2024 4:16 PM Dictation workstation:   [id number]        Assessment/Plan     Jam Cox is a 89 y.o. male with a past medical history of paroxysmal Atrial Fibrillation on Eliquis, systolic HF, last LVEF 30-35% (TTE 5/24), COPD, dementia, hyperlipidemia, hypertension presenting with c/o SOB and a cough for the last few days.  GI was consulted for elevated LFTs, ischemic bowel.   Etiologies include congestive hepatitis 2/2 heart failure exacerbation, ischemic colitis due to mesenteric ischemia, acute viral hepatitis.    -Will recheck lactate  - Recommend to consult acute care surgery for possible ischemic bowel  -Supportive care as per primary team  -Acute viral hepatic panel pending  -Trend LFTs daily  -GI will follow  -Recommend goals of care discussion    Case d/w Dr. Annmarie PRIEST spent 45 minutes in the professional and overall care of this patient.      Jovan Cabrera, APRN-CNP

## 2024-08-26 NOTE — CARE PLAN
Problem: Nutrition  Goal: Less than 5 days NPO/clear liquids  Outcome: Progressing  Goal: Oral intake greater than 50%  Outcome: Progressing  Goal: Oral intake greater 75%  Outcome: Progressing  Goal: Consume prescribed supplement  Outcome: Progressing  Goal: Adequate PO fluid intake  Outcome: Progressing  Goal: Nutrition support goals are met within 48 hrs  Outcome: Progressing  Goal: Nutrition support is meeting 75% of nutrient needs  Outcome: Progressing  Goal: Tube feed tolerance  Outcome: Progressing  Goal: BG  mg/dL  Outcome: Progressing  Goal: Lab values WNL  Outcome: Progressing  Goal: Electrolytes WNL  Outcome: Progressing  Goal: Promote healing  Outcome: Progressing  Goal: Maintain stable weight  Outcome: Progressing  Goal: Reduce weight from edema/fluid  Outcome: Progressing  Goal: Gradual weight gain  Outcome: Progressing  Goal: Improve ostomy output  Outcome: Progressing     Problem: Pain - Adult  Goal: Verbalizes/displays adequate comfort level or baseline comfort level  Outcome: Progressing     Problem: Safety - Adult  Goal: Free from fall injury  Outcome: Progressing     Problem: Safety - Adult  Goal: Free from fall injury  Outcome: Progressing     Problem: Chronic Conditions and Co-morbidities  Goal: Patient's chronic conditions and co-morbidity symptoms are monitored and maintained or improved  Outcome: Progressing   The patient's goals for the shift include      The clinical goals for the shift include decrease anxiety

## 2024-08-26 NOTE — ED PROCEDURE NOTE
Procedure  Critical Care    Performed by: Han Ferrera MD  Authorized by: Han Ferrera MD    Critical care provider statement:     Critical care time (minutes):  40    Critical care time was exclusive of:  Separately billable procedures and treating other patients and teaching time    Critical care was necessary to treat or prevent imminent or life-threatening deterioration of the following conditions:  Cardiac failure and shock    Critical care was time spent personally by me on the following activities:  Blood draw for specimens, development of treatment plan with patient or surrogate, discussions with consultants, discussions with primary provider, evaluation of patient's response to treatment, examination of patient, ordering and performing treatments and interventions, ordering and review of laboratory studies, ordering and review of radiographic studies, pulse oximetry, re-evaluation of patient's condition and review of old charts               Han Ferrera MD  08/25/24 1847

## 2024-08-27 ENCOUNTER — APPOINTMENT (OUTPATIENT)
Dept: RADIOLOGY | Facility: CLINIC | Age: 89
End: 2024-08-27
Payer: MEDICARE

## 2024-08-27 ENCOUNTER — APPOINTMENT (OUTPATIENT)
Dept: RADIOLOGY | Facility: HOSPITAL | Age: 89
End: 2024-08-27
Payer: MEDICARE

## 2024-08-27 LAB
ALBUMIN SERPL BCP-MCNC: 3.5 G/DL (ref 3.4–5)
ALP SERPL-CCNC: 102 U/L (ref 33–136)
ALT SERPL W P-5'-P-CCNC: 456 U/L (ref 10–52)
AMMONIA PLAS-SCNC: 45 UMOL/L (ref 16–53)
ANION GAP SERPL CALC-SCNC: 17 MMOL/L (ref 10–20)
AST SERPL W P-5'-P-CCNC: 318 U/L (ref 9–39)
BILIRUB DIRECT SERPL-MCNC: 1.4 MG/DL (ref 0–0.3)
BILIRUB SERPL-MCNC: 4.9 MG/DL (ref 0–1.2)
BUN SERPL-MCNC: 39 MG/DL (ref 6–23)
CALCIUM SERPL-MCNC: 9.2 MG/DL (ref 8.6–10.3)
CHLORIDE SERPL-SCNC: 97 MMOL/L (ref 98–107)
CO2 SERPL-SCNC: 23 MMOL/L (ref 21–32)
CREAT SERPL-MCNC: 1.76 MG/DL (ref 0.5–1.3)
EGFRCR SERPLBLD CKD-EPI 2021: 36 ML/MIN/1.73M*2
ERYTHROCYTE [DISTWIDTH] IN BLOOD BY AUTOMATED COUNT: 17.3 % (ref 11.5–14.5)
GLUCOSE SERPL-MCNC: 111 MG/DL (ref 74–99)
HAV AB SER QL IA: REACTIVE
HAV IGM SER QL: NONREACTIVE
HBV SURFACE AB SER-ACNC: <3.1 MIU/ML
HCT VFR BLD AUTO: 40.1 % (ref 41–52)
HCV AB SER QL: NONREACTIVE
HGB BLD-MCNC: 13.2 G/DL (ref 13.5–17.5)
LACTATE SERPL-SCNC: 2.7 MMOL/L (ref 0.4–2)
LACTATE SERPL-SCNC: 2.7 MMOL/L (ref 0.4–2)
MCH RBC QN AUTO: 28.9 PG (ref 26–34)
MCHC RBC AUTO-ENTMCNC: 32.9 G/DL (ref 32–36)
MCV RBC AUTO: 88 FL (ref 80–100)
NRBC BLD-RTO: 0.2 /100 WBCS (ref 0–0)
PLATELET # BLD AUTO: 163 X10*3/UL (ref 150–450)
POTASSIUM SERPL-SCNC: 5 MMOL/L (ref 3.5–5.3)
PROT SERPL-MCNC: 6.7 G/DL (ref 6.4–8.2)
RBC # BLD AUTO: 4.56 X10*6/UL (ref 4.5–5.9)
SODIUM SERPL-SCNC: 132 MMOL/L (ref 136–145)
T4 FREE SERPL-MCNC: 1.2 NG/DL (ref 0.61–1.12)
WBC # BLD AUTO: 8.6 X10*3/UL (ref 4.4–11.3)

## 2024-08-27 PROCEDURE — 85027 COMPLETE CBC AUTOMATED: CPT | Performed by: FAMILY MEDICINE

## 2024-08-27 PROCEDURE — 99232 SBSQ HOSP IP/OBS MODERATE 35: CPT | Performed by: NURSE PRACTITIONER

## 2024-08-27 PROCEDURE — 82248 BILIRUBIN DIRECT: CPT

## 2024-08-27 PROCEDURE — 73660 X-RAY EXAM OF TOE(S): CPT | Mod: LEFT SIDE | Performed by: RADIOLOGY

## 2024-08-27 PROCEDURE — 36415 COLL VENOUS BLD VENIPUNCTURE: CPT

## 2024-08-27 PROCEDURE — 2500000002 HC RX 250 W HCPCS SELF ADMINISTERED DRUGS (ALT 637 FOR MEDICARE OP, ALT 636 FOR OP/ED): Performed by: PHARMACIST

## 2024-08-27 PROCEDURE — 84439 ASSAY OF FREE THYROXINE: CPT | Performed by: NURSE PRACTITIONER

## 2024-08-27 PROCEDURE — 2500000004 HC RX 250 GENERAL PHARMACY W/ HCPCS (ALT 636 FOR OP/ED): Performed by: INTERNAL MEDICINE

## 2024-08-27 PROCEDURE — 73660 X-RAY EXAM OF TOE(S): CPT | Mod: LT

## 2024-08-27 PROCEDURE — 2500000001 HC RX 250 WO HCPCS SELF ADMINISTERED DRUGS (ALT 637 FOR MEDICARE OP): Performed by: FAMILY MEDICINE

## 2024-08-27 PROCEDURE — 86709 HEPATITIS A IGM ANTIBODY: CPT | Mod: AHULAB

## 2024-08-27 PROCEDURE — 36415 COLL VENOUS BLD VENIPUNCTURE: CPT | Performed by: FAMILY MEDICINE

## 2024-08-27 PROCEDURE — 99233 SBSQ HOSP IP/OBS HIGH 50: CPT | Performed by: INTERNAL MEDICINE

## 2024-08-27 PROCEDURE — 82140 ASSAY OF AMMONIA: CPT | Performed by: NURSE PRACTITIONER

## 2024-08-27 PROCEDURE — 99232 SBSQ HOSP IP/OBS MODERATE 35: CPT

## 2024-08-27 PROCEDURE — 2500000002 HC RX 250 W HCPCS SELF ADMINISTERED DRUGS (ALT 637 FOR MEDICARE OP, ALT 636 FOR OP/ED): Performed by: FAMILY MEDICINE

## 2024-08-27 PROCEDURE — 2500000005 HC RX 250 GENERAL PHARMACY W/O HCPCS

## 2024-08-27 PROCEDURE — 2500000004 HC RX 250 GENERAL PHARMACY W/ HCPCS (ALT 636 FOR OP/ED): Performed by: FAMILY MEDICINE

## 2024-08-27 PROCEDURE — 94640 AIRWAY INHALATION TREATMENT: CPT

## 2024-08-27 PROCEDURE — 80053 COMPREHEN METABOLIC PANEL: CPT | Performed by: FAMILY MEDICINE

## 2024-08-27 PROCEDURE — 2060000001 HC INTERMEDIATE ICU ROOM DAILY

## 2024-08-27 PROCEDURE — 83605 ASSAY OF LACTIC ACID: CPT | Performed by: NURSE PRACTITIONER

## 2024-08-27 PROCEDURE — 93975 VASCULAR STUDY: CPT

## 2024-08-27 PROCEDURE — 99232 SBSQ HOSP IP/OBS MODERATE 35: CPT | Performed by: INTERNAL MEDICINE

## 2024-08-27 RX ORDER — DIGOXIN 0.25 MG/ML
500 INJECTION INTRAMUSCULAR; INTRAVENOUS ONCE
Status: COMPLETED | OUTPATIENT
Start: 2024-08-27 | End: 2024-08-27

## 2024-08-27 RX ORDER — DIGOXIN 0.25 MG/ML
250 INJECTION INTRAMUSCULAR; INTRAVENOUS ONCE
Status: COMPLETED | OUTPATIENT
Start: 2024-08-27 | End: 2024-08-27

## 2024-08-27 ASSESSMENT — COGNITIVE AND FUNCTIONAL STATUS - GENERAL
TOILETING: A LITTLE
DRESSING REGULAR LOWER BODY CLOTHING: A LITTLE
MOVING FROM LYING ON BACK TO SITTING ON SIDE OF FLAT BED WITH BEDRAILS: A LITTLE
HELP NEEDED FOR BATHING: A LITTLE
MOVING TO AND FROM BED TO CHAIR: A LITTLE
WALKING IN HOSPITAL ROOM: A LITTLE
DRESSING REGULAR UPPER BODY CLOTHING: A LITTLE
EATING MEALS: A LITTLE
STANDING UP FROM CHAIR USING ARMS: A LITTLE
TURNING FROM BACK TO SIDE WHILE IN FLAT BAD: A LITTLE
PERSONAL GROOMING: A LITTLE
CLIMB 3 TO 5 STEPS WITH RAILING: A LITTLE
MOBILITY SCORE: 18
DAILY ACTIVITIY SCORE: 18

## 2024-08-27 ASSESSMENT — PAIN SCALES - GENERAL: PAINLEVEL_OUTOF10: 5 - MODERATE PAIN

## 2024-08-27 NOTE — PROGRESS NOTES
"  INFECTIOUS DISEASE DAILY PROGRESS NOTE    SUBJECTIVE:    No new events. No fevers. Did not sleep last night so sleepy today.    OBJECTIVE:  VITALS (Last 24 Hours)  /68   Pulse 102   Temp 36.6 °C (97.9 °F) (Oral)   Resp 19   Ht 1.778 m (5' 10\")   Wt 65.4 kg (144 lb 1.6 oz)   SpO2 100%   BMI 20.68 kg/m²     PHYSICAL EXAM:  Gen - NAD, sleepy today  Heart - tachy  Lungs - no wheezing  Abd - soft, no ttp, BS present  Skin - no rash    ABX: None    LABS:  Lab Results   Component Value Date    WBC 8.6 08/27/2024    HGB 13.2 (L) 08/27/2024    HCT 40.1 (L) 08/27/2024    MCV 88 08/27/2024     08/27/2024     Lab Results   Component Value Date    GLUCOSE 111 (H) 08/27/2024    CALCIUM 9.2 08/27/2024     (L) 08/27/2024    K 5.0 08/27/2024    CO2 23 08/27/2024    CL 97 (L) 08/27/2024    BUN 39 (H) 08/27/2024    CREATININE 1.76 (H) 08/27/2024     Results from last 72 hours   Lab Units 08/27/24  0824   ALK PHOS U/L 102   BILIRUBIN TOTAL mg/dL 4.9*   BILIRUBIN DIRECT mg/dL 1.4*   PROTEIN TOTAL g/dL 6.7   ALT U/L 456*   AST U/L 318*   ALBUMIN g/dL 3.5     Estimated Creatinine Clearance: 24.3 mL/min (A) (by C-G formula based on SCr of 1.76 mg/dL (H)).    ASSESSMENT/PLAN:     Acute on Chronic Systolic Heart Failure Exacerbation - cardiology seeing  Mesenteric Ischemia related to HF  Hepatitis - likely passive congestion, would expect AST/ALT to be much more elevated if there was ischemia  Lactic Acidosis - I suspect this is from ischemia and not sepsis/infection. No fever/leukocytosis present.     No infection suspected at this time. OK to remain off abx.    Will sign off. Please call back with questions. Thanks!    Pete Rdz MD  [facility]  Office #[phone]      "

## 2024-08-27 NOTE — CONSULTS
Wound Care Consult     Visit Date: 8/27/2024      Patient Name: Jam Cox         MRN: [id number]           YOB: 1930     Reason for Consult: trauma injuries to bilateral feet on toes. Per family patient hits is toes while walking in sandals.         Recommendations to wear closed toe shoes.      Pertinent Labs:   Albumin   Date Value Ref Range Status   08/27/2024 3.5 3.4 - 5.0 g/dL Final       Wound Assessment:  Wound 08/27/24 Traumatic Toe (Comment  which one) Dorsal foot;Left (Active)   Wound Image   08/27/24 1248   Site Assessment Brown 08/27/24 1248   Radha-Wound Assessment Intact 08/27/24 1248   Shape round 08/27/24 1248   Wound Length (cm) 1 cm 08/27/24 1248   Wound Width (cm) 1 cm 08/27/24 1248   Wound Surface Area (cm^2) 1 cm^2 08/27/24 1248       Wound 08/27/24 Traumatic Toe (Comment  which one) Dorsal foot;Right (Active)   Wound Image   08/27/24 1249   Site Assessment Brown;Gonzalez 08/27/24 1249   Shape round 08/27/24 1249   Wound Length (cm) 0.4 cm 08/27/24 1249   Wound Width (cm) 0.4 cm 08/27/24 1249   Wound Surface Area (cm^2) 0.16 cm^2 08/27/24 1249       Wound Team Summary Assessment: Notified Dr. Kaci Oconnor CNP of wound care recommendations. Additional supplies left at bedside    Scabbed trauma areas on toes  Cleanse with bath wipes or soap and water. Rinse well and pat dry.   Paint with betadine iodine, allow to dry   Complete daily and as needed     Wound Team Plan: Thank you for this consult. Assessment has been completed and orders have been placed. Wound care to be completed by nursing per orders. Please place new consult if there is a change in wound status.     While in bed patient should only be on one fitted sheet, and one chux. Please, do not use brief while patient is resting in bed. Elevate heels off the bed surface at all times. Turn and reposition at least every 2 hours.        Lisa Crowley RN BSN,WCC,CWOCN  [phone]   8/27/2024  4:11 PM

## 2024-08-27 NOTE — PROGRESS NOTES
Occupational Therapy                 Therapy Communication Note    Patient Name: Jam Cox  MRN: [id number]  Today's Date: 8/27/2024     Discipline: Occupational Therapy    Missed Visit Reason: Missed Visit Reason: Patient refused, Other (Comment) (Atte,pted to see pt for therapy evals. Wife present. Per nursing and spouse pt has not slept in several days and is now sleeping. Wife provided home set up and pt baseline. Pt sleeping soundly unable to be aroused at this time)    Missed Time: Attempt

## 2024-08-27 NOTE — PROGRESS NOTES
Jam Cox is a 89 y.o. male     Patient is lethargic this morning  Was confused through the night and sleeping for the first time  Appears euvolemic on my exam    Review of Systems           Vitals:    08/27/24 1120   BP: 130/66   Pulse:    Resp: 20   Temp: 36.5 °C (97.7 °F)   SpO2: 94%        Scheduled medications  apixaban, 2.5 mg, oral, BID  [Held by provider] atorvastatin, 10 mg, oral, Daily  budesonide, 0.5 mg, nebulization, BID  cholecalciferol, 2,000 Units, oral, Daily  digoxin, 250 mcg, intravenous, Once  digoxin, 250 mcg, intravenous, Once  donepezil, 10 mg, oral, Daily  [Held by provider] empagliflozin, 10 mg, oral, Daily  finasteride, 5 mg, oral, Daily  formoterol, 20 mcg, nebulization, BID  melatonin, 3 mg, oral, Nightly  memantine, 10 mg, oral, Daily  metoprolol succinate XL, 25 mg, oral, Daily  OLANZapine, 2.5 mg, oral, Nightly  pantoprazole, 40 mg, oral, Daily before breakfast   Or  pantoprazole, 40 mg, intravenous, Daily before breakfast  polyethylene glycol, 17 g, oral, Daily  [Held by provider] sacubitriL-valsartan, 0.5 tablet, oral, BID  [Held by provider] spironolactone, 12.5 mg, oral, Daily  tamsulosin, 0.4 mg, oral, Daily      Continuous medications     PRN medications  PRN medications: acetaminophen **OR** acetaminophen, guaiFENesin, ipratropium-albuteroL, metoprolol    Lab Review   Results from last 7 days   Lab Units 08/27/24  0546 08/26/24  0705 08/25/24  1537   WBC AUTO x10*3/uL 8.6 8.0 6.9   HEMOGLOBIN g/dL 13.2* 12.3* 12.6*   HEMATOCRIT % 40.1* 39.8* 39.0*   PLATELETS AUTO x10*3/uL 163 167 147*     Results from last 7 days   Lab Units 08/27/24  0824 08/27/24  0546 08/26/24  0705 08/25/24  1716 08/25/24  1533   SODIUM mmol/L  --  132* 130*  --  130*   POTASSIUM mmol/L  --  5.0 5.3 4.9 6.1*   CHLORIDE mmol/L  --  97* 95*  --  95*   CO2 mmol/L  --  23 17*  --  18*   BUN mg/dL  --  39* 30*  --  21   CREATININE mg/dL  --  1.76* 1.99*  --  1.71*   CALCIUM mg/dL  --  9.2 9.7  --  9.8    PROTEIN TOTAL g/dL 6.7  --   --  7.3 7.2   BILIRUBIN TOTAL mg/dL 4.9*  --   --  5.3* 5.2*   ALK PHOS U/L 102  --   --  106 112   ALT U/L 456*  --   --  150* 135*   AST U/L 318*  --   --  155* 143*   GLUCOSE mg/dL  --  111* 128*  --  146*     Results from last 7 days   Lab Units 08/25/24  1716 08/25/24  1533   TROPHS ng/L 28* 26*        CT chest abdomen pelvis wo IV contrast   Final Result   1.Scattered inflammatory change of the small bowel, the hepatic   flexure of the colon and the adjacent gastric antrum concerning for   nonspecific gastroenteritis/colitis.  Also considered would be   mesenteric ischemia given the extensive amount of atherosclerotic   calcification.   2.Trace amount of free fluid in the right upper quadrant deep   pelvis is nonspecific and may be secondary to GI tract   inflammation/infection.  Lack of oral contrast material limits   evaluation for perforation.  No evidence of free air.   3.Cholelithiasis without specific findings of acute   cholecystitis.   4.Markedly enlarged prostate gland.   5.Massive right hydrocele extending up into the right inguinal   canal.   6.A 6 mm subpleural left lower lobe pulmonary nodule nonspecific   and may be postinfectious.   7.Septal thickening in the lung bases which may reflect mild   pulmonary edema residual changes of prior pneumonia.   8.Several calcifications in both kidneys measure up to   approximately 6 mm on the right and 5 mm on the left. Unclear if these   are vascular calcifications or urinary tract calculi. No   hydronephrosis.   Signed by Judah Harley MD      XR chest 2 views   Final Result   1. Left basilar atelectasis.   2. No acute cardiopulmonary findings.   Signed by Ricardo Way MD      CT head wo IV contrast   Final Result   No depressed skull fracture. No acute intracranial bleed or focal   mass effect.        Tiny old lacunar infarcts in the left thalamus and left basal ganglia.        Moderate volume loss.         Mild-to-moderate chronic white matter ischemic disease in the deep   periventricular regions.        MACRO:   None        Signed by: Eusebio Jesus 8/25/2024 4:16 PM   Dictation workstation:   [id number]      Transthoracic Echo (TTE) Limited    (Results Pending)   US abdomen limited liver    (Results Pending)   XR toe left 2+ views    (Results Pending)   Vascular US abdomen/pelvis duplex complete    (Results Pending)         Physical Exam    Constitutional   General appearance: Sleepy  Pulmonary   Respiratory assessment: No respiratory distress, normal respiratory rhythm and effort.    Auscultation of Lungs: Clear bilateral breath sounds.   Cardiovascular   Auscultation of heart: Apical pulse normal, heart rate and rhythm normal, normal S1 and S2, no murmurs and no pericardial rub.    Exam for edema: No peripheral edema.   Abdomen   Abdominal Exam: No bruits, normal bowel sounds, soft, non-tender, no abdominal mass palpated.    Liver and Spleen exam: No hepato-splenomegaly.   Musculoskeletal     Neurologic   Alert x 1-2        Assessment/Plan      #Acute on chronic systolic congestive heart failure  Appears euvolemic    #Transaminitis  Rising LFTs  Continue to hold statins    #CKD 3B  Monitor    #Dementia  Slightly worse from the recent stressful events  Has a sitter in the room    Reviewed infectious disease note  Monitoring off antibiotics

## 2024-08-27 NOTE — PROGRESS NOTES
Occupational Therapy                 Therapy Communication Note    Patient Name: Jam Cox  MRN: [id number]  Today's Date: 8/27/2024     Discipline: Occupational Therapy    Missed Visit Reason: Missed Visit Reason: Patient refused (Attempted to see pt for OT eval. Pt declined at this time requested therapy return at another time as he needs to finish his lunch.)    Missed Time: Attempt

## 2024-08-27 NOTE — PROGRESS NOTES
Physical Therapy                 Therapy Communication Note    Patient Name: Jam Cox  MRN: [id number]  Today's Date: 8/27/2024     Discipline: Physical Therapy    Missed Visit Reason: Missed Visit Reason:  (Pt soundly sleeping, does not waken to sternal rub or noxious stimuli. Not appropriate for PT eval at this time.)    Missed Time: Attempt    Comment:

## 2024-08-27 NOTE — PROGRESS NOTES
Medicine NP follow up note    Subjective:  Seen in am, d/w wife.   Sitter at bedside.   Pt lethargic, reportedly has not had much sleep in the last 2 days. Awakens to name briefly, attempts to follow commands.   Taking PO meds.   Wife wondering about stability of bilat 3rd toe / L shin ulcers - all are dry without discharge or cellulitis.     Vitals (Last 24 Hours):  Heart Rate:  [102-115]   Temp:  [36.5 °C (97.7 °F)-36.9 °C (98.5 °F)]   Resp:  [19-20]   BP: (100-130)/(66-72)   SpO2:  [94 %-100 %]     I have reviewed all imaging reports and labs pertinent to this visit / presenting problem    PHYSICAL EXAM:  Constitutional: lethargic   Eyes: +icterus  ENMT: mucous membranes dry / mouth breathing   Head/Neck: supple  Respiratory/Thorax: CTA bilaterally, non-labored breathing, no cough, on RA  Cardiovascular: irregular, no murmurs heard  Gastrointestinal: ND/S/NT  : no Cantu  Musculoskeletal: no joint swelling, L 3rd toe TTP  Extremities: no edema  Neurological: lethargic, attempts to follow simple commands   Skin: warm and dry, dry ulcers to 3rd toes bilat, L shin     MEDS:  Scheduled meds  apixaban, 2.5 mg, oral, BID  [Held by provider] atorvastatin, 10 mg, oral, Daily  budesonide, 0.5 mg, nebulization, BID  cholecalciferol, 2,000 Units, oral, Daily  digoxin, 250 mcg, intravenous, Once  digoxin, 250 mcg, intravenous, Once  donepezil, 10 mg, oral, Daily  [Held by provider] empagliflozin, 10 mg, oral, Daily  finasteride, 5 mg, oral, Daily  formoterol, 20 mcg, nebulization, BID  melatonin, 3 mg, oral, Nightly  memantine, 10 mg, oral, Daily  metoprolol succinate XL, 25 mg, oral, Daily  OLANZapine, 2.5 mg, oral, Nightly  pantoprazole, 40 mg, oral, Daily before breakfast   Or  pantoprazole, 40 mg, intravenous, Daily before breakfast  polyethylene glycol, 17 g, oral, Daily  [Held by provider] sacubitriL-valsartan, 0.5 tablet, oral, BID  [Held by provider] spironolactone, 12.5 mg, oral, Daily  tamsulosin, 0.4 mg, oral,  Daily    PRN meds  PRN medications: acetaminophen **OR** acetaminophen, guaiFENesin, ipratropium-albuteroL, metoprolol    ASSESSMENT/PLAN:  89 y.o. male with h/o dementia, paroxysmal A-Fib on Eliquis, HFrEF, cardiomyopathy, COPD, CVA, presented with of shortness of breath and cough.   Work up with A-Fib RVR, mild acute HF, YOSEF, transaminitis, lactic acidosis, CT C/A/P with possible gastroenteritis/colitis, possible mesenteric ischemia, cholelithiasis, massive right hydrocele, possible mild pulmonary edema, calcifications in both kidneys without hydronephrosis.    SOB and cough on admission   Mild acute HFrEF s/p diuresis   Underlying COPD   -currently SpO2 stable on RA, lungs CTA   -FU limited TTE ordered by cardio   -ruled out for PE with ddimer normal for age, infection ruled out by ID  -continue nebulizers, consider ABG based on clinical status / if lethargy persists     A-Fib RVR  -continue eliquis, metoprolol, dig loading per cardio, tele/limited TTE    YOSEF, soft BP  Lactic acidosis (improving)   -cr improved today, holding aldactone, entresto, jardiance   -known prostatomegaly, last  ml - monitor closely, continue flomax     Elevated transaminases   Possible congestive hepatopathy vs other   -worse today, liver US pending, monitor daily, statin on hold, pos HAV ab noted/IgM pending     Possible mesenteric ischemia  -mesenteric US pending     Lethargy   -could be 2/2 impaired sleep recently with underlying dementia, monitor for now   -swallow eval as needed     HypoNa  -noted previously, uptrending s/p Lasix, monitor     Abnormal TSH/FT4   -recheck after acute illness     Toe ulcers   -no s/s infection on exam, L toe xray stable (done 2/2 pain on exam)     Other comorbidities as above  -continue medications as ordered and adjust based on clinical course     VTE / GI prophylaxis   -on eliquis, PPI, bowel regimen in place     Discharge planning  -PT/OT     DNAR/I-arrest in place (confirmed with wife at  bedside)     Discussed with Dr. Shen and the interdisciplinary team     Kaci Oconnor, APRN-CNP

## 2024-08-27 NOTE — PROGRESS NOTES
"Subjective Data:  No complaints. Confused.        Objective Data:  Last Recorded Vitals:  Vitals:    24 0322 24 0719 24 0731 24 1120   BP: 104/70 100/68  130/66   BP Location:    Right arm   Patient Position:    Lying   Pulse:       Resp:  19  20   Temp:  36.6 °C (97.9 °F)  36.5 °C (97.7 °F)   TempSrc:  Oral  Oral   SpO2:  96% 100% 94%   Weight:       Height:         Medical Gas Therapy: None (Room air)  Weight  Av.4 kg (142 lb 0.8 oz)  Min: 63.5 kg (140 lb)  Max: 65.4 kg (144 lb 1.6 oz)    LABS:  CMP:  Results from last 7 days   Lab Units 24  0824 24  0546 24  0705 24  1716 24  1533   SODIUM mmol/L  --  132* 130*  --  130*   POTASSIUM mmol/L  --  5.0 5.3 4.9 6.1*   CHLORIDE mmol/L  --  97* 95*  --  95*   CO2 mmol/L  --  23 17*  --  18*   ANION GAP mmol/L  --  17 23*  --  23*   BUN mg/dL  --  39* 30*  --  21   CREATININE mg/dL  --  1.76* 1.99*  --  1.71*   EGFR mL/min/1.73m*2  --  36* 31*  --  37*   MAGNESIUM mg/dL  --   --   --   --  2.20   ALBUMIN g/dL 3.5  --   --  3.7 3.7   ALT U/L 456*  --   --  150* 135*   AST U/L 318*  --   --  155* 143*   BILIRUBIN TOTAL mg/dL 4.9*  --   --  5.3* 5.2*     CBC:  Results from last 7 days   Lab Units 24  0546 24  0705 24  1537   WBC AUTO x10*3/uL 8.6 8.0 6.9   HEMOGLOBIN g/dL 13.2* 12.3* 12.6*   HEMATOCRIT % 40.1* 39.8* 39.0*   PLATELETS AUTO x10*3/uL 163 167 147*   MCV fL 88 92 90     COAG:     ABO: No results found for: \"ABO\"  HEME/ENDO:  Results from last 7 days   Lab Units 24  1141   TSH mIU/L 0.34*      CARDIAC:   Results from last 7 days   Lab Units 24  1716 24  1533   TROPHS ng/L 28* 26*   BNP pg/mL  --  1,796*             Last I/O:    Intake/Output Summary (Last 24 hours) at 2024 1227  Last data filed at 2024 1800  Gross per 24 hour   Intake --   Output 400 ml   Net -400 ml     Net IO Since Admission: -200 mL [24 1227]            Inpatient " Medications:  Scheduled medications   Medication Dose Route Frequency    apixaban  2.5 mg oral BID    [Held by provider] atorvastatin  10 mg oral Daily    budesonide  0.5 mg nebulization BID    cholecalciferol  2,000 Units oral Daily    digoxin  250 mcg intravenous Once    digoxin  250 mcg intravenous Once    donepezil  10 mg oral Daily    [Held by provider] empagliflozin  10 mg oral Daily    finasteride  5 mg oral Daily    formoterol  20 mcg nebulization BID    melatonin  3 mg oral Nightly    memantine  10 mg oral Daily    metoprolol succinate XL  25 mg oral Daily    OLANZapine  2.5 mg oral Nightly    pantoprazole  40 mg oral Daily before breakfast    Or    pantoprazole  40 mg intravenous Daily before breakfast    polyethylene glycol  17 g oral Daily    [Held by provider] sacubitriL-valsartan  0.5 tablet oral BID    [Held by provider] spironolactone  12.5 mg oral Daily    tamsulosin  0.4 mg oral Daily     PRN medications   Medication    acetaminophen    Or    acetaminophen    guaiFENesin    ipratropium-albuteroL    metoprolol     Continuous Medications   Medication Dose Last Rate           Physical Exam:  Gen Well appearing elderly male lying in bed in no apparent distress Body mass index is 20.68 kg/m².   CV irregularly irregular, tachy. No m/r/g appreciated. +JVD, no leg edema.   Pulm Lungs clear with normal respiratory effort.  Neuro Alert but speech largely nonsensical. Grossly nonfocal.    I reviewed Telemetry - atrial fibrillation with RVR       Assessment:  Acute on chronic systolic HF  Mild volume excess by imaging with noted elevated BNP and JVD. Worsening transaminitis noted which may or may not relate to vascular congestion. Soft BP's limit further diuresis. On RA and appears comfortable.     Persistent atrial fibrillation  Rates remain sub-optimal. On anticoagulation with apixaban (dose adjusted given YOSEF).     Cardiomyopathy  On a reasonable outpatient regimen though Entresto and Jardiance on hold  given YOSEF.     Recommendations   Adjusting AV blockade with added digoxin which may allow for improved BP and further diuresis. Limited TTE.       Eric Hernandes MD

## 2024-08-27 NOTE — PROGRESS NOTES
Care Coordinator Note:    Plan: Patient with Elev LFT, mesenteric ischemia- elevated lactate but ID following. No atb as no signs of infection. Gi following ? GOC. ACS consulted. Pending plans.     Status: inpatient  Payor: Lul[employer] gonzalez  [facility] at [facility]  Barrier: ?Surg intv  ADOD: few days    Crystal Fish [facility]      08/27/24 1128   Discharge Planning   Expected Discharge Disposition HH Services

## 2024-08-27 NOTE — PROGRESS NOTES
"Jam Cox is a 89 y.o. male on day 2 of admission presenting with Elevated LFTs.    Subjective   No acute events overnight.  Patient is asleep.  Sitter and spouse at bedside.  Talk to the daughter on the phone.  Per sitter and wife, no bowel movements, no nausea and vomiting.  Tolerates diet.       Objective     Physical Exam  Constitutional:       General: He is sleeping.      Appearance: He is toxic-appearing.   HENT:      Head: Normocephalic and atraumatic.   Cardiovascular:      Rate and Rhythm: Rhythm irregularly irregular.   Pulmonary:      Effort: Tachypnea present.   Abdominal:      General: Abdomen is flat. Bowel sounds are normal. There is no distension.      Palpations: Abdomen is soft.   Skin:     General: Skin is warm and dry.         Last Recorded Vitals  Blood pressure 143/80, pulse 102, temperature 36.5 °C (97.7 °F), temperature source Oral, resp. rate 20, height 1.778 m (5' 10\"), weight 65.4 kg (144 lb 1.6 oz), SpO2 95%.  Intake/Output last 3 Shifts:  I/O last 3 completed shifts:  In: 200 (3.1 mL/kg) [P.O.:200]  Out: 400 (6.1 mL/kg) [Urine:400 (0.2 mL/kg/hr)]  Weight: 65.4 kg     Relevant Results      XR toe left 2+ views    Result Date: 8/27/2024  Interpreted By:  Eusebio Jesus, STUDY: XR TOE LEFT 2+ VIEWS;  8/27/2024 12:27 pm   INDICATION: Signs/Symptoms:L 3rd toe ulcer/pain.   COMPARISON: None.   ACCESSION NUMBER(S): [id number]   ORDERING CLINICIAN: GEORGE PINO   TECHNIQUE: 3 views  of the  left 3rd were obtained.   FINDINGS: Mild soft tissue swelling of the 3rd toe. No soft tissue gas density. No significant osteophytic change. No lytic or blastic destructive bone lesion. No acute fracture or dislocation. No opaque soft tissue foreign body. No periosteal reaction or erosion.       Mild 3rd toe soft tissue swelling. No gas density or bone destruction.   MACRO: None   Signed by: Eusebio Jesus 8/27/2024 12:40 PM Dictation workstation:   [id number]    Electrocardiogram, 12-lead PRN ACS " symptoms    Result Date: 8/26/2024  Atrial fibrillation with rapid ventricular response Right superior axis deviation Nonspecific intraventricular block Cannot rule out Anterior infarct , age undetermined Abnormal ECG When compared with ECG of 06-MAY-2024 05:15, Previous ECG has undetermined rhythm, needs review Minimal criteria for Anterior infarct are now Present See ED provider note for full interpretation and clinical correlation Confirmed by Tanya Weiss ([id number]) on 8/26/2024 9:42:10 PM    CT chest abdomen pelvis wo IV contrast    Result Date: 8/25/2024  STUDY: CT Chest, Abdomen, and Pelvis without IV Contrast; 8/25/2024 5:37 PM INDICATION: Lft elevation.  Concern for sepsis.  Mild right upper quadrant pain. COMPARISON: CXR 8/25/2024.  CTA chest 5/3/2024. ACCESSION NUMBER(S): [id number] ORDERING CLINICIAN: RUDI FAY TECHNIQUE: CT of the chest, abdomen, and pelvis was performed.  Contiguous axial images were obtained at 3 mm slice thickness through the chest, abdomen, and pelvis.  Coronal and sagittal reconstructions at 3 mm slice thickness were performed.  No intravenous contrast was administered.  FINDINGS: Please note that the evaluation of vessels, lymph nodes and organs is limited without intravenous contrast. CHEST: MEDIASTINUM: A 1.4 cm left thyroid nodule. Several mildly prominent mediastinal lymph nodes. The heart is mildly enlarged.  Extensive coronary artery calcification. Extensive systemic vascular calcification.  Thoracic aorta is at the upper limit of normal for size at the distal descending arch measuring 3.8 cm. LUNGS/PLEURA: A 6 cm subpleural left lower lobe nodule on image 143.  Septal thickening in the lung bases.  Next centrilobular and paraseptal emphysema. No pleural effusion or pneumothorax. ABDOMEN:  LIVER: Normal size and overall density.  Mildly nodular contour.  No evidence of focal mass.  BILE DUCTS: No intrahepatic or extrahepatic biliary ductal dilatation.   GALLBLADDER: The gallbladder contains several small stones.  No specific findings of acute cholecystitis. STOMACH: Thickening of the gastric antrum with surrounding inflammatory change.  Mild thickening of the adjacent duodenum and adjacent hepatic flexure.  PANCREAS: No masses or ductal dilatation.  SPLEEN: No splenomegaly or focal splenic lesion.  ADRENAL GLANDS: No thickening or nodules.  KIDNEYS AND URETERS: Several calcifications in both kidneys measure up to approximately 6 mm on the right and 5 mm on the left.  No hydronephrosis.  Unclear if these are vascular calcifications or urinary tract calculi.  Otherwise normal noncontrast appearance.  PELVIS:  BLADDER: No abnormalities identified.  REPRODUCTIVE ORGANS: Prostate gland is markedly enlarged at 7.6 x 6.5 x 7.2 cm.  Massive (15 x 9 x 9 cm) right hydrocele extending up into the right inguinal canal.  BOWEL: Thickened areas of small bowel and large bowel, particularly at the hepatic flexure of the colon and loops of jejunum in the left mid abdomen.  No obstruction.  Scattered diverticula.  VESSELS: Extensive systemic vascular calcification.  No abdominal aortic aneurysm. PERITONEUM/RETROPERITONEUM/LYMPH NODES: Trace amount of free fluid in the right upper quadrant and deep pelvis.  No free air. No lymphadenopathy.  ABDOMINAL WALL: No abnormalities identified. SOFT TISSUES: No abnormalities identified.  BONES: No acute fracture or aggressive osseous lesion.  Severe degenerative changes of the spine.    1.Scattered inflammatory change of the small bowel, the hepatic flexure of the colon and the adjacent gastric antrum concerning for nonspecific gastroenteritis/colitis.  Also considered would be mesenteric ischemia given the extensive amount of atherosclerotic calcification. 2.Trace amount of free fluid in the right upper quadrant deep pelvis is nonspecific and may be secondary to GI tract inflammation/infection.  Lack of oral contrast material limits  evaluation for perforation.  No evidence of free air. 3.Cholelithiasis without specific findings of acute cholecystitis. 4.Markedly enlarged prostate gland. 5.Massive right hydrocele extending up into the right inguinal canal. 6.A 6 mm subpleural left lower lobe pulmonary nodule nonspecific and may be postinfectious. 7.Septal thickening in the lung bases which may reflect mild pulmonary edema residual changes of prior pneumonia. 8.Several calcifications in both kidneys measure up to approximately 6 mm on the right and 5 mm on the left. Unclear if these are vascular calcifications or urinary tract calculi. No hydronephrosis. Signed by Judah Harley MD    XR chest 2 views    Result Date: 8/25/2024  STUDY: Chest Radiographs;  8/25/2024 4:11 PM INDICATION: Shortness of breath.  History of pneumonia and heart failure. COMPARISON: CXR 6/2/2024. ACCESSION NUMBER(S): [id number] ORDERING CLINICIAN: RUDI FAY TECHNIQUE:  Frontal and lateral chest. FINDINGS: CARDIOMEDIASTINAL SILHOUETTE: Cardiomediastinal silhouette is normal in size and configuration.  LUNGS: There is left basilar atelectasis. There is no focal consolidation.  ABDOMEN: No remarkable upper abdominal findings.  BONES: No acute osseous changes.    1. Left basilar atelectasis. 2. No acute cardiopulmonary findings. Signed by Ricardo Way MD    CT head wo IV contrast    Result Date: 8/25/2024  Interpreted By:  Eusebio Jesus, STUDY: CT HEAD WO IV CONTRAST;  8/25/2024 3:52 pm   INDICATION: Signs/Symptoms:assess for ICH, altered on eliquis.   COMPARISON: Comparison study is from 06/02/2024..   ACCESSION NUMBER(S): [id number]   ORDERING CLINICIAN: RUDI FAY   TECHNIQUE: Routine axial images were obtained from the skull base through the vertex.  Sagittal and coronal reconstruction images were generated. Brain, subdural, and bone windows were reviewed. N/A Unavailable   FINDINGS: INTRACRANIAL: Moderate prominence of ventricles and sulci. There is  mild-to-moderate patchy hypodensity throughout the deep periventricular white matter. Tiny old lacunar infarcts in the left basal ganglia and left thalamus. No acute intracranial bleed, midline shift, or focal mass effect. No destructive bone lesion. No depressed skull fracture. Skullbase arterial calcifications in the carotid siphons and vertebral arteries.   EXTRACRANIAL: Visualized paranasal sinuses were clear. Visualized mastoid air cells were clear.       No depressed skull fracture. No acute intracranial bleed or focal mass effect.   Tiny old lacunar infarcts in the left thalamus and left basal ganglia.   Moderate volume loss.   Mild-to-moderate chronic white matter ischemic disease in the deep periventricular regions.   MACRO: None   Signed by: Eusebio Jesus 8/25/2024 4:16 PM Dictation workstation:   [id number]    * Cannot find OR log *  Last relevant procedure:        This patient currently has cardiac telemetry ordered; if you would like to modify or discontinue the telemetry order, click here to go to the orders activity to modify/discontinue the order.                 Assessment/Plan   Assessment & Plan  Elevated LFTs    Apathy    Benign prostatic hyperplasia    Cardiomyopathy, ischemic    Mixed Alzheimer's and vascular dementia (Multi)    CHF (congestive heart failure) (Multi)    Chronic obstructive pulmonary disease (Multi)    Jam Cox is a 89 y.o. male with a past medical history of paroxysmal Atrial Fibrillation on Eliquis, systolic HF, last LVEF 30-35% (TTE 5/24), COPD, dementia, hyperlipidemia, hypertension presenting with c/o SOB and a cough for the last few days.  GI was consulted for elevated LFTs, ischemic bowel.        -Lactate downtrending 2.7  -Acute viral hepatic panel negative.  Will order Hep A IgM  -LFTs uptrending except bilirubin.  Continue trend LFTs daily  -Will order US liver with Doppler and vascular US mesenteric artery duplex to rule out Budd-Chiari and to assess mesenteric  blood flow  -Continue diet as tolerated  -GI will follow    Case d/w Dr. Annmarie Cabrera, APRN-CNP

## 2024-08-27 NOTE — DISCHARGE INSTRUCTIONS
Wound care recommendations: Please wear closed toe shoes     Scabbed trauma areas on toes  Cleanse with bath wipes or soap and water. Rinse well and pat dry.   Paint with betadine iodine, allow to dry   Complete daily and as needed     While in bed patient should only be on one fitted sheet, and one chux. Please, do not use brief while patient is resting in bed. Elevate heels off the bed surface at all times. Turn and reposition at least every 2 hours.

## 2024-08-28 ENCOUNTER — APPOINTMENT (OUTPATIENT)
Dept: VASCULAR MEDICINE | Facility: HOSPITAL | Age: 89
End: 2024-08-28
Payer: MEDICARE

## 2024-08-28 ENCOUNTER — APPOINTMENT (OUTPATIENT)
Dept: CARDIOLOGY | Facility: HOSPITAL | Age: 89
End: 2024-08-28
Payer: MEDICARE

## 2024-08-28 LAB
ALBUMIN SERPL BCP-MCNC: 3.2 G/DL (ref 3.4–5)
ALP SERPL-CCNC: 95 U/L (ref 33–136)
ALT SERPL W P-5'-P-CCNC: 333 U/L (ref 10–52)
ANION GAP SERPL CALC-SCNC: 17 MMOL/L (ref 10–20)
AST SERPL W P-5'-P-CCNC: 152 U/L (ref 9–39)
BILIRUB DIRECT SERPL-MCNC: 1.2 MG/DL (ref 0–0.3)
BILIRUB SERPL-MCNC: 4.3 MG/DL (ref 0–1.2)
BUN SERPL-MCNC: 33 MG/DL (ref 6–23)
CALCIUM SERPL-MCNC: 8.9 MG/DL (ref 8.6–10.3)
CHLORIDE SERPL-SCNC: 103 MMOL/L (ref 98–107)
CO2 SERPL-SCNC: 22 MMOL/L (ref 21–32)
CREAT SERPL-MCNC: 1.23 MG/DL (ref 0.5–1.3)
EGFRCR SERPLBLD CKD-EPI 2021: 55 ML/MIN/1.73M*2
EJECTION FRACTION APICAL 4 CHAMBER: 27.7
EJECTION FRACTION: 34 %
ERYTHROCYTE [DISTWIDTH] IN BLOOD BY AUTOMATED COUNT: 17.5 % (ref 11.5–14.5)
GLUCOSE BLD MANUAL STRIP-MCNC: 83 MG/DL (ref 74–99)
GLUCOSE SERPL-MCNC: 70 MG/DL (ref 74–99)
HCT VFR BLD AUTO: 43.5 % (ref 41–52)
HGB BLD-MCNC: 14.3 G/DL (ref 13.5–17.5)
MCH RBC QN AUTO: 29.2 PG (ref 26–34)
MCHC RBC AUTO-ENTMCNC: 32.9 G/DL (ref 32–36)
MCV RBC AUTO: 89 FL (ref 80–100)
NRBC BLD-RTO: 0 /100 WBCS (ref 0–0)
PLATELET # BLD AUTO: 163 X10*3/UL (ref 150–450)
POTASSIUM SERPL-SCNC: 4.4 MMOL/L (ref 3.5–5.3)
PROT SERPL-MCNC: 6.3 G/DL (ref 6.4–8.2)
RBC # BLD AUTO: 4.9 X10*6/UL (ref 4.5–5.9)
RIGHT VENTRICLE PEAK SYSTOLIC PRESSURE: 29.6 MMHG
SODIUM SERPL-SCNC: 138 MMOL/L (ref 136–145)
WBC # BLD AUTO: 6.3 X10*3/UL (ref 4.4–11.3)

## 2024-08-28 PROCEDURE — 2500000001 HC RX 250 WO HCPCS SELF ADMINISTERED DRUGS (ALT 637 FOR MEDICARE OP): Performed by: FAMILY MEDICINE

## 2024-08-28 PROCEDURE — 99232 SBSQ HOSP IP/OBS MODERATE 35: CPT | Performed by: INTERNAL MEDICINE

## 2024-08-28 PROCEDURE — 36415 COLL VENOUS BLD VENIPUNCTURE: CPT | Performed by: NURSE PRACTITIONER

## 2024-08-28 PROCEDURE — 93975 VASCULAR STUDY: CPT | Performed by: SURGERY

## 2024-08-28 PROCEDURE — 99222 1ST HOSP IP/OBS MODERATE 55: CPT | Performed by: SURGERY

## 2024-08-28 PROCEDURE — 82947 ASSAY GLUCOSE BLOOD QUANT: CPT

## 2024-08-28 PROCEDURE — 93308 TTE F-UP OR LMTD: CPT

## 2024-08-28 PROCEDURE — 2500000004 HC RX 250 GENERAL PHARMACY W/ HCPCS (ALT 636 FOR OP/ED): Performed by: NURSE PRACTITIONER

## 2024-08-28 PROCEDURE — 97161 PT EVAL LOW COMPLEX 20 MIN: CPT | Mod: GP

## 2024-08-28 PROCEDURE — 2500000002 HC RX 250 W HCPCS SELF ADMINISTERED DRUGS (ALT 637 FOR MEDICARE OP, ALT 636 FOR OP/ED): Performed by: FAMILY MEDICINE

## 2024-08-28 PROCEDURE — 99233 SBSQ HOSP IP/OBS HIGH 50: CPT | Performed by: NURSE PRACTITIONER

## 2024-08-28 PROCEDURE — 2500000002 HC RX 250 W HCPCS SELF ADMINISTERED DRUGS (ALT 637 FOR MEDICARE OP, ALT 636 FOR OP/ED): Performed by: PHARMACIST

## 2024-08-28 PROCEDURE — 82248 BILIRUBIN DIRECT: CPT | Performed by: NURSE PRACTITIONER

## 2024-08-28 PROCEDURE — 93975 VASCULAR STUDY: CPT

## 2024-08-28 PROCEDURE — 97166 OT EVAL MOD COMPLEX 45 MIN: CPT | Mod: GO

## 2024-08-28 PROCEDURE — 80053 COMPREHEN METABOLIC PANEL: CPT | Performed by: INTERNAL MEDICINE

## 2024-08-28 PROCEDURE — 94640 AIRWAY INHALATION TREATMENT: CPT

## 2024-08-28 PROCEDURE — 83735 ASSAY OF MAGNESIUM: CPT | Performed by: NURSE PRACTITIONER

## 2024-08-28 PROCEDURE — 85027 COMPLETE CBC AUTOMATED: CPT | Performed by: NURSE PRACTITIONER

## 2024-08-28 PROCEDURE — 93308 TTE F-UP OR LMTD: CPT | Performed by: INTERNAL MEDICINE

## 2024-08-28 PROCEDURE — 2060000001 HC INTERMEDIATE ICU ROOM DAILY

## 2024-08-28 PROCEDURE — 99232 SBSQ HOSP IP/OBS MODERATE 35: CPT

## 2024-08-28 RX ORDER — IPRATROPIUM BROMIDE AND ALBUTEROL SULFATE 2.5; .5 MG/3ML; MG/3ML
3 SOLUTION RESPIRATORY (INHALATION) EVERY 2 HOUR PRN
Status: DISCONTINUED | OUTPATIENT
Start: 2024-08-28 | End: 2024-08-30 | Stop reason: HOSPADM

## 2024-08-28 RX ORDER — FUROSEMIDE 10 MG/ML
40 INJECTION INTRAMUSCULAR; INTRAVENOUS ONCE
Status: DISCONTINUED | OUTPATIENT
Start: 2024-08-28 | End: 2024-08-29 | Stop reason: SDUPTHER

## 2024-08-28 ASSESSMENT — COGNITIVE AND FUNCTIONAL STATUS - GENERAL
MOVING TO AND FROM BED TO CHAIR: A LOT
HELP NEEDED FOR BATHING: A LOT
MOVING FROM LYING ON BACK TO SITTING ON SIDE OF FLAT BED WITH BEDRAILS: A LITTLE
CLIMB 3 TO 5 STEPS WITH RAILING: A LOT
WALKING IN HOSPITAL ROOM: A LOT
MOVING FROM LYING ON BACK TO SITTING ON SIDE OF FLAT BED WITH BEDRAILS: A LITTLE
EATING MEALS: A LITTLE
MOVING TO AND FROM BED TO CHAIR: A LITTLE
TOILETING: A LOT
MOBILITY SCORE: 17
WALKING IN HOSPITAL ROOM: A LOT
DRESSING REGULAR UPPER BODY CLOTHING: A LITTLE
EATING MEALS: A LOT
PERSONAL GROOMING: A LOT
CLIMB 3 TO 5 STEPS WITH RAILING: TOTAL
HELP NEEDED FOR BATHING: A LOT
STANDING UP FROM CHAIR USING ARMS: A LITTLE
DRESSING REGULAR UPPER BODY CLOTHING: A LOT
MOBILITY SCORE: 13
STANDING UP FROM CHAIR USING ARMS: A LITTLE
DRESSING REGULAR LOWER BODY CLOTHING: A LOT
EATING MEALS: A LOT
TOILETING: TOTAL
DAILY ACTIVITIY SCORE: 12
TURNING FROM BACK TO SIDE WHILE IN FLAT BAD: A LITTLE
WALKING IN HOSPITAL ROOM: A LOT
TOILETING: A LOT
DRESSING REGULAR UPPER BODY CLOTHING: A LOT
DRESSING REGULAR LOWER BODY CLOTHING: TOTAL
DAILY ACTIVITIY SCORE: 10
TURNING FROM BACK TO SIDE WHILE IN FLAT BAD: A LITTLE
STANDING UP FROM CHAIR USING ARMS: A LITTLE
PERSONAL GROOMING: A LOT
PERSONAL GROOMING: A LOT
DAILY ACTIVITIY SCORE: 14
TURNING FROM BACK TO SIDE WHILE IN FLAT BAD: A LOT
HELP NEEDED FOR BATHING: A LOT
MOBILITY SCORE: 16
DRESSING REGULAR LOWER BODY CLOTHING: A LOT
MOVING TO AND FROM BED TO CHAIR: A LITTLE
CLIMB 3 TO 5 STEPS WITH RAILING: A LOT

## 2024-08-28 ASSESSMENT — PAIN SCALES - GENERAL
PAINLEVEL_OUTOF10: 0 - NO PAIN

## 2024-08-28 ASSESSMENT — PAIN - FUNCTIONAL ASSESSMENT
PAIN_FUNCTIONAL_ASSESSMENT: 0-10

## 2024-08-28 ASSESSMENT — ENCOUNTER SYMPTOMS
CONSTIPATION: 1
CONFUSION: 1
FREQUENCY: 1

## 2024-08-28 ASSESSMENT — ACTIVITIES OF DAILY LIVING (ADL)
ADL_ASSISTANCE: INDEPENDENT
BATHING_ASSISTANCE: MAXIMAL
ADL_ASSISTANCE: INDEPENDENT

## 2024-08-28 NOTE — PROGRESS NOTES
"Jam Cox is a 89 y.o. male on day 3 of admission presenting with Elevated LFTs.    Subjective   No acute events overnight.  Spouse and 2 daughters at bedside.  Discussed the results of vascular US and liver US with Doppler.  Patient is tolerating diet.  He had a bowel movement today.  Denies nausea, vomiting, and diarrhea.       Objective     Physical Exam  Constitutional:       Appearance: He is toxic-appearing.   HENT:      Head: Normocephalic and atraumatic.   Cardiovascular:      Rate and Rhythm: Rhythm irregularly irregular.   Pulmonary:      Effort: Pulmonary effort is normal.      Breath sounds: Normal breath sounds.   Abdominal:      General: Abdomen is flat. Bowel sounds are normal. There is no distension.      Palpations: Abdomen is soft. There is no mass.      Tenderness: There is no abdominal tenderness. There is no guarding or rebound.      Hernia: No hernia is present.   Musculoskeletal:         General: Normal range of motion.      Cervical back: Neck supple.   Skin:     General: Skin is warm and dry.   Neurological:      Mental Status: He is alert. Mental status is at baseline. He is confused.   Psychiatric:         Mood and Affect: Mood normal.         Behavior: Behavior normal.         Last Recorded Vitals  Blood pressure 120/50, pulse 75, temperature 36.8 °C (98.2 °F), resp. rate 17, height 1.778 m (5' 10\"), weight 65.4 kg (144 lb 1.6 oz), SpO2 93%.  Intake/Output last 3 Shifts:  I/O last 3 completed shifts:  In: - (0 mL/kg)   Out: 500 (7.6 mL/kg) [Urine:500 (0.2 mL/kg/hr)]  Weight: 65.4 kg     Relevant Results      Scheduled medications  apixaban, 2.5 mg, oral, BID  [Held by provider] atorvastatin, 10 mg, oral, Daily  budesonide, 0.5 mg, nebulization, BID  cholecalciferol, 2,000 Units, oral, Daily  donepezil, 10 mg, oral, Daily  [Held by provider] empagliflozin, 10 mg, oral, Daily  finasteride, 5 mg, oral, Daily  formoterol, 20 mcg, nebulization, BID  furosemide, 40 mg, intravenous, " Once  melatonin, 3 mg, oral, Nightly  memantine, 10 mg, oral, Daily  metoprolol succinate XL, 25 mg, oral, Daily  OLANZapine, 2.5 mg, oral, Nightly  pantoprazole, 40 mg, oral, Daily before breakfast  polyethylene glycol, 17 g, oral, Daily  [Held by provider] sacubitriL-valsartan, 0.5 tablet, oral, BID  [Held by provider] spironolactone, 12.5 mg, oral, Daily  tamsulosin, 0.4 mg, oral, Daily      Continuous medications     PRN medications  PRN medications: acetaminophen **OR** acetaminophen, guaiFENesin, ipratropium-albuteroL, metoprolol   Results for orders placed or performed during the hospital encounter of 08/25/24 (from the past 24 hour(s))   CBC   Result Value Ref Range    WBC 6.3 4.4 - 11.3 x10*3/uL    nRBC 0.0 0.0 - 0.0 /100 WBCs    RBC 4.90 4.50 - 5.90 x10*6/uL    Hemoglobin 14.3 13.5 - 17.5 g/dL    Hematocrit 43.5 41.0 - 52.0 %    MCV 89 80 - 100 fL    MCH 29.2 26.0 - 34.0 pg    MCHC 32.9 32.0 - 36.0 g/dL    RDW 17.5 (H) 11.5 - 14.5 %    Platelets 163 150 - 450 x10*3/uL   Comprehensive Metabolic Panel   Result Value Ref Range    Glucose 70 (L) 74 - 99 mg/dL    Sodium 138 136 - 145 mmol/L    Potassium 4.4 3.5 - 5.3 mmol/L    Chloride 103 98 - 107 mmol/L    Bicarbonate 22 21 - 32 mmol/L    Anion Gap 17 10 - 20 mmol/L    Urea Nitrogen 33 (H) 6 - 23 mg/dL    Creatinine 1.23 0.50 - 1.30 mg/dL    eGFR 55 (L) >60 mL/min/1.73m*2    Calcium 8.9 8.6 - 10.3 mg/dL    Albumin 3.2 (L) 3.4 - 5.0 g/dL    Alkaline Phosphatase 95 33 - 136 U/L    Total Protein 6.3 (L) 6.4 - 8.2 g/dL     (H) 9 - 39 U/L    Bilirubin, Total 4.3 (H) 0.0 - 1.2 mg/dL     (H) 10 - 52 U/L   Bilirubin, Direct   Result Value Ref Range    Bilirubin, Direct 1.2 (H) 0.0 - 0.3 mg/dL   POCT GLUCOSE   Result Value Ref Range    POCT Glucose 83 74 - 99 mg/dL      Vascular US Mesenteric Artery Duplex Complete    Result Date: 8/28/2024             [place] [address]   Tel [phone] and Fax [phone]   Vascular Lab Report [facility] US MESENTERIC ARTERY DUPLEX COMPLETE  Patient Name:      OSMANY SILVERIO       Reading Physician: [id number] Cavazos MD Study Date:        8/28/2024            Ordering           [id number] JYOTI                                         Physician:         MAREK MRN/PID:           [id number]             Technologist:      Maribell Ford RVT Accession#:        [id number]         Technologist 2: Date of Birth/Age: 11/13/1930 / 89      Encounter#:        [id number]                    years Gender:            M Admission Status:  Inpatient            Location           [facility]                                         Performed:  Diagnosis/ICD: Chronic mesenteric ischemia-K55.1 CPT Codes:     97063 Mesenteric Duplex scan  CONCLUSIONS: Mesenteric: The celiac, hepatic, splenic and SMA appear widely patent with no evidence of stenosis. The patient was NPO for this study.  Additional Findings: Technically difficult study due to abdominal bowel gas.  Imaging & Doppler Findings:  AORTA    AP Distal 1.50 cm  Aorta PSV         42 cm/s Celiac Origin  cm/s Celiac Prox PSV   213 cm/s Celiac Mid PSV    170 cm/s Celiac Dist PSV   149 cm/s SMA Origin PSV    152 cm/s SMA Prox PSV      222 cm/s SMA Mid PSV       142 cm/s SMA Dist PSV      82 cm/s PREETI PSV           259 cm/s PREETI Prox PSV      122 cm/s Hepatic PSV       98 cm/s Splenic PSV       100 cm/s   [id number] Joshua Cavazos MD Electronically signed by [id number] Cavazos MD on 8/28/2024 at 12:14:06 PM  ** Final **     US liver with doppler    Result Date: 8/27/2024  Interpreted By:  Keena Mancini, STUDY: US LIVER WITH DOPPLER;  8/27/2024 4:30 pm   INDICATION: 89 y/o   M with  Signs/Symptoms: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   COMPARISON: None.   ACCESSION NUMBER(S): [id number]   ORDERING CLINICIAN: JYOTI JARA   TECHNIQUE: Sonographic images of the abdominal viscera are were obtained.   FINDINGS: LIVER:   Right lobe measures 14.2 cm  in craniocaudal length. Echogenicity within normal limits. No focal lesion demonstrated.   The main portal vein measures 13 mm in diameter. On Doppler evaluation, intact hepatopetal flow in the main, left and right portal veins. Intact flow in the main, right and left hepatic arteries. Intact phasic flow in the left, middle and right hepatic veins. Retro hepatic IVC is patent.   BILE DUCTS:   Nondilated. Common duct measures  4 mm.   GALLBLADDER:   Normally distended. Contains shadowing echogenic stones as well as nonshadowing sludge. Mild low-density wall thickening and/or pericholecystic fluid. No overlying tenderness.   PANCREAS:  Partially obscured by overlying bowel gas.  Visualized portions are unremarkable.   SPLEEN:   Normal in size, measuring 9.4 cm in length. No focal lesion demonstrated. Hepatopetal flow in the distal splenic vein posterior to the pancreas.   RIGHT KIDNEY:   Normal in size. No hydronephrosis.   ABDOMINAL AORTA AND IVC:   Visualized portions are normal in caliber.   PERITONEAL FLUID:   Small to moderate volume ascites.   On brief evaluation of the bladder, enlarged heterogenous prostate measures 7.9 cm transversely and indents its base.       Patent hepatic vasculature. No sonographically detectable liver lesion. Small to moderate volume ascites.   Stones and sludge in the gallbladder.  Possible gallbladder wall thickening versus pericholecystic fluid, nonspecific in the setting of ascites.   Enlarged heterogenous prostate gland indenting the bladder incidentally noted.   MACRO: None.   Signed by: Keena Mancini 8/27/2024 4:47 PM Dictation workstation:   [id number]    XR toe left 2+ views    Result Date: 8/27/2024  Interpreted By:  Eusebio Jesus, STUDY: XR TOE LEFT 2+ VIEWS;  8/27/2024 12:27 pm   INDICATION: Signs/Symptoms:L 3rd toe ulcer/pain.   COMPARISON: None.   ACCESSION NUMBER(S): [id number]   ORDERING CLINICIAN: GEORGE PINO   TECHNIQUE: 3 views  of the  left 3rd were obtained.    FINDINGS: Mild soft tissue swelling of the 3rd toe. No soft tissue gas density. No significant osteophytic change. No lytic or blastic destructive bone lesion. No acute fracture or dislocation. No opaque soft tissue foreign body. No periosteal reaction or erosion.       Mild 3rd toe soft tissue swelling. No gas density or bone destruction.   MACRO: None   Signed by: Eusebio Jesus 8/27/2024 12:40 PM Dictation workstation:   [id number]    Electrocardiogram, 12-lead PRN ACS symptoms    Result Date: 8/26/2024  Atrial fibrillation with rapid ventricular response Right superior axis deviation Nonspecific intraventricular block Cannot rule out Anterior infarct , age undetermined Abnormal ECG When compared with ECG of 06-MAY-2024 05:15, Previous ECG has undetermined rhythm, needs review Minimal criteria for Anterior infarct are now Present See ED provider note for full interpretation and clinical correlation Confirmed by Tanya Weiss ([id number]) on 8/26/2024 9:42:10 PM    CT chest abdomen pelvis wo IV contrast    Result Date: 8/25/2024  STUDY: CT Chest, Abdomen, and Pelvis without IV Contrast; 8/25/2024 5:37 PM INDICATION: Lft elevation.  Concern for sepsis.  Mild right upper quadrant pain. COMPARISON: CXR 8/25/2024.  CTA chest 5/3/2024. ACCESSION NUMBER(S): [id number] ORDERING CLINICIAN: RUDI FAY TECHNIQUE: CT of the chest, abdomen, and pelvis was performed.  Contiguous axial images were obtained at 3 mm slice thickness through the chest, abdomen, and pelvis.  Coronal and sagittal reconstructions at 3 mm slice thickness were performed.  No intravenous contrast was administered.  FINDINGS: Please note that the evaluation of vessels, lymph nodes and organs is limited without intravenous contrast. CHEST: MEDIASTINUM: A 1.4 cm left thyroid nodule. Several mildly prominent mediastinal lymph nodes. The heart is mildly enlarged.  Extensive coronary artery calcification. Extensive systemic vascular calcification.   Thoracic aorta is at the upper limit of normal for size at the distal descending arch measuring 3.8 cm. LUNGS/PLEURA: A 6 cm subpleural left lower lobe nodule on image 143.  Septal thickening in the lung bases.  Next centrilobular and paraseptal emphysema. No pleural effusion or pneumothorax. ABDOMEN:  LIVER: Normal size and overall density.  Mildly nodular contour.  No evidence of focal mass.  BILE DUCTS: No intrahepatic or extrahepatic biliary ductal dilatation.  GALLBLADDER: The gallbladder contains several small stones.  No specific findings of acute cholecystitis. STOMACH: Thickening of the gastric antrum with surrounding inflammatory change.  Mild thickening of the adjacent duodenum and adjacent hepatic flexure.  PANCREAS: No masses or ductal dilatation.  SPLEEN: No splenomegaly or focal splenic lesion.  ADRENAL GLANDS: No thickening or nodules.  KIDNEYS AND URETERS: Several calcifications in both kidneys measure up to approximately 6 mm on the right and 5 mm on the left.  No hydronephrosis.  Unclear if these are vascular calcifications or urinary tract calculi.  Otherwise normal noncontrast appearance.  PELVIS:  BLADDER: No abnormalities identified.  REPRODUCTIVE ORGANS: Prostate gland is markedly enlarged at 7.6 x 6.5 x 7.2 cm.  Massive (15 x 9 x 9 cm) right hydrocele extending up into the right inguinal canal.  BOWEL: Thickened areas of small bowel and large bowel, particularly at the hepatic flexure of the colon and loops of jejunum in the left mid abdomen.  No obstruction.  Scattered diverticula.  VESSELS: Extensive systemic vascular calcification.  No abdominal aortic aneurysm. PERITONEUM/RETROPERITONEUM/LYMPH NODES: Trace amount of free fluid in the right upper quadrant and deep pelvis.  No free air. No lymphadenopathy.  ABDOMINAL WALL: No abnormalities identified. SOFT TISSUES: No abnormalities identified.  BONES: No acute fracture or aggressive osseous lesion.  Severe degenerative changes of the  spine.    1.Scattered inflammatory change of the small bowel, the hepatic flexure of the colon and the adjacent gastric antrum concerning for nonspecific gastroenteritis/colitis.  Also considered would be mesenteric ischemia given the extensive amount of atherosclerotic calcification. 2.Trace amount of free fluid in the right upper quadrant deep pelvis is nonspecific and may be secondary to GI tract inflammation/infection.  Lack of oral contrast material limits evaluation for perforation.  No evidence of free air. 3.Cholelithiasis without specific findings of acute cholecystitis. 4.Markedly enlarged prostate gland. 5.Massive right hydrocele extending up into the right inguinal canal. 6.A 6 mm subpleural left lower lobe pulmonary nodule nonspecific and may be postinfectious. 7.Septal thickening in the lung bases which may reflect mild pulmonary edema residual changes of prior pneumonia. 8.Several calcifications in both kidneys measure up to approximately 6 mm on the right and 5 mm on the left. Unclear if these are vascular calcifications or urinary tract calculi. No hydronephrosis. Signed by Judah Harley MD    XR chest 2 views    Result Date: 8/25/2024  STUDY: Chest Radiographs;  8/25/2024 4:11 PM INDICATION: Shortness of breath.  History of pneumonia and heart failure. COMPARISON: CXR 6/2/2024. ACCESSION NUMBER(S): [id number] ORDERING CLINICIAN: RUDI FAY TECHNIQUE:  Frontal and lateral chest. FINDINGS: CARDIOMEDIASTINAL SILHOUETTE: Cardiomediastinal silhouette is normal in size and configuration.  LUNGS: There is left basilar atelectasis. There is no focal consolidation.  ABDOMEN: No remarkable upper abdominal findings.  BONES: No acute osseous changes.    1. Left basilar atelectasis. 2. No acute cardiopulmonary findings. Signed by Ricardo Way MD    CT head wo IV contrast    Result Date: 8/25/2024  Interpreted By:  Eusebio Jesus, STUDY: CT HEAD WO IV CONTRAST;  8/25/2024 3:52 pm   INDICATION:  Signs/Symptoms:assess for ICH, altered on eliquis.   COMPARISON: Comparison study is from 06/02/2024..   ACCESSION NUMBER(S): [id number]   ORDERING CLINICIAN: RUDI FAY   TECHNIQUE: Routine axial images were obtained from the skull base through the vertex.  Sagittal and coronal reconstruction images were generated. Brain, subdural, and bone windows were reviewed. N/A Unavailable   FINDINGS: INTRACRANIAL: Moderate prominence of ventricles and sulci. There is mild-to-moderate patchy hypodensity throughout the deep periventricular white matter. Tiny old lacunar infarcts in the left basal ganglia and left thalamus. No acute intracranial bleed, midline shift, or focal mass effect. No destructive bone lesion. No depressed skull fracture. Skullbase arterial calcifications in the carotid siphons and vertebral arteries.   EXTRACRANIAL: Visualized paranasal sinuses were clear. Visualized mastoid air cells were clear.       No depressed skull fracture. No acute intracranial bleed or focal mass effect.   Tiny old lacunar infarcts in the left thalamus and left basal ganglia.   Moderate volume loss.   Mild-to-moderate chronic white matter ischemic disease in the deep periventricular regions.   MACRO: None   Signed by: Eusebio Jesus 8/25/2024 4:16 PM Dictation workstation:   [id number]    * Cannot find OR log *  Last relevant procedure:        This patient currently has cardiac telemetry ordered; if you would like to modify or discontinue the telemetry order, click here to go to the orders activity to modify/discontinue the order.                 Assessment/Plan   Assessment & Plan  Elevated LFTs    Apathy    Benign prostatic hyperplasia    Cardiomyopathy, ischemic    Mixed Alzheimer's and vascular dementia (Multi)    CHF (congestive heart failure) (Multi)    Chronic obstructive pulmonary disease (Multi)       Jam MATIAS Brooke is a 89 y.o. male with a past medical history of paroxysmal Atrial Fibrillation on Eliquis,  systolic HF, last LVEF 30-35% (TTE 5/24), COPD, dementia, hyperlipidemia, hypertension presenting with c/o SOB and a cough for the last few days.  GI was consulted for elevated LFTs, ischemic bowel.   Acute viral hepatic negative. LFTs trending down today. US liver with Doppler showed small to moderate volume ascites.  Stones and sludge in the gallbladder.  Possible gallbladder thickening versus pericholecystic fluid.  US mesenteric artery duplex showed the celiac, hepatic, splenic and SMA appear widely patent with no evidence of stenosis.  Etiologies include elevated LFTs due to congestion and cholelithiasis.    -Acute surgery following for cholelithiasis  -Cardiology following for congestive heart failure and fluid overload.  Continue diuresis per cardiology  - Continue to trend LFTs daily  -Continue diet as tolerated  -GI will follow     Case d/w Dr. Annmarie Cabrera, APRN-CNP

## 2024-08-28 NOTE — CARE PLAN
Problem: Pain - Adult  Goal: Verbalizes/displays adequate comfort level or baseline comfort level  Outcome: Progressing     Problem: Safety - Adult  Goal: Free from fall injury  Outcome: Progressing     Problem: Chronic Conditions and Co-morbidities  Goal: Patient's chronic conditions and co-morbidity symptoms are monitored and maintained or improved  Outcome: Progressing     Problem: Skin  Goal: Prevent/manage excess moisture  Outcome: Progressing  Goal: Prevent/minimize sheer/friction injuries  Outcome: Progressing     Problem: Heart Failure  Goal: Reduction in peripheral edema within 24 hours  Outcome: Progressing  Goal: Report improvement of dyspnea/breathlessness this shift  Outcome: Progressing    The clinical goals for the shift include Patient will be safe and comfortable throughout shift

## 2024-08-28 NOTE — PROGRESS NOTES
08/28/24 1449   Discharge Planning   Expected Discharge Disposition SNF         Revisited patient and family at bedside to discuss discharge plan. Explained to daughters about PT/OT rec for mod and SNF placement. They agreed to look at the list. List printed from [facility] according to insurance and geographic location and given to family for reviewing. Will follow on patient's choices.

## 2024-08-28 NOTE — PROGRESS NOTES
Occupational Therapy    Occupational Therapy Evaluation    Name: Jam Cox  MRN: [id number]  : 1930  Date: 24    Time Entry:                            Assessment:  OT Assessment  OT Assessment Results: Decreased ADL status, Decreased cognition, Decreased endurance, Decreased functional mobility  Prognosis: Fair  Barriers to Discharge: None  Evaluation/Treatment Tolerance: Patient limited by fatigue  Medical Staff Made Aware: Yes  Strengths: Support of extended family/friends, Support and attitude of living partners  Barriers to Participation: Comorbidities  Plan:  Outpatient Plan  Treatment Interventions: ADL retraining, Functional transfer training, Endurance training, Cognitive reorientation    Subjective   Current Problem:  1. Elevated LFTs        2. Acute kidney injury (CMS-HCC)        3. Dyspnea, unspecified type        4. Acute on chronic congestive heart failure, unspecified heart failure type (Multi)        5. Acute on chronic systolic heart failure (Multi)  Transthoracic Echo (TTE) Limited    Transthoracic Echo (TTE) Limited      6. Abnormal LFTs (liver function tests)  US liver with doppler    US liver with doppler    CANCELED: Vascular US abdomen/pelvis duplex complete    CANCELED: Vascular US abdomen/pelvis duplex complete      7. Mesenteric ischemia, chronic (Multi)  US liver with doppler    US liver with doppler    CANCELED: Vascular US abdomen/pelvis duplex complete    CANCELED: Vascular US abdomen/pelvis duplex complete      8. Mesenteric ischemia (Multi)  Vascular US Mesenteric Artery Duplex Complete    Vascular US Mesenteric Artery Duplex Complete      9. Mesenteric vascular insufficiency, chronic (Multi)  Vascular US Mesenteric Artery Duplex Complete    Vascular US Mesenteric Artery Duplex Complete        General:   OT Received On: 24  General  Reason for Referral: SOB  Referred By: CHRISTINA Cooley MD  Past Medical History Relevant to Rehab:   Past Medical History:   Diagnosis  Date    Abnormality of plasma protein, unspecified 05/01/2017    Elevated blood protein    Benign prostatic hyperplasia without lower urinary tract symptoms 05/01/2017    BPH with elevated PSA    Disorder of prostate, unspecified 05/01/2017    Prostate disorder    Elevated prostate specific antigen (PSA) 05/01/2017    Elevated prostate specific antigen (PSA)    Elevated prostate specific antigen (PSA) 05/01/2017    Abnormal prostate specific antigen    Elevated prostate specific antigen (PSA) 05/01/2017    Abnormal PSA    Elevated prostate specific antigen (PSA) 05/01/2017    Abnormal prostate specific antigen test    Epistaxis 05/01/2017    Epistaxis, recurrent    Essential (primary) hypertension 12/19/2022    Hypertension    Frequency of micturition 05/01/2017    Urinary frequency    Hallucinations, unspecified 05/01/2017    Hallucinations    Headache, unspecified 05/01/2017    Bilateral headache    Hematuria, unspecified 05/01/2017    Hematuria    Impacted cerumen 05/23/2024    Ischemic cardiomyopathy 12/13/2021    Cardiomyopathy, ischemic    Nasal discharge 05/23/2024    Nasal mucosa dry 05/23/2024    Other amnesia 05/01/2017    Memory loss    Other microscopic hematuria 05/01/2017    Hematuria, microscopic    Other seasonal allergic rhinitis 05/01/2017    Seasonal allergies    Other secondary cataract, right eye 05/31/2018    Posterior capsular opacification visually significant of right eye    Other specified disorders of the male genital organs 05/01/2017    Scrotal swelling    Personal history of other specified conditions 02/10/2020    History of epistaxis    Presence of intraocular lens 05/01/2017    Pseudophakia    Primary insomnia 05/01/2017    Primary insomnia    Primary open-angle glaucoma, unspecified eye, stage unspecified 05/01/2017    Open angle primary glaucoma    Unspecified atrial flutter (Multi) 05/01/2017    Atrial flutter    Unspecified glaucoma 05/01/2017    Glaucoma    Vitamin D deficiency,  unspecified 05/01/2017    Vitamin D deficiency       Missed Visit: Yes  Missed Visit Reason: Patient refused, Other (Comment) (Atte,pted to see pt for therapy evals. Wife present. Per nursing and spouse pt has not slept in several days and is now sleeping. Wife provided home set up and pt baseline. Pt sleeping soundly unable to be aroused at this time)  Family/Caregiver Present: Yes (Family nd sitter present)  Co-Treatment: PT  Co-Treatment Reason: to maximixe pt mobility, safety and participation  Prior to Session Communication: Bedside nurse  Patient Position Received: Bed, 3 rail up, Alarm on  Preferred Learning Style: auditory, kinesthetic, verbal, visual  General Comment: agreeable to therapy evals  Precautions:  Precautions  Medical Precautions: Fall precautions  Pain Assessment:  Pain Assessment  Pain Assessment: 0-10  0-10 (Numeric) Pain Score: 0 - No pain    Objective   Cognition:  Cognition  Overall Cognitive Status: Impaired  Orientation Level: Disoriented to place, Disoriented to time  Following Commands:  (Follows ~50% of commands)  Memory: Exceptions to WFL  Long-Term Memory: Impaired  Short-Term Memory: Impaired  Insight: Mild  Impulsive: Mildly     Home Living:  Home Living  Type of Home: House  Lives With: Spouse, Adult children  Home Adaptive Equipment:  (rollator)  Home Layout: Two level, Bed/bath upstairs  Home Access: Stairs to enter with rails  Entrance Stairs-Rails: Both  Entrance Stairs-Number of Steps: 4  Bathroom Shower/Tub: Walk-in shower  Bathroom Toilet: Handicapped height, Adaptive toilet seating  Bathroom Equipment: Grab bars in shower  Prior Function Per Pt/Caregiver Report:  Prior Function Per Pt/Caregiver Report  Level of [place]: Independent with ADLs and functional transfers  Receives Help From: Family  ADL Assistance: Independent  Homemaking Assistance: Needs assistance  Ambulatory Assistance: Independent  Hand Dominance: Right  Prior Function Comments: 1 fall in the past 6  month. Pt is a poor historian, family assists with providing history.  IADL History:  Homemaking Responsibilities: No  Current License: No  Mode of Transportation: Car, Family  Occupation: Retired  ADL:  Eating Assistance: Maximal  Eating Deficit: Setup, Increased time to complete, Scoop assist  Grooming Assistance: Maximal  Bathing Assistance: Maximal  Bathing Deficit: Left lower leg including foot, Right lower leg including foot, Buttocks, Perineal area  UE Dressing Assistance: Maximal  UE Dressing Deficit: Thread RUE, Thread LUE, Pull around back, Pull down in back, Pull over head  LE Dressing Assistance: Total  LE Dressing Deficit: Don/doff R sock, Don/doff L sock, Thread RLE into underwear, Thread LLE into underwear, Pull up over hips  Toileting Assistance with Device: Total  Activity Tolerance:  Activity Tolerance  Endurance: Tolerates less than 10 min exercise with changes in vital signs  Activity Tolerance Comments: fatigue with activity  Mobility/Transfer: Bed Mobility  Bed Mobility: Yes  Bed Mobility 1  Bed Mobility 1: Supine to sitting  Level of Assistance 1: Moderate assistance, Moderate verbal cues, Moderate tactile cues    Transfers  Transfer: Yes  Transfer 1  Technique 1: Sit to stand, Stand to sit  Transfer Device 1: Walker  Transfer Level of Assistance 1: Minimum assistance, Moderate verbal cues, Minimal tactile cues  Trials/Comments 1: VCs for hand placement      Functional Mobility:  Functional Mobility  Functional Mobility Performed: Yes  Functional Mobility 1  Surface 1: Level tile  Device 1: Rolling walker  Assistance 1: Moderate assistance, Moderate verbal cues, Moderate tactile cues  Quality of Functional Mobility 1: Narrow base of support  Comments 1: VCs for ahand and walker placement  Sitting Balance:  Static Sitting Balance  Static Sitting-Balance Support: Feet supported  Static Sitting-Level of Assistance: Contact guard  Dynamic Sitting Balance  Dynamic Sitting-Balance Support: Feet  supported  Dynamic Sitting-Level of Assistance: Contact guard  Dynamic Sitting-Balance: Reaching for objects  Standing Balance:  Static Standing Balance  Static Standing-Balance Support: Bilateral upper extremity supported  Static Standing-Level of Assistance: Minimum assistance  Dynamic Standing Balance  Dynamic Standing-Balance Support: Bilateral upper extremity supported  Dynamic Standing-Level of Assistance: Moderate assistance  Dynamic Standing-Balance: Reaching for objects  Vision:Vision - Basic Assessment  Current Vision: No visual deficits  Sensation:  Light Touch: No apparent deficits  Sharp/Dull: No apparent deficits  Sensation Comment: intact  Strength:  Strength Comments: B UE srtength diatall WFL, shld NT  Coordination:  Movements are Fluid and Coordinated: Yes  Finger to Nose: Intact  Coordination Comment: appears intact  Hand Function:  Gross Grasp: Functional  Coordination: Functional  Extremities: RUE   RUE : Exceptions to WFL (pt not following commands for shld ROM) and LUE   LUE: Exceptions to WFL (pt not following commands for shld ROM)    Outcome Measures:  [facility] Daily Activity  Putting on and taking off regular lower body clothing: Total  Bathing (including washing, rinsing, drying): A lot  Putting on and taking off regular upper body clothing: A lot  Toileting, which includes using toilet, bedpan or urinal: Total  Taking care of personal grooming such as brushing teeth: A lot  Eating Meals: A lot  Daily Activity - Total Score: 10        OP EDUCATION:  Education  Individual(s) Educated: Patient (family)  Education Provided: Fall precautons, Risk and benefits of OT discussed with patient or other, POC discussed and agreed upon  Risk and Benefits Discussed with Patient/Caregiver/Other: yes  Patient/Caregiver Demonstrated Understanding: yes  Plan of Care Discussed and Agreed Upon: yes  Patient Response to Education: Patient/Caregiver Verbalized Understanding of Information, Patient/Caregiver  Performed Return Demonstration of Exercises/Activities, Patient/Caregiver Asked Appropriate Questions    Goals:  Encounter Problems       Encounter Problems (Active)       ADLs       Patient will perform UB and LB bathing 25% with moderate assist level of assistance and adaptive equipment prn . (Progressing)       Start:  08/28/24    Expected End:  09/11/24            Patient with complete upper body dressing with minimal assist  level of assistance donning and doffing all UE clothes with no adaptive equipment while supported sitting (Progressing)       Start:  08/28/24    Expected End:  09/11/24            Patient with complete lower body dressing with moderate assist level of assistance donning and doffing all LE clothes  with PRN adaptive equipment while supported sitting (Progressing)       Start:  08/28/24    Expected End:  09/11/24            Patient will feed self with set-up level of assistance and verbal cues, manual cues, tactile cues, and visual cues using PRN adaptive equipment. (Progressing)       Start:  08/28/24    Expected End:  09/11/24               COGNITION/SAFETY       Patient will follow 75%% Simple commands to allow improved ADL performance. (Progressing)       Start:  08/28/24    Expected End:  09/11/24            Patient will demonstrated orientation x 3 with verbal cues, visual cues, and auditory cues. (Progressing)       Start:  08/28/24    Expected End:  09/11/24       ORIENTATION            TRANSFERS       Patient will perform bed mobility minimal assist  level of assistance and bed rails and draw sheet in order to improve safety and independence with mobility (Progressing)       Start:  08/28/24    Expected End:  09/11/24            Patient will complete functional transfer to chair with front wheeled walker with minimal assist  level of assistance. (Progressing)       Start:  08/28/24    Expected End:  09/11/24

## 2024-08-28 NOTE — PROGRESS NOTES
Jam Cox is a 89 y.o. male     More alert today  No shortness of breath nausea vomiting or abdominal pain  Transaminitis shows some improvement      Review of Systems           Vitals:    08/28/24 1141   BP: 120/50   Pulse: 75   Resp: 17   Temp: 37.1 °C (98.8 °F)   SpO2: 93%        Scheduled medications  apixaban, 2.5 mg, oral, BID  [Held by provider] atorvastatin, 10 mg, oral, Daily  budesonide, 0.5 mg, nebulization, BID  cholecalciferol, 2,000 Units, oral, Daily  donepezil, 10 mg, oral, Daily  [Held by provider] empagliflozin, 10 mg, oral, Daily  finasteride, 5 mg, oral, Daily  formoterol, 20 mcg, nebulization, BID  melatonin, 3 mg, oral, Nightly  memantine, 10 mg, oral, Daily  metoprolol succinate XL, 25 mg, oral, Daily  OLANZapine, 2.5 mg, oral, Nightly  pantoprazole, 40 mg, oral, Daily before breakfast  polyethylene glycol, 17 g, oral, Daily  [Held by provider] sacubitriL-valsartan, 0.5 tablet, oral, BID  [Held by provider] spironolactone, 12.5 mg, oral, Daily  tamsulosin, 0.4 mg, oral, Daily      Continuous medications     PRN medications  PRN medications: acetaminophen **OR** acetaminophen, guaiFENesin, ipratropium-albuteroL, metoprolol    Lab Review   Results from last 7 days   Lab Units 08/28/24  0629 08/27/24  0546 08/26/24  0705   WBC AUTO x10*3/uL 6.3 8.6 8.0   HEMOGLOBIN g/dL 14.3 13.2* 12.3*   HEMATOCRIT % 43.5 40.1* 39.8*   PLATELETS AUTO x10*3/uL 163 163 167     Results from last 7 days   Lab Units 08/28/24  0629 08/27/24  0824 08/27/24  0546 08/26/24  0705 08/25/24  1716   SODIUM mmol/L 138  --  132* 130*  --    POTASSIUM mmol/L 4.4  --  5.0 5.3 4.9   CHLORIDE mmol/L 103  --  97* 95*  --    CO2 mmol/L 22  --  23 17*  --    BUN mg/dL 33*  --  39* 30*  --    CREATININE mg/dL 1.23  --  1.76* 1.99*  --    CALCIUM mg/dL 8.9  --  9.2 9.7  --    PROTEIN TOTAL g/dL 6.3* 6.7  --   --  7.3   BILIRUBIN TOTAL mg/dL 4.3* 4.9*  --   --  5.3*   ALK PHOS U/L 95 102  --   --  106   ALT U/L 333* 456*  --   --   150*   AST U/L 152* 318*  --   --  155*   GLUCOSE mg/dL 70*  --  111* 128*  --      Results from last 7 days   Lab Units 08/25/24  1716 08/25/24  1533   TROPHS ng/L 28* 26*        Vascular US Mesenteric Artery Duplex Complete   Final Result      US liver with doppler   Final Result   Patent hepatic vasculature. No sonographically detectable liver   lesion. Small to moderate volume ascites.        Stones and sludge in the gallbladder.  Possible gallbladder wall   thickening versus pericholecystic fluid, nonspecific in the setting   of ascites.        Enlarged heterogenous prostate gland indenting the bladder   incidentally noted.        MACRO:   None.        Signed by: Keena Mancini 8/27/2024 4:47 PM   Dictation workstation:   [id number]      XR toe left 2+ views   Final Result   Mild 3rd toe soft tissue swelling. No gas density or bone destruction.        MACRO:   None        Signed by: Eusebio Jesus 8/27/2024 12:40 PM   Dictation workstation:   [id number]      CT chest abdomen pelvis wo IV contrast   Final Result   1.Scattered inflammatory change of the small bowel, the hepatic   flexure of the colon and the adjacent gastric antrum concerning for   nonspecific gastroenteritis/colitis.  Also considered would be   mesenteric ischemia given the extensive amount of atherosclerotic   calcification.   2.Trace amount of free fluid in the right upper quadrant deep   pelvis is nonspecific and may be secondary to GI tract   inflammation/infection.  Lack of oral contrast material limits   evaluation for perforation.  No evidence of free air.   3.Cholelithiasis without specific findings of acute   cholecystitis.   4.Markedly enlarged prostate gland.   5.Massive right hydrocele extending up into the right inguinal   canal.   6.A 6 mm subpleural left lower lobe pulmonary nodule nonspecific   and may be postinfectious.   7.Septal thickening in the lung bases which may reflect mild   pulmonary edema residual changes of prior  pneumonia.   8.Several calcifications in both kidneys measure up to   approximately 6 mm on the right and 5 mm on the left. Unclear if these   are vascular calcifications or urinary tract calculi. No   hydronephrosis.   Signed by Judah Harley MD      XR chest 2 views   Final Result   1. Left basilar atelectasis.   2. No acute cardiopulmonary findings.   Signed by Ricardo Way MD      CT head wo IV contrast   Final Result   No depressed skull fracture. No acute intracranial bleed or focal   mass effect.        Tiny old lacunar infarcts in the left thalamus and left basal ganglia.        Moderate volume loss.        Mild-to-moderate chronic white matter ischemic disease in the deep   periventricular regions.        MACRO:   None        Signed by: Eusebio Jesus 8/25/2024 4:16 PM   Dictation workstation:   [id number]      Transthoracic Echo (TTE) Limited    (Results Pending)         Physical Exam    Constitutional   General appearance: Sleepy  Pulmonary   Respiratory assessment: No respiratory distress, normal respiratory rhythm and effort.    Auscultation of Lungs: Clear bilateral breath sounds.   Cardiovascular   Auscultation of heart: Apical pulse normal, heart rate and rhythm normal, normal S1 and S2, no murmurs and no pericardial rub.    Exam for edema: No peripheral edema.   Abdomen   Abdominal Exam: No bruits, normal bowel sounds, soft, non-tender, no abdominal mass palpated.    Liver and Spleen exam: No hepato-splenomegaly.   Musculoskeletal     Neurologic   Alert x 1-2        Assessment/Plan      #Acute on chronic systolic congestive heart failure  Appears euvolemic    #Transaminitis  Liver ultrasound noted  Does have gallstones  But minimal tenderness  Will get a surgical opinion    #Elevated free T4/low TSH  Borderline  Will get endocrinology opinion    #CKD 3B  Monitor    #Dementia  Slightly worse from the recent stressful events  Has a sitter in the room    Reviewed infectious disease  note  Monitoring off antibiotics

## 2024-08-28 NOTE — CONSULTS
Reason For Consult  Abnormal liver function tests    Assessment/Plan   Patient with abnormal liver function tests.  Seems to be more of a unconjugated hyperbilirubinemia with suggests possible hepatic congestion or underlying liver dysfunction as primary etiology.  He has a benign abdominal exam.  Very low suspicion for acute cholecystitis.  Liver serologies have been sent.  We can get a ultrasound to better characterize whether or not his bile ducts are dilated or not.  I am not inclined to recommend any surgical intervention on this gentleman.  Will continue to trend his LFTs and follow-up with you    History Of Present Illness  Jam Cox is a 89 y.o. male presenting with weakness and change in balance from home.  He does have a known long history of dementia and is unable to really give much in terms of history.  His 2 daughters are bedside.  In the ER workup revealed evidence of an elevated lactate and elevated liver function tests.  She is also being up for CHF.  Bilirubin was greater than 4.  CT scan has been done which showed gallstones.  I do not see any comment about biliary dilatation or not.  He had a Doppler study of the mesenteric circulation and no significant stenosis was described.  Patient seems to be resting comfortably in bed. Of note the patient has a history of a CHF with a 30% EF.  He also is on Eliquis for atrial fibrillation     Past Medical History  He has a past medical history of Abnormality of plasma protein, unspecified (05/01/2017), Benign prostatic hyperplasia without lower urinary tract symptoms (05/01/2017), Disorder of prostate, unspecified (05/01/2017), Elevated prostate specific antigen (PSA) (05/01/2017), Elevated prostate specific antigen (PSA) (05/01/2017), Elevated prostate specific antigen (PSA) (05/01/2017), Elevated prostate specific antigen (PSA) (05/01/2017), Epistaxis (05/01/2017), Essential (primary) hypertension (12/19/2022), Frequency of micturition  (05/01/2017), Hallucinations, unspecified (05/01/2017), Headache, unspecified (05/01/2017), Hematuria, unspecified (05/01/2017), Impacted cerumen (05/23/2024), Ischemic cardiomyopathy (12/13/2021), Nasal discharge (05/23/2024), Nasal mucosa dry (05/23/2024), Other amnesia (05/01/2017), Other microscopic hematuria (05/01/2017), Other seasonal allergic rhinitis (05/01/2017), Other secondary cataract, right eye (05/31/2018), Other specified disorders of the male genital organs (05/01/2017), Personal history of other specified conditions (02/10/2020), Presence of intraocular lens (05/01/2017), Primary insomnia (05/01/2017), Primary open-angle glaucoma, unspecified eye, stage unspecified (05/01/2017), Unspecified atrial flutter (Multi) (05/01/2017), Unspecified glaucoma (05/01/2017), and Vitamin D deficiency, unspecified (05/01/2017).    Surgical History  He has a past surgical history that includes Cataract extraction (10/07/2014); Other surgical history (10/07/2014); MR angio head wo IV contrast (3/3/2018); and MR angio neck wo IV contrast (3/3/2018).     Social History  He reports that he has quit smoking. His smoking use included cigarettes. He has never been exposed to tobacco smoke. He has never used smokeless tobacco. He reports that he does not currently use alcohol. He reports that he does not currently use drugs.    Family History  Family History   Problem Relation Name Age of Onset    No Known Problems Mother      No Known Problems Father          Allergies  Ace inhibitors    Review of Systems   Cardiovascular:         History of CHF   Gastrointestinal:  Positive for constipation.   Genitourinary:  Positive for frequency.   Neurological:         Dementia, confusion   Psychiatric/Behavioral:  Positive for confusion.         Physical Exam  No acute distress  Resting comfortably in bed, obvious dementia unable to answer any questions  Scleral icterus present  Heart rate in the 70s  Anterior lungs are  "clear  Abdomen soft nondistended and nontender.  Negative Calvillo sign  Extremities warm    Last Recorded Vitals  Blood pressure 120/50, pulse 75, temperature 36.8 °C (98.2 °F), resp. rate 17, height 1.778 m (5' 10\"), weight 65.4 kg (144 lb 1.6 oz), SpO2 93%.    Relevant Results  Lab Results   Component Value Date    WBC 6.3 08/28/2024    HGB 14.3 08/28/2024    HCT 43.5 08/28/2024    MCV 89 08/28/2024     08/28/2024         Sodium   Date Value Ref Range Status   08/28/2024 138 136 - 145 mmol/L Final     Lactate   Date Value Ref Range Status   08/27/2024 2.7 (H) 0.4 - 2.0 mmol/L Final     Chloride   Date Value Ref Range Status   08/28/2024 103 98 - 107 mmol/L Final     Urea Nitrogen   Date Value Ref Range Status   08/28/2024 33 (H) 6 - 23 mg/dL Final         Lab Results   Component Value Date    K 4.4 08/28/2024    CALCIUM 8.9 08/28/2024      No results found for this or any previous visit from the past 3 days.          I spent 30 minutes in the professional and overall care of this patient.        "

## 2024-08-28 NOTE — PROGRESS NOTES
Physical Therapy    Physical Therapy Evaluation    Patient Name: Jam Cox  MRN: [id number]  Today's Date: 8/28/2024   Time Calculation  Start Time: 0928  Stop Time: 0944  Time Calculation (min): 16 min    Assessment/Plan   PT Assessment  PT Assessment Results: Decreased endurance, Decreased range of motion, Decreased strength, Impaired balance, Decreased mobility, Decreased cognition, Impaired judgement, Decreased safety awareness  Rehab Prognosis: Good  End of Session Communication: Bedside nurse  Assessment Comment: PT Evaluation Completed. The patient presented with decreased mobility, balance, endurance, safety awareness, and increased pain, lethargy and fatigue. These impairments are negatively impacting his ability to perform at baseline functional level, in home environment. The patient is at risk of falls & injury. This patient would benefit from skilled therapy intervention to address limitations and progress towards the PT goals.  Anticipate moderate frequency PT needs at discharge  End of Session Patient Position: Bed, 3 rail up, Alarm on (sitter present)  IP OR SWING BED PT PLAN  Inpatient or Swing Bed: Inpatient  PT Plan  Treatment/Interventions: Bed mobility, Gait training, Transfer training, Balance training, Strengthening, Endurance training, Range of motion, Therapeutic exercise, Home exercise program, Therapeutic activity  PT Plan: Ongoing PT  PT Frequency: 3 times per week  PT Discharge Recommendations: Low intensity level of continued care  PT Recommended Transfer Status: Assist x2  PT - OK to Discharge: Yes (PT POC established.)      Subjective   General Visit Information:  General  Reason for Referral: SOB, cough, afib  Referred By: CHRISTINA Cooley MD  Past Medical History Relevant to Rehab:   Past Medical History:   Diagnosis Date    Abnormality of plasma protein, unspecified 05/01/2017    Elevated blood protein    Benign prostatic hyperplasia without lower urinary tract symptoms 05/01/2017     BPH with elevated PSA    Disorder of prostate, unspecified 05/01/2017    Prostate disorder    Elevated prostate specific antigen (PSA) 05/01/2017    Elevated prostate specific antigen (PSA)    Elevated prostate specific antigen (PSA) 05/01/2017    Abnormal prostate specific antigen    Elevated prostate specific antigen (PSA) 05/01/2017    Abnormal PSA    Elevated prostate specific antigen (PSA) 05/01/2017    Abnormal prostate specific antigen test    Epistaxis 05/01/2017    Epistaxis, recurrent    Essential (primary) hypertension 12/19/2022    Hypertension    Frequency of micturition 05/01/2017    Urinary frequency    Hallucinations, unspecified 05/01/2017    Hallucinations    Headache, unspecified 05/01/2017    Bilateral headache    Hematuria, unspecified 05/01/2017    Hematuria    Impacted cerumen 05/23/2024    Ischemic cardiomyopathy 12/13/2021    Cardiomyopathy, ischemic    Nasal discharge 05/23/2024    Nasal mucosa dry 05/23/2024    Other amnesia 05/01/2017    Memory loss    Other microscopic hematuria 05/01/2017    Hematuria, microscopic    Other seasonal allergic rhinitis 05/01/2017    Seasonal allergies    Other secondary cataract, right eye 05/31/2018    Posterior capsular opacification visually significant of right eye    Other specified disorders of the male genital organs 05/01/2017    Scrotal swelling    Personal history of other specified conditions 02/10/2020    History of epistaxis    Presence of intraocular lens 05/01/2017    Pseudophakia    Primary insomnia 05/01/2017    Primary insomnia    Primary open-angle glaucoma, unspecified eye, stage unspecified 05/01/2017    Open angle primary glaucoma    Unspecified atrial flutter (Multi) 05/01/2017    Atrial flutter    Unspecified glaucoma 05/01/2017    Glaucoma    Vitamin D deficiency, unspecified 05/01/2017    Vitamin D deficiency       Co-Treatment: OT  Co-Treatment Reason: to maximixe pt mobility, safety and participation  Prior to Session  Communication: Bedside nurse  Patient Position Received: Bed, 3 rail up, Alarm on  General Comment: Pt confused but agreeable to eval. Difficult to understand speech noted throughout. Family member present for session.  Home Living:  Home Living  Type of Home: House  Lives With: Spouse, Adult children  Home Adaptive Equipment:  (rollator)  Home Layout: Two level, Bed/bath upstairs  Home Access: Stairs to enter with rails  Entrance Stairs-Rails: Both  Entrance Stairs-Number of Steps: 4  Bathroom Shower/Tub: Walk-in shower  Bathroom Toilet: Handicapped height, Adaptive toilet seating  Bathroom Equipment: Grab bars in shower  Prior Level of Function:  Prior Function Per Pt/Caregiver Report  Level of [place]: Independent with ADLs and functional transfers  Receives Help From: Family  ADL Assistance: Independent  Homemaking Assistance: Needs assistance  Ambulatory Assistance: Independent  Prior Function Comments: 1 fall in the past 6 month. Pt is a poor historian, family assists with providing history.  Precautions:  Precautions  Medical Precautions: Fall precautions    Objective   Pain:  Pain Assessment  Pain Assessment: 0-10  0-10 (Numeric) Pain Score: 0 - No pain  Cognition:  Cognition  Overall Cognitive Status: Impaired  Orientation Level: Disoriented to place, Disoriented to time  Following Commands:  (Follows ~50% of commands)  Memory: Exceptions to WFL  Long-Term Memory: Impaired  Short-Term Memory: Impaired  Insight: Mild  Impulsive: Mildly    General Assessments:  Activity Tolerance  Endurance: Tolerates less than 10 min exercise with changes in vital signs    Sensation  Light Touch: No apparent deficits         Postural Control  Postural Control: Within Functional Limits (sitting)    Static Sitting Balance  Static Sitting-Balance Support: Feet supported  Static Sitting-Level of Assistance: Contact guard  Dynamic Sitting Balance  Dynamic Sitting-Balance Support: Feet supported, Bilateral upper extremity  supported  Dynamic Sitting-Level of Assistance: Contact guard    Static Standing Balance  Static Standing-Balance Support: Bilateral upper extremity supported  Static Standing-Level of Assistance: Contact guard  Dynamic Standing Balance  Dynamic Standing-Balance Support: Bilateral upper extremity supported  Dynamic Standing-Level of Assistance: Moderate assistance (x2)  Functional Assessments:  Bed Mobility  Bed Mobility: Yes  Bed Mobility 1  Bed Mobility 1: Supine to sitting  Level of Assistance 1: Moderate assistance  Bed Mobility Comments 1: Cues for sequencing. Assist to maneuver trunk into sitting.    Transfers  Transfer: Yes  Transfer 1  Technique 1: Sit to stand, Stand to sit  Transfer Device 1: Walker  Transfer Level of Assistance 1: Minimum assistance  Trials/Comments 1: Cues for hand placement and scooting to the EOB.    Ambulation/Gait Training  Ambulation/Gait Training Performed: Yes  Ambulation/Gait Training 1  Surface 1: Level tile  Device 1: Rolling walker  Assistance 1: Moderate assistance (x2)  Comments/Distance (ft) 1: 4 ft forward/backward. Pt very unsteady ambulting with one LOB requiring therapist intervention to prevent fall. Max cues for walker placement and sequencing with therapist needed to continually move walker. Forward flexed posture with increased knee flexion. Pt had on episode of stool incontinence while ambulating.  Extremity/Trunk Assessments:  RUE   RUE :  (grossly WFL based on observation)  LUE   LUE:  (grossly WFL based on observation)  RLE   RLE :  (Grossly >3/5 based on observation. ROM WFL.)  LLE   LLE :  (Grossly >3/5 based on observation. ROM WFL.)  Outcome Measures:  [facility] Basic Mobility  Turning from your back to your side while in a flat bed without using bedrails: A little  Moving from lying on your back to sitting on the side of a flat bed without using bedrails: A lot  Moving to and from bed to chair (including a wheelchair): A lot  Standing up from a chair using your  arms (e.g. wheelchair or bedside chair): A little  To walk in hospital room: A lot  Climbing 3-5 steps with railing: Total  Basic Mobility - Total Score: 13    Encounter Problems       Encounter Problems (Active)       Balance       Pt will tolerate 10+ mins dynamic standing balance activities with CGA or less and no LOB using a RW.        Start:  08/28/24    Expected End:  09/11/24               Mobility       STG - Patient will ambulate 30 ft with CGA and a RW.        Start:  08/28/24    Expected End:  09/11/24               PT Transfers       STG - Patient will perform bed mobility with CGA or less       Start:  08/28/24    Expected End:  09/11/24            STG - Patient will transfer sit to and from stand with CGA and a RW.       Start:  08/28/24    Expected End:  09/11/24               Pain - Adult              Education Documentation  Body Mechanics, taught by Giulia Feng, PT at 8/28/2024 10:57 AM.  Learner: Patient  Readiness: Acceptance  Method: Explanation  Response: Verbalizes Understanding    Mobility Training, taught by Giulia Feng, PT at 8/28/2024 10:57 AM.  Learner: Patient  Readiness: Acceptance  Method: Explanation  Response: Verbalizes Understanding    Education Comments  No comments found.

## 2024-08-28 NOTE — PROGRESS NOTES
"Subjective Data:  No complaints.   Calm not answering questions- sitter at the bedside    Status post IV digoxin load   Telemetry showed afib now CVR HR 80-90s NSVT (longest 11 beats)  Transaminitis improving creatinine 1.23 today   BP improving         Objective Data:  Last Recorded Vitals:  Vitals:    24 2128 24 0355 24 0734 24 0752   BP:  134/51  127/70   BP Location:  Left arm  Right arm   Patient Position:  Lying  Lying   Pulse: 79      Resp:  17  18   Temp:  36.3 °C (97.4 °F)  36.5 °C (97.7 °F)   TempSrc:  Temporal  Temporal   SpO2:  97% 94% 94%   Weight:       Height:         Medical Gas Therapy: None (Room air)  Weight  Av.4 kg (142 lb 0.8 oz)  Min: 63.5 kg (140 lb)  Max: 65.4 kg (144 lb 1.6 oz)    LABS:  CMP:  Results from last 7 days   Lab Units 24  0629 24  0824 24  0546 24  0705 24  1716 24  1533   SODIUM mmol/L 138  --  132* 130*  --  130*   POTASSIUM mmol/L 4.4  --  5.0 5.3 4.9 6.1*   CHLORIDE mmol/L 103  --  97* 95*  --  95*   CO2 mmol/L 22  --  23 17*  --  18*   ANION GAP mmol/L 17  --  17 23*  --  23*   BUN mg/dL 33*  --  39* 30*  --  21   CREATININE mg/dL 1.23  --  1.76* 1.99*  --  1.71*   EGFR mL/min/1.73m*2 55*  --  36* 31*  --  37*   MAGNESIUM mg/dL  --   --   --   --   --  2.20   ALBUMIN g/dL 3.2* 3.5  --   --  3.7 3.7   ALT U/L 333* 456*  --   --  150* 135*   AST U/L 152* 318*  --   --  155* 143*   BILIRUBIN TOTAL mg/dL 4.3* 4.9*  --   --  5.3* 5.2*     CBC:  Results from last 7 days   Lab Units 24  0629 24  0546 24  0705 24  1537   WBC AUTO x10*3/uL 6.3 8.6 8.0 6.9   HEMOGLOBIN g/dL 14.3 13.2* 12.3* 12.6*   HEMATOCRIT % 43.5 40.1* 39.8* 39.0*   PLATELETS AUTO x10*3/uL 163 163 167 147*   MCV fL 89 88 92 90     COAG:     ABO: No results found for: \"ABO\"  HEME/ENDO:  Results from last 7 days   Lab Units 24  1141   TSH mIU/L 0.34*      CARDIAC:   Results from last 7 days   Lab Units 24  1716 " 08/25/24  1533   TROPHS ng/L 28* 26*   BNP pg/mL  --  1,796*             Last I/O:    Intake/Output Summary (Last 24 hours) at 8/28/2024 1123  Last data filed at 8/27/2024 1514  Gross per 24 hour   Intake --   Output 500 ml   Net -500 ml     Net IO Since Admission: -700 mL [08/28/24 1123]            Inpatient Medications:  Scheduled medications   Medication Dose Route Frequency    apixaban  2.5 mg oral BID    [Held by provider] atorvastatin  10 mg oral Daily    budesonide  0.5 mg nebulization BID    cholecalciferol  2,000 Units oral Daily    donepezil  10 mg oral Daily    [Held by provider] empagliflozin  10 mg oral Daily    finasteride  5 mg oral Daily    formoterol  20 mcg nebulization BID    melatonin  3 mg oral Nightly    memantine  10 mg oral Daily    metoprolol succinate XL  25 mg oral Daily    OLANZapine  2.5 mg oral Nightly    pantoprazole  40 mg oral Daily before breakfast    polyethylene glycol  17 g oral Daily    [Held by provider] sacubitriL-valsartan  0.5 tablet oral BID    [Held by provider] spironolactone  12.5 mg oral Daily    tamsulosin  0.4 mg oral Daily     PRN medications   Medication    acetaminophen    Or    acetaminophen    guaiFENesin    ipratropium-albuteroL    metoprolol     Continuous Medications   Medication Dose Last Rate           Physical Exam:  Gen Well appearing elderly male lying in bed in no apparent distress Body mass index is 20.68 kg/m².   CV irregularly irregular. No m/r/g appreciated. +JVD, no leg edema.   Pulm Lungs clear with normal respiratory effort.  Neuro Alert but speech largely nonsensical. Grossly nonfocal.    I reviewed Telemetry - atrial fibrillation CVR HR 80-90s NSVT (longest 11 beats)       Assessment:  Acute on chronic systolic HF  Mild volume excess by imaging with noted elevated BNP and JVD. BP improved today. Transaminitis noted which may or may not  relate to vascular congestion. On RA and appears comfortable.     Persistent atrial fibrillation  Rates have  now improved after IV Digoxin load. Continue metoprolol succinate 25 mg daily.  On anticoagulation with apixaban (dose adjusted given YOSEF).     Cardiomyopathy  On a reasonable outpatient regimen though Entresto and Jardiance on hold given YOSEF.      Recommendations   Lasix 40 IVP x1- monitor response.   Limited TTE        Varinder Johns, APRN-CNP

## 2024-08-29 LAB
ALBUMIN SERPL BCP-MCNC: 2.8 G/DL (ref 3.4–5)
ALP SERPL-CCNC: 94 U/L (ref 33–136)
ALT SERPL W P-5'-P-CCNC: 270 U/L (ref 10–52)
ANION GAP SERPL CALC-SCNC: 13 MMOL/L (ref 10–20)
AST SERPL W P-5'-P-CCNC: 112 U/L (ref 9–39)
BACTERIA BLD CULT: NORMAL
BACTERIA BLD CULT: NORMAL
BILIRUB DIRECT SERPL-MCNC: 1.1 MG/DL (ref 0–0.3)
BILIRUB SERPL-MCNC: 3.7 MG/DL (ref 0–1.2)
BUN SERPL-MCNC: 21 MG/DL (ref 6–23)
CALCIUM SERPL-MCNC: 8.4 MG/DL (ref 8.6–10.3)
CHLORIDE SERPL-SCNC: 107 MMOL/L (ref 98–107)
CO2 SERPL-SCNC: 20 MMOL/L (ref 21–32)
CREAT SERPL-MCNC: 0.99 MG/DL (ref 0.5–1.3)
EGFRCR SERPLBLD CKD-EPI 2021: 71 ML/MIN/1.73M*2
ERYTHROCYTE [DISTWIDTH] IN BLOOD BY AUTOMATED COUNT: 17.5 % (ref 11.5–14.5)
GLUCOSE SERPL-MCNC: 87 MG/DL (ref 74–99)
HCT VFR BLD AUTO: 48.2 % (ref 41–52)
HGB BLD-MCNC: 14.7 G/DL (ref 13.5–17.5)
MAGNESIUM SERPL-MCNC: 2.4 MG/DL (ref 1.6–2.4)
MCH RBC QN AUTO: 29.8 PG (ref 26–34)
MCHC RBC AUTO-ENTMCNC: 30.5 G/DL (ref 32–36)
MCV RBC AUTO: 98 FL (ref 80–100)
NRBC BLD-RTO: 0.5 /100 WBCS (ref 0–0)
PLATELET # BLD AUTO: 158 X10*3/UL (ref 150–450)
POTASSIUM SERPL-SCNC: 4.3 MMOL/L (ref 3.5–5.3)
PROT SERPL-MCNC: 6.2 G/DL (ref 6.4–8.2)
RBC # BLD AUTO: 4.94 X10*6/UL (ref 4.5–5.9)
SODIUM SERPL-SCNC: 136 MMOL/L (ref 136–145)
WBC # BLD AUTO: 5.9 X10*3/UL (ref 4.4–11.3)

## 2024-08-29 PROCEDURE — 99232 SBSQ HOSP IP/OBS MODERATE 35: CPT

## 2024-08-29 PROCEDURE — 2060000001 HC INTERMEDIATE ICU ROOM DAILY

## 2024-08-29 PROCEDURE — 99232 SBSQ HOSP IP/OBS MODERATE 35: CPT | Performed by: INTERNAL MEDICINE

## 2024-08-29 PROCEDURE — 2500000001 HC RX 250 WO HCPCS SELF ADMINISTERED DRUGS (ALT 637 FOR MEDICARE OP): Performed by: INTERNAL MEDICINE

## 2024-08-29 PROCEDURE — 84075 ASSAY ALKALINE PHOSPHATASE: CPT | Performed by: NURSE PRACTITIONER

## 2024-08-29 PROCEDURE — 85027 COMPLETE CBC AUTOMATED: CPT | Performed by: NURSE PRACTITIONER

## 2024-08-29 PROCEDURE — 2500000001 HC RX 250 WO HCPCS SELF ADMINISTERED DRUGS (ALT 637 FOR MEDICARE OP): Performed by: FAMILY MEDICINE

## 2024-08-29 PROCEDURE — 97116 GAIT TRAINING THERAPY: CPT | Mod: GP

## 2024-08-29 PROCEDURE — 82374 ASSAY BLOOD CARBON DIOXIDE: CPT | Performed by: NURSE PRACTITIONER

## 2024-08-29 PROCEDURE — 2500000004 HC RX 250 GENERAL PHARMACY W/ HCPCS (ALT 636 FOR OP/ED): Performed by: FAMILY MEDICINE

## 2024-08-29 PROCEDURE — 94640 AIRWAY INHALATION TREATMENT: CPT

## 2024-08-29 PROCEDURE — 2500000002 HC RX 250 W HCPCS SELF ADMINISTERED DRUGS (ALT 637 FOR MEDICARE OP, ALT 636 FOR OP/ED): Performed by: PHARMACIST

## 2024-08-29 PROCEDURE — 36415 COLL VENOUS BLD VENIPUNCTURE: CPT | Performed by: NURSE PRACTITIONER

## 2024-08-29 PROCEDURE — 99221 1ST HOSP IP/OBS SF/LOW 40: CPT | Performed by: INTERNAL MEDICINE

## 2024-08-29 PROCEDURE — 99233 SBSQ HOSP IP/OBS HIGH 50: CPT | Performed by: INTERNAL MEDICINE

## 2024-08-29 PROCEDURE — 2500000004 HC RX 250 GENERAL PHARMACY W/ HCPCS (ALT 636 FOR OP/ED): Performed by: INTERNAL MEDICINE

## 2024-08-29 PROCEDURE — 2500000002 HC RX 250 W HCPCS SELF ADMINISTERED DRUGS (ALT 637 FOR MEDICARE OP, ALT 636 FOR OP/ED): Performed by: FAMILY MEDICINE

## 2024-08-29 RX ORDER — DIGOXIN 125 MCG
125 TABLET ORAL DAILY
Status: DISCONTINUED | OUTPATIENT
Start: 2024-08-29 | End: 2024-08-30 | Stop reason: HOSPADM

## 2024-08-29 RX ORDER — FUROSEMIDE 10 MG/ML
40 INJECTION INTRAMUSCULAR; INTRAVENOUS DAILY
Status: DISCONTINUED | OUTPATIENT
Start: 2024-08-29 | End: 2024-08-30

## 2024-08-29 ASSESSMENT — COGNITIVE AND FUNCTIONAL STATUS - GENERAL
TURNING FROM BACK TO SIDE WHILE IN FLAT BAD: A LOT
DRESSING REGULAR UPPER BODY CLOTHING: A LITTLE
DAILY ACTIVITIY SCORE: 18
TOILETING: A LITTLE
DRESSING REGULAR LOWER BODY CLOTHING: A LITTLE
EATING MEALS: A LITTLE
WALKING IN HOSPITAL ROOM: A LOT
CLIMB 3 TO 5 STEPS WITH RAILING: A LOT
MOBILITY SCORE: 16
PERSONAL GROOMING: A LITTLE
MOVING FROM LYING ON BACK TO SITTING ON SIDE OF FLAT BED WITH BEDRAILS: A LITTLE
MOVING TO AND FROM BED TO CHAIR: A LITTLE
WALKING IN HOSPITAL ROOM: A LOT
MOVING TO AND FROM BED TO CHAIR: A LITTLE
STANDING UP FROM CHAIR USING ARMS: A LITTLE
TURNING FROM BACK TO SIDE WHILE IN FLAT BAD: A LITTLE
HELP NEEDED FOR BATHING: A LITTLE
MOVING FROM LYING ON BACK TO SITTING ON SIDE OF FLAT BED WITH BEDRAILS: A LITTLE
CLIMB 3 TO 5 STEPS WITH RAILING: TOTAL
MOBILITY SCORE: 13
STANDING UP FROM CHAIR USING ARMS: A LOT

## 2024-08-29 ASSESSMENT — PAIN - FUNCTIONAL ASSESSMENT
PAIN_FUNCTIONAL_ASSESSMENT: 0-10

## 2024-08-29 ASSESSMENT — PAIN SCALES - GENERAL
PAINLEVEL_OUTOF10: 0 - NO PAIN

## 2024-08-29 NOTE — PROGRESS NOTES
08/29/24 1358   Discharge Planning   Expected Discharge Disposition  Services         Revisited patient and wife and daughters to discuss discharge plan and to see if they made decision on SNF. Also spoke with patient's daughter Esperanza, who is SW. Explained again PT/OT rec for mod and SNF placement. After long discussion wife and family decided they will take him home with [facility]. Requested from [facility] to place referrals for HC and [facility].  Wife and daughters declined SNF.

## 2024-08-29 NOTE — PROGRESS NOTES
"Jam Cox is a 89 y.o. male on day 4 of admission presenting with Elevated LFTs.    Subjective   No acute events overnight.  Patient is laying in bed comfortably.  Bedside nurse and sitter present at bedside.  Sitter stated that patient had a bowel movement yesterday.  Tolerates his diet.  Denies abdominal pain.       Objective     Physical Exam  Constitutional:       Appearance: He is toxic-appearing.   HENT:      Head: Normocephalic and atraumatic.   Cardiovascular:      Rate and Rhythm: Tachycardia present. Rhythm irregular.   Pulmonary:      Effort: Pulmonary effort is normal. Tachypnea present.   Abdominal:      General: Abdomen is flat. Bowel sounds are normal. There is no distension.      Palpations: Abdomen is soft. There is no mass.      Tenderness: There is no abdominal tenderness. There is no guarding or rebound.      Hernia: No hernia is present.   Musculoskeletal:      Cervical back: Neck supple.   Skin:     General: Skin is warm and dry.   Neurological:      General: No focal deficit present.      Mental Status: He is alert. He is confused.   Psychiatric:         Mood and Affect: Mood normal.         Behavior: Behavior normal.         Last Recorded Vitals  Blood pressure 130/62, pulse 98, temperature 37 °C (98.6 °F), temperature source Temporal, resp. rate 19, height 1.778 m (5' 10\"), weight 65.4 kg (144 lb 1.6 oz), SpO2 94%.  Intake/Output last 3 Shifts:  I/O last 3 completed shifts:  In: - (0 mL/kg)   Out: 1300 (19.9 mL/kg) [Urine:1300 (0.6 mL/kg/hr)]  Weight: 65.4 kg     Relevant Results      Scheduled medications  apixaban, 5 mg, oral, BID  [Held by provider] atorvastatin, 10 mg, oral, Daily  budesonide, 0.5 mg, nebulization, BID  cholecalciferol, 2,000 Units, oral, Daily  digoxin, 125 mcg, oral, Daily  donepezil, 10 mg, oral, Daily  empagliflozin, 10 mg, oral, Daily  finasteride, 5 mg, oral, Daily  formoterol, 20 mcg, nebulization, BID  furosemide, 40 mg, intravenous, Once  melatonin, 3 mg, " oral, Nightly  memantine, 10 mg, oral, Daily  metoprolol succinate XL, 25 mg, oral, Daily  pantoprazole, 40 mg, oral, Daily before breakfast  polyethylene glycol, 17 g, oral, Daily  sacubitriL-valsartan, 0.5 tablet, oral, BID  [Held by provider] spironolactone, 12.5 mg, oral, Daily  tamsulosin, 0.4 mg, oral, Daily      Continuous medications     PRN medications  PRN medications: acetaminophen **OR** acetaminophen, guaiFENesin, ipratropium-albuteroL, metoprolol   Results for orders placed or performed during the hospital encounter of 08/25/24 (from the past 24 hour(s))   POCT GLUCOSE   Result Value Ref Range    POCT Glucose 83 74 - 99 mg/dL   Transthoracic Echo (TTE) Limited   Result Value Ref Range    LV EF 34 %    RVSP 29.6 mmHg    LV A4C EF 27.7    Basic metabolic panel   Result Value Ref Range    Glucose 87 74 - 99 mg/dL    Sodium 136 136 - 145 mmol/L    Potassium 4.3 3.5 - 5.3 mmol/L    Chloride 107 98 - 107 mmol/L    Bicarbonate 20 (L) 21 - 32 mmol/L    Anion Gap 13 10 - 20 mmol/L    Urea Nitrogen 21 6 - 23 mg/dL    Creatinine 0.99 0.50 - 1.30 mg/dL    eGFR 71 >60 mL/min/1.73m*2    Calcium 8.4 (L) 8.6 - 10.3 mg/dL   CBC   Result Value Ref Range    WBC 5.9 4.4 - 11.3 x10*3/uL    nRBC 0.5 (H) 0.0 - 0.0 /100 WBCs    RBC 4.94 4.50 - 5.90 x10*6/uL    Hemoglobin 14.7 13.5 - 17.5 g/dL    Hematocrit 48.2 41.0 - 52.0 %    MCV 98 80 - 100 fL    MCH 29.8 26.0 - 34.0 pg    MCHC 30.5 (L) 32.0 - 36.0 g/dL    RDW 17.5 (H) 11.5 - 14.5 %    Platelets 158 150 - 450 x10*3/uL   Hepatic Function Panel   Result Value Ref Range    Albumin 2.8 (L) 3.4 - 5.0 g/dL    Bilirubin, Total 3.7 (H) 0.0 - 1.2 mg/dL    Bilirubin, Direct 1.1 (H) 0.0 - 0.3 mg/dL    Alkaline Phosphatase 94 33 - 136 U/L     (H) 10 - 52 U/L     (H) 9 - 39 U/L    Total Protein 6.2 (L) 6.4 - 8.2 g/dL        Transthoracic Echo (TTE) Limited    Result Date: 8/28/2024   [facility], [address] [place] [address]              Tel  [phone] and Fax [phone] TRANSTHORACIC ECHOCARDIOGRAM REPORT  Patient Name:      OSMANY SILVERIO       Reading Physician:    [id number] Eric Almonte MD Study Date:        8/28/2024            Ordering Provider:    [id number] ALMONTE MRN/PID:           [id number]             Fellow: Accession#:        [id number]         Nurse: Date of Birth/Age: 11/13/1930 / 89      Sonographer:          Yomi Solis RDCS                    years Gender:            M                    Additional Staff: Height:            182.88 cm            Admit Date:           8/25/2024 Weight:            69.85 kg             Admission Status:     Inpatient -                                                               Routine BSA / BMI:         1.91 m2 / 20.89      Encounter#:           [id number]                    kg/m2 Blood Pressure:    120/50 mmHg          Department Location:  [facility] Study Type:    TRANSTHORACIC ECHO (TTE) LIMITED Diagnosis/ICD: Acute on chronic systolic (congestive) heart failure (CHF)-I50.23 Indication:    HF CPT Code:      Echo Limited-43714; Doppler Limited-79899; Color Doppler-47110 Patient History: Pertinent History: CHF, COPD, A-Fib, HTN and Hyperlipidemia. Study Detail: The following Echo studies were performed: 2D and Doppler.               Technically challenging study due to body habitus, patient lying               in supine position and prominent lung artifact.  PHYSICIAN INTERPRETATION: Left Ventricle: Left ventricular ejection fraction is moderately decreased, calculated by Oropzea's biplane at 34%. The patient is in atrial fibrillation which may influence the estimate of left ventricular function and transvalvular flows. There is global hypokinesis of the left ventricle with minor regional variations. The left ventricular cavity size was not assessed. Left ventricular  diastolic filling was not assessed. Left Atrium: The left atrium was not assessed. Right Ventricle: The right ventricle was not assessed. There is reduced right ventricular systolic function. Right Atrium: The right atrium was not assessed. Aortic Valve: The aortic valve was not assessed. There is mild aortic valve regurgitation. Mitral Valve: The mitral valve is normal in structure. There is moderate mitral annular calcification. There is moderate mitral valve regurgitation. Tricuspid Valve: The tricuspid valve is structurally normal. There is mild tricuspid regurgitation. The right ventricular systolic pressure is unable to be estimated. Pulmonic Valve: The pulmonic valve was not assessed. Pulmonic valve regurgitation was not assessed. Pericardium: There is a trivial pericardial effusion. Aorta: The aortic root was not assessed. Systemic Veins: The inferior vena cava appears to be of normal size. There is less than 50% IVC collapse with inspiration. In comparison to the previous echocardiogram(s): Compared with study dated 5/6/2024, no significant change.  CONCLUSIONS:  1. Poorly visualized anatomical structures due to suboptimal image quality.  2. Left ventricular ejection fraction is moderately decreased, calculated by Oropeza's biplane at 34%.  3. There is global hypokinesis of the left ventricle with minor regional variations.  4. There is reduced right ventricular systolic function.  5. There is moderate mitral annular calcification.  6. Moderate mitral valve regurgitation.  7. Mild aortic valve regurgitation.  8. The patient is in atrial fibrillation which may influence the estimate of left ventricular function and transvalvular flows. QUANTITATIVE DATA SUMMARY: 2D MEASUREMENTS:                       Normal Ranges: LVEDV Index: 68 ml/m2 LV SYSTOLIC FUNCTION BY 2D PLANIMETRY (MOD):                      Normal Ranges: EF-A4C View:    28 % (>=55%) EF-A2C View:    33 % EF-Biplane:     34 % LV EF Reported: 34 %  TRICUSPID VALVE/RVSP:                             Normal Ranges: Peak TR Velocity: 2.58 m/s Est. RA Pressure: 3 mmHg RV Syst Pressure: 29.6 mmHg (< 30mmHg) IVC Diam:         1.78 cm  [id number] Eric Hernandes MD Electronically signed on 8/28/2024 at 8:23:28 PM  ** Final **     Vascular US Mesenteric Artery Duplex Complete    Result Date: 8/28/2024             [place] [address]   Tel [phone] and Fax [phone]  Vascular Lab Report [facility] US MESENTERIC ARTERY DUPLEX COMPLETE  Patient Name:      OSMANY MATIAS JEAN CLAUDE       Reading Physician: [id number] Cavazos MD Study Date:        8/28/2024            Ordering           [id number] JYOTI                                         Physician:         MAREK MRN/PID:           [id number]             Technologist:      Maribell Ford RVT Accession#:        [id number]         Technologist 2: Date of Birth/Age: 11/13/1930 / 89      Encounter#:        [id number]                    years Gender:            M Admission Status:  Inpatient            Location           [facility]                                         Performed:  Diagnosis/ICD: Chronic mesenteric ischemia-K55.1 CPT Codes:     07596 Mesenteric Duplex scan  CONCLUSIONS: Mesenteric: The celiac, hepatic, splenic and SMA appear widely patent with no evidence of stenosis. The patient was NPO for this study.  Additional Findings: Technically difficult study due to abdominal bowel gas.  Imaging & Doppler Findings:  AORTA    AP Distal 1.50 cm  Aorta PSV         42 cm/s Celiac Origin  cm/s Celiac Prox PSV   213 cm/s Celiac Mid PSV    170 cm/s Celiac Dist PSV   149 cm/s SMA Origin PSV    152 cm/s SMA Prox PSV      222 cm/s SMA Mid PSV       142 cm/s SMA Dist PSV      82 cm/s PREETI PSV           259 cm/s PREETI Prox PSV      122 cm/s Hepatic PSV       98 cm/s Splenic PSV       100 cm/s   [id number] Cavazos MD Electronically signed by [id number] Cavazos MD on 8/28/2024 at 12:14:06 PM   ** Final **     US liver with doppler    Result Date: 8/27/2024  Interpreted By:  Keena Mancini, STUDY: US LIVER WITH DOPPLER;  8/27/2024 4:30 pm   INDICATION: 89 y/o   M with  Signs/Symptoms: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   COMPARISON: None.   ACCESSION NUMBER(S): [id number]   ORDERING CLINICIAN: JYOTI JARA   TECHNIQUE: Sonographic images of the abdominal viscera are were obtained.   FINDINGS: LIVER:   Right lobe measures 14.2 cm in craniocaudal length. Echogenicity within normal limits. No focal lesion demonstrated.   The main portal vein measures 13 mm in diameter. On Doppler evaluation, intact hepatopetal flow in the main, left and right portal veins. Intact flow in the main, right and left hepatic arteries. Intact phasic flow in the left, middle and right hepatic veins. Retro hepatic IVC is patent.   BILE DUCTS:   Nondilated. Common duct measures  4 mm.   GALLBLADDER:   Normally distended. Contains shadowing echogenic stones as well as nonshadowing sludge. Mild low-density wall thickening and/or pericholecystic fluid. No overlying tenderness.   PANCREAS:  Partially obscured by overlying bowel gas.  Visualized portions are unremarkable.   SPLEEN:   Normal in size, measuring 9.4 cm in length. No focal lesion demonstrated. Hepatopetal flow in the distal splenic vein posterior to the pancreas.   RIGHT KIDNEY:   Normal in size. No hydronephrosis.   ABDOMINAL AORTA AND IVC:   Visualized portions are normal in caliber.   PERITONEAL FLUID:   Small to moderate volume ascites.   On brief evaluation of the bladder, enlarged heterogenous prostate measures 7.9 cm transversely and indents its base.       Patent hepatic vasculature. No sonographically detectable liver lesion. Small to moderate volume ascites.   Stones and sludge in the gallbladder.  Possible gallbladder wall thickening versus pericholecystic fluid, nonspecific in the setting of ascites.   Enlarged heterogenous prostate gland indenting  the bladder incidentally noted.   MACRO: None.   Signed by: Keena Mancini 8/27/2024 4:47 PM Dictation workstation:   [id number]    XR toe left 2+ views    Result Date: 8/27/2024  Interpreted By:  Eusebio Jesus, STUDY: XR TOE LEFT 2+ VIEWS;  8/27/2024 12:27 pm   INDICATION: Signs/Symptoms:L 3rd toe ulcer/pain.   COMPARISON: None.   ACCESSION NUMBER(S): [id number]   ORDERING CLINICIAN: GEORGE PINO   TECHNIQUE: 3 views  of the  left 3rd were obtained.   FINDINGS: Mild soft tissue swelling of the 3rd toe. No soft tissue gas density. No significant osteophytic change. No lytic or blastic destructive bone lesion. No acute fracture or dislocation. No opaque soft tissue foreign body. No periosteal reaction or erosion.       Mild 3rd toe soft tissue swelling. No gas density or bone destruction.   MACRO: None   Signed by: Eusebio Jesus 8/27/2024 12:40 PM Dictation workstation:   [id number]    Electrocardiogram, 12-lead PRN ACS symptoms    Result Date: 8/26/2024  Atrial fibrillation with rapid ventricular response Right superior axis deviation Nonspecific intraventricular block Cannot rule out Anterior infarct , age undetermined Abnormal ECG When compared with ECG of 06-MAY-2024 05:15, Previous ECG has undetermined rhythm, needs review Minimal criteria for Anterior infarct are now Present See ED provider note for full interpretation and clinical correlation Confirmed by Tanya Weiss ([id number]) on 8/26/2024 9:42:10 PM    CT chest abdomen pelvis wo IV contrast    Result Date: 8/25/2024  STUDY: CT Chest, Abdomen, and Pelvis without IV Contrast; 8/25/2024 5:37 PM INDICATION: Lft elevation.  Concern for sepsis.  Mild right upper quadrant pain. COMPARISON: CXR 8/25/2024.  CTA chest 5/3/2024. ACCESSION NUMBER(S): [id number] ORDERING CLINICIAN: RUDI FAY TECHNIQUE: CT of the chest, abdomen, and pelvis was performed.  Contiguous axial images were obtained at 3 mm slice thickness through the chest, abdomen, and pelvis.   Coronal and sagittal reconstructions at 3 mm slice thickness were performed.  No intravenous contrast was administered.  FINDINGS: Please note that the evaluation of vessels, lymph nodes and organs is limited without intravenous contrast. CHEST: MEDIASTINUM: A 1.4 cm left thyroid nodule. Several mildly prominent mediastinal lymph nodes. The heart is mildly enlarged.  Extensive coronary artery calcification. Extensive systemic vascular calcification.  Thoracic aorta is at the upper limit of normal for size at the distal descending arch measuring 3.8 cm. LUNGS/PLEURA: A 6 cm subpleural left lower lobe nodule on image 143.  Septal thickening in the lung bases.  Next centrilobular and paraseptal emphysema. No pleural effusion or pneumothorax. ABDOMEN:  LIVER: Normal size and overall density.  Mildly nodular contour.  No evidence of focal mass.  BILE DUCTS: No intrahepatic or extrahepatic biliary ductal dilatation.  GALLBLADDER: The gallbladder contains several small stones.  No specific findings of acute cholecystitis. STOMACH: Thickening of the gastric antrum with surrounding inflammatory change.  Mild thickening of the adjacent duodenum and adjacent hepatic flexure.  PANCREAS: No masses or ductal dilatation.  SPLEEN: No splenomegaly or focal splenic lesion.  ADRENAL GLANDS: No thickening or nodules.  KIDNEYS AND URETERS: Several calcifications in both kidneys measure up to approximately 6 mm on the right and 5 mm on the left.  No hydronephrosis.  Unclear if these are vascular calcifications or urinary tract calculi.  Otherwise normal noncontrast appearance.  PELVIS:  BLADDER: No abnormalities identified.  REPRODUCTIVE ORGANS: Prostate gland is markedly enlarged at 7.6 x 6.5 x 7.2 cm.  Massive (15 x 9 x 9 cm) right hydrocele extending up into the right inguinal canal.  BOWEL: Thickened areas of small bowel and large bowel, particularly at the hepatic flexure of the colon and loops of jejunum in the left mid abdomen.   No obstruction.  Scattered diverticula.  VESSELS: Extensive systemic vascular calcification.  No abdominal aortic aneurysm. PERITONEUM/RETROPERITONEUM/LYMPH NODES: Trace amount of free fluid in the right upper quadrant and deep pelvis.  No free air. No lymphadenopathy.  ABDOMINAL WALL: No abnormalities identified. SOFT TISSUES: No abnormalities identified.  BONES: No acute fracture or aggressive osseous lesion.  Severe degenerative changes of the spine.    1.Scattered inflammatory change of the small bowel, the hepatic flexure of the colon and the adjacent gastric antrum concerning for nonspecific gastroenteritis/colitis.  Also considered would be mesenteric ischemia given the extensive amount of atherosclerotic calcification. 2.Trace amount of free fluid in the right upper quadrant deep pelvis is nonspecific and may be secondary to GI tract inflammation/infection.  Lack of oral contrast material limits evaluation for perforation.  No evidence of free air. 3.Cholelithiasis without specific findings of acute cholecystitis. 4.Markedly enlarged prostate gland. 5.Massive right hydrocele extending up into the right inguinal canal. 6.A 6 mm subpleural left lower lobe pulmonary nodule nonspecific and may be postinfectious. 7.Septal thickening in the lung bases which may reflect mild pulmonary edema residual changes of prior pneumonia. 8.Several calcifications in both kidneys measure up to approximately 6 mm on the right and 5 mm on the left. Unclear if these are vascular calcifications or urinary tract calculi. No hydronephrosis. Signed by Judah Harley MD    XR chest 2 views    Result Date: 8/25/2024  STUDY: Chest Radiographs;  8/25/2024 4:11 PM INDICATION: Shortness of breath.  History of pneumonia and heart failure. COMPARISON: CXR 6/2/2024. ACCESSION NUMBER(S): [id number] ORDERING CLINICIAN: RUDI FAY TECHNIQUE:  Frontal and lateral chest. FINDINGS: CARDIOMEDIASTINAL SILHOUETTE: Cardiomediastinal silhouette is  normal in size and configuration.  LUNGS: There is left basilar atelectasis. There is no focal consolidation.  ABDOMEN: No remarkable upper abdominal findings.  BONES: No acute osseous changes.    1. Left basilar atelectasis. 2. No acute cardiopulmonary findings. Signed by Ricardo Way MD    CT head wo IV contrast    Result Date: 8/25/2024  Interpreted By:  Eusebio Jesus, STUDY: CT HEAD WO IV CONTRAST;  8/25/2024 3:52 pm   INDICATION: Signs/Symptoms:assess for ICH, altered on eliquis.   COMPARISON: Comparison study is from 06/02/2024..   ACCESSION NUMBER(S): [id number]   ORDERING CLINICIAN: RUDI FAY   TECHNIQUE: Routine axial images were obtained from the skull base through the vertex.  Sagittal and coronal reconstruction images were generated. Brain, subdural, and bone windows were reviewed. N/A Unavailable   FINDINGS: INTRACRANIAL: Moderate prominence of ventricles and sulci. There is mild-to-moderate patchy hypodensity throughout the deep periventricular white matter. Tiny old lacunar infarcts in the left basal ganglia and left thalamus. No acute intracranial bleed, midline shift, or focal mass effect. No destructive bone lesion. No depressed skull fracture. Skullbase arterial calcifications in the carotid siphons and vertebral arteries.   EXTRACRANIAL: Visualized paranasal sinuses were clear. Visualized mastoid air cells were clear.       No depressed skull fracture. No acute intracranial bleed or focal mass effect.   Tiny old lacunar infarcts in the left thalamus and left basal ganglia.   Moderate volume loss.   Mild-to-moderate chronic white matter ischemic disease in the deep periventricular regions.   MACRO: None   Signed by: Eusebio Jesus 8/25/2024 4:16 PM Dictation workstation:   [id number]    * Cannot find OR log *  Last relevant procedure:        This patient currently has cardiac telemetry ordered; if you would like to modify or discontinue the telemetry order, click here to go to the  orders activity to modify/discontinue the order.                 Assessment/Plan   Assessment & Plan  Elevated LFTs    Apathy    Benign prostatic hyperplasia    Cardiomyopathy, ischemic    Mixed Alzheimer's and vascular dementia (Multi)    CHF (congestive heart failure) (Multi)    Chronic obstructive pulmonary disease (Multi)      Jam Cox is a 89 y.o. male with a past medical history of paroxysmal Atrial Fibrillation on Eliquis, systolic HF, last LVEF 30-35% (TTE 5/24), COPD, dementia, hyperlipidemia, hypertension presenting with c/o SOB and a cough for the last few days.  GI was consulted for elevated LFTs, ischemic bowel.   LFTs continue trending down today.  General surgery not recommending any surgical interventions for now.     -Cardiology following for congestive heart failure and fluid overload.  Continue diuresis per cardiology  - Continue to trend LFTs daily  -Continue diet as tolerated  -GI will sign off.  Please reach out if any questions or further assistance needed     Case d/w Dr. Annmarie Cabrera, APRN-CNP

## 2024-08-29 NOTE — PROGRESS NOTES
Jam Cox is a 89 y.o. male     Sundowning at night and stays up  And sleeps through the day   appears euvolemic    Will need to get him home with home health care as soon as possible      Review of Systems           Vitals:    08/29/24 1124   BP: 111/57   Pulse: 82   Resp: 20   Temp: 37 °C (98.6 °F)   SpO2: 93%        Scheduled medications  apixaban, 5 mg, oral, BID  [Held by provider] atorvastatin, 10 mg, oral, Daily  budesonide, 0.5 mg, nebulization, BID  cholecalciferol, 2,000 Units, oral, Daily  digoxin, 125 mcg, oral, Daily  donepezil, 10 mg, oral, Daily  empagliflozin, 10 mg, oral, Daily  finasteride, 5 mg, oral, Daily  formoterol, 20 mcg, nebulization, BID  furosemide, 40 mg, intravenous, Daily  melatonin, 3 mg, oral, Nightly  memantine, 10 mg, oral, Daily  metoprolol succinate XL, 25 mg, oral, Daily  pantoprazole, 40 mg, oral, Daily before breakfast  polyethylene glycol, 17 g, oral, Daily  sacubitriL-valsartan, 0.5 tablet, oral, BID  [Held by provider] spironolactone, 12.5 mg, oral, Daily  tamsulosin, 0.4 mg, oral, Daily      Continuous medications     PRN medications  PRN medications: acetaminophen **OR** acetaminophen, guaiFENesin, ipratropium-albuteroL, metoprolol    Lab Review   Results from last 7 days   Lab Units 08/29/24  0522 08/28/24  0629 08/27/24  0546   WBC AUTO x10*3/uL 5.9 6.3 8.6   HEMOGLOBIN g/dL 14.7 14.3 13.2*   HEMATOCRIT % 48.2 43.5 40.1*   PLATELETS AUTO x10*3/uL 158 163 163     Results from last 7 days   Lab Units 08/29/24  0522 08/28/24  0629 08/27/24  0824 08/27/24  0546   SODIUM mmol/L 136 138  --  132*   POTASSIUM mmol/L 4.3 4.4  --  5.0   CHLORIDE mmol/L 107 103  --  97*   CO2 mmol/L 20* 22  --  23   BUN mg/dL 21 33*  --  39*   CREATININE mg/dL 0.99 1.23  --  1.76*   CALCIUM mg/dL 8.4* 8.9  --  9.2   PROTEIN TOTAL g/dL 6.2* 6.3* 6.7  --    BILIRUBIN TOTAL mg/dL 3.7* 4.3* 4.9*  --    ALK PHOS U/L 94 95 102  --    ALT U/L 270* 333* 456*  --    AST U/L 112* 152* 318*  --     GLUCOSE mg/dL 87 70*  --  111*     Results from last 7 days   Lab Units 08/25/24  1716 08/25/24  1533   TROPHS ng/L 28* 26*        Transthoracic Echo (TTE) Limited   Final Result      Vascular US Mesenteric Artery Duplex Complete   Final Result      US liver with doppler   Final Result   Patent hepatic vasculature. No sonographically detectable liver   lesion. Small to moderate volume ascites.        Stones and sludge in the gallbladder.  Possible gallbladder wall   thickening versus pericholecystic fluid, nonspecific in the setting   of ascites.        Enlarged heterogenous prostate gland indenting the bladder   incidentally noted.        MACRO:   None.        Signed by: Keena Mancini 8/27/2024 4:47 PM   Dictation workstation:   [id number]      XR toe left 2+ views   Final Result   Mild 3rd toe soft tissue swelling. No gas density or bone destruction.        MACRO:   None        Signed by: Eusebio Jesus 8/27/2024 12:40 PM   Dictation workstation:   [id number]      CT chest abdomen pelvis wo IV contrast   Final Result   1.Scattered inflammatory change of the small bowel, the hepatic   flexure of the colon and the adjacent gastric antrum concerning for   nonspecific gastroenteritis/colitis.  Also considered would be   mesenteric ischemia given the extensive amount of atherosclerotic   calcification.   2.Trace amount of free fluid in the right upper quadrant deep   pelvis is nonspecific and may be secondary to GI tract   inflammation/infection.  Lack of oral contrast material limits   evaluation for perforation.  No evidence of free air.   3.Cholelithiasis without specific findings of acute   cholecystitis.   4.Markedly enlarged prostate gland.   5.Massive right hydrocele extending up into the right inguinal   canal.   6.A 6 mm subpleural left lower lobe pulmonary nodule nonspecific   and may be postinfectious.   7.Septal thickening in the lung bases which may reflect mild   pulmonary edema residual changes of  prior pneumonia.   8.Several calcifications in both kidneys measure up to   approximately 6 mm on the right and 5 mm on the left. Unclear if these   are vascular calcifications or urinary tract calculi. No   hydronephrosis.   Signed by Judah Harley MD      XR chest 2 views   Final Result   1. Left basilar atelectasis.   2. No acute cardiopulmonary findings.   Signed by Ricardo Way MD      CT head wo IV contrast   Final Result   No depressed skull fracture. No acute intracranial bleed or focal   mass effect.        Tiny old lacunar infarcts in the left thalamus and left basal ganglia.        Moderate volume loss.        Mild-to-moderate chronic white matter ischemic disease in the deep   periventricular regions.        MACRO:   None        Signed by: Eusebio Jesus 8/25/2024 4:16 PM   Dictation workstation:   [id number]            Physical Exam    Constitutional   General appearance: Sleepy  Pulmonary   Respiratory assessment: No respiratory distress, normal respiratory rhythm and effort.    Auscultation of Lungs: Clear bilateral breath sounds.   Cardiovascular   Auscultation of heart: Apical pulse normal, heart rate and rhythm normal, normal S1 and S2, no murmurs and no pericardial rub.    Exam for edema: No peripheral edema.   Abdomen   Abdominal Exam: No bruits, normal bowel sounds, soft, non-tender, no abdominal mass palpated.    Liver and Spleen exam: No hepato-splenomegaly.   Musculoskeletal     Neurologic   Alert x 1-2        Assessment/Plan      #Acute on chronic systolic congestive heart failure  Appears euvolemic    #Transaminitis  Improving liver function tests  Surgery input noted     #Elevated free T4/low TSH  Endocrinology input pending    #CKD 3B  Monitor    #Dementia  Slightly worse from the recent stressful events  Has a sitter in the room with sundowning    Reviewed infectious disease note  Monitoring off antibiotics

## 2024-08-29 NOTE — CARE PLAN
The clinical goals for the shift include patient will be able to remain free of harm and injury by the end of the shift      Problem: Nutrition  Goal: Less than 5 days NPO/clear liquids  Outcome: Progressing  Goal: Oral intake greater than 50%  Outcome: Progressing  Goal: Oral intake greater 75%  Outcome: Progressing  Goal: Consume prescribed supplement  Outcome: Progressing  Goal: Adequate PO fluid intake  Outcome: Progressing  Goal: Nutrition support goals are met within 48 hrs  Outcome: Progressing  Goal: Nutrition support is meeting 75% of nutrient needs  Outcome: Progressing  Goal: Tube feed tolerance  Outcome: Progressing  Goal: BG  mg/dL  Outcome: Progressing  Goal: Lab values WNL  Outcome: Progressing  Goal: Electrolytes WNL  Outcome: Progressing  Goal: Promote healing  Outcome: Progressing  Goal: Maintain stable weight  Outcome: Progressing  Goal: Reduce weight from edema/fluid  Outcome: Progressing  Goal: Gradual weight gain  Outcome: Progressing  Goal: Improve ostomy output  Outcome: Progressing     Problem: Pain - Adult  Goal: Verbalizes/displays adequate comfort level or baseline comfort level  Outcome: Progressing     Problem: Skin  Goal: Decreased wound size/increased tissue granulation at next dressing change  Outcome: Progressing  Goal: Participates in plan/prevention/treatment measures  Outcome: Progressing  Goal: Prevent/manage excess moisture  Outcome: Progressing  Goal: Prevent/minimize sheer/friction injuries  Outcome: Progressing  Goal: Promote/optimize nutrition  Outcome: Progressing  Goal: Promote skin healing  Outcome: Progressing  Flowsheets (Taken 8/29/2024 1225)  Promote skin healing:   Turn/reposition every 2 hours/use positioning/transfer devices   Assess skin/pad under line(s)/device(s)     Problem: Heart Failure  Goal: Improved gas exchange this shift  Outcome: Progressing  Goal: Improved urinary output this shift  Outcome: Progressing  Goal: Reduction in peripheral edema  within 24 hours  Outcome: Progressing  Goal: Report improvement of dyspnea/breathlessness this shift  Outcome: Progressing  Goal: Weight from fluid excess reduced over 2-3 days, then stabilize  Outcome: Progressing  Goal: Increase self care and/or family involvement in 24 hours  Outcome: Progressing

## 2024-08-29 NOTE — CONSULTS
Inpatient consult to Endocrinology  Consult performed by: Brian Rebollar MD  Consult ordered by: Malcolm Shen MD      Reason For Consult  Thyroid    History Of Present Illness  Jam Cox is a 89 y.o. male admitted 4 days ago with weakness and abnormal liver function tests.      Dementia  Speech largely unintelligible  Denies awareness of any thyroid problem or medication    Amiodarone infusion 3 months ago for atrial fibrillation.  He is not taking chronic amiodarone.     Past Medical History  He has a past medical history of Abnormality of plasma protein, unspecified (05/01/2017), Benign prostatic hyperplasia without lower urinary tract symptoms (05/01/2017), Disorder of prostate, unspecified (05/01/2017), Elevated prostate specific antigen (PSA) (05/01/2017), Elevated prostate specific antigen (PSA) (05/01/2017), Elevated prostate specific antigen (PSA) (05/01/2017), Elevated prostate specific antigen (PSA) (05/01/2017), Epistaxis (05/01/2017), Essential (primary) hypertension (12/19/2022), Frequency of micturition (05/01/2017), Hallucinations, unspecified (05/01/2017), Headache, unspecified (05/01/2017), Hematuria, unspecified (05/01/2017), Impacted cerumen (05/23/2024), Ischemic cardiomyopathy (12/13/2021), Nasal discharge (05/23/2024), Nasal mucosa dry (05/23/2024), Other amnesia (05/01/2017), Other microscopic hematuria (05/01/2017), Other seasonal allergic rhinitis (05/01/2017), Other secondary cataract, right eye (05/31/2018), Other specified disorders of the male genital organs (05/01/2017), Personal history of other specified conditions (02/10/2020), Presence of intraocular lens (05/01/2017), Primary insomnia (05/01/2017), Primary open-angle glaucoma, unspecified eye, stage unspecified (05/01/2017), Unspecified atrial flutter (Multi) (05/01/2017), Unspecified glaucoma (05/01/2017), and Vitamin D deficiency, unspecified (05/01/2017).    Surgical History  He has a past surgical history that  "includes Cataract extraction (10/07/2014); Other surgical history (10/07/2014); MR angio head wo IV contrast (3/3/2018); and MR angio neck wo IV contrast (3/3/2018).     Social History  He reports that he has quit smoking. His smoking use included cigarettes. He has never been exposed to tobacco smoke. He has never used smokeless tobacco. He reports that he does not currently use alcohol. He reports that he does not currently use drugs.    Family History  Family History   Problem Relation Name Age of Onset    No Known Problems Mother      No Known Problems Father          Allergies  Ace inhibitors    Review of Systems   Reason unable to perform ROS: Dementia.        Physical Exam  Constitutional:       General: He is not in acute distress.  HENT:      Head: Normocephalic.   Neck:      Thyroid: No thyroid mass or thyromegaly.   Neurological:      Mental Status: He is alert. He is disoriented.            Last Recorded Vitals  Blood pressure 130/62, pulse 98, temperature 37 °C (98.6 °F), temperature source Temporal, resp. rate 19, height 1.778 m (5' 10\"), weight 65.4 kg (144 lb 1.6 oz), SpO2 94%.    Relevant Results   Latest Reference Range & Units 08/26/24 11:41 08/27/24 08:24   Thyroxine, Free 0.61 - 1.12 ng/dL  1.20 (H)   Thyroid Stimulating Hormone 0.44 - 3.98 mIU/L 0.34 (L)        Assessment/Plan   Assessment & Plan  Elevated LFTs    Apathy    Benign prostatic hyperplasia    Cardiomyopathy, ischemic    Mixed Alzheimer's and vascular dementia (Multi)    CHF (congestive heart failure) (Multi)    Chronic obstructive pulmonary disease (Multi)      ABNORMAL THYROID FUNCTION TESTS  Minimal TSH suppression, similar to result from April, prior to amiodarone  Mild Free T4 elevation, may still be impacted by amiodarone effect  No evidence to suggest hyperthyroidism  No goiter      RECOMMENDATIONS  No intervention indicated  Plan to repeat TSH and free T4 to confirm.       Brian Rebollar MD    "

## 2024-08-29 NOTE — PROGRESS NOTES
"Subjective Data:  No complaints. Confused.        Objective Data:  Last Recorded Vitals:  Vitals:    24 2252 24 0133 24 0720 24 0737   BP: 131/65 126/71 130/62    BP Location: Right arm Left arm Right arm    Patient Position: Lying Lying Lying    Pulse:  96 98    Resp:     Temp: 36.9 °C (98.4 °F) 36.6 °C (97.8 °F) 37 °C (98.6 °F)    TempSrc: Oral Temporal Temporal    SpO2: 94% 94% 96% 94%   Weight:       Height:         Medical Gas Therapy: None (Room air)  Weight  Av.4 kg (142 lb 0.8 oz)  Min: 63.5 kg (140 lb)  Max: 65.4 kg (144 lb 1.6 oz)    LABS:  CMP:  Results from last 7 days   Lab Units 24  0522 24  0629 24  0824 24  0546 24  0705 24  1716 24  1533   SODIUM mmol/L 136 138  --  132* 130*  --  130*   POTASSIUM mmol/L 4.3 4.4  --  5.0 5.3 4.9 6.1*   CHLORIDE mmol/L 107 103  --  97* 95*  --  95*   CO2 mmol/L 20* 22  --  23 17*  --  18*   ANION GAP mmol/L 13 17  --  17 23*  --  23*   BUN mg/dL 21 33*  --  39* 30*  --  21   CREATININE mg/dL 0.99 1.23  --  1.76* 1.99*  --  1.71*   EGFR mL/min/1.73m*2 71 55*  --  36* 31*  --  37*   MAGNESIUM mg/dL  --  2.40  --   --   --   --  2.20   ALBUMIN g/dL 2.8* 3.2* 3.5  --   --  3.7 3.7   ALT U/L 270* 333* 456*  --   --  150* 135*   AST U/L 112* 152* 318*  --   --  155* 143*   BILIRUBIN TOTAL mg/dL 3.7* 4.3* 4.9*  --   --  5.3* 5.2*     CBC:  Results from last 7 days   Lab Units 24  0522 24  0629 24  0546 24  0705 24  1537   WBC AUTO x10*3/uL 5.9 6.3 8.6 8.0 6.9   HEMOGLOBIN g/dL 14.7 14.3 13.2* 12.3* 12.6*   HEMATOCRIT % 48.2 43.5 40.1* 39.8* 39.0*   PLATELETS AUTO x10*3/uL 158 163 163 167 147*   MCV fL 98 89 88 92 90     COAG:     ABO: No results found for: \"ABO\"  HEME/ENDO:  Results from last 7 days   Lab Units 24  1141   TSH mIU/L 0.34*      CARDIAC:   Results from last 7 days   Lab Units 24  1716 24  1533   TROPHS ng/L 28* 26*   BNP pg/mL  --  " 1,796*             Last I/O:    Intake/Output Summary (Last 24 hours) at 8/29/2024 1032  Last data filed at 8/29/2024 0600  Gross per 24 hour   Intake --   Output 1300 ml   Net -1300 ml     Net IO Since Admission: -2,000 mL [08/29/24 1032]            Inpatient Medications:  Scheduled medications   Medication Dose Route Frequency    apixaban  5 mg oral BID    [Held by provider] atorvastatin  10 mg oral Daily    budesonide  0.5 mg nebulization BID    cholecalciferol  2,000 Units oral Daily    digoxin  125 mcg oral Daily    donepezil  10 mg oral Daily    empagliflozin  10 mg oral Daily    finasteride  5 mg oral Daily    formoterol  20 mcg nebulization BID    furosemide  40 mg intravenous Once    melatonin  3 mg oral Nightly    memantine  10 mg oral Daily    metoprolol succinate XL  25 mg oral Daily    pantoprazole  40 mg oral Daily before breakfast    polyethylene glycol  17 g oral Daily    sacubitriL-valsartan  0.5 tablet oral BID    [Held by provider] spironolactone  12.5 mg oral Daily    tamsulosin  0.4 mg oral Daily     PRN medications   Medication    acetaminophen    Or    acetaminophen    guaiFENesin    ipratropium-albuteroL    metoprolol     Continuous Medications   Medication Dose Last Rate           Physical Exam:  Gen Well appearing elderly male lying in bed in no apparent distress Body mass index is 20.68 kg/m².   CV irregularly irregular. No m/r/g appreciated. +JVD, no leg edema.   Pulm Lungs clear with normal respiratory effort.  Neuro Alert but speech largely nonsensical. Grossly nonfocal.    I reviewed Telemetry - Atrial fibrillation with CVR       Assessment:  Acute on chronic systolic HF  Mild volume excess by imaging with noted elevated BNP and JVD. HD parameters have improved. On RA and appears comfortable. TTE stable.     Persistent atrial fibrillation  Rates improved and now acceptable. On anticoagulation with apixaban (dose had been adjusted given YOSEF).     Cardiomyopathy  On a reasonable  outpatient regimen though Entresto and Jardiance on hold given YOSEF.     Recommendations:   Will continue diuresis. Strict I/O's. Daily weights. Monitor Mag/K and supplement to >2/4. Close monitoring of renal function. Will con't digoxin as well. Eliquis back to full dose.      Eric Hernandes MD

## 2024-08-29 NOTE — PROGRESS NOTES
Physical Therapy    Physical Therapy Treatment    Patient Name: Jam Cox  MRN: [id number]  Today's Date: 8/29/2024  Time Calculation  Start Time: 1616  Stop Time: 1633  Time Calculation (min): 17 min         Assessment/Plan   PT Assessment  End of Session Communication: Bedside nurse, PCT/NA/CTA  Assessment Comment: PT treatment completed. Pt demonstrates improved ambulation however continues to be limited by significant confusion, balance, deconditioning, and fatigues and would continue to beneift from moderate intensity level therapy on discharge.  End of Session Patient Position: Up in chair, Alarm off, caregiver present (sitter present)     PT Plan  Treatment/Interventions: Bed mobility, Gait training, Transfer training, Balance training, Strengthening, Endurance training, Range of motion, Therapeutic exercise, Home exercise program, Therapeutic activity  PT Plan: Ongoing PT  PT Frequency: 3 times per week  PT Discharge Recommendations: Moderate intensity level of continued care  PT Recommended Transfer Status: Assist x1  PT - OK to Discharge: Yes (PT POC established.)      General Visit Information:   PT  Visit  PT Received On: 08/29/24  General  Reason for Referral: SOB, cough, afib  Referred By: CHRISTINA Cooley MD  Past Medical History Relevant to Rehab:   Past Medical History:   Diagnosis Date    Abnormality of plasma protein, unspecified 05/01/2017    Elevated blood protein    Benign prostatic hyperplasia without lower urinary tract symptoms 05/01/2017    BPH with elevated PSA    Disorder of prostate, unspecified 05/01/2017    Prostate disorder    Elevated prostate specific antigen (PSA) 05/01/2017    Elevated prostate specific antigen (PSA)    Elevated prostate specific antigen (PSA) 05/01/2017    Abnormal prostate specific antigen    Elevated prostate specific antigen (PSA) 05/01/2017    Abnormal PSA    Elevated prostate specific antigen (PSA) 05/01/2017    Abnormal prostate specific antigen test     Epistaxis 05/01/2017    Epistaxis, recurrent    Essential (primary) hypertension 12/19/2022    Hypertension    Frequency of micturition 05/01/2017    Urinary frequency    Hallucinations, unspecified 05/01/2017    Hallucinations    Headache, unspecified 05/01/2017    Bilateral headache    Hematuria, unspecified 05/01/2017    Hematuria    Impacted cerumen 05/23/2024    Ischemic cardiomyopathy 12/13/2021    Cardiomyopathy, ischemic    Nasal discharge 05/23/2024    Nasal mucosa dry 05/23/2024    Other amnesia 05/01/2017    Memory loss    Other microscopic hematuria 05/01/2017    Hematuria, microscopic    Other seasonal allergic rhinitis 05/01/2017    Seasonal allergies    Other secondary cataract, right eye 05/31/2018    Posterior capsular opacification visually significant of right eye    Other specified disorders of the male genital organs 05/01/2017    Scrotal swelling    Personal history of other specified conditions 02/10/2020    History of epistaxis    Presence of intraocular lens 05/01/2017    Pseudophakia    Primary insomnia 05/01/2017    Primary insomnia    Primary open-angle glaucoma, unspecified eye, stage unspecified 05/01/2017    Open angle primary glaucoma    Unspecified atrial flutter (Multi) 05/01/2017    Atrial flutter    Unspecified glaucoma 05/01/2017    Glaucoma    Vitamin D deficiency, unspecified 05/01/2017    Vitamin D deficiency       Prior to Session Communication: Bedside nurse  Patient Position Received: Bed, 3 rail up, Alarm on  General Comment: Pt confused but agreeable to treatment.    Subjective   Precautions:  Precautions  Medical Precautions: Fall precautions    Vital Signs (Past 2hrs)        Date/Time Vitals Session Patient Position Pulse Resp SpO2 BP MAP (mmHg)    08/29/24 1640 --  --  95  19  92 %  111/66  --                         Objective   Pain:  Pain Assessment  Pain Assessment: 0-10  0-10 (Numeric) Pain Score:  (Reporting shoulder and feet pain but does not provide a  rating.)  Cognition:  Cognition  Overall Cognitive Status: Impaired  Coordination:     Postural Control:  Static Sitting Balance  Static Sitting-Balance Support: Feet supported, Bilateral upper extremity supported  Static Sitting-Level of Assistance: Contact guard  Dynamic Sitting Balance  Dynamic Sitting-Balance Support: Feet supported, Bilateral upper extremity supported  Dynamic Sitting-Level of Assistance: Contact guard  Static Standing Balance  Static Standing-Balance Support: Bilateral upper extremity supported  Static Standing-Level of Assistance: Minimum assistance  Dynamic Standing Balance  Dynamic Standing-Balance Support: Bilateral upper extremity supported  Dynamic Standing-Level of Assistance: Minimum assistance, Moderate assistance    Activity Tolerance:  Activity Tolerance  Endurance: Tolerates 10 - 20 min exercise with multiple rests  Treatments:  Bed Mobility  Bed Mobility: Yes  Bed Mobility 1  Bed Mobility 1: Supine to sitting  Level of Assistance 1: Moderate assistance  Bed Mobility Comments 1: Step by step cues for sequencing. Assist to maneuver trunk into sitting.    Ambulation/Gait Training  Ambulation/Gait Training Performed: Yes  Ambulation/Gait Training 1  Surface 1: Level tile  Device 1: Rolling walker  Assistance 1: Moderate assistance  Comments/Distance (ft) 1: 12 ft x2. Pt ambulates with decreased barbara and step length. Very forward flexed posture which increases with further ambulation. Cues to correct. Pt moderately unsteady.  Transfers  Transfer: Yes  Transfer 1  Technique 1: Sit to stand, Stand to sit  Transfer Device 1: Walker  Transfer Level of Assistance 1: Minimum assistance, Moderate assistance  Trials/Comments 1: First trial min A, second trial mod A. Cues to scoot to the EOB and for hand placement prior to standing. Pt repeatedly attempts to sit prior to backing up to chair/bed.         Outcome Measures:  [facility] Basic Mobility  Turning from your back to your side while in a  flat bed without using bedrails: A little  Moving from lying on your back to sitting on the side of a flat bed without using bedrails: A lot  Moving to and from bed to chair (including a wheelchair): A little  Standing up from a chair using your arms (e.g. wheelchair or bedside chair): A lot  To walk in hospital room: A lot  Climbing 3-5 steps with railing: Total  Basic Mobility - Total Score: 13    Education Documentation  Body Mechanics, taught by Giulia Feng PT at 8/29/2024  5:06 PM.  Learner: Patient  Readiness: Acceptance  Method: Explanation  Response: Needs Reinforcement    Mobility Training, taught by Giulia Feng, PT at 8/29/2024  5:06 PM.  Learner: Patient  Readiness: Acceptance  Method: Explanation  Response: Needs Reinforcement    Education Comments  No comments found.        OP EDUCATION:       Encounter Problems       Encounter Problems (Active)       Balance       Pt will tolerate 10+ mins dynamic standing balance activities with CGA or less and no LOB using a RW.  (Progressing)       Start:  08/28/24    Expected End:  09/11/24               Mobility       STG - Patient will ambulate 30 ft with CGA and a RW.  (Progressing)       Start:  08/28/24    Expected End:  09/11/24               PT Transfers       STG - Patient will perform bed mobility with CGA or less (Progressing)       Start:  08/28/24    Expected End:  09/11/24            STG - Patient will transfer sit to and from stand with CGA and a RW. (Progressing)       Start:  08/28/24    Expected End:  09/11/24               Pain - Adult

## 2024-08-29 NOTE — PROGRESS NOTES
Pharmacy Medication History Review    Jam Cox is a 89 y.o. male admitted for Elevated LFTs. Pharmacy reviewed the patient's [id number] medications and allergies for accuracy.    The list below reflectives the updated PTA list. Please review each medication in order reconciliation for additional clarification and justification.  Prior to Admission Medications   Prescriptions Last Dose Informant   VITAMIN B COMPLEX ORAL 8/25/2024 Self   Sig: Take 1 tablet by mouth once daily.   apixaban (Eliquis) 5 mg tablet 8/25/2024 Self   Sig: Take 1 tablet (5 mg) by mouth 2 times a day.   atorvastatin (Lipitor) 10 mg tablet 8/25/2024 Self   Sig: Take 1 tablet (10 mg) by mouth once daily.   cholecalciferol (Vitamin D-3) 50 mcg (2,000 unit) capsule 8/25/2024 Self   Sig: Take 1 capsule (50 mcg) by mouth once daily.   donepezil (Aricept) 10 mg tablet 8/25/2024 Self   Sig: Take 1 tablet (10 mg) by mouth once daily.   empagliflozin (Jardiance) 10 mg 8/25/2024 Self   Sig: Take 1 tablet (10 mg) by mouth once daily.   finasteride (Proscar) 5 mg tablet 8/25/2024 Self   Sig: Take 1 tablet (5 mg) by mouth once daily.   fluticasone propion-salmeteroL (Advair Diskus) 250-50 mcg/dose diskus inhaler 8/25/2024 Self   Sig: Inhale 1 puff 2 times a day. Rinse mouth with water after use to reduce aftertaste and incidence of candidiasis. Do not swallow.   ipratropium-albuteroL (Duo-Neb) 0.5-2.5 mg/3 mL nebulizer solution     Sig: Take 3 mL by nebulization every 4 hours if needed for wheezing or shortness of breath.   melatonin 3 mg tablet Past Week Self   Sig: Take 1 tablet (3 mg) by mouth once daily at bedtime.   memantine (Namenda) 10 mg tablet  Self   Sig: Take 1 tablet (10 mg) by mouth 2 times a day.   Patient taking differently: Take 1 tablet (10 mg) by mouth once daily.   metoprolol succinate XL (Toprol-XL) 25 mg 24 hr tablet 8/25/2024 Self   Sig: Take 1 tablet (25 mg) by mouth once daily. Do not crush or chew.   Patient taking  differently: Take 0.5 tablets (12.5 mg) by mouth once daily. Do not crush or chew.   sacubitriL-valsartan (Entresto) 24-26 mg tablet 2024 Self   Sig: Take 0.5 tablets by mouth 2 times a day.   spironolactone (Aldactone) 25 mg tablet 2024 Self   Sig: Take 0.5 tablets (12.5 mg) by mouth once daily.   tamsulosin (Flomax) 0.4 mg 24 hr capsule 2024 Self   Sig: Take 1 capsule (0.4 mg) by mouth once daily. Take at bedtime.   walker (Ultra-Light Rollator) misc  Self   Si each once daily.      Facility-Administered Medications: None         The list below reflectives the updated allergy list. Please review each documented allergy for additional clarification and justification.  Allergies  Reviewed by Yulia Montoya on 2024        Severity Reactions Comments    Ace Inhibitors Not Specified Unknown ACE Inhibitors            Below are additional concerns with the patient's PTA list.  The following updates were made to the Prior to Admission medication list:     Source of Information:     Medications ADDED:   N/a  Medications CHANGED:  N/a  Medications REMOVED:   N/a  Medications NOT TAKING:   N/a    Allergy reviewed : Yes    Comments: spoke to patient      Yulia Montoya

## 2024-08-29 NOTE — PROGRESS NOTES
Medicine PA follow up note    Subjective:  Patient is lying in bed, very lethargic during my exam. Daughter and sitter at bedside. Both patient and family have no concerns.    Vitals (Last 24 Hours):  Heart Rate:  [82-98]   Temp:  [36.6 °C (97.8 °F)-37.2 °C (98.9 °F)]   Resp:  [19-20]   BP: (111-131)/(57-71)   SpO2:  [92 %-96 %]       I have reviewed all imaging reports and labs pertinent to this visit / presenting problem    PHYSICAL EXAM:  Constitutional: NAD, alert and cooperative  Eyes: no icterus  ENMT: mucous membranes moist, no lesions  Head/Neck: supple  Respiratory/Thorax: CTA bilaterally, non-labored breathing, no cough, on RA  Cardiovascular: RRR, no murmurs heard  Gastrointestinal: ND/S/NT  : no Cantu, no SP/flank discomfort  Musculoskeletal: no joint swelling, ROM intact  Extremities: no edema  Neurological: non-focal  Skin: warm and dry  Psych: calm, stable mood     MEDS:  Scheduled meds  apixaban, 5 mg, oral, BID  [Held by provider] atorvastatin, 10 mg, oral, Daily  budesonide, 0.5 mg, nebulization, BID  cholecalciferol, 2,000 Units, oral, Daily  digoxin, 125 mcg, oral, Daily  donepezil, 10 mg, oral, Daily  empagliflozin, 10 mg, oral, Daily  finasteride, 5 mg, oral, Daily  formoterol, 20 mcg, nebulization, BID  furosemide, 40 mg, intravenous, Daily  melatonin, 3 mg, oral, Nightly  memantine, 10 mg, oral, Daily  metoprolol succinate XL, 25 mg, oral, Daily  pantoprazole, 40 mg, oral, Daily before breakfast  polyethylene glycol, 17 g, oral, Daily  sacubitriL-valsartan, 0.5 tablet, oral, BID  [Held by provider] spironolactone, 12.5 mg, oral, Daily  tamsulosin, 0.4 mg, oral, Daily        Continuous meds       PRN meds  PRN medications: acetaminophen **OR** acetaminophen, guaiFENesin, ipratropium-albuteroL, metoprolol      ASSESSMENT/PLAN:  89 y.o. male with h/o dementia, paroxysmal A-Fib on Eliquis, HFrEF, cardiomyopathy, COPD, CVA, presented with of shortness of breath and cough.   Work up with A-Fib  RVR, mild acute HF, YOSEF, transaminitis, lactic acidosis, CT C/A/P with possible gastroenteritis/colitis, possible mesenteric ischemia, cholelithiasis, massive right hydrocele, possible mild pulmonary edema, calcifications in both kidneys without hydronephrosis.     SOB and cough on admission   Mild acute HFrEF s/p diuresis   Underlying COPD   -currently SpO2 stable on RA, lungs CTA   -ruled out for PE with ddimer normal for age, infection ruled out by ID  -continue nebulizers, consider ABG based on clinical status / if lethargy persists  -Lasix 40 mg IV      A-Fib RVR  -continue eliquis, metoprolol, dig loading per cardio, tele     YOSEF, soft BP  Lactic acidosis (improved)   -cr improved today, holding aldactone, restarted entresto, jardiance   -known prostatomegaly, last  ml - monitor closely, continue flomax      Elevated transaminases   Possible congestive hepatopathy vs other   -better today, statin on hold, 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, US liver noted, continue to monitor      Lethargy   -could be 2/2 impaired sleep recently with underlying dementia, monitor for now   -swallow eval as needed      HypoNa (resolved)  -monitor      Abnormal TSH/FT4   -recheck after acute illness  -endocrinology consulted      Toe ulcers   -no s/s infection on exam, L toe xray stable (done 2/2 pain on exam)     Other comorbidities as above  -continue medications as ordered and adjust based on clinical course     VTE / GI prophylaxis   -apixaban, PPI, bowel regimen in place     Discharge planning  -home when medically stable     Discussed with Dr. Shen and the interdisciplinary team     Mercy Garcia PA-C

## 2024-08-30 ENCOUNTER — DOCUMENTATION (OUTPATIENT)
Dept: HOME HEALTH SERVICES | Facility: HOME HEALTH | Age: 89
End: 2024-08-30
Payer: MEDICARE

## 2024-08-30 ENCOUNTER — HOME HEALTH ADMISSION (OUTPATIENT)
Dept: HOME HEALTH SERVICES | Facility: HOME HEALTH | Age: 89
End: 2024-08-30
Payer: MEDICARE

## 2024-08-30 VITALS
BODY MASS INDEX: 20.63 KG/M2 | TEMPERATURE: 97.9 F | OXYGEN SATURATION: 95 % | RESPIRATION RATE: 18 BRPM | HEIGHT: 70 IN | DIASTOLIC BLOOD PRESSURE: 65 MMHG | HEART RATE: 100 BPM | SYSTOLIC BLOOD PRESSURE: 105 MMHG | WEIGHT: 144.1 LBS

## 2024-08-30 DIAGNOSIS — I50.9 ACUTE ON CHRONIC CONGESTIVE HEART FAILURE, UNSPECIFIED HEART FAILURE TYPE: ICD-10-CM

## 2024-08-30 LAB
ALBUMIN SERPL BCP-MCNC: 3.5 G/DL (ref 3.4–5)
ALP SERPL-CCNC: 107 U/L (ref 33–136)
ALT SERPL W P-5'-P-CCNC: 294 U/L (ref 10–52)
ANION GAP SERPL CALC-SCNC: 15 MMOL/L (ref 10–20)
AST SERPL W P-5'-P-CCNC: 119 U/L (ref 9–39)
BILIRUB DIRECT SERPL-MCNC: 1 MG/DL (ref 0–0.3)
BILIRUB SERPL-MCNC: 3.6 MG/DL (ref 0–1.2)
BUN SERPL-MCNC: 23 MG/DL (ref 6–23)
CALCIUM SERPL-MCNC: 9.2 MG/DL (ref 8.6–10.3)
CHLORIDE SERPL-SCNC: 103 MMOL/L (ref 98–107)
CO2 SERPL-SCNC: 24 MMOL/L (ref 21–32)
CREAT SERPL-MCNC: 1.12 MG/DL (ref 0.5–1.3)
EGFRCR SERPLBLD CKD-EPI 2021: 61 ML/MIN/1.73M*2
ERYTHROCYTE [DISTWIDTH] IN BLOOD BY AUTOMATED COUNT: 17.2 % (ref 11.5–14.5)
GLUCOSE SERPL-MCNC: 107 MG/DL (ref 74–99)
HCT VFR BLD AUTO: 53.8 % (ref 41–52)
HGB BLD-MCNC: 16 G/DL (ref 13.5–17.5)
MCH RBC QN AUTO: 28 PG (ref 26–34)
MCHC RBC AUTO-ENTMCNC: 29.7 G/DL (ref 32–36)
MCV RBC AUTO: 94 FL (ref 80–100)
NRBC BLD-RTO: 0 /100 WBCS (ref 0–0)
PLATELET # BLD AUTO: 202 X10*3/UL (ref 150–450)
POTASSIUM SERPL-SCNC: 4.4 MMOL/L (ref 3.5–5.3)
PROT SERPL-MCNC: 7.6 G/DL (ref 6.4–8.2)
RBC # BLD AUTO: 5.72 X10*6/UL (ref 4.5–5.9)
SODIUM SERPL-SCNC: 138 MMOL/L (ref 136–145)
WBC # BLD AUTO: 6.1 X10*3/UL (ref 4.4–11.3)

## 2024-08-30 PROCEDURE — 2500000001 HC RX 250 WO HCPCS SELF ADMINISTERED DRUGS (ALT 637 FOR MEDICARE OP): Performed by: FAMILY MEDICINE

## 2024-08-30 PROCEDURE — 2500000004 HC RX 250 GENERAL PHARMACY W/ HCPCS (ALT 636 FOR OP/ED): Performed by: INTERNAL MEDICINE

## 2024-08-30 PROCEDURE — 80053 COMPREHEN METABOLIC PANEL: CPT | Performed by: NURSE PRACTITIONER

## 2024-08-30 PROCEDURE — 2500000001 HC RX 250 WO HCPCS SELF ADMINISTERED DRUGS (ALT 637 FOR MEDICARE OP): Performed by: INTERNAL MEDICINE

## 2024-08-30 PROCEDURE — 36415 COLL VENOUS BLD VENIPUNCTURE: CPT | Performed by: NURSE PRACTITIONER

## 2024-08-30 PROCEDURE — 2500000004 HC RX 250 GENERAL PHARMACY W/ HCPCS (ALT 636 FOR OP/ED): Performed by: FAMILY MEDICINE

## 2024-08-30 PROCEDURE — 85027 COMPLETE CBC AUTOMATED: CPT | Performed by: NURSE PRACTITIONER

## 2024-08-30 PROCEDURE — 97530 THERAPEUTIC ACTIVITIES: CPT | Mod: GO

## 2024-08-30 PROCEDURE — 99239 HOSP IP/OBS DSCHRG MGMT >30: CPT | Performed by: INTERNAL MEDICINE

## 2024-08-30 PROCEDURE — 94640 AIRWAY INHALATION TREATMENT: CPT

## 2024-08-30 PROCEDURE — 2500000002 HC RX 250 W HCPCS SELF ADMINISTERED DRUGS (ALT 637 FOR MEDICARE OP, ALT 636 FOR OP/ED): Performed by: FAMILY MEDICINE

## 2024-08-30 PROCEDURE — 99231 SBSQ HOSP IP/OBS SF/LOW 25: CPT | Performed by: SURGERY

## 2024-08-30 PROCEDURE — 82248 BILIRUBIN DIRECT: CPT | Performed by: NURSE PRACTITIONER

## 2024-08-30 PROCEDURE — 99233 SBSQ HOSP IP/OBS HIGH 50: CPT | Performed by: INTERNAL MEDICINE

## 2024-08-30 PROCEDURE — 2500000002 HC RX 250 W HCPCS SELF ADMINISTERED DRUGS (ALT 637 FOR MEDICARE OP, ALT 636 FOR OP/ED): Performed by: PHARMACIST

## 2024-08-30 RX ORDER — TORSEMIDE 20 MG/1
20 TABLET ORAL DAILY
Qty: 30 TABLET | Refills: 1 | Status: SHIPPED | OUTPATIENT
Start: 2024-08-31 | End: 2025-08-31

## 2024-08-30 RX ORDER — ATORVASTATIN CALCIUM 10 MG/1
10 TABLET, FILM COATED ORAL DAILY
Start: 2024-08-30

## 2024-08-30 RX ORDER — SPIRONOLACTONE 25 MG/1
12.5 TABLET ORAL DAILY
Start: 2024-08-30

## 2024-08-30 RX ORDER — TORSEMIDE 20 MG/1
20 TABLET ORAL DAILY
Status: DISCONTINUED | OUTPATIENT
Start: 2024-08-31 | End: 2024-08-30 | Stop reason: HOSPADM

## 2024-08-30 RX ORDER — DIGOXIN 125 MCG
125 TABLET ORAL DAILY
Qty: 30 TABLET | Refills: 1 | Status: SHIPPED | OUTPATIENT
Start: 2024-08-31 | End: 2025-08-31

## 2024-08-30 ASSESSMENT — COGNITIVE AND FUNCTIONAL STATUS - GENERAL
DAILY ACTIVITIY SCORE: 18
DRESSING REGULAR UPPER BODY CLOTHING: A LITTLE
TOILETING: A LITTLE
CLIMB 3 TO 5 STEPS WITH RAILING: A LOT
DRESSING REGULAR LOWER BODY CLOTHING: A LOT
TURNING FROM BACK TO SIDE WHILE IN FLAT BAD: A LITTLE
PERSONAL GROOMING: A LITTLE
PERSONAL GROOMING: A LITTLE
WALKING IN HOSPITAL ROOM: A LOT
STANDING UP FROM CHAIR USING ARMS: A LITTLE
MOBILITY SCORE: 17
EATING MEALS: A LITTLE
EATING MEALS: A LITTLE
MOVING TO AND FROM BED TO CHAIR: A LITTLE
TOILETING: TOTAL
DRESSING REGULAR LOWER BODY CLOTHING: A LITTLE
HELP NEEDED FOR BATHING: A LITTLE
DAILY ACTIVITIY SCORE: 14
HELP NEEDED FOR BATHING: A LOT
DRESSING REGULAR UPPER BODY CLOTHING: A LITTLE

## 2024-08-30 ASSESSMENT — PAIN SCALES - GENERAL
PAINLEVEL_OUTOF10: 0 - NO PAIN

## 2024-08-30 ASSESSMENT — PAIN - FUNCTIONAL ASSESSMENT
PAIN_FUNCTIONAL_ASSESSMENT: 0-10

## 2024-08-30 NOTE — CARE PLAN
Problem: Nutrition  Goal: Less than 5 days NPO/clear liquids  Outcome: Progressing  Goal: Oral intake greater than 50%  Outcome: Progressing  Goal: Oral intake greater 75%  Outcome: Progressing  Goal: Consume prescribed supplement  Outcome: Progressing  Goal: Adequate PO fluid intake  Outcome: Progressing  Goal: Nutrition support goals are met within 48 hrs  Outcome: Progressing  Goal: Nutrition support is meeting 75% of nutrient needs  Outcome: Progressing  Goal: Tube feed tolerance  Outcome: Progressing  Goal: BG  mg/dL  Outcome: Progressing  Goal: Lab values WNL  Outcome: Progressing  Goal: Electrolytes WNL  Outcome: Progressing  Goal: Promote healing  Outcome: Progressing  Goal: Maintain stable weight  Outcome: Progressing  Goal: Reduce weight from edema/fluid  Outcome: Progressing  Goal: Gradual weight gain  Outcome: Progressing  Goal: Improve ostomy output  Outcome: Progressing     Problem: Pain - Adult  Goal: Verbalizes/displays adequate comfort level or baseline comfort level  Outcome: Progressing     Problem: Safety - Adult  Goal: Free from fall injury  Outcome: Progressing     Problem: Discharge Planning  Goal: Discharge to home or other facility with appropriate resources  Outcome: Progressing     Problem: Chronic Conditions and Co-morbidities  Goal: Patient's chronic conditions and co-morbidity symptoms are monitored and maintained or improved  Outcome: Progressing     Problem: Skin  Goal: Decreased wound size/increased tissue granulation at next dressing change  Outcome: Progressing  Flowsheets (Taken 8/30/2024 0507)  Decreased wound size/increased tissue granulation at next dressing change: Promote sleep for wound healing  Goal: Participates in plan/prevention/treatment measures  Outcome: Progressing  Flowsheets (Taken 8/30/2024 0507)  Participates in plan/prevention/treatment measures: Increase activity/out of bed for meals  Goal: Prevent/manage excess moisture  Outcome:  Progressing  Flowsheets (Taken 8/30/2024 0507)  Prevent/manage excess moisture: Cleanse incontinence/protect with barrier cream  Goal: Prevent/minimize sheer/friction injuries  Outcome: Progressing  Flowsheets (Taken 8/30/2024 0507)  Prevent/minimize sheer/friction injuries: Use pull sheet  Goal: Promote/optimize nutrition  Outcome: Progressing  Flowsheets (Taken 8/30/2024 0507)  Promote/optimize nutrition: Monitor/record intake including meals  Goal: Promote skin healing  Outcome: Progressing  Flowsheets (Taken 8/30/2024 0507)  Promote skin healing: Assess skin/pad under line(s)/device(s)     Problem: Heart Failure  Goal: Improved gas exchange this shift  Outcome: Progressing  Goal: Improved urinary output this shift  Outcome: Progressing  Goal: Reduction in peripheral edema within 24 hours  Outcome: Progressing  Goal: Report improvement of dyspnea/breathlessness this shift  Outcome: Progressing  Goal: Weight from fluid excess reduced over 2-3 days, then stabilize  Outcome: Progressing  Goal: Increase self care and/or family involvement in 24 hours  Outcome: Progressing

## 2024-08-30 NOTE — PROGRESS NOTES
"Subjective Data:  No complaints. He is eager to be discharged.        Objective Data:  Last Recorded Vitals:  Vitals:    24 0439 24 0500 24 0746 24 0835   BP: 105/62   106/68   BP Location: Left arm   Left arm   Patient Position: Lying   Lying   Pulse: 93 90  100   Resp: 17   19   Temp: 37.1 °C (98.7 °F)   36.4 °C (97.6 °F)   TempSrc: Skin   Axillary   SpO2: 96%  96% 95%   Weight:       Height:         Medical Gas Therapy: None (Room air)  Weight  Av.4 kg (142 lb 0.8 oz)  Min: 63.5 kg (140 lb)  Max: 65.4 kg (144 lb 1.6 oz)    LABS:  CMP:  Results from last 7 days   Lab Units 24  0601 24  0522 24  0629 24  0824 24  0546 24  0705 24  1716 24  1533   SODIUM mmol/L 138 136 138  --  132* 130*  --  130*   POTASSIUM mmol/L 4.4 4.3 4.4  --  5.0 5.3 4.9 6.1*   CHLORIDE mmol/L 103 107 103  --  97* 95*  --  95*   CO2 mmol/L 24 20* 22  --  23 17*  --  18*   ANION GAP mmol/L 15 13 17  --  17 23*  --  23*   BUN mg/dL 23 21 33*  --  39* 30*  --  21   CREATININE mg/dL 1.12 0.99 1.23  --  1.76* 1.99*  --  1.71*   EGFR mL/min/1.73m*2 61 71 55*  --  36* 31*  --  37*   MAGNESIUM mg/dL  --   --  2.40  --   --   --   --  2.20   ALBUMIN g/dL 3.5 2.8* 3.2* 3.5  --   --  3.7 3.7   ALT U/L 294* 270* 333* 456*  --   --  150* 135*   AST U/L 119* 112* 152* 318*  --   --  155* 143*   BILIRUBIN TOTAL mg/dL 3.6* 3.7* 4.3* 4.9*  --   --  5.3* 5.2*     CBC:  Results from last 7 days   Lab Units 24  0601 24  0522 24  0629 24  0546 24  0705 24  1537   WBC AUTO x10*3/uL 6.1 5.9 6.3 8.6 8.0 6.9   HEMOGLOBIN g/dL 16.0 14.7 14.3 13.2* 12.3* 12.6*   HEMATOCRIT % 53.8* 48.2 43.5 40.1* 39.8* 39.0*   PLATELETS AUTO x10*3/uL 202 158 163 163 167 147*   MCV fL 94 98 89 88 92 90     COAG:     ABO: No results found for: \"ABO\"  HEME/ENDO:  Results from last 7 days   Lab Units 24  1141   TSH mIU/L 0.34*      CARDIAC:   Results from last 7 days   Lab " Units 08/25/24  1716 08/25/24  1533   TROPHS ng/L 28* 26*   BNP pg/mL  --  1,796*             Last I/O:    Intake/Output Summary (Last 24 hours) at 8/30/2024 1213  Last data filed at 8/30/2024 0000  Gross per 24 hour   Intake --   Output 1100 ml   Net -1100 ml     Net IO Since Admission: -4,000 mL [08/30/24 1213]            Inpatient Medications:  Scheduled medications   Medication Dose Route Frequency    apixaban  5 mg oral BID    [Held by provider] atorvastatin  10 mg oral Daily    budesonide  0.5 mg nebulization BID    cholecalciferol  2,000 Units oral Daily    digoxin  125 mcg oral Daily    donepezil  10 mg oral Daily    empagliflozin  10 mg oral Daily    finasteride  5 mg oral Daily    formoterol  20 mcg nebulization BID    furosemide  40 mg intravenous Daily    melatonin  3 mg oral Nightly    memantine  10 mg oral Daily    metoprolol succinate XL  25 mg oral Daily    pantoprazole  40 mg oral Daily before breakfast    polyethylene glycol  17 g oral Daily    sacubitriL-valsartan  0.5 tablet oral BID    [Held by provider] spironolactone  12.5 mg oral Daily    tamsulosin  0.4 mg oral Daily     PRN medications   Medication    acetaminophen    Or    acetaminophen    guaiFENesin    ipratropium-albuteroL    metoprolol     Continuous Medications   Medication Dose Last Rate           Physical Exam:  Gen Well appearing elderly male lying in bed in no apparent distress Body mass index is 20.68 kg/m².   CV irregularly irregular. No m/r/g appreciated. No JVD, no leg edema.   Pulm Lungs clear with normal respiratory effort.  Neuro Alert and conversant.    I reviewed Telemetry - Atrial fibrillation with predominantly CVR       Assessment:  Acute on chronic systolic HF  Mild volume excess by imaging with noted elevated BNP and JVD. HD parameters have improved. On RA and appears comfortable. TTE stable. Volume status now acceptable.     Persistent atrial fibrillation  Rates improved and now acceptable on dual AV blockade. On  anticoagulation with apixaban. Outpatient cardioversion can be considered.     Cardiomyopathy  On a reasonable outpatient regimen though Aldactone on hold indefinitely given soft BP's.     Recommendations:   Diuretics to oral. No cardiac barriers to discharge. Follow up with HF clinic and with his outpatient cardiologist.       Thank you for the consult. Will sign off, but we are available for any ?'s       Eric Hernandes MD

## 2024-08-30 NOTE — DISCHARGE SUMMARY
Discharge Diagnosis  Elevated LFTs    Issues Requiring Follow-Up  pCP    Test Results Pending At Discharge  Pending Labs       No current pending labs.            Hospital Course   Patient with a past medical history significant for paroxysmal atrial fibrillation systolic congestive heart failure cardiomyopathy with an EF of 30% COPD dementia was admitted to the hospital with worsening shortness of breath  There was some concerns about possible ischemic bowel with elevated lactate and elevated LFTs  However it was felt that the elevated LFTs were secondary to passive congestion from congestive heart failure  The liver disease have improved  We did obtain a gallbladder ultrasound which showed stones and in surgery input was consistent with passive congestion of liver  Patient was reportedly to diuretics  He does continue to sundown in the hospital and has needed steroids  Strongly recommend patient return home with home health care instead of going to rehab facility we will not do well  Family in agreement  Will arrange for home health care with therapy  Discharge in stable condition    Pertinent Physical Exam At Time of Discharge  Physical Exam    Constitutional   General appearance: Pleasantly confused    Pulmonary   Respiratory assessment: No respiratory distress, normal respiratory rhythm and effort.    Auscultation of Lungs: Clear bilateral breath sounds.   Cardiovascular   Auscultation of heart: Apical pulse normal, heart rate and rhythm normal, normal S1 and S2, no murmurs and no pericardial rub.    Exam for edema: No peripheral edema.   Abdomen   Abdominal Exam: No bruits, normal bowel sounds, soft, non-tender, no abdominal mass palpated.    Liver and Spleen exam: No hepato-splenomegaly.   Musculoskeletal   Examination of gait: Normal.    Inspection of digits and nails: No clubbing or cyanosis of the fingernails.    Inspection/palpation of joints, bones and muscles: No joint swelling. Normal movement of all  extremities.   Skin   Skin inspection: Normal skin color and pigmentation, normal skin turgor and no visible rash.   Neurologic   Cranial nerves: Nerves 2-12 were intact, no focal neuro defects.    Home Medications     Medication List      START taking these medications     digoxin 125 MCG tablet; Commonly known as: Lanoxin; Take 1 tablet (125   mcg) by mouth once daily.; Start taking on: August 31, 2024   torsemide 20 mg tablet; Commonly known as: Demadex; Take 1 tablet (20   mg) by mouth once daily. Do not fill before August 31, 2024.; Start taking   on: August 31, 2024     CHANGE how you take these medications     atorvastatin 10 mg tablet; Commonly known as: Lipitor; Take 1 tablet (10   mg) by mouth once daily. Follow up with PCP prior to restarting; What   changed: additional instructions   spironolactone 25 mg tablet; Commonly known as: Aldactone; Take 0.5   tablets (12.5 mg) by mouth once daily. Follow up with PCP prior to   restarting; What changed: additional instructions     CONTINUE taking these medications     cholecalciferol 50 mcg (2,000 unit) capsule; Commonly known as: Vitamin   D-3   clindamycin 1 % lotion; Commonly known as: Cleocin T; Apply 1   Application topically 2 times a day. Apply to affected area twice daily   donepezil 10 mg tablet; Commonly known as: Aricept; Take 1 tablet (10   mg) by mouth once daily.   Eliquis 5 mg tablet; Generic drug: apixaban; Take 1 tablet (5 mg) by   mouth 2 times a day.   Entresto 24-26 mg tablet; Generic drug: sacubitriL-valsartan; Take 0.5   tablets by mouth 2 times a day.   finasteride 5 mg tablet; Commonly known as: Proscar; Take 1 tablet (5   mg) by mouth once daily.   fluticasone propion-salmeteroL 250-50 mcg/dose diskus inhaler; Commonly   known as: Advair Diskus; Inhale 1 puff 2 times a day. Rinse mouth with   water after use to reduce aftertaste and incidence of candidiasis. Do not   swallow.   ipratropium-albuteroL 0.5-2.5 mg/3 mL nebulizer solution;  Commonly known   as: Duo-Neb; Take 3 mL by nebulization every 4 hours if needed for   wheezing or shortness of breath.   Jardiance 10 mg; Generic drug: empagliflozin; Take 1 tablet (10 mg) by   mouth once daily.   melatonin 3 mg tablet; Take 1 tablet (3 mg) by mouth once daily at   bedtime.   memantine 10 mg tablet; Commonly known as: Namenda; Take 1 tablet (10   mg) by mouth 2 times a day.   metoprolol succinate XL 25 mg 24 hr tablet; Commonly known as:   Toprol-XL; Take 1 tablet (25 mg) by mouth once daily. Do not crush or   chew.   tamsulosin 0.4 mg 24 hr capsule; Commonly known as: Flomax; Take 1   capsule (0.4 mg) by mouth once daily. Take at bedtime.   Ultra-Light Rollator misc; Generic drug: walker; 1 each once daily.   VITAMIN B COMPLEX ORAL       Outpatient Follow-Up  Future Appointments   Date Time Provider Department Center   9/30/2024 11:30 AM Malcolm Shen MD [id number] [facility]   1/9/2025  1:40 PM Agustina Diaz MD [id number] [facility]   2/5/2025  1:00 PM Sanjay Black MD [id number] [facility]   4/10/2025 10:45 AM Sherrell Malcolm MD [id number] [facility]     Patient seen at bedside. Events from the last visit reviewed. Discussed with staff. Results of tests and investigations from last visit reviewed and discussed with patient/Family. Electronic chart on [facility] reviewed. Input / Recommendations  from consultants  appreciated and reviewed and agreed with.     discharge summary and profile completed. medications reviewed and discussed with patient and family.  scripts completed and signed.     total discharge time in excess of 30 minutes.      Malcolm Shen MD

## 2024-08-30 NOTE — PROGRESS NOTES
Occupational Therapy    OT Treatment    Patient Name: Jam Cox  MRN: [id number]  Today's Date: 8/30/2024  Time Calculation  Start Time: 1345  Stop Time: 1403  Time Calculation (min): 18 min        Assessment:  OT Assessment: Demonstrating improved fxnl mobility and transfers. Granddaughter present and stating they will not take him to SNF because she's a nurse and can help/ family can help. Ed provided on need for 24/7 sup and assist with ADLs/ mobility. Recommending mod intensity OT or Low with 24/7 supervision.  Prognosis: Fair  Barriers to Discharge: None  Evaluation/Treatment Tolerance: Patient limited by fatigue (cognition is a barrier)  Medical Staff Made Aware: Yes  End of Session Communication: Bedside nurse  End of Session Patient Position: Bed, 3 rail up, Alarm on  Prognosis: Fair  Barriers to Discharge: None  Evaluation/Treatment Tolerance: Patient limited by fatigue (cognition is a barrier)  Medical Staff Made Aware: Yes  Plan:  Treatment Interventions: ADL retraining, Functional transfer training, Endurance training, Cognitive reorientation  OT Frequency: 3 times per week  OT Discharge Recommendations: Moderate intensity level of continued care  OT Recommended Transfer Status: Moderate assist, Assist of 2  OT - OK to Discharge: Yes  Treatment Interventions: ADL retraining, Functional transfer training, Endurance training, Cognitive reorientation    Subjective   Previous Visit Info:  OT Last Visit  OT Received On: 08/30/24  General:  General  Reason for Referral: SOB, cough, afib  Referred By: CHRISTINA Cooley MD  Past Medical History Relevant to Rehab:   Past Medical History:   Diagnosis Date    Abnormality of plasma protein, unspecified 05/01/2017    Elevated blood protein    Benign prostatic hyperplasia without lower urinary tract symptoms 05/01/2017    BPH with elevated PSA    Disorder of prostate, unspecified 05/01/2017    Prostate disorder    Elevated prostate specific antigen (PSA) 05/01/2017     Elevated prostate specific antigen (PSA)    Elevated prostate specific antigen (PSA) 05/01/2017    Abnormal prostate specific antigen    Elevated prostate specific antigen (PSA) 05/01/2017    Abnormal PSA    Elevated prostate specific antigen (PSA) 05/01/2017    Abnormal prostate specific antigen test    Epistaxis 05/01/2017    Epistaxis, recurrent    Essential (primary) hypertension 12/19/2022    Hypertension    Frequency of micturition 05/01/2017    Urinary frequency    Hallucinations, unspecified 05/01/2017    Hallucinations    Headache, unspecified 05/01/2017    Bilateral headache    Hematuria, unspecified 05/01/2017    Hematuria    Impacted cerumen 05/23/2024    Ischemic cardiomyopathy 12/13/2021    Cardiomyopathy, ischemic    Nasal discharge 05/23/2024    Nasal mucosa dry 05/23/2024    Other amnesia 05/01/2017    Memory loss    Other microscopic hematuria 05/01/2017    Hematuria, microscopic    Other seasonal allergic rhinitis 05/01/2017    Seasonal allergies    Other secondary cataract, right eye 05/31/2018    Posterior capsular opacification visually significant of right eye    Other specified disorders of the male genital organs 05/01/2017    Scrotal swelling    Personal history of other specified conditions 02/10/2020    History of epistaxis    Presence of intraocular lens 05/01/2017    Pseudophakia    Primary insomnia 05/01/2017    Primary insomnia    Primary open-angle glaucoma, unspecified eye, stage unspecified 05/01/2017    Open angle primary glaucoma    Unspecified atrial flutter (Multi) 05/01/2017    Atrial flutter    Unspecified glaucoma 05/01/2017    Glaucoma    Vitamin D deficiency, unspecified 05/01/2017    Vitamin D deficiency     Family/Caregiver Present: Yes  Caregiver Feedback: granddaughter present for part of session, wife present for part of session  Prior to Session Communication: Bedside nurse  Patient Position Received: Bed, 3 rail up, Alarm on  Preferred Learning Style: auditory,  kinesthetic, verbal, visual  General Comment: confused, follows 1 step commands, decreased attention, confused with more than 1 person giving directions  Precautions:  Hearing/Visual Limitations: [place]  Medical Precautions: Fall precautions    Objective    Cognition:  Cognition  Orientation Level: Disoriented to time, Disoriented to situation    Bed Mobility/Transfers: Bed Mobility 1  Bed Mobility 1: Supine to sitting  Level of Assistance 1: Minimum assistance  Bed Mobility Comments 1: assist with RLE and hips, HOB elevated    Transfer 1  Transfer From 1: Bed to  Transfer to 1: Stand  Technique 1: Sit to stand, Stand to sit  Transfer Device 1: Walker  Transfer Level of Assistance 1: Contact guard  Trials/Comments 1: x3 trials, ed to pt and family on hand placement and safe transfer techniques    Functional Mobility:  Functional Mobility  Functional Mobility Performed: Yes  Functional Mobility 1  Surface 1: Level tile  Device 1: Rolling walker  Assistance 1: Contact guard  Comments 1: Side step L/ R x5 steps each direction, x2 trials. Cues for safe use of RW, cues for hand placement, cues for seqeuncing and attention to task  Sitting Balance:  Static Sitting Balance  Static Sitting-Balance Support: Feet supported  Static Sitting-Level of Assistance: Close supervision  Dynamic Sitting Balance  Dynamic Sitting-Balance Support: Feet supported  Dynamic Sitting-Level of Assistance: Close supervision  Dynamic Sitting-Balance: Reaching for objects    Outcome Measures:[facility] Daily Activity  Putting on and taking off regular lower body clothing: A lot  Bathing (including washing, rinsing, drying): A lot  Putting on and taking off regular upper body clothing: A little  Toileting, which includes using toilet, bedpan or urinal: Total  Taking care of personal grooming such as brushing teeth: A little  Eating Meals: A little  Daily Activity - Total Score: 14      Education Documentation  Precautions, taught by Roxanne Batista OT at  8/30/2024  2:24 PM.  Learner: Patient  Readiness: Acceptance  Method: Explanation  Response: Verbalizes Understanding    Body Mechanics, taught by Roxanne Batista OT at 8/30/2024  2:24 PM.  Learner: Patient  Readiness: Acceptance  Method: Explanation  Response: Verbalizes Understanding    Education Comments  No comments found.      Goals:  Encounter Problems       Encounter Problems (Active)       ADLs       Patient will perform UB and LB bathing 25% with moderate assist level of assistance and adaptive equipment prn . (Progressing)       Start:  08/28/24    Expected End:  09/11/24            Patient with complete upper body dressing with minimal assist  level of assistance donning and doffing all UE clothes with no adaptive equipment while supported sitting (Progressing)       Start:  08/28/24    Expected End:  09/11/24            Patient with complete lower body dressing with moderate assist level of assistance donning and doffing all LE clothes  with PRN adaptive equipment while supported sitting (Progressing)       Start:  08/28/24    Expected End:  09/11/24            Patient will feed self with set-up level of assistance and verbal cues, manual cues, tactile cues, and visual cues using PRN adaptive equipment. (Progressing)       Start:  08/28/24    Expected End:  09/11/24               COGNITION/SAFETY       Patient will follow 75%% Simple commands to allow improved ADL performance. (Progressing)       Start:  08/28/24    Expected End:  09/11/24            Patient will demonstrated orientation x 3 with verbal cues, visual cues, and auditory cues. (Progressing)       Start:  08/28/24    Expected End:  09/11/24       ORIENTATION            TRANSFERS       Patient will perform bed mobility minimal assist  level of assistance and bed rails and draw sheet in order to improve safety and independence with mobility (Progressing)       Start:  08/28/24    Expected End:  09/11/24            Patient will complete  functional transfer to chair with front wheeled walker with minimal assist  level of assistance. (Progressing)       Start:  08/28/24    Expected End:  09/11/24

## 2024-08-30 NOTE — HH CARE COORDINATION
Home Care received a Referral for Nursing, Physical Therapy, and Occupational Therapy. We have processed the referral for a Start of Care on 24-48 HOURS .     If you have any questions or concerns, please feel free to contact us at [phone]. Follow the prompts, enter your five digit zip code, and you will be directed to your care team on [facility] 3.

## 2024-08-30 NOTE — NURSING NOTE

## 2024-08-30 NOTE — PROGRESS NOTES
08/30/24 1150   Discharge Planning   Expected Discharge Disposition SNF         Revisited patient, his wife and daughter at bedside to discus discharge plan. Family is not sure what they will decide. They did give choices for SNF as 1. [facility] and 2. [facility]. Requested from [facility] to send referral to facilities for reviewing. Will continue to follow for acceptance and for discharge needs.    1520     Spoke to patient's wife and daughter and explained to them that they will have to follow up with podiatry as outpatient and that patient is ready for discharge. They agreed with plan to go home with [facility] following and [facility].

## 2024-08-30 NOTE — HH CARE COORDINATION
Home Care received a Referral for Nursing, Physical Therapy, Occupational Therapy, and Home Health Aide. We have processed the referral for a Start of Care on 24-48 HOURS.     If you have any questions or concerns, please feel free to contact us at [phone]. Follow the prompts, enter your five digit zip code, and you will be directed to your care team on [facility] 3.

## 2024-08-30 NOTE — PROGRESS NOTES
"Jam Cox is a 89 y.o. male on day 5 of admission presenting with Elevated LFTs.    Assessment/Plan   Patient with abnormal liver function test likely secondary to hepatic congestion.  They seem to be improving.  There is no role for surgical intervention.  Please call for additional questions    Subjective   Patient without complaints.  Pleasant and nontoxic-appearing       Objective     Physical Exam  NAD  Alert  Abdomen is soft and benign    Last Recorded Vitals  Blood pressure 106/68, pulse 100, temperature 36.4 °C (97.6 °F), temperature source Axillary, resp. rate 19, height 1.778 m (5' 10\"), weight 65.4 kg (144 lb 1.6 oz), SpO2 95%.  Intake/Output last 3 Shifts:  I/O last 3 completed shifts:  In: - (0 mL/kg)   Out: 2850 (43.6 mL/kg) [Urine:2850 (1.2 mL/kg/hr)]  Weight: 65.4 kg     Relevant Results    Scheduled medications  apixaban, 5 mg, oral, BID  [Held by provider] atorvastatin, 10 mg, oral, Daily  budesonide, 0.5 mg, nebulization, BID  cholecalciferol, 2,000 Units, oral, Daily  digoxin, 125 mcg, oral, Daily  donepezil, 10 mg, oral, Daily  empagliflozin, 10 mg, oral, Daily  finasteride, 5 mg, oral, Daily  formoterol, 20 mcg, nebulization, BID  furosemide, 40 mg, intravenous, Daily  melatonin, 3 mg, oral, Nightly  memantine, 10 mg, oral, Daily  metoprolol succinate XL, 25 mg, oral, Daily  pantoprazole, 40 mg, oral, Daily before breakfast  polyethylene glycol, 17 g, oral, Daily  sacubitriL-valsartan, 0.5 tablet, oral, BID  [Held by provider] spironolactone, 12.5 mg, oral, Daily  tamsulosin, 0.4 mg, oral, Daily      Continuous medications     PRN medications  PRN medications: acetaminophen **OR** acetaminophen, guaiFENesin, ipratropium-albuteroL, metoprolol    Results for orders placed or performed during the hospital encounter of 08/25/24 (from the past 24 hour(s))   Basic metabolic panel   Result Value Ref Range    Glucose 107 (H) 74 - 99 mg/dL    Sodium 138 136 - 145 mmol/L    Potassium 4.4 3.5 - 5.3 " mmol/L    Chloride 103 98 - 107 mmol/L    Bicarbonate 24 21 - 32 mmol/L    Anion Gap 15 10 - 20 mmol/L    Urea Nitrogen 23 6 - 23 mg/dL    Creatinine 1.12 0.50 - 1.30 mg/dL    eGFR 61 >60 mL/min/1.73m*2    Calcium 9.2 8.6 - 10.3 mg/dL   CBC   Result Value Ref Range    WBC 6.1 4.4 - 11.3 x10*3/uL    nRBC 0.0 0.0 - 0.0 /100 WBCs    RBC 5.72 4.50 - 5.90 x10*6/uL    Hemoglobin 16.0 13.5 - 17.5 g/dL    Hematocrit 53.8 (H) 41.0 - 52.0 %    MCV 94 80 - 100 fL    MCH 28.0 26.0 - 34.0 pg    MCHC 29.7 (L) 32.0 - 36.0 g/dL    RDW 17.2 (H) 11.5 - 14.5 %    Platelets 202 150 - 450 x10*3/uL   Hepatic Function Panel   Result Value Ref Range    Albumin 3.5 3.4 - 5.0 g/dL    Bilirubin, Total 3.6 (H) 0.0 - 1.2 mg/dL    Bilirubin, Direct 1.0 (H) 0.0 - 0.3 mg/dL    Alkaline Phosphatase 107 33 - 136 U/L     (H) 10 - 52 U/L     (H) 9 - 39 U/L    Total Protein 7.6 6.4 - 8.2 g/dL           I spent 25 minutes in the professional and overall care of this patient.      Lucas Bustos MD

## 2024-08-30 NOTE — PROGRESS NOTES
"Jam Cox is a 89 y.o. male on day 5 of admission presenting with Elevated LFTs.    Subjective   Pt resting comfortably in bed, pleasantly confused  Unable to obtain ROS 2/2 mental status       Objective     Constitutional: elderly male, thin, pt in NAD, alert and cooperative  Eyes: PERRL, EOMI, no icterus   ENMT: mucous membranes moist, no apparent injury, no lesions seen  Head/Neck: Neck supple, no apparent injury  Respiratory/Thorax: Lungs CTA bilaterally, non-labored breathing, no cough, on RA  Cardiovascular: Regular, rate and rhythm, no murmurs, normal S1 and S2  Gastrointestinal: Nondistended, soft, non-tender, BS present x 4  Musculoskeletal: ROM intact, no joint swelling, normal strength  Extremities: normal extremities, no edema, contusions or wounds  Neurological: alert and oriented x 2  Skin: Warm and dry, Left and Right 3rd toes with healed wound, no signs of infection      Last Recorded Vitals  Blood pressure 94/61, pulse 97, temperature 36.7 °C (98.1 °F), temperature source Temporal, resp. rate 19, height 1.778 m (5' 10\"), weight 65.4 kg (144 lb 1.6 oz), SpO2 93%.  Intake/Output last 3 Shifts:  I/O last 3 completed shifts:  In: - (0 mL/kg)   Out: 2850 (43.6 mL/kg) [Urine:2850 (1.2 mL/kg/hr)]  Weight: 65.4 kg     Relevant Results            This patient currently has cardiac telemetry ordered; if you would like to modify or discontinue the telemetry order, click here to go to the orders activity to modify/discontinue the order.      Assessment/Plan   Assessment & Plan  Elevated LFTs    Apathy    Benign prostatic hyperplasia    Cardiomyopathy, ischemic    Mixed Alzheimer's and vascular dementia (Multi)    CHF (congestive heart failure) (Multi)    Chronic obstructive pulmonary disease (Multi)    Jam Cox is a 89 y.o. male with history of dementia, COPD, chronic HF, BPH admitted with acute on chronic systolic HF, cardiomyopathy and elevated LFT's.    Neuro: hx of Dementia  - A&Ox1-2, pleasantly " confused  - continue home Aricept, Mematine  - continue Melatonin for insomnia    Cardio: Acute on Chronic Systolic HF, Afib, Cardiomyopathy  - HDS, euvolemic on exam  - seen by cardiology, possible CVN as outpt   - continue Digoxin, Apixiban, Jardiance, Entresto, Tosemide    Pulm: Hx of COPD  - stable on RA  - continue Budesonide    FEN/GI: Transaminitis 2/2 sepsis?  - LFTs downtrending  - regular diet  - daily Miralax  - PPI    Renal: no acute issues  - voiding  - continue Flomax    MSK: marie 3rd toe wounds  - no signs of infection  - outpt podiatry consult    ID:  no acute issues  -MICRO: BC x2 Neg  - monitor off atb    Dispo: stable for discharge today           I spent 25 minutes in the professional and overall care of this patient.      ADIN Latif-CNP

## 2024-09-02 ENCOUNTER — PATIENT OUTREACH (OUTPATIENT)
Dept: CARE COORDINATION | Age: 89
End: 2024-09-02
Payer: MEDICARE

## 2024-09-02 SDOH — HEALTH STABILITY: MENTAL HEALTH: HOW OFTEN DO YOU HAVE A DRINK CONTAINING ALCOHOL?: NEVER

## 2024-09-02 SDOH — SOCIAL STABILITY: SOCIAL NETWORK: HOW OFTEN DO YOU ATTEND CHURCH OR RELIGIOUS SERVICES?: NEVER

## 2024-09-02 SDOH — SOCIAL STABILITY: SOCIAL NETWORK: HOW OFTEN DO YOU GET TOGETHER WITH FRIENDS OR RELATIVES?: MORE THAN THREE TIMES A WEEK

## 2024-09-02 SDOH — ECONOMIC STABILITY: HOUSING INSECURITY: AT ANY TIME IN THE PAST 12 MONTHS, WERE YOU HOMELESS OR LIVING IN A SHELTER (INCLUDING NOW)?: NO

## 2024-09-02 SDOH — SOCIAL STABILITY: SOCIAL INSECURITY: WITHIN THE LAST YEAR, HAVE YOU BEEN HUMILIATED OR EMOTIONALLY ABUSED IN OTHER WAYS BY YOUR PARTNER OR EX-PARTNER?: NO

## 2024-09-02 SDOH — SOCIAL STABILITY: SOCIAL NETWORK: HOW OFTEN DO YOU ATTENT MEETINGS OF THE CLUB OR ORGANIZATION YOU BELONG TO?: NEVER

## 2024-09-02 SDOH — HEALTH STABILITY: MENTAL HEALTH
STRESS IS WHEN SOMEONE FEELS TENSE, NERVOUS, ANXIOUS, OR CAN'T SLEEP AT NIGHT BECAUSE THEIR MIND IS TROUBLED. HOW STRESSED ARE YOU?: TO SOME EXTENT

## 2024-09-02 SDOH — ECONOMIC STABILITY: INCOME INSECURITY: IN THE PAST 12 MONTHS, HAS THE ELECTRIC, GAS, OIL, OR WATER COMPANY THREATENED TO SHUT OFF SERVICE IN YOUR HOME?: NO

## 2024-09-02 SDOH — HEALTH STABILITY: MENTAL HEALTH: HOW MANY STANDARD DRINKS CONTAINING ALCOHOL DO YOU HAVE ON A TYPICAL DAY?: PATIENT DOES NOT DRINK

## 2024-09-02 SDOH — ECONOMIC STABILITY: FOOD INSECURITY: WITHIN THE PAST 12 MONTHS, YOU WORRIED THAT YOUR FOOD WOULD RUN OUT BEFORE YOU GOT MONEY TO BUY MORE.: NEVER TRUE

## 2024-09-02 SDOH — SOCIAL STABILITY: SOCIAL NETWORK: ARE YOU MARRIED, WIDOWED, DIVORCED, SEPARATED, NEVER MARRIED, OR LIVING WITH A PARTNER?: MARRIED

## 2024-09-02 SDOH — HEALTH STABILITY: MENTAL HEALTH
HOW OFTEN DO YOU NEED TO HAVE SOMEONE HELP YOU WHEN YOU READ INSTRUCTIONS, PAMPHLETS, OR OTHER WRITTEN MATERIAL FROM YOUR DOCTOR OR PHARMACY?: ALWAYS

## 2024-09-02 SDOH — SOCIAL STABILITY: SOCIAL NETWORK: IN A TYPICAL WEEK, HOW MANY TIMES DO YOU TALK ON THE PHONE WITH FAMILY, FRIENDS, OR NEIGHBORS?: THREE TIMES A WEEK

## 2024-09-02 SDOH — ECONOMIC STABILITY: FOOD INSECURITY: WITHIN THE PAST 12 MONTHS, THE FOOD YOU BOUGHT JUST DIDN'T LAST AND YOU DIDN'T HAVE MONEY TO GET MORE.: NEVER TRUE

## 2024-09-02 SDOH — ECONOMIC STABILITY: INCOME INSECURITY: HOW HARD IS IT FOR YOU TO PAY FOR THE VERY BASICS LIKE FOOD, HOUSING, MEDICAL CARE, AND HEATING?: NOT HARD AT ALL

## 2024-09-02 SDOH — HEALTH STABILITY: PHYSICAL HEALTH: ON AVERAGE, HOW MANY DAYS PER WEEK DO YOU ENGAGE IN MODERATE TO STRENUOUS EXERCISE (LIKE A BRISK WALK)?: 0 DAYS

## 2024-09-02 SDOH — SOCIAL STABILITY: SOCIAL NETWORK
DO YOU BELONG TO ANY CLUBS OR ORGANIZATIONS SUCH AS CHURCH GROUPS UNIONS, FRATERNAL OR ATHLETIC GROUPS, OR SCHOOL GROUPS?: YES

## 2024-09-02 SDOH — HEALTH STABILITY: PHYSICAL HEALTH: ON AVERAGE, HOW MANY MINUTES DO YOU ENGAGE IN EXERCISE AT THIS LEVEL?: 0 MIN

## 2024-09-02 SDOH — HEALTH STABILITY: MENTAL HEALTH: HOW OFTEN DO YOU HAVE 6 OR MORE DRINKS ON ONE OCCASION?: NEVER

## 2024-09-02 SDOH — SOCIAL STABILITY: SOCIAL INSECURITY: WITHIN THE LAST YEAR, HAVE YOU BEEN AFRAID OF YOUR PARTNER OR EX-PARTNER?: NO

## 2024-09-02 SDOH — ECONOMIC STABILITY: INCOME INSECURITY: IN THE LAST 12 MONTHS, WAS THERE A TIME WHEN YOU WERE NOT ABLE TO PAY THE MORTGAGE OR RENT ON TIME?: NO

## 2024-09-02 ASSESSMENT — LIFESTYLE VARIABLES
AUDIT-C TOTAL SCORE: 0
SKIP TO QUESTIONS 9-10: 1

## 2024-09-02 NOTE — PROGRESS NOTES
"Enrollment Call Note:   Pt had a series of hospitalizations in April through May 2024, for acutely decompensated heart failure in setting of AF/RVR, and multifocal pneumonia per provider note. At this time pt is at home and gets care from family, mainly daughter Jonny who is retired. Pt's wife is also involved in his care. Pt has had past diagnosis of CHF, COPD, and A-fib. Daughter is requesting pt has Masimo, will be discussed on [facility] 9/4 @1430.  8/30 Reported: \" Home Care received a Referral for Nursing, Physical Therapy, Occupational Therapy, and Home Health Aide. We have processed the referral for a Start of Care on 24-48 HOURS.\"     Daughter Jonny demonstrates clear understanding: Yes        Last 3 Weights:  Wt Readings from Last 7 Encounters:   08/26/24 65.4 kg (144 lb 1.6 oz)   06/27/24 67.2 kg (148 lb 3.2 oz)   06/12/24 68.2 kg (150 lb 4.8 oz)   06/03/24 67.1 kg (148 lb)   06/02/24 67.6 kg (149 lb)   05/29/24 66.7 kg (147 lb)   05/23/24 66.2 kg (146 lb)       Masimo Device: No     Virtual Visits--Scheduled (Most Recent Date at Top)  Follow up Appointments  Recent Visits  No visits were found meeting these conditions.  Showing recent visits within past 30 days and meeting all other requirements  Future Appointments  Date Type Provider Dept   09/30/24 Appointment Malcolm Shen MD Do [id number]   Showing future appointments within next 90 days and meeting all other requirements       Frequency of RN Calls & Virtual Visits per Team Agreement: [facility] Frequency: Daily    Medication issues Addressed (what was done):     Follow up appointments scheduled by [facility] Staff: Initial [facility] 9/4 @1430  Referrals made by [facility] staff:         "

## 2024-09-03 ENCOUNTER — HOME CARE VISIT (OUTPATIENT)
Dept: HOME HEALTH SERVICES | Facility: HOME HEALTH | Age: 89
End: 2024-09-03
Payer: MEDICARE

## 2024-09-03 ENCOUNTER — PATIENT OUTREACH (OUTPATIENT)
Dept: CARE COORDINATION | Age: 89
End: 2024-09-03
Payer: MEDICARE

## 2024-09-03 ENCOUNTER — PATIENT OUTREACH (OUTPATIENT)
Dept: HOME HEALTH SERVICES | Age: 89
End: 2024-09-03
Payer: MEDICARE

## 2024-09-03 VITALS — WEIGHT: 136 LBS | BODY MASS INDEX: 19.51 KG/M2

## 2024-09-03 PROCEDURE — 169592 NO-PAY CLAIM PROCEDURE

## 2024-09-03 PROCEDURE — G0299 HHS/HOSPICE OF RN EA 15 MIN: HCPCS | Mod: HHH

## 2024-09-03 NOTE — PROGRESS NOTES
"Daily Call Note:   1230 - Daily call completed with pt's spouse today. She states that the pt had just come down stairs to start his day and was taking his aerosol at the time of this call.  Spouse reports that the pt doesn't seem to be struggling, when asked if he was having and SOB or trouble breathing. He has no swelling and \"never c/o pain.\"   They do own a scale and he weighed 136 today.    Initial [facility] is 9/4 at 1430.  No other questions or concerns.    Pt Education: per POC  Barriers: none  Topics for Daily Review: breathing, pain, swelling, aerosols  Pt demonstrates clear understanding: Yes    Daily Weight:  Vitals:    09/03/24 0800   Weight: 61.7 kg (136 lb)      Last 3 Weights:  Wt Readings from Last 7 Encounters:   09/03/24 61.7 kg (136 lb)   08/26/24 65.4 kg (144 lb 1.6 oz)   06/27/24 67.2 kg (148 lb 3.2 oz)   06/12/24 68.2 kg (150 lb 4.8 oz)   06/03/24 67.1 kg (148 lb)   06/02/24 67.6 kg (149 lb)   05/29/24 66.7 kg (147 lb)       Masimo Device: No   Masimo Clinical Impression: n/a    Virtual Visits--Scheduled (Most Recent Date at Top)  Follow up Appointments  Recent Visits  No visits were found meeting these conditions.  Showing recent visits within past 30 days and meeting all other requirements  Future Appointments  Date Type Provider Dept   09/30/24 Appointment Malcolm Shen MD Do [id number]   Showing future appointments within next 90 days and meeting all other requirements       Frequency of RN Calls & Virtual Visits per Team Agreement: [facility] Frequency: Daily    Medication issues Addressed (what was done): none    Follow up appointments scheduled by [facility] Staff: none  Referrals made by [facility] staff: none        "

## 2024-09-04 ENCOUNTER — HOME CARE VISIT (OUTPATIENT)
Dept: HOME HEALTH SERVICES | Facility: HOME HEALTH | Age: 89
End: 2024-09-04
Payer: MEDICARE

## 2024-09-04 ENCOUNTER — TELEMEDICINE (OUTPATIENT)
Dept: PHARMACY | Facility: HOSPITAL | Age: 89
End: 2024-09-04
Payer: MEDICARE

## 2024-09-04 ENCOUNTER — PATIENT OUTREACH (OUTPATIENT)
Dept: HOME HEALTH SERVICES | Age: 89
End: 2024-09-04

## 2024-09-04 ENCOUNTER — PATIENT OUTREACH (OUTPATIENT)
Dept: PRIMARY CARE | Facility: CLINIC | Age: 89
End: 2024-09-04

## 2024-09-04 VITALS
HEIGHT: 69 IN | SYSTOLIC BLOOD PRESSURE: 105 MMHG | BODY MASS INDEX: 20.08 KG/M2 | HEART RATE: 75 BPM | DIASTOLIC BLOOD PRESSURE: 56 MMHG | RESPIRATION RATE: 18 BRPM | OXYGEN SATURATION: 96 % | TEMPERATURE: 97.9 F

## 2024-09-04 VITALS
DIASTOLIC BLOOD PRESSURE: 53 MMHG | BODY MASS INDEX: 19.51 KG/M2 | HEART RATE: 65 BPM | SYSTOLIC BLOOD PRESSURE: 90 MMHG | WEIGHT: 136 LBS

## 2024-09-04 VITALS
HEART RATE: 66 BPM | DIASTOLIC BLOOD PRESSURE: 54 MMHG | RESPIRATION RATE: 15 BRPM | TEMPERATURE: 96.7 F | SYSTOLIC BLOOD PRESSURE: 90 MMHG

## 2024-09-04 DIAGNOSIS — I48.0 PAROXYSMAL ATRIAL FIBRILLATION (MULTI): ICD-10-CM

## 2024-09-04 DIAGNOSIS — J42 CHRONIC BRONCHITIS, UNSPECIFIED CHRONIC BRONCHITIS TYPE (MULTI): ICD-10-CM

## 2024-09-04 DIAGNOSIS — I50.42 CHRONIC COMBINED SYSTOLIC AND DIASTOLIC CONGESTIVE HEART FAILURE (MULTI): Primary | ICD-10-CM

## 2024-09-04 PROCEDURE — G0151 HHCP-SERV OF PT,EA 15 MIN: HCPCS | Mod: HHH

## 2024-09-04 RX ORDER — IPRATROPIUM BROMIDE AND ALBUTEROL SULFATE 2.5; .5 MG/3ML; MG/3ML
3 SOLUTION RESPIRATORY (INHALATION) 4 TIMES DAILY PRN
Qty: 360 ML | Refills: 11 | Status: SHIPPED | OUTPATIENT
Start: 2024-09-04 | End: 2025-08-30

## 2024-09-04 RX ORDER — METOPROLOL SUCCINATE 25 MG/1
25 TABLET, EXTENDED RELEASE ORAL DAILY
Qty: 30 TABLET | Refills: 1 | Status: SHIPPED | OUTPATIENT
Start: 2024-09-04 | End: 2024-11-03

## 2024-09-04 SDOH — HEALTH STABILITY: PHYSICAL HEALTH
EXERCISE COMMENTS: SEATED LE HEP X 10 REPS EACH AP, SEATED HIP FLEXION, SEATED KNEE EXTENSION, PILLOW SQUEEZE AND HIP ABD.

## 2024-09-04 ASSESSMENT — ENCOUNTER SYMPTOMS
DESCRIPTION OF MEMORY LOSS: LONG TERM
LOSS OF SENSATION IN FEET: 0
DESCRIPTION OF MEMORY LOSS: SHORT TERM
CHANGE IN APPETITE: VARYING
PERSON REPORTING PAIN: PATIENT
DEPRESSION: 0
DENIES PAIN: 1
DENIES PAIN: 1
OCCASIONAL FEELINGS OF UNSTEADINESS: 1
APPETITE LEVEL: FAIR
MUSCLE WEAKNESS: 1

## 2024-09-04 ASSESSMENT — ACTIVITIES OF DAILY LIVING (ADL)
AMBULATION ASSISTANCE ON FLAT SURFACES: 1
ENTERING_EXITING_HOME: TOTAL DEPENDENCE

## 2024-09-04 NOTE — PROGRESS NOTES
Discharge Facility: [facility]   Discharge Diagnosis:Elevated LFTs   Admission Date: 8/26/24  Discharge Date: 8/30/24    PCP Appointment Date: no appointment, message to office  Specialist Appointment Date: n/a  Hospital Encounter and Summary Linked: Yes    Two attempts were made to reach patient within two business days after discharge. Voicemail left with contact information for patient to call back with any non-emergent questions or concerns if able.

## 2024-09-04 NOTE — PROGRESS NOTES
Initial [facility] call completed with Dr Bell and Eliel. Patients wife states the patient is doing better since his discharge. She states that his breathing has been good. He complained of stomach discomfort this am that has resolved. Patient has been having regular bowel movements. Appetite has been improving.   Weight yesterday 136 LBS BP 90/53 HR 65 no edema   patient takes his meds with applesauce no issues with meds patients daughter states the patient has a sore on his toe they cleaned it and put a bandaid on they will keep an eye on it  this has been on going since April he bumps it often. He has an abrasion on his bottom that is superficial and smaller than a quarter they will also keep an eye on this. Reviewed the images in his chart from admission. HC has started. PT is going to see the patient this afternoon. Labs reviewed they should be drawn by home care tomorrow.  Medication reviewed patient is taking full dose of Metoprolol they stopped his spironolactone  and atorvastatin he is taking 1/2 tab entresto BID.  No medication needs at this time reviewed up coming appointments. Next [facility] 9/11 @ 1330 we will have the medic get his labs tomorrow

## 2024-09-04 NOTE — PROGRESS NOTES
Pharmacy Post-Discharge Visit     Jam Cox is a 89 y.o. male was referred to Clinical Pharmacy Team to complete a post-discharge medication optimization and monitoring visit.  The patient was referred for their CHF and Afib management while now re-enrolled in our [facility] after recent admission.     Admission Date: 8/25/24  Discharge Date: 8/30/24    Referring Provider: El Bourgeois M*  PCP: Malcolm Shen MD - next visit: 9/30      Subjective   Allergies   Allergen Reactions    Ace Inhibitors Unknown     ACE Inhibitors       [employer] #[id number] [place], [place] - [address] AT [facility] [address]  [address]  [facility] [address]  Phone: [phone] Fax: [phone]      Medication System Management:  Affordability/Accessibility: already approved for PAP   Adherence/Organization: no issues       Social History     Social History Narrative    Not on file        Notable Medication changes following discharge:  Start: digoxin and torsemide   Stop: none  Change: none    HPI  CHF ASSESSMENT  Staging:  Most recent ejection fraction: 30-35%  NYHA Stage: II  ACC/AHA Stage: C     Symptom Assessment:  Weight changes/edema?: Yes - down 8 lbs from most recent admission date   Dyspnea?: None  Dizziness/syncope/palpitations?: No     Current Regimen:  ARNI/ACEi/ARB: Yes - entresto  Beta Blocker: Yes - metoprolol  MRA: Yes - spironolactone   SGLT2i: Yes - jardiance      Other therapy:  Eliquis   Digoxin      Secondary Prevention:  The ASCVD Risk score (Brittanie CLEANING, et al., 2019) failed to calculate for the following reasons:    The 2019 ASCVD risk score is only valid for ages 40 to 79     Aspirin 81mg? yes  Statin?: Yes - atorvastatin   HTN?: No      PULMONARY ASSESSMENT  Patient has been diagnosed with: COPD  does not see a pulmonologist  Last visit: n/a     Current Regimen:  Advair   Duo nebs      Appropriate Inhaler Technique? yes  How often do you use  your rescue inhaler? N/a     Symptom Assessment:  Current symptoms: dyspnea, fatigue, and weight loss  Symptoms are remain unchanged     Immunization History:  Influenza: Date [9/19/2023]  PCV13: Date [2/3/2017]  PPSV23: Date [10/4/2012]  PCV20: Date [n/a]  COVID: Date [10/16/2023]  RSV: Date [11/16/2023]     Smoking history:  He has never smoked.     Review of Systems        Objective     There were no vitals taken for this visit.   BP Readings from Last 4 Encounters:   08/30/24 105/65   06/27/24 104/52   06/12/24 (!) 101/47   06/12/24 (!) 100/42      There were no vitals filed for this visit.     LAB  Lab Results   Component Value Date    BILITOT 3.6 (H) 08/30/2024    CALCIUM 9.2 08/30/2024    CO2 24 08/30/2024     08/30/2024    CREATININE 1.12 08/30/2024    GLUCOSE 107 (H) 08/30/2024    ALKPHOS 107 08/30/2024    K 4.4 08/30/2024    PROT 7.6 08/30/2024     08/30/2024     (H) 08/30/2024     (H) 08/30/2024    BUN 23 08/30/2024    ANIONGAP 15 08/30/2024    MG 2.40 08/28/2024    PHOS 3.2 05/08/2024    ALBUMIN 3.5 08/30/2024    LIPASE 38 10/05/2017    GFRMALE 79 05/23/2023     Lab Results   Component Value Date    TRIG 73 03/05/2018    CHOL 104 03/05/2018    HDL 32.7 (A) 03/05/2018     Lab Results   Component Value Date    HGBA1C 5.4 04/29/2024         Current Outpatient Medications   Medication Instructions    atorvastatin (LIPITOR) 10 mg, oral, Daily, Follow up with PCP prior to restarting    cholecalciferol (Vitamin D-3) 50 mcg (2,000 unit) capsule 1 capsule, oral, Daily    clindamycin (Cleocin T) 1 % lotion 1 Application, Topical, 2 times daily, Apply to affected area twice daily    digoxin (LANOXIN) 125 mcg, oral, Daily    donepezil (ARICEPT) 10 mg, oral, Daily    Eliquis 5 mg, oral, 2 times daily    finasteride (PROSCAR) 5 mg, oral, Daily    fluticasone propion-salmeteroL (Advair Diskus) 250-50 mcg/dose diskus inhaler 1 puff, inhalation, 2 times daily RT, Rinse mouth with water after use  to reduce aftertaste and incidence of candidiasis. Do not swallow.    ipratropium-albuteroL (Duo-Neb) 0.5-2.5 mg/3 mL nebulizer solution 3 mL, nebulization, Every 4 hours PRN    Jardiance 10 mg, oral, Daily    melatonin 3 mg, oral, Nightly    memantine (NAMENDA) 10 mg, oral, 2 times daily    metoprolol succinate XL (TOPROL-XL) 25 mg, oral, Daily, Do not crush or chew.    sacubitriL-valsartan (Entresto) 24-26 mg tablet 0.5 tablets, oral, 2 times daily    spironolactone (ALDACTONE) 12.5 mg, oral, Daily, Follow up with PCP prior to restarting    tamsulosin (FLOMAX) 0.4 mg, oral, Daily, Take at bedtime.    torsemide (DEMADEX) 20 mg, oral, Daily    VITAMIN B COMPLEX ORAL 1 tablet, oral, Daily    walker (Ultra-Light Rollator) misc 1 each, miscellaneous, Daily            Assessment/Plan   Problem List Items Addressed This Visit    None    Afib  No chest pain or feelings of palpitations  Was started on digoxin from this most recent admission   Also continuing with BB and eliquis too   HR today 65  CHF  Most recent EF was 30-35% while admitted   Current GDMT --> metoprolol, entresto, spironolactone and jardiance   Spironolactone is being held   Also confirmed taking the full 25mg daily of metoprolol since being home   Lasix was stopped and was started on torsemide daily   Monitoring weights and BP's daily   BP today 90/53  Will need to get repeat labs done this week --> nursing will try to have medic team go out to obtain   His atorvastatin is also being held due to the elevated LFT's  COPD  No trouble with breathing since being home   Continue with advair bid for maintenance inhaler   Duo nebs as needed     PAP:  -already an approved PAP patient for his Eliquis, Jardiance, Entresto and Advair   -enrolled until June, 2025     Follow Up: 1 week     Continue all meds under the continuation of care with the referring provider and clinical pharmacy team.    Eliel Sosa, PharmD     Verbal consent to manage patient's drug  therapy was obtained from the patient and an individual authorized to act on behalf of a patient. They were informed they may decline to participate or withdraw from participation in pharmacy services at any time.

## 2024-09-05 ENCOUNTER — PATIENT OUTREACH (OUTPATIENT)
Dept: CARE COORDINATION | Age: 89
End: 2024-09-05
Payer: MEDICARE

## 2024-09-05 ENCOUNTER — LAB (OUTPATIENT)
Dept: LAB | Facility: LAB | Age: 89
End: 2024-09-05
Payer: MEDICARE

## 2024-09-05 ENCOUNTER — DOCUMENTATION (OUTPATIENT)
Dept: HOME HEALTH SERVICES | Age: 89
End: 2024-09-05

## 2024-09-05 ENCOUNTER — DOCUMENTATION (OUTPATIENT)
Dept: CARE COORDINATION | Age: 89
End: 2024-09-05
Payer: MEDICARE

## 2024-09-05 DIAGNOSIS — R79.89 ELEVATED LFTS: ICD-10-CM

## 2024-09-05 LAB
ALBUMIN SERPL BCP-MCNC: 3 G/DL (ref 3.4–5)
ALP SERPL-CCNC: 84 U/L (ref 33–136)
ALT SERPL W P-5'-P-CCNC: 55 U/L (ref 10–52)
ANION GAP SERPL CALC-SCNC: 11 MMOL/L (ref 10–20)
AST SERPL W P-5'-P-CCNC: 23 U/L (ref 9–39)
BILIRUB SERPL-MCNC: 2.9 MG/DL (ref 0–1.2)
BUN SERPL-MCNC: 32 MG/DL (ref 6–23)
CALCIUM SERPL-MCNC: 7.9 MG/DL (ref 8.6–10.3)
CHLORIDE SERPL-SCNC: 96 MMOL/L (ref 98–107)
CO2 SERPL-SCNC: 33 MMOL/L (ref 21–32)
CREAT SERPL-MCNC: 1.16 MG/DL (ref 0.5–1.3)
EGFRCR SERPLBLD CKD-EPI 2021: 59 ML/MIN/1.73M*2
GLUCOSE SERPL-MCNC: 75 MG/DL (ref 74–99)
POTASSIUM SERPL-SCNC: 3.9 MMOL/L (ref 3.5–5.3)
PROT SERPL-MCNC: 6.2 G/DL (ref 6.4–8.2)
SODIUM SERPL-SCNC: 136 MMOL/L (ref 136–145)

## 2024-09-05 PROCEDURE — 80053 COMPREHEN METABOLIC PANEL: CPT

## 2024-09-05 PROCEDURE — 36415 COLL VENOUS BLD VENIPUNCTURE: CPT

## 2024-09-05 ASSESSMENT — ACTIVITIES OF DAILY LIVING (ADL): OASIS_M1830: 05

## 2024-09-05 NOTE — PROGRESS NOTES
[facility] Transitions Assessment    Patient's Address:   [address] [place] [address]  **  If this is not the address patient will receive services - alert team and address in EMR**       Patient Contacts:  Extended Emergency Contact Information  Primary Emergency Contact: FAHAD SILVERIO   [place] [employer] [place]  Mobile Phone: [phone]  Relation: Daughter  Secondary Emergency Contact: FORTINO SILVERIO  Address: [address], [place] [address] [facility]  Home Phone: [phone]  Mobile Phone: [phone]  Relation: Spouse                                Patient's Preferred Phone: [phone]  Patient's E-mail: [email or web address]     Arrived on scene to obtain labs and complete an assessment. Pt found in bed, per his wife, she hasn't gotten him up to eat breakfast as she didn't know if labs were supposed to be fasting. Also pt is pretty tired today. Which is not so unusual. Lung sounds are CTA bilaterally. Manual blood pressure being 94/58.Heartrate 58,02 sat 96 percent room air. Labs obtained and taken to [facility] out patient per the request of main lab. All findings reported to team.

## 2024-09-05 NOTE — PROGRESS NOTES
Community Resource Name:   Phone Number:   Staff Member:      Discussed the following topics on behalf of the patient:  []  Behavioral Health Assistance     []  Case Management  []   Assistance  []  Digital Equity Assistance  []  Dental Health Assistance  []  Education Assistance  []  Employment Assistance  []  Financial Strain Relief Assistance  []  Food Insecurity Assistance  []  Healthcare Coverage Assistance  []  Housing Stability Assistance  []  IP Violence Relief Assistance  []  Legal Assistance  []  Physical Activity Assistance  []  Social Connection Assistance  []  Stress Relief Assistance   []  Substance Abuse Assistance  []  Transportation Assistance  []  Utility Assistance  [x]  Other: [LITERACY/UNKNOWN]    Remy Norman LCSW

## 2024-09-05 NOTE — PROGRESS NOTES
Medic visit, per medic: Headed to Mag with Polo's labs.   He's pretty tired today. Lung sounds good.   98/54 manual heartrate 58 02 sat 96 16 resp.

## 2024-09-06 ENCOUNTER — HOME CARE VISIT (OUTPATIENT)
Dept: HOME HEALTH SERVICES | Facility: HOME HEALTH | Age: 89
End: 2024-09-06
Payer: MEDICARE

## 2024-09-07 ENCOUNTER — PATIENT OUTREACH (OUTPATIENT)
Dept: HOME HEALTH SERVICES | Age: 89
End: 2024-09-07
Payer: MEDICARE

## 2024-09-07 NOTE — PROGRESS NOTES
Spoke with pt wife for daily call. Wife said pt is doing well had breakfast and meds this am. They were unable to do vitals today. No c/o sob, cp, edema. No other concerns at this time.

## 2024-09-09 ENCOUNTER — HOME CARE VISIT (OUTPATIENT)
Dept: HOME HEALTH SERVICES | Facility: HOME HEALTH | Age: 89
End: 2024-09-09
Payer: MEDICARE

## 2024-09-09 ENCOUNTER — PATIENT OUTREACH (OUTPATIENT)
Dept: HOME HEALTH SERVICES | Age: 89
End: 2024-09-09
Payer: MEDICARE

## 2024-09-09 PROCEDURE — G0156 HHCP-SVS OF AIDE,EA 15 MIN: HCPCS | Mod: HHH

## 2024-09-09 NOTE — PROGRESS NOTES
"Daily Call Note: [facility] daily call complete.  Call conducted w pt's wife.  She reports pt is feeling well.    Denies CP/SOB/edema.    Wife reports \" sores\" on bilateral feet.  States they are not getting worse, and he had them while admitted in hospital.  I chat [facility] RN, she will assess at tomorrow's visit.    B/P 112/67  HR 88  No other questions/concerns voiced.    Verified upcoming weekly [facility] call.       Pt Education:  POC   Barriers:   Topics for Daily Review:   Pt demonstrates clear understanding: No    Daily Weight: 135 lbs   There were no vitals filed for this visit.   Last 3 Weights:  Wt Readings from Last 7 Encounters:   09/04/24 61.7 kg (136 lb)   09/03/24 61.7 kg (136 lb)   08/26/24 65.4 kg (144 lb 1.6 oz)   06/27/24 67.2 kg (148 lb 3.2 oz)   06/12/24 68.2 kg (150 lb 4.8 oz)   06/03/24 67.1 kg (148 lb)   06/02/24 67.6 kg (149 lb)       Masimo Device: No   Masimo Clinical Impression:     Virtual Visits--Scheduled (Most Recent Date at Top)  Follow up Appointments  Recent Visits  No visits were found meeting these conditions.  Showing recent visits within past 30 days and meeting all other requirements  Future Appointments  Date Type Provider Dept   09/30/24 Appointment Malcolm Shen MD Do [id number]   Showing future appointments within next 90 days and meeting all other requirements       Frequency of RN Calls & Virtual Visits per Team Agreement: [facility] Frequency: Daily    Medication issues Addressed (what was done):     Follow up appointments scheduled by [facility] Staff:   Referrals made by [facility] staff:              "

## 2024-09-10 ENCOUNTER — HOME CARE VISIT (OUTPATIENT)
Dept: HOME HEALTH SERVICES | Facility: HOME HEALTH | Age: 89
End: 2024-09-10
Payer: MEDICARE

## 2024-09-10 ENCOUNTER — PATIENT OUTREACH (OUTPATIENT)
Dept: CARE COORDINATION | Age: 89
End: 2024-09-10
Payer: MEDICARE

## 2024-09-10 VITALS — DIASTOLIC BLOOD PRESSURE: 70 MMHG | HEART RATE: 73 BPM | SYSTOLIC BLOOD PRESSURE: 126 MMHG

## 2024-09-10 NOTE — PROGRESS NOTES
"Daily Call Note:   Spoke with pt's daughter Jonny. Pt did not sleep well so she stated he was taking a nap when nurse called. Pt and his family in the home were exposed to COVID by another family member on 9/8. They are in quarantine right now. None of them are positive yet, they did home testing yesterday. She stated she cancelled Home Health appts too. Her mother has a \"cold\" she stated but otherwise no one seems to be sick right now. Pt does have some SOB on exertion which he has had, not increasing. He does not wear oxygen right now. No further questions. Aware of [facility] 9/11 @1430.      Topics for Daily Review: COVID exposure 9/8. Pt and wife recently vaccinated for COVID.    daughter demonstrates clear understanding: Yes    Daily Weight:  There were no vitals filed for this visit.   Last 3 Weights:  Wt Readings from Last 7 Encounters:   09/04/24 61.7 kg (136 lb)   09/03/24 61.7 kg (136 lb)   08/26/24 65.4 kg (144 lb 1.6 oz)   06/27/24 67.2 kg (148 lb 3.2 oz)   06/12/24 68.2 kg (150 lb 4.8 oz)   06/03/24 67.1 kg (148 lb)   06/02/24 67.6 kg (149 lb)       Masimo Device: No   Masimo Clinical Impression:     Virtual Visits--Scheduled (Most Recent Date at Top)  Follow up Appointments  Recent Visits  No visits were found meeting these conditions.  Showing recent visits within past 30 days and meeting all other requirements  Future Appointments  Date Type Provider Dept   09/30/24 Appointment Malcolm Shen MD Do [id number]   Showing future appointments within next 90 days and meeting all other requirements       Frequency of RN Calls & Virtual Visits per Team Agreement: [facility] Frequency: Daily    Medication issues Addressed (what was done):     Follow up appointments scheduled by [facility] Staff:           "

## 2024-09-11 ENCOUNTER — TELEMEDICINE (OUTPATIENT)
Dept: PHARMACY | Facility: HOSPITAL | Age: 89
End: 2024-09-11
Payer: MEDICARE

## 2024-09-11 ENCOUNTER — APPOINTMENT (OUTPATIENT)
Dept: CARE COORDINATION | Age: 89
End: 2024-09-11
Payer: MEDICARE

## 2024-09-11 ENCOUNTER — HOME CARE VISIT (OUTPATIENT)
Dept: HOME HEALTH SERVICES | Facility: HOME HEALTH | Age: 89
End: 2024-09-11
Payer: MEDICARE

## 2024-09-11 ENCOUNTER — PATIENT OUTREACH (OUTPATIENT)
Dept: HOME HEALTH SERVICES | Age: 89
End: 2024-09-11

## 2024-09-11 VITALS
WEIGHT: 134.4 LBS | HEART RATE: 70 BPM | DIASTOLIC BLOOD PRESSURE: 61 MMHG | SYSTOLIC BLOOD PRESSURE: 124 MMHG | BODY MASS INDEX: 19.85 KG/M2

## 2024-09-11 DIAGNOSIS — I50.42 CHRONIC COMBINED SYSTOLIC AND DIASTOLIC CONGESTIVE HEART FAILURE (MULTI): Primary | ICD-10-CM

## 2024-09-11 DIAGNOSIS — J42 CHRONIC BRONCHITIS, UNSPECIFIED CHRONIC BRONCHITIS TYPE (MULTI): ICD-10-CM

## 2024-09-11 DIAGNOSIS — I48.0 PAROXYSMAL ATRIAL FIBRILLATION (MULTI): ICD-10-CM

## 2024-09-11 NOTE — PROGRESS NOTES
Pharmacy Post-Discharge Visit - Follow Up     Jam Cox is a 89 y.o. male was referred to Clinical Pharmacy Team to complete a post-discharge medication optimization and monitoring visit.  The patient was referred for their CHF management while also enrolled in [facility].     Referring Provider: Mayco Marsh MD  PCP: Malcolm Shen MD - next visit: 9/30      Subjective   Allergies   Allergen Reactions    Ace Inhibitors Unknown     ACE Inhibitors       [employer] #[id number] [place], [place] - [address] AT [facility] [address]  [address]  [facility] [address]  Phone: [phone] Fax: [phone]      Medication System Management:  Affordability/Accessibility: approved for PAP  Adherence/Organization: no issues       Social History     Social History Narrative    Not on file          HPI  CHF ASSESSMENT  Staging:  Most recent ejection fraction: 30-35%  NYHA Stage: II  ACC/AHA Stage: C     Symptom Assessment:  Weight changes/edema?: Yes - down another 2 lbs from visit last week   Dyspnea?: None  Dizziness/syncope/palpitations?: No     Current Regimen:  ARNI/ACEi/ARB: Yes - entresto  Beta Blocker: Yes - metoprolol  MRA: Yes - spironolactone   SGLT2i: Yes - jardiance      Other therapy:  Eliquis   Digoxin      Secondary Prevention:  The ASCVD Risk score (Brittanie CLEANING, et al., 2019) failed to calculate for the following reasons:    The 2019 ASCVD risk score is only valid for ages 40 to 79     Aspirin 81mg? yes  Statin?: Yes - atorvastatin   HTN?: No      PULMONARY ASSESSMENT  Patient has been diagnosed with: COPD  does not see a pulmonologist  Last visit: n/a     Current Regimen:  Advair   Duo nebs      Appropriate Inhaler Technique? yes  How often do you use your rescue inhaler? N/a     Symptom Assessment:  Current symptoms: dyspnea, fatigue, and weight loss  Symptoms are remain unchanged     Immunization History:  Influenza: Date [9/19/2023]  PCV13: Date [2/3/2017]  PPSV23: Date  [10/4/2012]  PCV20: Date [n/a]  COVID: Date [10/16/2023]  RSV: Date [11/16/2023]     Smoking history:  He has never smoked.     Review of Systems        Objective     There were no vitals taken for this visit.   BP Readings from Last 4 Encounters:   09/10/24 126/70   09/04/24 90/54   09/04/24 90/53   09/03/24 105/56      There were no vitals filed for this visit.     LAB  Lab Results   Component Value Date    BILITOT 2.9 (H) 09/05/2024    CALCIUM 7.9 (L) 09/05/2024    CO2 33 (H) 09/05/2024    CL 96 (L) 09/05/2024    CREATININE 1.16 09/05/2024    GLUCOSE 75 09/05/2024    ALKPHOS 84 09/05/2024    K 3.9 09/05/2024    PROT 6.2 (L) 09/05/2024     09/05/2024    AST 23 09/05/2024    ALT 55 (H) 09/05/2024    BUN 32 (H) 09/05/2024    ANIONGAP 11 09/05/2024    MG 2.40 08/28/2024    PHOS 3.2 05/08/2024    ALBUMIN 3.0 (L) 09/05/2024    LIPASE 38 10/05/2017    GFRMALE 79 05/23/2023     Lab Results   Component Value Date    TRIG 73 03/05/2018    CHOL 104 03/05/2018    HDL 32.7 (A) 03/05/2018     Lab Results   Component Value Date    HGBA1C 5.4 04/29/2024         Current Outpatient Medications   Medication Instructions    atorvastatin (LIPITOR) 10 mg, oral, Daily, Follow up with PCP prior to restarting    cholecalciferol (Vitamin D-3) 50 mcg (2,000 unit) capsule 1 capsule, oral, Daily    clindamycin (Cleocin T) 1 % lotion 1 Application, Topical, 2 times daily, Apply to affected area twice daily    digoxin (LANOXIN) 125 mcg, oral, Daily    donepezil (ARICEPT) 10 mg, oral, Daily    Eliquis 5 mg, oral, 2 times daily    finasteride (PROSCAR) 5 mg, oral, Daily    fluticasone propion-salmeteroL (Advair Diskus) 250-50 mcg/dose diskus inhaler 1 puff, inhalation, 2 times daily RT, Rinse mouth with water after use to reduce aftertaste and incidence of candidiasis. Do not swallow.    ipratropium-albuteroL (Duo-Neb) 0.5-2.5 mg/3 mL nebulizer solution 3 mL, nebulization, 4 times daily PRN    Jardiance 10 mg, oral, Daily    melatonin 3  mg, oral, Nightly    memantine (NAMENDA) 10 mg, oral, 2 times daily    metoprolol succinate XL (TOPROL-XL) 25 mg, oral, Daily, Do not crush or chew.    sacubitriL-valsartan (Entresto) 24-26 mg tablet 0.5 tablets, oral, 2 times daily    spironolactone (ALDACTONE) 12.5 mg, oral, Daily, Follow up with PCP prior to restarting    tamsulosin (FLOMAX) 0.4 mg, oral, Daily, Take at bedtime.    torsemide (DEMADEX) 20 mg, oral, Daily    VITAMIN B COMPLEX ORAL 1 tablet, oral, Daily    walker (Ultra-Light Rollator) misc 1 each, miscellaneous, Daily            Assessment/Plan   Problem List Items Addressed This Visit    None    Afib  No chest pain or feelings of palpitations  Was started on digoxin from this most recent admission   Also continuing with BB and eliquis too   HR today 70  CHF  Most recent EF was 30-35% while admitted   Current GDMT --> metoprolol, entresto, spironolactone and jardiance   Spironolactone is being held   Also confirmed taking the full 25mg daily of metoprolol since being home   Lasix was stopped and was started on torsemide daily   Monitoring weights and BP's daily   BP today 124/61  Repeat blood work showed slight bump in kidney function but will continue to hold spironolactone   His atorvastatin is also being held due to the elevated LFT's --> will also continue to hold but LFT's have also improved   COPD  No trouble with breathing since being home   Continue with advair bid for maintenance inhaler   Duo nebs as needed      PAP:  -already an approved PAP patient for his Eliquis, Jardiance, Entresto and Advair   -enrolled until June, 2025     Follow Up: 1 week     Continue all meds under the continuation of care with the referring provider and clinical pharmacy team.    Eliel Sosa, Selina     Verbal consent to manage patient's drug therapy was obtained from the patient. They were informed they may decline to participate or withdraw from participation in pharmacy services at any time.

## 2024-09-11 NOTE — PROGRESS NOTES
Daily Call Note:   [facility] with Eliel Melendez from pharmacy, Indy (wife) and Mariah (daughter). Patient's wife states he doing pretty good, he's eating a snack right now. States patient is up ambulating throughout the house (independently, does not like using his walker) and up and down the stairs (using the handrail) without any difficulty. He's eating and drinking well. Daughter states some mild intermittent confusion and patient not sleeping, but they have been using melatonin and patient sleeps well and wakes up back at baseline. Patient continues to take lasix and eliquis. Patient's daughter advised to continue to hold atorvastatin and spironolactone. They state HHC started last week but family was exposed to COVID and have restricted visitors. Discussed [facility]HC scheduled for 9/17. VS: /61, HR 70, weight 134.4 lb. Patient's family has no other questions, next [facility]VC scheduled 9/18 @ 1430.   Pt Education:   Barriers: None  Topics for Daily Review:   Pt demonstrates clear understanding: Yes    Daily Weight:  Vitals:    09/11/24 1400   Weight: 61 kg (134 lb 6.4 oz)      Last 3 Weights:  Wt Readings from Last 7 Encounters:   09/11/24 61 kg (134 lb 6.4 oz)   09/04/24 61.7 kg (136 lb)   09/03/24 61.7 kg (136 lb)   08/26/24 65.4 kg (144 lb 1.6 oz)   06/27/24 67.2 kg (148 lb 3.2 oz)   06/12/24 68.2 kg (150 lb 4.8 oz)   06/03/24 67.1 kg (148 lb)       Masimo Device: No   Masimo Clinical Impression: N/A    Virtual Visits--Scheduled (Most Recent Date at Top)  Follow up Appointments  Recent Visits  No visits were found meeting these conditions.  Showing recent visits within past 30 days and meeting all other requirements  Future Appointments  Date Type Provider Dept   09/30/24 Appointment Malcolm Shen MD Do [id number]   Showing future appointments within next 90 days and meeting all other requirements       Frequency of RN Calls & Virtual Visits per Team Agreement: [facility] Frequency:  Daily    Medication issues Addressed (what was done): None    Follow up appointments scheduled by [facility] Staff: None  Referrals made by [facility] staff: None

## 2024-09-12 ENCOUNTER — PATIENT OUTREACH (OUTPATIENT)
Dept: HOME HEALTH SERVICES | Age: 89
End: 2024-09-12

## 2024-09-12 VITALS — WEIGHT: 134.6 LBS | BODY MASS INDEX: 19.88 KG/M2

## 2024-09-12 NOTE — PROGRESS NOTES
"Daily Call Note:  [facility] daily call complete. Spoke w pt's wife, she rports pt is feeling well.    Denies CP/SOB/edema.   Wife did not check pt's VS.    She did report concern w \" sores\" on pt's feet. Secure chat sent to [facility] RN.    No other questions/concerns voiced.   Verified upcoming weekly [facility] call.     Pt Education:  POC   Barriers:   Topics for Daily Review:   Pt demonstrates clear understanding: Yes    Daily Weight:  134.6 lbs   There were no vitals filed for this visit.   Last 3 Weights:  Wt Readings from Last 7 Encounters:   09/11/24 61 kg (134 lb 6.4 oz)   09/04/24 61.7 kg (136 lb)   09/03/24 61.7 kg (136 lb)   08/26/24 65.4 kg (144 lb 1.6 oz)   06/27/24 67.2 kg (148 lb 3.2 oz)   06/12/24 68.2 kg (150 lb 4.8 oz)   06/03/24 67.1 kg (148 lb)       Masimo Device: No   Masimo Clinical Impression:     Virtual Visits--Scheduled (Most Recent Date at Top)  Follow up Appointments  Recent Visits  No visits were found meeting these conditions.  Showing recent visits within past 30 days and meeting all other requirements  Future Appointments  Date Type Provider Dept   09/30/24 Appointment Malcolm Shen MD Do [id number]   Showing future appointments within next 90 days and meeting all other requirements       Frequency of RN Calls & Virtual Visits per Team Agreement: [facility] Frequency: Daily    Medication issues Addressed (what was done):     Follow up appointments scheduled by [facility] Staff:   Referrals made by [facility] staff:              "

## 2024-09-14 ENCOUNTER — DOCUMENTATION (OUTPATIENT)
Dept: HOME HEALTH SERVICES | Age: 89
End: 2024-09-14
Payer: MEDICARE

## 2024-09-14 NOTE — PROGRESS NOTES
Daily call attempted. No answer. Left a voice message. Cellphone number in system is his granddaughter (POA). However she does not reside in the same home as patient.

## 2024-09-16 ENCOUNTER — PATIENT OUTREACH (OUTPATIENT)
Dept: HOME HEALTH SERVICES | Age: 89
End: 2024-09-16
Payer: MEDICARE

## 2024-09-16 NOTE — PROGRESS NOTES
Daily Call Note: [facility] daily call complete. Spoke w pt's dtr, and spouse.  They  report he is feeling well. Denies  SOB, edema.  Dtr states pt has been compliant w all meds.  Weight has increased 4.6 lbs since 9/12.  Dr Trent aware, no new orders she states to continue to monitor.    B/P 111/59  HR 70  No other questions/concerns voiced.   Verified upcoming weekly [facility] call.  Pt Education:  POC   Barriers:   Topics for Daily Review:   Pt demonstrates clear understanding: Yes    Daily Weight:  139.2  There were no vitals filed for this visit.   Last 3 Weights:  Wt Readings from Last 7 Encounters:   09/12/24 61.1 kg (134 lb 9.6 oz)   09/11/24 61 kg (134 lb 6.4 oz)   09/04/24 61.7 kg (136 lb)   09/03/24 61.7 kg (136 lb)   08/26/24 65.4 kg (144 lb 1.6 oz)   06/27/24 67.2 kg (148 lb 3.2 oz)   06/12/24 68.2 kg (150 lb 4.8 oz)       Masimo Device: No   Masimo Clinical Impression:     Virtual Visits--Scheduled (Most Recent Date at Top)  Follow up Appointments  Recent Visits  No visits were found meeting these conditions.  Showing recent visits within past 30 days and meeting all other requirements  Future Appointments  Date Type Provider Dept   09/30/24 Appointment Malcolm Shen MD Do [id number]   Showing future appointments within next 90 days and meeting all other requirements       Frequency of RN Calls & Virtual Visits per Team Agreement: [facility] Frequency: Daily    Medication issues Addressed (what was done):     Follow up appointments scheduled by [facility] Staff:   Referrals made by [facility] staff:

## 2024-09-17 ENCOUNTER — HOME CARE VISIT (OUTPATIENT)
Dept: HOME HEALTH SERVICES | Facility: HOME HEALTH | Age: 89
End: 2024-09-17
Payer: MEDICARE

## 2024-09-17 ENCOUNTER — PATIENT OUTREACH (OUTPATIENT)
Dept: CARE COORDINATION | Age: 89
End: 2024-09-17
Payer: MEDICARE

## 2024-09-17 VITALS — HEART RATE: 51 BPM | DIASTOLIC BLOOD PRESSURE: 62 MMHG | SYSTOLIC BLOOD PRESSURE: 128 MMHG | OXYGEN SATURATION: 95 %

## 2024-09-17 VITALS
SYSTOLIC BLOOD PRESSURE: 99 MMHG | HEART RATE: 66 BPM | WEIGHT: 137 LBS | DIASTOLIC BLOOD PRESSURE: 60 MMHG | BODY MASS INDEX: 20.23 KG/M2

## 2024-09-17 PROCEDURE — G0157 HHC PT ASSISTANT EA 15: HCPCS | Mod: CQ,HHH

## 2024-09-17 SDOH — HEALTH STABILITY: PHYSICAL HEALTH: EXERCISE COMMENTS: HES, LAQ X 20 REPS

## 2024-09-17 SDOH — HEALTH STABILITY: PHYSICAL HEALTH: EXERCISE TYPE: 50-60% CARRYOVER

## 2024-09-17 SDOH — HEALTH STABILITY: PHYSICAL HEALTH
EXERCISE COMMENTS: BIL LE SUPINE THER EX: ANKLE PUMPS, QUAD SETS, GLUTEAL SETS, HEEL SLIDES, HIP ABDUCTION, SAQ, SLR, BRIDGES, CLAMSHELLS WITH RESISTIVE THERBAND X 10 REPS.  BIL LE SEATED THER EX WITH ORANGE THERBAND RESISTIVE ANKLE DF/PF, HS CURLS, HIP ABDUCTION, MARC

## 2024-09-17 ASSESSMENT — ACTIVITIES OF DAILY LIVING (ADL): AMBULATION ASSISTANCE ON FLAT SURFACES: 1

## 2024-09-17 ASSESSMENT — ENCOUNTER SYMPTOMS
DENIES PAIN: 1
PERSON REPORTING PAIN: PATIENT

## 2024-09-17 NOTE — PROGRESS NOTES
Daily Call Note:   Spoke with spouse Crystal, pt is doing well today she stated. Pt walks up the stairs to use the BR she stated. Pt has a potty chair on main level. Some SOB on exertion she stated but generally not SOB. Pt does not wear oxygen at this time. Denies any symptoms of SOB at rest, dizziness, lightheadedness, swelling, or chest pain. [facility] 9/18 @1438, aware of appt. No further questions or concerns today.    Topics for Daily Review: weight  Spouse demonstrates clear understanding: Yes    Daily VS:  BP 99/60   Pulse 66   Wt 62.1 kg (137 lb)   BMI 20.23 kg/m²        Last 3 Weights:  Wt Readings from Last 7 Encounters:   09/12/24 61.1 kg (134 lb 9.6 oz)   09/11/24 61 kg (134 lb 6.4 oz)   09/04/24 61.7 kg (136 lb)   09/03/24 61.7 kg (136 lb)   08/26/24 65.4 kg (144 lb 1.6 oz)   06/27/24 67.2 kg (148 lb 3.2 oz)   06/12/24 68.2 kg (150 lb 4.8 oz)       Masimo Device: No       Virtual Visits--Scheduled (Most Recent Date at Top)  Follow up Appointments  Recent Visits  No visits were found meeting these conditions.  Showing recent visits within past 30 days and meeting all other requirements  Future Appointments  Date Type Provider Dept   09/30/24 Appointment Malcolm Shen MD Do [id number] [facility]   Showing future appointments within next 90 days and meeting all other requirements       Frequency of RN Calls & Virtual Visits per Team Agreement: Healthy at Home Frequency: Daily    Medication issues Addressed (what was done):     Follow up appointments scheduled by [facility] Staff: [facility] 9/18 @8578

## 2024-09-18 ENCOUNTER — APPOINTMENT (OUTPATIENT)
Dept: CARE COORDINATION | Age: 89
End: 2024-09-18
Payer: MEDICARE

## 2024-09-18 ENCOUNTER — HOME CARE VISIT (OUTPATIENT)
Dept: HOME HEALTH SERVICES | Facility: HOME HEALTH | Age: 89
End: 2024-09-18
Payer: MEDICARE

## 2024-09-18 ENCOUNTER — APPOINTMENT (OUTPATIENT)
Dept: PHARMACY | Facility: HOSPITAL | Age: 89
End: 2024-09-18
Payer: MEDICARE

## 2024-09-18 ENCOUNTER — PATIENT OUTREACH (OUTPATIENT)
Dept: HOME HEALTH SERVICES | Age: 89
End: 2024-09-18

## 2024-09-18 PROCEDURE — G0157 HHC PT ASSISTANT EA 15: HCPCS | Mod: CQ,HHH

## 2024-09-18 PROCEDURE — G0156 HHCP-SVS OF AIDE,EA 15 MIN: HCPCS | Mod: HHH

## 2024-09-18 SDOH — HEALTH STABILITY: PHYSICAL HEALTH: EXERCISE TYPE: 60% CARRYOVER OF HEP

## 2024-09-18 SDOH — HEALTH STABILITY: PHYSICAL HEALTH: EXERCISE COMMENTS: STRENGTHENING AS WELL.

## 2024-09-18 SDOH — HEALTH STABILITY: PHYSICAL HEALTH
EXERCISE COMMENTS: BIL LE SEATED THER EX WITH ORANGE THERBAND RESISTIVE ANKLE DF/PF, HS CURLS, HIP ABDUCTION, MARCHES, LAQ X 20 REPS    GAVE PATIENT THERBAND TO KEEP FOR HEP AND INCREASING LE STRENGTH, EDUCATED PATIENT AND WIFE THAT HE COULD USE BAND FOR HIS UPPER BODY

## 2024-09-18 ASSESSMENT — ENCOUNTER SYMPTOMS
PERSON REPORTING PAIN: PATIENT
DENIES PAIN: 1

## 2024-09-18 ASSESSMENT — ACTIVITIES OF DAILY LIVING (ADL): AMBULATION ASSISTANCE ON FLAT SURFACES: 1

## 2024-09-19 ENCOUNTER — HOME CARE VISIT (OUTPATIENT)
Dept: HOME HEALTH SERVICES | Facility: HOME HEALTH | Age: 89
End: 2024-09-19
Payer: MEDICARE

## 2024-09-19 ENCOUNTER — PATIENT OUTREACH (OUTPATIENT)
Dept: HOME HEALTH SERVICES | Age: 89
End: 2024-09-19
Payer: MEDICARE

## 2024-09-19 VITALS
SYSTOLIC BLOOD PRESSURE: 108 MMHG | WEIGHT: 134 LBS | BODY MASS INDEX: 19.79 KG/M2 | HEART RATE: 75 BPM | DIASTOLIC BLOOD PRESSURE: 59 MMHG

## 2024-09-19 NOTE — PROGRESS NOTES
Daily Call Note:     Daily call completed with the patient's Wife and daughter.  His wt was 134 lbs.  She's states his BP was 93/51, which she states was right when he woke up.  Advised her to recheck while we are on the call.  His BP is now 108/59, HR 75.  Last HHVC was missed so rescheduled to 9/22 @ 1500.    Pt Education:   Barriers:   Topics for Daily Review:   Pt demonstrates clear understanding:     Daily Weight:  There were no vitals filed for this visit.   Last 3 Weights:  Wt Readings from Last 7 Encounters:   09/17/24 62.1 kg (137 lb)   09/12/24 61.1 kg (134 lb 9.6 oz)   09/11/24 61 kg (134 lb 6.4 oz)   09/04/24 61.7 kg (136 lb)   09/03/24 61.7 kg (136 lb)   08/26/24 65.4 kg (144 lb 1.6 oz)   06/27/24 67.2 kg (148 lb 3.2 oz)       Masimo Device:    Masimo Clinical Impression:     Virtual Visits--Scheduled (Most Recent Date at Top)  Follow up Appointments  Recent Visits  No visits were found meeting these conditions.  Showing recent visits within past 30 days and meeting all other requirements  Future Appointments  Date Type Provider Dept   09/30/24 Appointment Malcolm Shen MD Do [id number] [facility]   Showing future appointments within next 90 days and meeting all other requirements       Frequency of RN Calls & Virtual Visits per Team Agreement:     Medication issues Addressed (what was done):     Follow up appointments scheduled by [facility] Staff:   Referrals made by [facility] staff:

## 2024-09-20 ENCOUNTER — PATIENT OUTREACH (OUTPATIENT)
Dept: HOME HEALTH SERVICES | Age: 89
End: 2024-09-20
Payer: MEDICARE

## 2024-09-20 VITALS — HEART RATE: 74 BPM | DIASTOLIC BLOOD PRESSURE: 45 MMHG | SYSTOLIC BLOOD PRESSURE: 100 MMHG

## 2024-09-20 NOTE — PROGRESS NOTES
Daily Call Note:     Daily call completed with patient's wife. She states he is doing good and they are outside on the porch getting some sunshine.  His vitals /45,  HR 74.  His weight was 141.2 lbs but he had on his clothes and shoes.  No questions, concerns or assistance needed during the call.     Pt Education:   Barriers:   Topics for Daily Review:   Pt demonstrates clear understanding:     Daily Weight:  There were no vitals filed for this visit.   Last 3 Weights:  Wt Readings from Last 7 Encounters:   09/19/24 60.8 kg (134 lb)   09/17/24 62.1 kg (137 lb)   09/12/24 61.1 kg (134 lb 9.6 oz)   09/11/24 61 kg (134 lb 6.4 oz)   09/04/24 61.7 kg (136 lb)   09/03/24 61.7 kg (136 lb)   08/26/24 65.4 kg (144 lb 1.6 oz)       Masimo Device:    Masimo Clinical Impression:     Virtual Visits--Scheduled (Most Recent Date at Top)  Follow up Appointments  Recent Visits  No visits were found meeting these conditions.  Showing recent visits within past 30 days and meeting all other requirements  Future Appointments  Date Type Provider Dept   09/30/24 Appointment Malcolm Shen MD Do [id number] [facility]   Showing future appointments within next 90 days and meeting all other requirements       Frequency of RN Calls & Virtual Visits per Team Agreement:     Medication issues Addressed (what was done):     Follow up appointments scheduled by [facility] Staff:   Referrals made by [facility] staff:

## 2024-09-21 ENCOUNTER — PATIENT OUTREACH (OUTPATIENT)
Dept: HOME HEALTH SERVICES | Age: 89
End: 2024-09-21
Payer: MEDICARE

## 2024-09-21 NOTE — PROGRESS NOTES
Daily Call Note:       Daily call completed with the patient's wife.  She states his weight today was 138.8 lbs.  She mentioned she weighed him with his pajama's on.  Asked if that is how he weighs everyday.  She believes so.  She states daughter was gonna wait until he finishes eating to take his vitals.  No questions, concerns, or needs during the call.     Pt Education:   Barriers:   Topics for Daily Review:   Pt demonstrates clear understanding:     Daily Weight:  There were no vitals filed for this visit.   Last 3 Weights:  Wt Readings from Last 7 Encounters:   09/19/24 60.8 kg (134 lb)   09/17/24 62.1 kg (137 lb)   09/12/24 61.1 kg (134 lb 9.6 oz)   09/11/24 61 kg (134 lb 6.4 oz)   09/04/24 61.7 kg (136 lb)   09/03/24 61.7 kg (136 lb)   08/26/24 65.4 kg (144 lb 1.6 oz)       Masimo Device:    Masimo Clinical Impression:     Virtual Visits--Scheduled (Most Recent Date at Top)  Follow up Appointments  Recent Visits  No visits were found meeting these conditions.  Showing recent visits within past 30 days and meeting all other requirements  Future Appointments  Date Type Provider Dept   09/30/24 Appointment Malcolm Shen MD Do [id number] [facility]   Showing future appointments within next 90 days and meeting all other requirements       Frequency of RN Calls & Virtual Visits per Team Agreement:     Medication issues Addressed (what was done):     Follow up appointments scheduled by [facility] Staff:  Referrals made by [facility] staff:

## 2024-09-22 ENCOUNTER — TELEMEDICINE (OUTPATIENT)
Dept: CARE COORDINATION | Age: 89
End: 2024-09-22
Payer: MEDICARE

## 2024-09-22 ENCOUNTER — PATIENT OUTREACH (OUTPATIENT)
Dept: HOME HEALTH SERVICES | Age: 89
End: 2024-09-22

## 2024-09-22 VITALS
DIASTOLIC BLOOD PRESSURE: 59 MMHG | HEART RATE: 74 BPM | BODY MASS INDEX: 20.59 KG/M2 | SYSTOLIC BLOOD PRESSURE: 128 MMHG | WEIGHT: 139.4 LBS

## 2024-09-22 DIAGNOSIS — I50.32 CHRONIC HEART FAILURE WITH PRESERVED EJECTION FRACTION: Primary | ICD-10-CM

## 2024-09-22 NOTE — PROGRESS NOTES
Daily Call Note: Weekly [facility] complete with Dr. Coats, and patient's spouse, Crystal. She states patient is doing well. He is eating better. She states she gives him an albuterol treatment as needed for his breathing. Next cardiology appointment is 1/9/25, she will call tomorrow and see if she can get an earlier appointment for follow up post discharge from the hospital. His weight is up to 139.4 lbs today from 134 lbs on 9/19. She denies any LE swelling. Dr. Coats instructed if his weight is up tp 140 lbs tomorrow,9/23/24, to give an extra torsemide 20 mg, she verbalized understanding. Discussed decreasing daily call frequency to M/W/F, she agrees. Scheduled next [facility] 9/29/24 at 1500, verbalized understanding. No other questions at this time.     Pt Education: per POC  Barriers:   Topics for Daily Review: BP, weight  Pt demonstrates clear understanding: Yes    Daily Weight:  Vitals:    09/22/24 1511   Weight: 63.2 kg (139 lb 6.4 oz)      Last 3 Weights:  Wt Readings from Last 7 Encounters:   09/22/24 63.2 kg (139 lb 6.4 oz)   09/19/24 60.8 kg (134 lb)   09/17/24 62.1 kg (137 lb)   09/12/24 61.1 kg (134 lb 9.6 oz)   09/11/24 61 kg (134 lb 6.4 oz)   09/04/24 61.7 kg (136 lb)   09/03/24 61.7 kg (136 lb)       Masimo Device: No   Masimo Clinical Impression: n/a    Virtual Visits--Scheduled (Most Recent Date at Top)  Follow up Appointments  Recent Visits  No visits were found meeting these conditions.  Showing recent visits within past 30 days and meeting all other requirements  Future Appointments  Date Type Provider Dept   09/30/24 Appointment Malcolm Shen MD Do [id number] [facility]   Showing future appointments within next 90 days and meeting all other requirements       Frequency of RN Calls & Virtual Visits per Team Agreement: Healthy at Home Frequency: M/W/F    Medication issues Addressed (what was done): none    Follow up appointments scheduled by [facility] Staff: none  Referrals made by [facility] staff:  none

## 2024-09-23 ENCOUNTER — PATIENT OUTREACH (OUTPATIENT)
Dept: HOME HEALTH SERVICES | Age: 89
End: 2024-09-23

## 2024-09-23 ENCOUNTER — HOME CARE VISIT (OUTPATIENT)
Dept: HOME HEALTH SERVICES | Facility: HOME HEALTH | Age: 89
End: 2024-09-23
Payer: MEDICARE

## 2024-09-23 VITALS
SYSTOLIC BLOOD PRESSURE: 108 MMHG | RESPIRATION RATE: 15 BRPM | TEMPERATURE: 98 F | DIASTOLIC BLOOD PRESSURE: 70 MMHG | HEART RATE: 87 BPM

## 2024-09-23 PROCEDURE — G0151 HHCP-SERV OF PT,EA 15 MIN: HCPCS | Mod: HHH

## 2024-09-23 PROCEDURE — G0156 HHCP-SVS OF AIDE,EA 15 MIN: HCPCS | Mod: HHH

## 2024-09-23 SDOH — HEALTH STABILITY: PHYSICAL HEALTH
EXERCISE COMMENTS: SEATED LE HEP X 10 REPS EACH AP, SEATED HIP FLEXION, SEATED KNEE EXTENSION, PILLOW SQUEEZE AND HIP ABD.  PERFORMED 15 REPS EACH OF FOLLOWING: LAQ, STANDING HIP ABD, FLEX AND EXTENSION, STANDING HEEL RAISES, MINI SQUATS AND STANDING KNEE FLEXION.

## 2024-09-23 ASSESSMENT — ENCOUNTER SYMPTOMS
PERSON REPORTING PAIN: PATIENT
DENIES PAIN: 1

## 2024-09-23 NOTE — PROGRESS NOTES
Daily Call Note: [facility] daily call complete. Call completed w pt's wife.  She reports pt is doing well.  He had [facility] PT visit today, and visit went well.  Pt's weight was 140.4 lbs.  Wife gave pt 20 mg Torsemide extra dose per [facility] MD.    Denies CP/SOB/edema.   B/P 108/70  HR 87    No other questions/concerns voiced.   Verified upcoming weekly [facility] call.  Pt Education:  POC   Barriers:   Topics for Daily Review:   Pt demonstrates clear understanding: Yes    Daily Weight:  140.4 lbs   There were no vitals filed for this visit.   Last 3 Weights:  Wt Readings from Last 7 Encounters:   09/22/24 63.2 kg (139 lb 6.4 oz)   09/19/24 60.8 kg (134 lb)   09/17/24 62.1 kg (137 lb)   09/12/24 61.1 kg (134 lb 9.6 oz)   09/11/24 61 kg (134 lb 6.4 oz)   09/04/24 61.7 kg (136 lb)   09/03/24 61.7 kg (136 lb)       Masimo Device: No   Masimo Clinical Impression:     Virtual Visits--Scheduled (Most Recent Date at Top)  Follow up Appointments  Recent Visits  No visits were found meeting these conditions.  Showing recent visits within past 30 days and meeting all other requirements  Future Appointments  Date Type Provider Dept   09/30/24 Appointment Malcolm Shen MD Do [id number] [facility]   Showing future appointments within next 90 days and meeting all other requirements       Frequency of RN Calls & Virtual Visits per Team Agreement: Healthy at Home Frequency: Bi-Weekly    Medication issues Addressed (what was done):     Follow up appointments scheduled by [facility] Staff:   Referrals made by [facility] staff:

## 2024-09-24 ENCOUNTER — HOME CARE VISIT (OUTPATIENT)
Dept: HOME HEALTH SERVICES | Facility: HOME HEALTH | Age: 89
End: 2024-09-24
Payer: MEDICARE

## 2024-09-24 VITALS
SYSTOLIC BLOOD PRESSURE: 118 MMHG | OXYGEN SATURATION: 95 % | TEMPERATURE: 97.6 F | HEART RATE: 62 BPM | WEIGHT: 138.13 LBS | RESPIRATION RATE: 18 BRPM | DIASTOLIC BLOOD PRESSURE: 78 MMHG | BODY MASS INDEX: 20.4 KG/M2

## 2024-09-24 PROCEDURE — G0300 HHS/HOSPICE OF LPN EA 15 MIN: HCPCS | Mod: HHH

## 2024-09-24 ASSESSMENT — PAIN SCALES - PAIN ASSESSMENT IN ADVANCED DEMENTIA (PAINAD)
NEGVOCALIZATION: 0
FACIALEXPRESSION: 0
TOTALSCORE: 0
NEGVOCALIZATION: 0 - NONE.
CONSOLABILITY: 0 - NO NEED TO CONSOLE.
BODYLANGUAGE: 0
BREATHING: 0
FACIALEXPRESSION: 0 - SMILING OR INEXPRESSIVE.
BODYLANGUAGE: 0 - RELAXED.
CONSOLABILITY: 0

## 2024-09-24 ASSESSMENT — ENCOUNTER SYMPTOMS
DENIES PAIN: 1
DESCRIPTION OF MEMORY LOSS: SHORT TERM
APPETITE LEVEL: FAIR
LAST BOWEL MOVEMENT: 67106
PERSON REPORTING PAIN: PATIENT
BOWEL PATTERN NORMAL: 1
STOOL FREQUENCY: DAILY

## 2024-09-25 ENCOUNTER — PATIENT OUTREACH (OUTPATIENT)
Dept: HOME HEALTH SERVICES | Age: 89
End: 2024-09-25
Payer: MEDICARE

## 2024-09-25 VITALS — WEIGHT: 138 LBS | BODY MASS INDEX: 20.38 KG/M2

## 2024-09-25 NOTE — PROGRESS NOTES
Daily Call Note: Daily call completed with the patient's wife. She stated that she has not had a chance to to vitals signs but had been able to weigh him (138 lb) - they were just returning to the home after a visit to the podiatrist. The patient was taken to see podiatrist at [facility], which see said is a system set up for the elderly - they examined and dressed the toes. She stated that sometime the skin is intact and sometimes there is breakdown on the middle toes of both feet. The feet were dressed by the [facility] RN yesterday and between visits by the patient's wife (or daughter). She stated that he needed an albuterol treatment when they returned home, but now his breathing is easy, and he is resting comfortably.      Daily Weight:  There were no vitals filed for this visit.   Last 3 Weights:  Wt Readings from Last 7 Encounters:   09/24/24 62.7 kg (138 lb 2 oz)   09/22/24 63.2 kg (139 lb 6.4 oz)   09/19/24 60.8 kg (134 lb)   09/17/24 62.1 kg (137 lb)   09/12/24 61.1 kg (134 lb 9.6 oz)   09/11/24 61 kg (134 lb 6.4 oz)   09/04/24 61.7 kg (136 lb)         Virtual Visits--Scheduled (Most Recent Date at Top)  Follow up Appointments  Recent Visits  No visits were found meeting these conditions.  Showing recent visits within past 30 days and meeting all other requirements  Future Appointments  Date Type Provider Dept   09/30/24 Appointment Malcolm Shen MD Do [id number] [facility]   Showing future appointments within next 90 days and meeting all other requirements       Frequency of RN Calls & Virtual Visits per Team Agreement: Healthy at Home Frequency: MWF

## 2024-09-26 DIAGNOSIS — G30.9 MIXED ALZHEIMER'S AND VASCULAR DEMENTIA (MULTI): ICD-10-CM

## 2024-09-26 DIAGNOSIS — F02.80 MIXED ALZHEIMER'S AND VASCULAR DEMENTIA (MULTI): ICD-10-CM

## 2024-09-26 DIAGNOSIS — F01.50 MIXED ALZHEIMER'S AND VASCULAR DEMENTIA (MULTI): ICD-10-CM

## 2024-09-27 ENCOUNTER — PATIENT OUTREACH (OUTPATIENT)
Dept: CARE COORDINATION | Age: 89
End: 2024-09-27
Payer: MEDICARE

## 2024-09-27 ENCOUNTER — HOME CARE VISIT (OUTPATIENT)
Dept: HOME HEALTH SERVICES | Facility: HOME HEALTH | Age: 89
End: 2024-09-27
Payer: MEDICARE

## 2024-09-27 PROCEDURE — G0157 HHC PT ASSISTANT EA 15: HCPCS | Mod: CQ,HHH

## 2024-09-27 RX ORDER — MEMANTINE HYDROCHLORIDE 10 MG/1
10 TABLET ORAL 2 TIMES DAILY
Qty: 180 TABLET | Refills: 3 | Status: SHIPPED | OUTPATIENT
Start: 2024-09-27 | End: 2025-09-27

## 2024-09-27 SDOH — HEALTH STABILITY: PHYSICAL HEALTH
EXERCISE COMMENTS: TIGUE. PATIENT AND CAREGIVER AND WERE EDUCATED ON THE IMPORTANCE OF PACING ACTIVITES TO PREVENT UNNECESSARY SORENESS.

## 2024-09-27 SDOH — HEALTH STABILITY: PHYSICAL HEALTH
EXERCISE COMMENTS: PATIENT PARTICIPATED IN STANDING THERAPEUTIC EXERCISES WITH YELLOW THERABAND FOR RESISTANCE. PATIENT REQUIRED VERBAL CUES AND VISUAL DEMONSTRATION FOR PROPER TECHNIQUE. PATIENT REQUIRED ONE SEATED REST BREAK IN THE MIDDLE EXERCISES TO RECOVER FROM FA

## 2024-09-27 ASSESSMENT — ACTIVITIES OF DAILY LIVING (ADL)
AMBULATION_DISTANCE/DURATION_TOLERATED: 150 FEET
AMBULATION ASSISTANCE ON FLAT SURFACES: 1

## 2024-09-27 ASSESSMENT — ENCOUNTER SYMPTOMS: DENIES PAIN: 1

## 2024-09-27 NOTE — PROGRESS NOTES
Daily Call Note:   LVM.    Daily Weight:  There were no vitals filed for this visit.   Last 3 Weights:  Wt Readings from Last 7 Encounters:   09/25/24 62.6 kg (138 lb)   09/24/24 62.7 kg (138 lb 2 oz)   09/22/24 63.2 kg (139 lb 6.4 oz)   09/19/24 60.8 kg (134 lb)   09/17/24 62.1 kg (137 lb)   09/12/24 61.1 kg (134 lb 9.6 oz)   09/11/24 61 kg (134 lb 6.4 oz)           Virtual Visits--Scheduled (Most Recent Date at Top)  Follow up Appointments  Recent Visits  No visits were found meeting these conditions.  Showing recent visits within past 30 days and meeting all other requirements  Future Appointments  Date Type Provider Dept   11/04/24 Appointment Malcolm Shen MD Do [id number] [facility]   Showing future appointments within next 90 days and meeting all other requirements

## 2024-09-29 ENCOUNTER — PATIENT OUTREACH (OUTPATIENT)
Dept: HOME HEALTH SERVICES | Age: 89
End: 2024-09-29

## 2024-09-29 ENCOUNTER — APPOINTMENT (OUTPATIENT)
Dept: CARE COORDINATION | Age: 89
End: 2024-09-29
Payer: MEDICARE

## 2024-09-29 VITALS — SYSTOLIC BLOOD PRESSURE: 101 MMHG | BODY MASS INDEX: 19.91 KG/M2 | DIASTOLIC BLOOD PRESSURE: 47 MMHG | WEIGHT: 134.8 LBS

## 2024-09-29 DIAGNOSIS — S91.309A MULTIPLE OPEN WOUNDS OF FOOT: ICD-10-CM

## 2024-09-29 DIAGNOSIS — M79.674 PAIN IN TOES OF BOTH FEET: Primary | ICD-10-CM

## 2024-09-29 DIAGNOSIS — M79.675 PAIN IN TOES OF BOTH FEET: Primary | ICD-10-CM

## 2024-09-29 NOTE — PROGRESS NOTES
"Daily Call Note: [facility] call was completed with the patient's wife, daughter Mariah, ZEHRA King. Daughter Mariah stated the patient is doing very well - he is without fever, chills, SOB - he is ambulating independently in the home, and on the stairs with standby supervision. He is eating and drinking well. He continues to have wounds of the third toe on both feet, which is the greatest concern to the family. The wounds are less than \"dime sized\" without odor or drainage. [facility] is following the patient for wound care but a podiatrist recommended him being followed at the wound clinic. The provider placed a referral for the Wound Clinic which will be followed up tomorrow. The family has no preference regarding place and time.   BP (!) 101/47 Comment: CNP aware - asymptomatic  Wt 61.1 kg (134 lb 12.8 oz)   BMI 19.91 kg/m²     Daily Weight:  There were no vitals filed for this visit.   Last 3 Weights:  Wt Readings from Last 7 Encounters:   09/25/24 62.6 kg (138 lb)   09/24/24 62.7 kg (138 lb 2 oz)   09/22/24 63.2 kg (139 lb 6.4 oz)   09/19/24 60.8 kg (134 lb)   09/17/24 62.1 kg (137 lb)   09/12/24 61.1 kg (134 lb 9.6 oz)   09/11/24 61 kg (134 lb 6.4 oz)           Virtual Visits--Scheduled (Most Recent Date at Top)  Follow up Appointments  Recent Visits  No visits were found meeting these conditions.  Showing recent visits within past 30 days and meeting all other requirements  Future Appointments  Date Type Provider Dept   11/04/24 Appointment Malcolm Shen MD Do [id number] [facility]   Showing future appointments within next 90 days and meeting all other requirements       Frequency of RN Calls & Virtual Visits per Team Agreement: Healthy at Home Frequency: MWF                 "

## 2024-09-30 ENCOUNTER — PATIENT OUTREACH (OUTPATIENT)
Dept: CARE COORDINATION | Age: 89
End: 2024-09-30

## 2024-09-30 ENCOUNTER — HOME CARE VISIT (OUTPATIENT)
Dept: HOME HEALTH SERVICES | Facility: HOME HEALTH | Age: 89
End: 2024-09-30
Payer: MEDICARE

## 2024-09-30 ENCOUNTER — APPOINTMENT (OUTPATIENT)
Dept: PRIMARY CARE | Facility: CLINIC | Age: 89
End: 2024-09-30
Payer: MEDICARE

## 2024-09-30 VITALS
DIASTOLIC BLOOD PRESSURE: 61 MMHG | HEART RATE: 87 BPM | WEIGHT: 138 LBS | BODY MASS INDEX: 20.38 KG/M2 | SYSTOLIC BLOOD PRESSURE: 108 MMHG

## 2024-09-30 PROCEDURE — G0156 HHCP-SVS OF AIDE,EA 15 MIN: HCPCS | Mod: HHH

## 2024-09-30 PROCEDURE — G0157 HHC PT ASSISTANT EA 15: HCPCS | Mod: CQ,HHH

## 2024-09-30 SDOH — HEALTH STABILITY: PHYSICAL HEALTH
EXERCISE COMMENTS: DUE TO COGNITIVE DEFICITS PATIENT CONTINUES TO REQUIRES VERBAL CUES AND VISUAL DEMONSTRATION FOR PROPER TECHNIQUE. PATIENT'S FAMILY IS VERY INVOLVED WITH PATIENT'S REHAB, FAMILY IS ABLE TO ASSIST PATIENT WITH ALL EXERCISE NEEDS. PATIENT REQUIRED 2 SE

## 2024-09-30 SDOH — HEALTH STABILITY: PHYSICAL HEALTH: EXERCISE COMMENTS: ATED REST BREAKS FURING EXERCISES TO RECOVER FROM FATIGUE.

## 2024-09-30 ASSESSMENT — ENCOUNTER SYMPTOMS: DENIES PAIN: 1

## 2024-09-30 ASSESSMENT — ACTIVITIES OF DAILY LIVING (ADL)
AMBULATION ASSISTANCE ON FLAT SURFACES: 1
AMBULATION_DISTANCE/DURATION_TOLERATED: 50 FEET X 2

## 2024-09-30 NOTE — PROGRESS NOTES
"Daily Call Note:   Spoke to daughter Mariah, stated the patient is doing good. Pt is ambulating independently in the home, and on the stairs , denies SOB at this time. Pt is eating and drinking well. He continues to have wounds of the 3d toe on both feet, wounds are less than \"dime sized\" without odor or drainage. [facility] is following patient for wound care, podiatrist recommended pt being followed by wound clinic. The provider placed a referral for wound clinic yesterday, 9/29 waiting on placement. Weight 138 lbs, up 4 lbs from yesterday, provider Dr Bourgeois notified. No other concerns today.      Topics for Daily Review: wound on toe, weight, overall assessment.  Daughter demonstrates clear understanding: Yes    Daily VS:   /61   Pulse 87   Wt 62.6 kg (138 lb)   BMI 20.38 kg/m²     Last 3 Weights:  Wt Readings from Last 7 Encounters:   09/29/24 61.1 kg (134 lb 12.8 oz)   09/25/24 62.6 kg (138 lb)   09/24/24 62.7 kg (138 lb 2 oz)   09/22/24 63.2 kg (139 lb 6.4 oz)   09/19/24 60.8 kg (134 lb)   09/17/24 62.1 kg (137 lb)   09/12/24 61.1 kg (134 lb 9.6 oz)       Masimo Device: No       Virtual Visits--Scheduled (Most Recent Date at Top)  Follow up Appointments  Recent Visits  No visits were found meeting these conditions.  Showing recent visits within past 30 days and meeting all other requirements  Future Appointments  Date Type Provider Dept   11/04/24 Appointment Malcolm Shen MD Do [id number] [facility]   Showing future appointments within next 90 days and meeting all other requirements       Frequency of RN Calls & Virtual Visits per Team Agreement: Healthy at Home Frequency: Bi-Weekly    Referral : Wound care clinic 9/29/24.  Follow up appointments scheduled by [facility] Staff: 10/7 @6100          "

## 2024-10-02 ENCOUNTER — HOME CARE VISIT (OUTPATIENT)
Dept: HOME HEALTH SERVICES | Facility: HOME HEALTH | Age: 89
End: 2024-10-02
Payer: MEDICARE

## 2024-10-02 ENCOUNTER — PATIENT OUTREACH (OUTPATIENT)
Dept: HOME HEALTH SERVICES | Age: 89
End: 2024-10-02
Payer: MEDICARE

## 2024-10-02 VITALS
RESPIRATION RATE: 18 BRPM | SYSTOLIC BLOOD PRESSURE: 103 MMHG | OXYGEN SATURATION: 96 % | HEART RATE: 74 BPM | TEMPERATURE: 98.8 F | DIASTOLIC BLOOD PRESSURE: 58 MMHG

## 2024-10-02 DIAGNOSIS — I10 PRIMARY HYPERTENSION: ICD-10-CM

## 2024-10-02 DIAGNOSIS — I50.9 ACUTE ON CHRONIC CONGESTIVE HEART FAILURE, UNSPECIFIED HEART FAILURE TYPE: ICD-10-CM

## 2024-10-02 PROCEDURE — G0299 HHS/HOSPICE OF RN EA 15 MIN: HCPCS | Mod: HHH

## 2024-10-02 PROCEDURE — RXMED WILLOW AMBULATORY MEDICATION CHARGE

## 2024-10-02 NOTE — PROGRESS NOTES
Call placed to pt. Regarding SW task. Spoke w/ daughter, Mariah. States pt. Is active w/ C and understands POC and how to manage health at home. States that pt. Has a wound on 3rd left toe, was seen by Podiatry for this. Podiatry recommended follow up with wound care. InZigswitchet message sent to [facility] team.

## 2024-10-03 ENCOUNTER — PATIENT OUTREACH (OUTPATIENT)
Dept: HOME HEALTH SERVICES | Age: 89
End: 2024-10-03
Payer: MEDICARE

## 2024-10-03 ENCOUNTER — HOME CARE VISIT (OUTPATIENT)
Dept: HOME HEALTH SERVICES | Facility: HOME HEALTH | Age: 89
End: 2024-10-03
Payer: MEDICARE

## 2024-10-03 VITALS
SYSTOLIC BLOOD PRESSURE: 140 MMHG | RESPIRATION RATE: 14 BRPM | TEMPERATURE: 97.2 F | HEART RATE: 58 BPM | DIASTOLIC BLOOD PRESSURE: 60 MMHG

## 2024-10-03 PROCEDURE — G0151 HHCP-SERV OF PT,EA 15 MIN: HCPCS | Mod: HHH

## 2024-10-03 ASSESSMENT — ENCOUNTER SYMPTOMS
LOSS OF SENSATION IN FEET: 0
DEPRESSION: 0
OCCASIONAL FEELINGS OF UNSTEADINESS: 1
PERSON REPORTING PAIN: PATIENT
DENIES PAIN: 1

## 2024-10-03 ASSESSMENT — ACTIVITIES OF DAILY LIVING (ADL)
HOME_HEALTH_OASIS: 01
OASIS_M1830: 01

## 2024-10-04 ENCOUNTER — PHARMACY VISIT (OUTPATIENT)
Dept: PHARMACY | Facility: CLINIC | Age: 89
End: 2024-10-04
Payer: MEDICARE

## 2024-10-04 ENCOUNTER — PATIENT OUTREACH (OUTPATIENT)
Dept: CARE COORDINATION | Age: 89
End: 2024-10-04
Payer: MEDICARE

## 2024-10-04 NOTE — PROGRESS NOTES
Daily Call Note:   1310 - Daily call completed with pt's dtr, Mariah. She states that pt is doing well - actually napping at the time of this call.   Pt is experiencing no symptoms, though the issue continues with his toes.  Explained to dtr that this RN called the wound clinic ([phone]) and left VM for them to contact her to schedule appt. Dtr. Verbalized understanding.  [facility] is 10/7 at 1630 - dtr verbalized understanding.  No other questions or concerns.    Pt Education: POC  Barriers: none  Topics for Daily Review: sx; wound clinic  Pt demonstrates clear understanding: Yes    Daily Weight:  There were no vitals filed for this visit.   Last 3 Weights:  Wt Readings from Last 7 Encounters:   09/30/24 62.6 kg (138 lb)   09/29/24 61.1 kg (134 lb 12.8 oz)   09/25/24 62.6 kg (138 lb)   09/24/24 62.7 kg (138 lb 2 oz)   09/22/24 63.2 kg (139 lb 6.4 oz)   09/19/24 60.8 kg (134 lb)   09/17/24 62.1 kg (137 lb)       Masimo Device: No   Masimo Clinical Impression: n/a    Virtual Visits--Scheduled (Most Recent Date at Top)  Follow up Appointments  Recent Visits  No visits were found meeting these conditions.  Showing recent visits within past 30 days and meeting all other requirements  Future Appointments  Date Type Provider Dept   11/04/24 Appointment Malcolm Shen MD Do [id number] [facility]   Showing future appointments within next 90 days and meeting all other requirements       Frequency of RN Calls & Virtual Visits per Team Agreement: Healthy at Home Frequency: MWF    Medication issues Addressed (what was done): none    Follow up appointments scheduled by [facility] Staff: [id number] for wound clinic to call pt's dtr  Referrals made by [facility] staff: wound clinic

## 2024-10-05 ASSESSMENT — ENCOUNTER SYMPTOMS
DENIES PAIN: 1
APPETITE LEVEL: FAIR
CHANGE IN APPETITE: UNCHANGED

## 2024-10-07 ENCOUNTER — APPOINTMENT (OUTPATIENT)
Dept: CARE COORDINATION | Age: 89
End: 2024-10-07
Payer: MEDICARE

## 2024-10-07 ENCOUNTER — APPOINTMENT (OUTPATIENT)
Dept: PHARMACY | Facility: HOSPITAL | Age: 89
End: 2024-10-07
Payer: MEDICARE

## 2024-10-07 ENCOUNTER — PATIENT OUTREACH (OUTPATIENT)
Dept: CARE COORDINATION | Age: 89
End: 2024-10-07

## 2024-10-09 ENCOUNTER — PATIENT OUTREACH (OUTPATIENT)
Dept: CARE COORDINATION | Age: 89
End: 2024-10-09
Payer: MEDICARE

## 2024-10-09 NOTE — PROGRESS NOTES
Daily Call Note:   1310 - Daily call completed with pt's dtr, Mariah. She states that the pt is doing well, currently taking a nap. Dtr reports that pt got out of doing his exercises by saying he was going upstairs to use the bathroom. He really didn't want to do the exercises.  Has appt with the wound clinic on 10/10  Missed [facility] rescheduled for 10/11 at 1830.  No other questions or concerns.    Pt Education: POC  Barriers: none  Topics for Daily Review: Daily call  Pt demonstrates clear understanding: Yes    Daily Weight:  There were no vitals filed for this visit.   Last 3 Weights:  Wt Readings from Last 7 Encounters:   09/30/24 62.6 kg (138 lb)   09/29/24 61.1 kg (134 lb 12.8 oz)   09/25/24 62.6 kg (138 lb)   09/24/24 62.7 kg (138 lb 2 oz)   09/22/24 63.2 kg (139 lb 6.4 oz)   09/19/24 60.8 kg (134 lb)   09/17/24 62.1 kg (137 lb)       Masimo Device: No   Masimo Clinical Impression: n/a    Virtual Visits--Scheduled (Most Recent Date at Top)  Follow up Appointments  Recent Visits  No visits were found meeting these conditions.  Showing recent visits within past 30 days and meeting all other requirements  Future Appointments  Date Type Provider Dept   11/04/24 Appointment Malcolm Shen MD Do [id number] [facility]   Showing future appointments within next 90 days and meeting all other requirements       Frequency of RN Calls & Virtual Visits per Team Agreement: Healthy at Home Frequency: Daily    Medication issues Addressed (what was done): none    Follow up appointments scheduled by [facility] Staff: none  Referrals made by [facility] staff: none

## 2024-10-10 ENCOUNTER — OFFICE VISIT (OUTPATIENT)
Dept: WOUND CARE | Facility: HOSPITAL | Age: 89
End: 2024-10-10
Payer: MEDICARE

## 2024-10-10 ENCOUNTER — HOSPITAL ENCOUNTER (OUTPATIENT)
Dept: RADIOLOGY | Facility: HOSPITAL | Age: 89
Discharge: HOME | End: 2024-10-10
Payer: MEDICARE

## 2024-10-10 DIAGNOSIS — I87.2 VENOUS INSUFFICIENCY: ICD-10-CM

## 2024-10-10 DIAGNOSIS — I73.9 PVD (PERIPHERAL VASCULAR DISEASE) (CMS-HCC): ICD-10-CM

## 2024-10-10 DIAGNOSIS — L03.90 WOUND CELLULITIS: ICD-10-CM

## 2024-10-10 DIAGNOSIS — M86.172 ACUTE OSTEOMYELITIS OF TOE OF LEFT FOOT (MULTI): Primary | ICD-10-CM

## 2024-10-10 DIAGNOSIS — J44.1 ASTHMA EXACERBATION WITH COPD (CHRONIC OBSTRUCTIVE PULMONARY DISEASE): ICD-10-CM

## 2024-10-10 PROCEDURE — 87184 SC STD DISK METHOD PER PLATE: CPT | Mod: WESLAB | Performed by: PODIATRIST

## 2024-10-10 PROCEDURE — 11043 DBRDMT MUSC&/FSCA 1ST 20/<: CPT

## 2024-10-10 PROCEDURE — 71046 X-RAY EXAM CHEST 2 VIEWS: CPT

## 2024-10-10 PROCEDURE — 11042 DBRDMT SUBQ TIS 1ST 20SQCM/<: CPT

## 2024-10-11 ENCOUNTER — PATIENT OUTREACH (OUTPATIENT)
Dept: HOME HEALTH SERVICES | Age: 89
End: 2024-10-11

## 2024-10-11 ENCOUNTER — TELEMEDICINE (OUTPATIENT)
Dept: CARE COORDINATION | Age: 89
End: 2024-10-11
Payer: MEDICARE

## 2024-10-11 DIAGNOSIS — I50.32 CHRONIC HEART FAILURE WITH PRESERVED EJECTION FRACTION: ICD-10-CM

## 2024-10-11 DIAGNOSIS — I48.19 PERSISTENT ATRIAL FIBRILLATION (MULTI): Primary | ICD-10-CM

## 2024-10-12 NOTE — PROGRESS NOTES
Daily Call Note:   [facility] completed with Dr. Doshi. Patient's wife stated the patient is doing okay. He went to wound clinic yesterday.  Informed them that his toe is not healing properly. Upcoming appointment with vascular 10/18. Continues to have some pain and discomfort in toe. Continues to receive [facility] for wound care. Patient graduated from program today.    /64  HR 73  Pt Education: Can call after graduation if needed  Barriers: None  Topics for Daily Review: Graduation, Wound clinic  Pt demonstrates clear understanding: Yes    Daily Weight:  There were no vitals filed for this visit.   Last 3 Weights:  Wt Readings from Last 7 Encounters:   09/30/24 62.6 kg (138 lb)   09/29/24 61.1 kg (134 lb 12.8 oz)   09/25/24 62.6 kg (138 lb)   09/24/24 62.7 kg (138 lb 2 oz)   09/22/24 63.2 kg (139 lb 6.4 oz)   09/19/24 60.8 kg (134 lb)   09/17/24 62.1 kg (137 lb)       Masimo Device: No   Masimo Clinical Impression: N/A    Virtual Visits--Scheduled (Most Recent Date at Top)  Follow up Appointments  Recent Visits  No visits were found meeting these conditions.  Showing recent visits within past 30 days and meeting all other requirements  Future Appointments  Date Type Provider Dept   11/04/24 Appointment Malcolm Shen MD Do [id number] [facility]   Showing future appointments within next 90 days and meeting all other requirements       Frequency of RN Calls & Virtual Visits per Team Agreement: Healthy at Home Frequency: Graduated    Medication issues Addressed (what was done): None    Follow up appointments scheduled by [facility] Staff: None  Referrals made by [facility] staff: None

## 2024-10-13 LAB
BACTERIA SPEC CULT: ABNORMAL
GRAM STN SPEC: ABNORMAL
GRAM STN SPEC: ABNORMAL

## 2024-10-14 PROCEDURE — RXMED WILLOW AMBULATORY MEDICATION CHARGE

## 2024-10-14 RX ORDER — TAMSULOSIN HYDROCHLORIDE 0.4 MG/1
0.4 CAPSULE ORAL DAILY
Qty: 90 CAPSULE | Refills: 0 | Status: SHIPPED | OUTPATIENT
Start: 2024-10-14 | End: 2025-01-12

## 2024-10-14 RX ORDER — DIGOXIN 125 MCG
125 TABLET ORAL DAILY
Qty: 30 TABLET | Refills: 1 | Status: SHIPPED | OUTPATIENT
Start: 2024-10-14 | End: 2025-10-14

## 2024-10-15 ENCOUNTER — PHARMACY VISIT (OUTPATIENT)
Dept: PHARMACY | Facility: CLINIC | Age: 89
End: 2024-10-15
Payer: MEDICARE

## 2024-10-17 ENCOUNTER — TELEPHONE (OUTPATIENT)
Dept: OPERATING ROOM | Facility: HOSPITAL | Age: 89
End: 2024-10-17
Payer: MEDICARE

## 2024-10-17 DIAGNOSIS — L97.522 SKIN ULCER OF TOE OF LEFT FOOT WITH FAT LAYER EXPOSED (MULTI): Primary | ICD-10-CM

## 2024-10-17 RX ORDER — SULFAMETHOXAZOLE AND TRIMETHOPRIM 800; 160 MG/1; MG/1
1 TABLET ORAL 2 TIMES DAILY
Qty: 20 TABLET | Refills: 0 | Status: SHIPPED | OUTPATIENT
Start: 2024-10-17 | End: 2024-10-27

## 2024-10-18 ENCOUNTER — HOSPITAL ENCOUNTER (EMERGENCY)
Facility: HOSPITAL | Age: 89
Discharge: HOME | End: 2024-10-18
Attending: EMERGENCY MEDICINE
Payer: MEDICARE

## 2024-10-18 ENCOUNTER — HOSPITAL ENCOUNTER (OUTPATIENT)
Dept: VASCULAR MEDICINE | Facility: HOSPITAL | Age: 89
Discharge: HOME | End: 2024-10-18
Payer: MEDICARE

## 2024-10-18 ENCOUNTER — CLINICAL SUPPORT (OUTPATIENT)
Dept: EMERGENCY MEDICINE | Facility: HOSPITAL | Age: 89
End: 2024-10-18
Payer: MEDICARE

## 2024-10-18 ENCOUNTER — APPOINTMENT (OUTPATIENT)
Dept: RADIOLOGY | Facility: HOSPITAL | Age: 89
End: 2024-10-18
Payer: MEDICARE

## 2024-10-18 VITALS
SYSTOLIC BLOOD PRESSURE: 94 MMHG | HEART RATE: 67 BPM | RESPIRATION RATE: 18 BRPM | DIASTOLIC BLOOD PRESSURE: 55 MMHG | WEIGHT: 138 LBS | OXYGEN SATURATION: 99 % | TEMPERATURE: 93.3 F | BODY MASS INDEX: 20.44 KG/M2 | HEIGHT: 69 IN

## 2024-10-18 DIAGNOSIS — I87.2 VENOUS INSUFFICIENCY: ICD-10-CM

## 2024-10-18 DIAGNOSIS — I70.209: Primary | ICD-10-CM

## 2024-10-18 DIAGNOSIS — I73.9 PVD (PERIPHERAL VASCULAR DISEASE) (CMS-HCC): ICD-10-CM

## 2024-10-18 LAB
ABO GROUP (TYPE) IN BLOOD: NORMAL
ALBUMIN SERPL BCP-MCNC: 3.8 G/DL (ref 3.4–5)
ALP SERPL-CCNC: 71 U/L (ref 33–136)
ALT SERPL W P-5'-P-CCNC: 17 U/L (ref 10–52)
ANION GAP BLDV CALCULATED.4IONS-SCNC: 10 MMOL/L (ref 10–25)
ANION GAP SERPL CALC-SCNC: 17 MMOL/L (ref 10–20)
ANTIBODY SCREEN: NORMAL
APTT PPP: 35 SECONDS (ref 27–38)
AST SERPL W P-5'-P-CCNC: 55 U/L (ref 9–39)
BASE EXCESS BLDV CALC-SCNC: 7.2 MMOL/L (ref -2–3)
BASOPHILS # BLD AUTO: 0.04 X10*3/UL (ref 0–0.1)
BASOPHILS NFR BLD AUTO: 0.7 %
BILIRUB SERPL-MCNC: 1.6 MG/DL (ref 0–1.2)
BODY SURFACE AREA: 1.75 M2
BODY TEMPERATURE: 37 DEGREES CELSIUS
BUN SERPL-MCNC: 11 MG/DL (ref 6–23)
CA-I BLDV-SCNC: 1.21 MMOL/L (ref 1.1–1.33)
CALCIUM SERPL-MCNC: 9.4 MG/DL (ref 8.6–10.6)
CHLORIDE BLDV-SCNC: 96 MMOL/L (ref 98–107)
CHLORIDE SERPL-SCNC: 97 MMOL/L (ref 98–107)
CO2 SERPL-SCNC: 27 MMOL/L (ref 21–32)
CREAT SERPL-MCNC: 1.01 MG/DL (ref 0.5–1.3)
EGFRCR SERPLBLD CKD-EPI 2021: 69 ML/MIN/1.73M*2
EOSINOPHIL # BLD AUTO: 0.27 X10*3/UL (ref 0–0.4)
EOSINOPHIL NFR BLD AUTO: 4.4 %
ERYTHROCYTE [DISTWIDTH] IN BLOOD BY AUTOMATED COUNT: 15.4 % (ref 11.5–14.5)
GLUCOSE BLDV-MCNC: 117 MG/DL (ref 74–99)
GLUCOSE SERPL-MCNC: 111 MG/DL (ref 74–99)
HCO3 BLDV-SCNC: 32.6 MMOL/L (ref 22–26)
HCT VFR BLD AUTO: 41.9 % (ref 41–52)
HCT VFR BLD EST: 41 % (ref 41–52)
HGB BLD-MCNC: 13.1 G/DL (ref 13.5–17.5)
HGB BLDV-MCNC: 13.6 G/DL (ref 13.5–17.5)
IMM GRANULOCYTES # BLD AUTO: 0.01 X10*3/UL (ref 0–0.5)
IMM GRANULOCYTES NFR BLD AUTO: 0.2 % (ref 0–0.9)
INR PPP: 2 (ref 0.9–1.1)
LACTATE BLDV-SCNC: 2.3 MMOL/L (ref 0.4–2)
LYMPHOCYTES # BLD AUTO: 1.85 X10*3/UL (ref 0.8–3)
LYMPHOCYTES NFR BLD AUTO: 30.2 %
MCH RBC QN AUTO: 29.2 PG (ref 26–34)
MCHC RBC AUTO-ENTMCNC: 31.3 G/DL (ref 32–36)
MCV RBC AUTO: 94 FL (ref 80–100)
MONOCYTES # BLD AUTO: 0.56 X10*3/UL (ref 0.05–0.8)
MONOCYTES NFR BLD AUTO: 9.2 %
NEUTROPHILS # BLD AUTO: 3.39 X10*3/UL (ref 1.6–5.5)
NEUTROPHILS NFR BLD AUTO: 55.3 %
NRBC BLD-RTO: 0 /100 WBCS (ref 0–0)
OXYHGB MFR BLDV: 66.3 % (ref 45–75)
PCO2 BLDV: 48 MM HG (ref 41–51)
PH BLDV: 7.44 PH (ref 7.33–7.43)
PLATELET # BLD AUTO: 207 X10*3/UL (ref 150–450)
PO2 BLDV: 42 MM HG (ref 35–45)
POTASSIUM BLDV-SCNC: 4.7 MMOL/L (ref 3.5–5.3)
POTASSIUM SERPL-SCNC: 4 MMOL/L (ref 3.5–5.3)
POTASSIUM SERPL-SCNC: 6.3 MMOL/L (ref 3.5–5.3)
PROT SERPL-MCNC: 7.7 G/DL (ref 6.4–8.2)
PROTHROMBIN TIME: 23 SECONDS (ref 9.8–12.8)
RBC # BLD AUTO: 4.48 X10*6/UL (ref 4.5–5.9)
RH FACTOR (ANTIGEN D): NORMAL
SAO2 % BLDV: 68 % (ref 45–75)
SODIUM BLDV-SCNC: 134 MMOL/L (ref 136–145)
SODIUM SERPL-SCNC: 135 MMOL/L (ref 136–145)
WBC # BLD AUTO: 6.1 X10*3/UL (ref 4.4–11.3)

## 2024-10-18 PROCEDURE — 86901 BLOOD TYPING SEROLOGIC RH(D): CPT

## 2024-10-18 PROCEDURE — 93923 UPR/LXTR ART STDY 3+ LVLS: CPT | Performed by: INTERNAL MEDICINE

## 2024-10-18 PROCEDURE — 75635 CT ANGIO ABDOMINAL ARTERIES: CPT | Performed by: RADIOLOGY

## 2024-10-18 PROCEDURE — 85610 PROTHROMBIN TIME: CPT

## 2024-10-18 PROCEDURE — 93923 UPR/LXTR ART STDY 3+ LVLS: CPT

## 2024-10-18 PROCEDURE — 84295 ASSAY OF SERUM SODIUM: CPT

## 2024-10-18 PROCEDURE — 82805 BLOOD GASES W/O2 SATURATION: CPT

## 2024-10-18 PROCEDURE — 96374 THER/PROPH/DIAG INJ IV PUSH: CPT

## 2024-10-18 PROCEDURE — 75635 CT ANGIO ABDOMINAL ARTERIES: CPT

## 2024-10-18 PROCEDURE — 93005 ELECTROCARDIOGRAM TRACING: CPT

## 2024-10-18 PROCEDURE — 36415 COLL VENOUS BLD VENIPUNCTURE: CPT

## 2024-10-18 PROCEDURE — 2550000001 HC RX 255 CONTRASTS: Performed by: EMERGENCY MEDICINE

## 2024-10-18 PROCEDURE — 85025 COMPLETE CBC W/AUTO DIFF WBC: CPT

## 2024-10-18 PROCEDURE — 99285 EMERGENCY DEPT VISIT HI MDM: CPT | Mod: 25

## 2024-10-18 PROCEDURE — 84132 ASSAY OF SERUM POTASSIUM: CPT | Mod: 59

## 2024-10-18 PROCEDURE — 2500000004 HC RX 250 GENERAL PHARMACY W/ HCPCS (ALT 636 FOR OP/ED)

## 2024-10-18 RX ORDER — ACETAMINOPHEN 500 MG
1000 TABLET ORAL EVERY 6 HOURS PRN
Qty: 30 TABLET | Refills: 0 | Status: SHIPPED | OUTPATIENT
Start: 2024-10-18 | End: 2024-10-28

## 2024-10-18 RX ORDER — MORPHINE SULFATE 4 MG/ML
2 INJECTION INTRAVENOUS ONCE
Status: COMPLETED | OUTPATIENT
Start: 2024-10-18 | End: 2024-10-18

## 2024-10-18 ASSESSMENT — LIFESTYLE VARIABLES
EVER FELT BAD OR GUILTY ABOUT YOUR DRINKING: NO
HAVE YOU EVER FELT YOU SHOULD CUT DOWN ON YOUR DRINKING: NO
EVER HAD A DRINK FIRST THING IN THE MORNING TO STEADY YOUR NERVES TO GET RID OF A HANGOVER: NO
HAVE PEOPLE ANNOYED YOU BY CRITICIZING YOUR DRINKING: NO
TOTAL SCORE: 0

## 2024-10-18 ASSESSMENT — COLUMBIA-SUICIDE SEVERITY RATING SCALE - C-SSRS
6. HAVE YOU EVER DONE ANYTHING, STARTED TO DO ANYTHING, OR PREPARED TO DO ANYTHING TO END YOUR LIFE?: NO
2. HAVE YOU ACTUALLY HAD ANY THOUGHTS OF KILLING YOURSELF?: NO
1. IN THE PAST MONTH, HAVE YOU WISHED YOU WERE DEAD OR WISHED YOU COULD GO TO SLEEP AND NOT WAKE UP?: NO

## 2024-10-18 ASSESSMENT — PAIN - FUNCTIONAL ASSESSMENT
PAIN_FUNCTIONAL_ASSESSMENT: 0-10
PAIN_FUNCTIONAL_ASSESSMENT: 0-10

## 2024-10-18 ASSESSMENT — PAIN DESCRIPTION - PROGRESSION: CLINICAL_PROGRESSION: GRADUALLY IMPROVING

## 2024-10-18 ASSESSMENT — PAIN SCALES - GENERAL
PAINLEVEL_OUTOF10: 0 - NO PAIN
PAINLEVEL_OUTOF10: 8
PAINLEVEL_OUTOF10: 6
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
## 2024-10-18 NOTE — ED PROVIDER NOTES
HPI   Chief Complaint   Patient presents with    Wound Check    Circulatory Problem       HPI  Patient is a 89-year-old male with a past medical history of paroxysmal A-fib on Eliquis, systolic congestive heart failure cardiomyopathy (EF 30%), COPD, and dementia who presents to the emergency department for concerns of lower extremity circulatory problems.  Patient is companied by his wife and daughter and they state that the patient was at the vascular clinic today for evaluation of wounds to the third toes on each foot.  He states that they were concerned because they were not able to feel pulses on either foot.  They state that they did get pulses higher up on his legs but they were able to get pulses with the Doppler in his feet.  Patient states that he has pain in his left foot and third toe on his left foot, but states he does have sensation.  Patient also has a similar wound on the third toe of his right foot.  They state that they have seen several doctors since July when these wounds first appeared.  They state that they have never had a problem with pulses in the past.  Patient states that he was able to walk with his walker from the vascular clinic down to the emergency department himself he denies loss of sensation or strength.  Patient denies chest pain, shortness of breath, fevers, abdominal pain, nausea or vomiting, changes in urination or bowel habits, injuries outside of his feet.  They state that he has been seeing doctors for his wounds who have been applying iodine to them.  Chart review shows that he started Bactrim yesterday.      Patient History   Past Medical History:   Diagnosis Date    Abnormality of plasma protein, unspecified 05/01/2017    Elevated blood protein    Benign prostatic hyperplasia without lower urinary tract symptoms 05/01/2017    BPH with elevated PSA    Disorder of prostate, unspecified 05/01/2017    Prostate disorder    Elevated prostate specific antigen (PSA) 05/01/2017     Elevated prostate specific antigen (PSA)    Elevated prostate specific antigen (PSA) 05/01/2017    Abnormal prostate specific antigen    Elevated prostate specific antigen (PSA) 05/01/2017    Abnormal PSA    Elevated prostate specific antigen (PSA) 05/01/2017    Abnormal prostate specific antigen test    Epistaxis 05/01/2017    Epistaxis, recurrent    Essential (primary) hypertension 12/19/2022    Hypertension    Frequency of micturition 05/01/2017    Urinary frequency    Hallucinations, unspecified 05/01/2017    Hallucinations    Headache, unspecified 05/01/2017    Bilateral headache    Hematuria, unspecified 05/01/2017    Hematuria    Impacted cerumen 05/23/2024    Ischemic cardiomyopathy 12/13/2021    Cardiomyopathy, ischemic    Nasal discharge 05/23/2024    Nasal mucosa dry 05/23/2024    Other amnesia 05/01/2017    Memory loss    Other microscopic hematuria 05/01/2017    Hematuria, microscopic    Other seasonal allergic rhinitis 05/01/2017    Seasonal allergies    Other secondary cataract, right eye 05/31/2018    Posterior capsular opacification visually significant of right eye    Other specified disorders of the male genital organs 05/01/2017    Scrotal swelling    Personal history of other specified conditions 02/10/2020    History of epistaxis    Presence of intraocular lens 05/01/2017    Pseudophakia    Primary insomnia 05/01/2017    Primary insomnia    Primary open-angle glaucoma, unspecified eye, stage unspecified 05/01/2017    Open angle primary glaucoma    Unspecified atrial flutter (Multi) 05/01/2017    Atrial flutter    Unspecified glaucoma 05/01/2017    Glaucoma    Vitamin D deficiency, unspecified 05/01/2017    Vitamin D deficiency     Past Surgical History:   Procedure Laterality Date    CATARACT EXTRACTION  10/07/2014    Cataract Surgery    MR HEAD ANGIO WO IV CONTRAST  3/3/2018    MR HEAD ANGIO WO IV CONTRAST 3/3/2018 [facility]    MR NECK ANGIO WO IV CONTRAST  3/3/2018    MR NECK ANGIO  WO IV CONTRAST 3/3/2018 [facility]    OTHER SURGICAL HISTORY  10/07/2014    Dental Surgery     Family History   Problem Relation Name Age of Onset    No Known Problems Mother      No Known Problems Father       Social History     Tobacco Use    Smoking status: Former     Types: Cigarettes     Passive exposure: Never    Smokeless tobacco: Never   Vaping Use    Vaping status: Never Used   Substance Use Topics    Alcohol use: Not Currently    Drug use: Not Currently       Physical Exam   ED Triage Vitals [10/18/24 1249]   Temperature Heart Rate Respirations BP   34.1 °C (93.3 °F) 67 18 94/55      Pulse Ox Temp Source Heart Rate Source Patient Position   99 % Temporal Monitor --      BP Location FiO2 (%)     -- --       Physical Exam  Vitals and nursing note reviewed.   Constitutional:       General: He is not in acute distress.     Appearance: He is well-developed.   HENT:      Head: Normocephalic and atraumatic.   Eyes:      Conjunctiva/sclera: Conjunctivae normal.   Cardiovascular:      Rate and Rhythm: Normal rate and regular rhythm.      Pulses:           Dorsalis pedis pulses are 0 on the right side and 0 on the left side.      Heart sounds: No murmur heard.  Pulmonary:      Effort: Pulmonary effort is normal. No respiratory distress.      Breath sounds: Normal breath sounds.   Abdominal:      Palpations: Abdomen is soft.      Tenderness: There is no abdominal tenderness.   Musculoskeletal:         General: No swelling.      Cervical back: Neck supple.   Feet:      Comments: Wounds with pale discoloration and edema to bilateral third toes  Pain to palpation of left third toe and left foot  5/5 strength and full ROM to bilateral lower extremities  DP pulses unable to be palpated or found on Doppler  Skin:     General: Skin is warm and dry.      Capillary Refill: Capillary refill takes less than 2 seconds.   Neurological:      Mental Status: He is alert.   Psychiatric:         Mood and Affect: Mood normal.      ED Course & MDM   Diagnoses as of 10/19/24 0627   Occlusive disease of artery of lower extremity (CMS-HCC)       Medical Decision Making  Patient is a 89-year-old male with a past medical history of paroxysmal A-fib on Eliquis, systolic congestive heart failure cardiomyopathy (EF 30%), COPD, and dementia who presents to the emergency department for concerns of lower extremity circulatory problems.  Patient's DP pulses were unable to be found on Doppler bilaterally.  Patient given morphine 2 mg for pain control.  Patient's vitals were stable and within normal limits.  A CT angio aorta and bilateral iliofemoral with runoff was ordered.  Vascular surgery was consulted and agreed to see the patient.  Basic labs were ordered along with coagulation screen and type and screen.  Upon signout at 1500 hrs., patient care was transferred to Dr. Aguirre with patient in stable condition awaiting CT scan, vascular recommendations, and final disposition.    Procedure  Procedures     Matthieu Curiel DO  Resident  10/18/24 1538       Matthieu Curiel DO  Resident  10/19/24 0623

## 2024-10-18 NOTE — PROGRESS NOTES
Emergency Medicine Transition of Care Note.    I received this patient during signout.  Please see Dr. Nieto's note for detailed H&P, labs and imaging.    89-year-old male with a past medical history of paroxysmal A-fib on Eliquis, systolic congestive heart failure cardiomyopathy (EF 30%), COPD, and dementia who presents to the emergency department for concerns of lower extremity circulatory problems. Patient's lower extremity DP pulses dopplerable by vascular surgery. Hyperkalemia thought to be due to hemolysis.    Plan at the time of signout was await CTA imaging results and follow-up with vascular surgery recommendations    Under my care, potassium was repeated and EKG obtained. Vascular surgery evaluated the patient following CT a imaging which demonstrated severe arterial disease of the lower extremity. Vascular surgery had extensive discussion with the patient about corrective operative procedures to restore flow, however due to family's age and comorbidities and risks that outweigh benefits, operative treatment was declined. As a result, patient was discharged with recommendation to follow-up with Dr. Cavazos in 1 to 3 months for symptoms. I reengaged family following vascular surgery discussions and confirmed with the family that they did not want to pursue further treatment of arterial disease at this time and patient is already taking Eliquis. As a result, patient was discharged home with return precautions and referral to vascular surgery    EKG interpretation  Heart rate 73 bpm  Narrow QRS  Atrial fibrillation  Normal axis  Old left bundle branch block    Diagnoses as of 10/18/24 1560   Occlusive disease of artery of lower extremity (CMS-[facility])       Final diagnoses:   None           Procedure  Procedures

## 2024-10-18 NOTE — ED TRIAGE NOTES
Pt presents after being seen in vascular clinic today for wounds to bilat feet, no pulses felt in clinic, BLE cold in clinic

## 2024-10-19 LAB
Q ONSET: 214 MS
QRS COUNT: 12 BEATS
QRS DURATION: 130 MS
QT INTERVAL: 390 MS
QTC CALCULATION(BAZETT): 429 MS
QTC FREDERICIA: 416 MS
R AXIS: 3 DEGREES
T AXIS: 111 DEGREES
T OFFSET: 409 MS
VENTRICULAR RATE: 73 BPM

## 2024-10-23 ENCOUNTER — PATIENT OUTREACH (OUTPATIENT)
Dept: PRIMARY CARE | Facility: CLINIC | Age: 89
End: 2024-10-23
Payer: MEDICARE

## 2024-10-24 ENCOUNTER — OFFICE VISIT (OUTPATIENT)
Dept: WOUND CARE | Facility: HOSPITAL | Age: 89
End: 2024-10-24
Payer: MEDICARE

## 2024-10-24 PROCEDURE — 99214 OFFICE O/P EST MOD 30 MIN: CPT

## 2024-11-04 ENCOUNTER — APPOINTMENT (OUTPATIENT)
Dept: PRIMARY CARE | Facility: CLINIC | Age: 89
End: 2024-11-04
Payer: MEDICARE

## 2024-11-04 VITALS
BODY MASS INDEX: 20.44 KG/M2 | HEART RATE: 68 BPM | DIASTOLIC BLOOD PRESSURE: 70 MMHG | SYSTOLIC BLOOD PRESSURE: 120 MMHG | WEIGHT: 138.4 LBS | TEMPERATURE: 98.1 F | RESPIRATION RATE: 16 BRPM

## 2024-11-04 DIAGNOSIS — G30.9 MIXED ALZHEIMER'S AND VASCULAR DEMENTIA (MULTI): ICD-10-CM

## 2024-11-04 DIAGNOSIS — E46 PROTEIN-CALORIE MALNUTRITION, UNSPECIFIED SEVERITY (MULTI): Primary | ICD-10-CM

## 2024-11-04 DIAGNOSIS — I73.9 PAD (PERIPHERAL ARTERY DISEASE) (CMS-HCC): ICD-10-CM

## 2024-11-04 DIAGNOSIS — I10 PRIMARY HYPERTENSION: ICD-10-CM

## 2024-11-04 DIAGNOSIS — I48.19 PERSISTENT ATRIAL FIBRILLATION (MULTI): ICD-10-CM

## 2024-11-04 DIAGNOSIS — Z23 FLU VACCINE NEED: ICD-10-CM

## 2024-11-04 DIAGNOSIS — E78.49 OTHER HYPERLIPIDEMIA: ICD-10-CM

## 2024-11-04 DIAGNOSIS — F02.80 MIXED ALZHEIMER'S AND VASCULAR DEMENTIA (MULTI): ICD-10-CM

## 2024-11-04 DIAGNOSIS — F01.50 MIXED ALZHEIMER'S AND VASCULAR DEMENTIA (MULTI): ICD-10-CM

## 2024-11-04 PROCEDURE — 1159F MED LIST DOCD IN RCRD: CPT | Performed by: INTERNAL MEDICINE

## 2024-11-04 PROCEDURE — 90662 IIV NO PRSV INCREASED AG IM: CPT | Performed by: INTERNAL MEDICINE

## 2024-11-04 PROCEDURE — 3074F SYST BP LT 130 MM HG: CPT | Performed by: INTERNAL MEDICINE

## 2024-11-04 PROCEDURE — 3078F DIAST BP <80 MM HG: CPT | Performed by: INTERNAL MEDICINE

## 2024-11-04 PROCEDURE — 1160F RVW MEDS BY RX/DR IN RCRD: CPT | Performed by: INTERNAL MEDICINE

## 2024-11-04 PROCEDURE — 99214 OFFICE O/P EST MOD 30 MIN: CPT | Performed by: INTERNAL MEDICINE

## 2024-11-04 PROCEDURE — G2211 COMPLEX E/M VISIT ADD ON: HCPCS | Performed by: INTERNAL MEDICINE

## 2024-11-04 PROCEDURE — G0008 ADMIN INFLUENZA VIRUS VAC: HCPCS | Performed by: INTERNAL MEDICINE

## 2024-11-04 NOTE — PROGRESS NOTES
Jam Cox is a 89 y.o. male   Patient with a past medical history significant for paroxysmal atrial fibrillation systolic congestive heart failure cardiomyopathy with an EF of 30% COPD dementia, peripheral artery disease    Had nonhealing sores on his toes  Circulation studies show poor circulation on right side  Will see Vascular again in January  Seeing podiatry    Appetite is OK  Feels fine  No chest pain/  SOB/ dizziness  BM OK  Energy level ok  Appetite OK             Review of Systems     Constitutional: no fever, no chills, not feeling poorly, not feeling tired and no recent weight gain, no recent weight loss.   ENT: no earache, no hearing loss, no nosebleeds, no nasal discharge, no sore throat and no hoarseness.   Cardiovascular: the heart rate was not slow, the heart rate was not fast, no chest pain, no palpitations, no intermittent leg claudication and no lower extremity edema.   Respiratory: no cough, wheezing or shortness of breath at rest or exertion  Gastrointestinal: no abdominal pain, no constipation, no melena, no nausea, no diarrhea, no vomiting and no blood in stools.   Musculoskeletal: no arthralgias, no myalgias, no back pain, no joint swelling, no joint stiffness, no limb pain and no limb swelling.   Integumentary: foot ulcer  Neurological: no headache,  no numbness, no dizziness, no tingling and no fainting.   All other systems have been reviewed and are negative for complaint.     Current Outpatient Medications   Medication Instructions    atorvastatin (LIPITOR) 10 mg, oral, Daily, Follow up with PCP prior to restarting    cholecalciferol (Vitamin D-3) 50 mcg (2,000 unit) capsule 1 capsule, oral, Daily    clindamycin (Cleocin T) 1 % lotion 1 Application, Topical, 2 times daily, Apply to affected area twice daily    digoxin (LANOXIN) 125 mcg, oral, Daily    donepezil (ARICEPT) 10 mg, oral, Daily    Eliquis 5 mg, oral, 2 times daily    finasteride (PROSCAR) 5 mg, oral, Daily    fluticasone  propion-salmeteroL (Advair Diskus) 250-50 mcg/dose diskus inhaler 1 puff, inhalation, 2 times daily RT, Rinse mouth with water after use to reduce aftertaste and incidence of candidiasis. Do not swallow.    ipratropium-albuteroL (Duo-Neb) 0.5-2.5 mg/3 mL nebulizer solution 3 mL, nebulization, 4 times daily PRN    Jardiance 10 mg, oral, Daily    melatonin 3 mg, oral, Nightly    memantine (NAMENDA) 10 mg, oral, 2 times daily    metoprolol succinate XL (TOPROL-XL) 25 mg, oral, Daily, Do not crush or chew.    sacubitriL-valsartan (Entresto) 24-26 mg tablet 0.5 tablets, oral, 2 times daily    spironolactone (ALDACTONE) 12.5 mg, oral, Daily, Follow up with PCP prior to restarting    tamsulosin (FLOMAX) 0.4 mg, oral, Daily, Take at bedtime.    torsemide (DEMADEX) 20 mg, oral, Daily    VITAMIN B COMPLEX ORAL 1 tablet, oral, Daily    walker (Ultra-Light Rollator) misc 1 each, miscellaneous, Daily         Vitals:    11/04/24 1229   BP: 120/70   Pulse: 68   Resp: 16   Temp: 36.7 °C (98.1 °F)        Physical Exam     Constitutional   General appearance: Alert and in no acute distress.     Pulmonary   Respiratory assessment: No respiratory distress, normal respiratory rhythm and effort.    Auscultation of Lungs: Clear bilateral breath sounds.   Cardiovascular   Auscultation of heart: Apical pulse normal, heart rate and rhythm normal, normal S1 and S2, no murmurs and no pericardial rub.    Exam for edema: No peripheral edema.   Abdomen   Abdominal Exam: No bruits, normal bowel sounds, soft, non-tender, no abdominal mass palpated.    Liver and Spleen exam: No hepato-splenomegaly.   Musculoskeletal   Examination of gait: Normal.    Inspection of digits and nails: No clubbing or cyanosis of the fingernails.    Inspection/palpation of joints, bones and muscles: No joint swelling. Normal movement of all extremities.   Skin   Skin inspection: sores on bilateral third toe  Neurologic   Cranial nerves: Nerves 2-12 were intact, alert x  1-2    Assessment/Plan          Patient with a past medical history significant for paroxysmal atrial fibrillation systolic congestive heart failure cardiomyopathy with an EF of 30% COPD dementia, peripheral artery disease      # PAD  # Toe ulcer healing well  Will see vascular in January    # PAF  # Chronic systolic CHF  Stable  Continue current medications    # COPD  #Alzheimer's dementia  # Malnutrition  Continue boost/ ensure

## 2024-11-12 ENCOUNTER — APPOINTMENT (OUTPATIENT)
Dept: WOUND CARE | Facility: HOSPITAL | Age: 89
End: 2024-11-12
Payer: MEDICARE

## 2024-11-20 ENCOUNTER — TELEPHONE (OUTPATIENT)
Dept: PRIMARY CARE | Facility: CLINIC | Age: 89
End: 2024-11-20
Payer: MEDICARE

## 2024-11-20 DIAGNOSIS — I50.42 CHRONIC COMBINED SYSTOLIC AND DIASTOLIC CONGESTIVE HEART FAILURE: ICD-10-CM

## 2024-11-20 DIAGNOSIS — I50.9 ACUTE ON CHRONIC CONGESTIVE HEART FAILURE, UNSPECIFIED HEART FAILURE TYPE: ICD-10-CM

## 2024-11-21 RX ORDER — TORSEMIDE 20 MG/1
TABLET ORAL
Qty: 90 TABLET | Refills: 2 | Status: SHIPPED | OUTPATIENT
Start: 2024-11-21

## 2024-11-21 RX ORDER — DIGOXIN 125 MCG
125 TABLET ORAL DAILY
Qty: 90 TABLET | Refills: 2 | Status: SHIPPED | OUTPATIENT
Start: 2024-11-21

## 2024-11-21 RX ORDER — TORSEMIDE 20 MG/1
TABLET ORAL
Qty: 30 TABLET | Refills: 1 | Status: SHIPPED | OUTPATIENT
Start: 2024-11-21

## 2024-11-21 RX ORDER — METOPROLOL SUCCINATE 25 MG/1
25 TABLET, EXTENDED RELEASE ORAL DAILY
Qty: 30 TABLET | Refills: 1 | Status: SHIPPED | OUTPATIENT
Start: 2024-11-21 | End: 2025-01-20

## 2024-11-25 ENCOUNTER — TELEPHONE (OUTPATIENT)
Dept: PRIMARY CARE | Facility: CLINIC | Age: 89
End: 2024-11-25
Payer: MEDICARE

## 2024-11-25 DIAGNOSIS — G30.9 MIXED ALZHEIMER'S AND VASCULAR DEMENTIA (MULTI): ICD-10-CM

## 2024-11-25 DIAGNOSIS — F01.50 MIXED ALZHEIMER'S AND VASCULAR DEMENTIA (MULTI): ICD-10-CM

## 2024-11-25 DIAGNOSIS — F02.80 MIXED ALZHEIMER'S AND VASCULAR DEMENTIA (MULTI): ICD-10-CM

## 2024-11-25 RX ORDER — DONEPEZIL HYDROCHLORIDE 10 MG/1
10 TABLET, FILM COATED ORAL DAILY
Qty: 90 TABLET | Refills: 0 | Status: SHIPPED | OUTPATIENT
Start: 2024-11-25

## 2024-11-29 ENCOUNTER — PATIENT OUTREACH (OUTPATIENT)
Dept: PRIMARY CARE | Facility: CLINIC | Age: 89
End: 2024-11-29
Payer: MEDICARE

## 2024-12-09 PROCEDURE — RXMED WILLOW AMBULATORY MEDICATION CHARGE

## 2024-12-10 ENCOUNTER — PHARMACY VISIT (OUTPATIENT)
Dept: PHARMACY | Facility: CLINIC | Age: 89
End: 2024-12-10
Payer: MEDICARE

## 2024-12-12 NOTE — H&P
PATIENT NAME: Jam Cox  MRN: [id number]    SERVICE DATE:  5/3/2024  SERVICE TIME:  6:59 AM    Jam Cox is a 89 y.o. male on day 0 of admission presenting with Multifocal pneumonia.      HPI    Patient is a 89-year-old male with medical history significant for heart failure with moderately reduced ejection fraction presenting to [facility] due to shortness of breath.  It was noted that patient was discharged yesterday [facility] where he was admitted initially for CHF exacerbation.  Within the last month this is patient's third hospital admission.  From what I could gather from chart review as well as history obtained from patient's granddaughter at bedside, patient was admitted to [facility] for CHF exacerbation and received IV Lasix for diuresis.  After patient was discharged, he developed shortness of breath and recovery brought back to the hospital for further evaluation.  Suspect that patient was probably over diuresed and now became volume down into the of the volume overloaded.  Additionally, CT imaging done on admission showed patient to have bilateral multifocal pneumonia in his lower lobes for which broad-spectrum antibiotics was started.  Patient additionally was also found to be in A-fib with RVR on admission and received IV metoprolol IV however his blood pressure dropped significantly and patient was switched over to an amiodarone drip with improvement in his heart rate.  Patient will be admitted to the medical ICU due to septic shock versus cardiogenic shock in setting of his congestive heart failure requiring vasopressors.    Past Medical History: As noted above  Surgical History: Reviewed in patient's chart  Social History: Does not endorse, alcohol use, illicit drug abuse  Family History: Reviewed in patient's chart complete 12 point review of systems unable to be thoroughly obtained due to patient's    Review of Systems     12 point review of systems was unable to be  thoroughly obtained due to patient's underlying baseline dementia.  OBJECTIVE    Vitals:    05/03/24 0515 05/03/24 0520 05/03/24 0525 05/03/24 0626   BP: 105/73 (!) 77/49 (!) 78/47 99/60   BP Location:    Right arm   Patient Position:    Lying   Pulse: (!) 106 (!) 104 (!) 103 93   Resp: (!) 27 (!) 22 (!) 36 25   Temp:    36.3 °C (97.3 °F)   TempSrc:    Temporal   SpO2: (!) 90% (!) 93% 96% 92%   Weight:    71.2 kg (156 lb 15.5 oz)   Height:    1.829 m (6')      Results from last 7 days   Lab Units 05/03/24  0201   WBC AUTO x10*3/uL 8.7   HEMOGLOBIN g/dL 13.9   HEMATOCRIT % 41.1   PLATELETS AUTO x10*3/uL 252   NEUTROS PCT AUTO % 83.2   LYMPHS PCT AUTO % 9.1   MONOS PCT AUTO % 7.3   EOS PCT AUTO % 0.0     Results from last 7 days   Lab Units 05/03/24  0520 05/03/24  0201   SODIUM mmol/L  --  137   POTASSIUM mmol/L 4.3 5.9*   CHLORIDE mmol/L  --  101   CO2 mmol/L  --  24   BUN mg/dL  --  24*   CREATININE mg/dL  --  1.40*   CALCIUM mg/dL  --  9.8   PROTEIN TOTAL g/dL  --  7.9   BILIRUBIN TOTAL mg/dL  --  2.2*   ALK PHOS U/L  --  88   ALT U/L  --  41   AST U/L  --  58*   GLUCOSE mg/dL  --  107*       Scheduled Medications  azithromycin, 500 mg, intravenous, q24h  lactated Ringer's, 500 mL, intravenous, Once  lactated Ringer's, 500 mL, intravenous, Once  oxygen, , inhalation, Continuous - Inhalation  piperacillin-tazobactam, 3.375 g, intravenous, q6h  vancomycin, 1,250 mg, intravenous, Once   Followed by  [START ON 5/4/2024] vancomycin, 1,000 mg, intravenous, q24h           Physical Exam  Constitutional:       General: He is not in acute distress.  HENT:      Head: Normocephalic and atraumatic.      Nose: Nose normal.      Mouth/Throat:      Mouth: Mucous membranes are dry.   Eyes:      Extraocular Movements: Extraocular movements intact.      Conjunctiva/sclera: Conjunctivae normal.      Pupils: Pupils are equal, round, and reactive to light.   Cardiovascular:      Rate and Rhythm: Normal rate.      Pulses: Normal pulses.       Heart sounds: Normal heart sounds.   Pulmonary:      Effort: Pulmonary effort is normal.      Breath sounds: Normal breath sounds.   Abdominal:      General: Bowel sounds are normal.      Palpations: Abdomen is soft.   Musculoskeletal:         General: Normal range of motion.   Skin:     General: Skin is warm and dry.   Neurological:      Mental Status: He is alert. Mental status is at baseline.            ASSESSMENT & PLAN    89-year-old male PMH of HFimpEF, pAfib, COPD, dementia, who presents with shortness of breath. Patient was found to have multifocal pneumonia on CT, started on broadspectrum antibiotics. He was found to be in afib with RVR, received metoprolol IV and his BP dropped, his HR seems to have improved with amiodarone drip. His shock is likely septic from pneumonia, with downtrending lactate, low pressor requirements.       Neuro/Psych  #Dementia  -c/w home donepezil and memantine    CV  #Shock Septic vs Cardiogenic   ::Likely septic in setting of pneumonia vs cardiogenic (known to have CHF)  ::Lactate improved from 4->2  -Central venous saturation low.  Very high concern for patient being in cardiogenic shock.  Consulted heart failure specialist team and recommended starting patient on dobutamine 2.5.  -There is also component of septic shock given patient's underlying pneumonia.  -Continue to monitor patient's pressures.  Goal is to maintain maps greater than 65.      #Afib with RVR  ::known to have afib, on apixiban  ::did not tolerate IV metoprolol in the ED  -c/w with amiodarone gtt, HR seems to be controlled  -Pt on heparin drip     #HFimpEF  -Hold home jardiance, entresto, metoprolol      Pulm  #COPD/asthma  -c/w home Advair      GI  #Dysphagia  #Concern for aspiration  -Speech evaluation pending  -Patient is currently n.p.o. at this time    /Renal  #YOSEF  -Patient has not had a good urine output at this time.  -Will need to continue to monitor very closely at this time as his underlying  cardiogenic shock could be contributing to poor perfusion.    Endo  No issues at this time  ID  #Septic shock  #Bilateral lower lobe pneumonia    -Patient started on broad-spectrum coverage with IV vancomycin and Zosyn start date 5/2    Heme/Onc  -No issues at this time            Vent/O2: FiO2 (%):  [36 %-55 %] 44 %   Lines/Devices: Peripheral IV, right IJ  Drips: Dobutamine, Levophed  ATBx: Vancomycin, Zosyn  Fluids: None  Diet: N.p.o.  PPX: -  DVT PPX: Heparin drip  Code status: Full code discussed with patient's granddaughter who is the POA  Dispo: ICU    Dr. Nick Wagner  Internal Medicine PGY-2  May 3, 2024 6:59 AM    None

## 2024-12-19 ENCOUNTER — OFFICE VISIT (OUTPATIENT)
Dept: WOUND CARE | Facility: HOSPITAL | Age: 89
End: 2024-12-19
Payer: MEDICARE

## 2024-12-19 PROCEDURE — 99213 OFFICE O/P EST LOW 20 MIN: CPT

## 2024-12-26 PROCEDURE — RXMED WILLOW AMBULATORY MEDICATION CHARGE

## 2024-12-27 ENCOUNTER — PHARMACY VISIT (OUTPATIENT)
Dept: PHARMACY | Facility: CLINIC | Age: 89
End: 2024-12-27
Payer: MEDICARE

## 2025-01-09 ENCOUNTER — OFFICE VISIT (OUTPATIENT)
Dept: CARDIOLOGY | Facility: HOSPITAL | Age: OVER 89
End: 2025-01-09
Payer: MEDICARE

## 2025-01-09 ENCOUNTER — OFFICE VISIT (OUTPATIENT)
Dept: WOUND CARE | Facility: HOSPITAL | Age: OVER 89
End: 2025-01-09
Payer: MEDICARE

## 2025-01-09 VITALS
HEART RATE: 100 BPM | BODY MASS INDEX: 21.52 KG/M2 | WEIGHT: 145.7 LBS | SYSTOLIC BLOOD PRESSURE: 115 MMHG | OXYGEN SATURATION: 100 % | DIASTOLIC BLOOD PRESSURE: 51 MMHG

## 2025-01-09 DIAGNOSIS — I48.0 PAF (PAROXYSMAL ATRIAL FIBRILLATION) (MULTI): ICD-10-CM

## 2025-01-09 DIAGNOSIS — I73.9 PAD (PERIPHERAL ARTERY DISEASE) (CMS-HCC): ICD-10-CM

## 2025-01-09 DIAGNOSIS — I50.22 CHRONIC SYSTOLIC HEART FAILURE: Primary | ICD-10-CM

## 2025-01-09 PROCEDURE — 99213 OFFICE O/P EST LOW 20 MIN: CPT

## 2025-01-09 PROCEDURE — 3078F DIAST BP <80 MM HG: CPT | Performed by: INTERNAL MEDICINE

## 2025-01-09 PROCEDURE — 99214 OFFICE O/P EST MOD 30 MIN: CPT | Performed by: INTERNAL MEDICINE

## 2025-01-09 PROCEDURE — 3074F SYST BP LT 130 MM HG: CPT | Performed by: INTERNAL MEDICINE

## 2025-01-09 PROCEDURE — G2211 COMPLEX E/M VISIT ADD ON: HCPCS | Performed by: INTERNAL MEDICINE

## 2025-01-09 PROCEDURE — 1159F MED LIST DOCD IN RCRD: CPT | Performed by: INTERNAL MEDICINE

## 2025-01-09 NOTE — PATIENT INSTRUCTIONS
Thank you for coming to see us today! To reach Dr. Diaz's office please call [phone]. Fax [phone]. Call [phone] to schedule an appointment. You may also contact the HF RNs at [email or web address]  (Please include your name and date of birth)  For MEDICATION REFILLS, please call [phone] option [phone] then option 1.    No medication changes today.  Schedule a follow up with Dr. Diaz in 9 months.

## 2025-01-09 NOTE — PROGRESS NOTES
Denies chest pain, chest pressure, palpitations, shortness of breath, dyspnea on exertion, orthopnea, PND. No edema noted in BLE.   Denies headaches, dizziness, lightheadedness, and falls.    His wife states he has wounds on both of his big toes and has swelling in the area.   His daughter states he has fatigue and naps often throughout the day.

## 2025-01-09 NOTE — PROGRESS NOTES
Heart Failure Cardiology    Jam Cox is a 89 y.o. male from [facility], here with his daughter (JEAN) and wife. Mr. Cox has severe dementia.    Prior history  He had a series of hospitalizations in April through May 2024, for acutely decompensated heart failure in setting of AF/RVR, and multifocal pneumonia. During last admission he was admitted to MICU for management of shock requiring vasopressors. His condition improved and he is now home. He is actually tolerating triple therapy for HFrEF.    History at visit today  Since I last saw him he has been doing well per report of his family.  They give him his medications daily.  They have not noticed any fluid swelling.  They are not describing that he is having difficulties breathing.    Studies:    Echocardiogram 5/6/2024   1. Left ventricular systolic function is moderately decreased with a 30-35% estimated ejection fraction.   2. Poorly visualized anatomical structures due to suboptimal image quality.   3. There is reduced right ventricular systolic function.   4. The left atrium is enlarged.   5. There is global hypokinesis of the left ventricle with minor regional variations.    Echocardiogram 8/28/2024  1. Poorly visualized anatomical structures due to suboptimal image quality.   2. Left ventricular ejection fraction is moderately decreased, calculated by Oropeza's biplane at 34%.   3. There is global hypokinesis of the left ventricle with minor regional variations.   4. There is reduced right ventricular systolic function.   5. There is moderate mitral annular calcification.   6. Moderate mitral valve regurgitation.   7. Mild aortic valve regurgitation.   8. The patient is in atrial fibrillation which may influence the estimate of left ventricular function and transvalvular flows.    Exam: /51 (BP Location: Right arm, Patient Position: Sitting)   Pulse 100   Wt 66.1 kg (145 lb 11.2 oz)   SpO2 100%   BMI 21.52 kg/m²   No  JVD  Irregular  No LE edema    Current Outpatient Medications   Medication Instructions    atorvastatin (LIPITOR) 10 mg, oral, Daily, Follow up with PCP prior to restarting    cholecalciferol (Vitamin D-3) 50 mcg (2,000 unit) capsule 1 capsule, Daily    clindamycin (Cleocin T) 1 % lotion 1 Application, Topical, 2 times daily, Apply to affected area twice daily    digoxin (LANOXIN) 125 mcg, oral, Daily    donepezil (ARICEPT) 10 mg, oral, Daily    Eliquis 5 mg, oral, 2 times daily    finasteride (PROSCAR) 5 mg, oral, Daily    fluticasone propion-salmeteroL (Advair Diskus) 250-50 mcg/dose diskus inhaler 1 puff, inhalation, 2 times daily RT, Rinse mouth with water after use to reduce aftertaste and incidence of candidiasis. Do not swallow.    ipratropium-albuteroL (Duo-Neb) 0.5-2.5 mg/3 mL nebulizer solution 3 mL, nebulization, 4 times daily PRN    Jardiance 10 mg, oral, Daily    melatonin 3 mg, oral, Nightly    memantine (NAMENDA) 10 mg, oral, 2 times daily    metoprolol succinate XL (TOPROL-XL) 25 mg, oral, Daily, Do not crush or chew.    sacubitriL-valsartan (Entresto) 24-26 mg tablet 0.5 tablets, oral, 2 times daily    spironolactone (ALDACTONE) 12.5 mg, oral, Daily, Follow up with PCP prior to restarting    tamsulosin (FLOMAX) 0.4 mg, oral, Daily, Take at bedtime.    VITAMIN B COMPLEX ORAL 1 tablet, Daily    walker (Ultra-Light Rollator) misc 1 each, miscellaneous, Daily     Past medical history (non-cardiac):  -- Dementia  -- COPD, prior exacerbations requiring steroids    Cardiovascular history:  -- Paroxsymal atrial fibrillation (on DOAC)  -- HFrEF, Stage C, unable to assess NYHA due to dementia  -- PAD, severe    Assessment: 89 y.o. AAM with significant dementia, history of PAF and HFrEF Stage C. He is on quadruple therapy for HFrEF and seems to be tolerating it well.     Plan:  -- Continue quadruple therapy for HFrEF  -- We reviewed goals of care again with his family today, and they do not plan to pursue any  invasive procedures or interventions in the future.  Goal is to keep him comfortable and at home.    Agustina Diaz MD, MPH  Advanced Heart Failure and Transplant Cardiology  [facility]

## 2025-01-16 ENCOUNTER — OFFICE VISIT (OUTPATIENT)
Dept: WOUND CARE | Facility: HOSPITAL | Age: OVER 89
End: 2025-01-16
Payer: MEDICARE

## 2025-01-16 DIAGNOSIS — I73.9 PVD (PERIPHERAL VASCULAR DISEASE) (CMS-HCC): Primary | ICD-10-CM

## 2025-01-16 PROCEDURE — 99213 OFFICE O/P EST LOW 20 MIN: CPT

## 2025-01-16 PROCEDURE — 99213 OFFICE O/P EST LOW 20 MIN: CPT | Performed by: NURSE PRACTITIONER

## 2025-01-21 ENCOUNTER — TELEPHONE (OUTPATIENT)
Dept: PRIMARY CARE | Facility: CLINIC | Age: OVER 89
End: 2025-01-21

## 2025-01-21 DIAGNOSIS — N40.1 BENIGN PROSTATIC HYPERPLASIA WITH NOCTURIA: Primary | ICD-10-CM

## 2025-01-21 DIAGNOSIS — R35.1 BENIGN PROSTATIC HYPERPLASIA WITH NOCTURIA: Primary | ICD-10-CM

## 2025-01-21 RX ORDER — TAMSULOSIN HYDROCHLORIDE 0.4 MG/1
0.4 CAPSULE ORAL DAILY
Qty: 30 CAPSULE | Refills: 11 | Status: SHIPPED | OUTPATIENT
Start: 2025-01-21 | End: 2026-01-21

## 2025-01-21 NOTE — TELEPHONE ENCOUNTER
Patient daughter is asking for a refill for tamsulosin 0.4mg I don't see it on his med list is this ok to refill

## 2025-01-23 ENCOUNTER — OFFICE VISIT (OUTPATIENT)
Dept: WOUND CARE | Facility: HOSPITAL | Age: OVER 89
End: 2025-01-23
Payer: MEDICARE

## 2025-01-23 PROCEDURE — 99214 OFFICE O/P EST MOD 30 MIN: CPT

## 2025-01-25 ENCOUNTER — APPOINTMENT (OUTPATIENT)
Dept: RADIOLOGY | Facility: HOSPITAL | Age: OVER 89
End: 2025-01-25
Payer: MEDICARE

## 2025-01-25 ENCOUNTER — HOSPITAL ENCOUNTER (EMERGENCY)
Facility: HOSPITAL | Age: OVER 89
Discharge: HOME | End: 2025-01-25
Attending: EMERGENCY MEDICINE
Payer: MEDICARE

## 2025-01-25 ENCOUNTER — APPOINTMENT (OUTPATIENT)
Dept: CARDIOLOGY | Facility: HOSPITAL | Age: OVER 89
End: 2025-01-25
Payer: MEDICARE

## 2025-01-25 VITALS
HEART RATE: 97 BPM | WEIGHT: 141 LBS | HEIGHT: 67 IN | DIASTOLIC BLOOD PRESSURE: 60 MMHG | OXYGEN SATURATION: 100 % | SYSTOLIC BLOOD PRESSURE: 117 MMHG | TEMPERATURE: 97.9 F | BODY MASS INDEX: 22.13 KG/M2 | RESPIRATION RATE: 18 BRPM
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
## 2025-01-25 LAB
ALBUMIN SERPL BCP-MCNC: 3.7 G/DL (ref 3.4–5)
ALP SERPL-CCNC: 67 U/L (ref 33–136)
ALT SERPL W P-5'-P-CCNC: 6 U/L (ref 10–52)
ANION GAP SERPL CALC-SCNC: 11 MMOL/L (ref 10–20)
APPEARANCE UR: CLEAR
AST SERPL W P-5'-P-CCNC: 13 U/L (ref 9–39)
BASOPHILS # BLD AUTO: 0.03 X10*3/UL (ref 0–0.1)
BASOPHILS NFR BLD AUTO: 0.4 %
BILIRUB SERPL-MCNC: 1.5 MG/DL (ref 0–1.2)
BILIRUB UR STRIP.AUTO-MCNC: NEGATIVE MG/DL
BUN SERPL-MCNC: 14 MG/DL (ref 6–23)
CALCIUM SERPL-MCNC: 8.9 MG/DL (ref 8.6–10.3)
CARDIAC TROPONIN I PNL SERPL HS: 25 NG/L (ref 0–20)
CARDIAC TROPONIN I PNL SERPL HS: 28 NG/L (ref 0–20)
CHLORIDE SERPL-SCNC: 102 MMOL/L (ref 98–107)
CO2 SERPL-SCNC: 29 MMOL/L (ref 21–32)
COLOR UR: YELLOW
CREAT SERPL-MCNC: 1.27 MG/DL (ref 0.5–1.3)
EGFRCR SERPLBLD CKD-EPI 2021: 52 ML/MIN/1.73M*2
EOSINOPHIL # BLD AUTO: 0.2 X10*3/UL (ref 0–0.4)
EOSINOPHIL NFR BLD AUTO: 2.7 %
ERYTHROCYTE [DISTWIDTH] IN BLOOD BY AUTOMATED COUNT: 14.3 % (ref 11.5–14.5)
GLUCOSE SERPL-MCNC: 107 MG/DL (ref 74–99)
GLUCOSE UR STRIP.AUTO-MCNC: ABNORMAL MG/DL
HCT VFR BLD AUTO: 35.6 % (ref 41–52)
HGB BLD-MCNC: 11.5 G/DL (ref 13.5–17.5)
HOLD SPECIMEN: NORMAL
IMM GRANULOCYTES # BLD AUTO: 0.01 X10*3/UL (ref 0–0.5)
IMM GRANULOCYTES NFR BLD AUTO: 0.1 % (ref 0–0.9)
KETONES UR STRIP.AUTO-MCNC: NEGATIVE MG/DL
LEUKOCYTE ESTERASE UR QL STRIP.AUTO: NEGATIVE
LYMPHOCYTES # BLD AUTO: 1.32 X10*3/UL (ref 0.8–3)
LYMPHOCYTES NFR BLD AUTO: 17.6 %
MCH RBC QN AUTO: 32 PG (ref 26–34)
MCHC RBC AUTO-ENTMCNC: 32.3 G/DL (ref 32–36)
MCV RBC AUTO: 99 FL (ref 80–100)
MONOCYTES # BLD AUTO: 0.7 X10*3/UL (ref 0.05–0.8)
MONOCYTES NFR BLD AUTO: 9.3 %
NEUTROPHILS # BLD AUTO: 5.26 X10*3/UL (ref 1.6–5.5)
NEUTROPHILS NFR BLD AUTO: 69.9 %
NITRITE UR QL STRIP.AUTO: NEGATIVE
NRBC BLD-RTO: 0 /100 WBCS (ref 0–0)
PH UR STRIP.AUTO: 6 [PH]
PLATELET # BLD AUTO: 182 X10*3/UL (ref 150–450)
POTASSIUM SERPL-SCNC: 4.3 MMOL/L (ref 3.5–5.3)
PROT SERPL-MCNC: 7.4 G/DL (ref 6.4–8.2)
PROT UR STRIP.AUTO-MCNC: NEGATIVE MG/DL
RBC # BLD AUTO: 3.59 X10*6/UL (ref 4.5–5.9)
RBC # UR STRIP.AUTO: NEGATIVE /UL
SODIUM SERPL-SCNC: 138 MMOL/L (ref 136–145)
SP GR UR STRIP.AUTO: 1.01
UROBILINOGEN UR STRIP.AUTO-MCNC: NORMAL MG/DL
WBC # BLD AUTO: 7.5 X10*3/UL (ref 4.4–11.3)

## 2025-01-25 PROCEDURE — 99285 EMERGENCY DEPT VISIT HI MDM: CPT | Mod: 25 | Performed by: EMERGENCY MEDICINE

## 2025-01-25 PROCEDURE — 73552 X-RAY EXAM OF FEMUR 2/>: CPT | Mod: RIGHT SIDE | Performed by: RADIOLOGY

## 2025-01-25 PROCEDURE — 80053 COMPREHEN METABOLIC PANEL: CPT | Performed by: EMERGENCY MEDICINE

## 2025-01-25 PROCEDURE — 70450 CT HEAD/BRAIN W/O DYE: CPT

## 2025-01-25 PROCEDURE — 85025 COMPLETE CBC W/AUTO DIFF WBC: CPT | Performed by: EMERGENCY MEDICINE

## 2025-01-25 PROCEDURE — 73502 X-RAY EXAM HIP UNI 2-3 VIEWS: CPT | Mod: RIGHT SIDE | Performed by: RADIOLOGY

## 2025-01-25 PROCEDURE — 73502 X-RAY EXAM HIP UNI 2-3 VIEWS: CPT | Mod: RT

## 2025-01-25 PROCEDURE — 2500000001 HC RX 250 WO HCPCS SELF ADMINISTERED DRUGS (ALT 637 FOR MEDICARE OP): Performed by: EMERGENCY MEDICINE

## 2025-01-25 PROCEDURE — 70450 CT HEAD/BRAIN W/O DYE: CPT | Performed by: RADIOLOGY

## 2025-01-25 PROCEDURE — 84484 ASSAY OF TROPONIN QUANT: CPT | Performed by: EMERGENCY MEDICINE

## 2025-01-25 PROCEDURE — 73552 X-RAY EXAM OF FEMUR 2/>: CPT | Mod: RT

## 2025-01-25 PROCEDURE — 72125 CT NECK SPINE W/O DYE: CPT | Performed by: RADIOLOGY

## 2025-01-25 PROCEDURE — 81003 URINALYSIS AUTO W/O SCOPE: CPT | Performed by: EMERGENCY MEDICINE

## 2025-01-25 PROCEDURE — 93005 ELECTROCARDIOGRAM TRACING: CPT

## 2025-01-25 PROCEDURE — 72125 CT NECK SPINE W/O DYE: CPT

## 2025-01-25 PROCEDURE — 36415 COLL VENOUS BLD VENIPUNCTURE: CPT | Performed by: EMERGENCY MEDICINE

## 2025-01-25 RX ORDER — TRAMADOL HYDROCHLORIDE 50 MG/1
25 TABLET ORAL EVERY 4 HOURS PRN
Qty: 9 TABLET | Refills: 0 | Status: SHIPPED | OUTPATIENT
Start: 2025-01-25 | End: 2025-01-25

## 2025-01-25 RX ORDER — TRAMADOL HYDROCHLORIDE 50 MG/1
25 TABLET ORAL EVERY 4 HOURS PRN
Qty: 9 TABLET | Refills: 0 | Status: SHIPPED | OUTPATIENT
Start: 2025-01-25 | End: 2025-01-28

## 2025-01-25 RX ORDER — TRAMADOL HYDROCHLORIDE 50 MG/1
50 TABLET ORAL ONCE
Status: COMPLETED | OUTPATIENT
Start: 2025-01-25 | End: 2025-01-25

## 2025-01-25 RX ADMIN — TRAMADOL HYDROCHLORIDE 50 MG: 50 TABLET, COATED ORAL at 12:26

## 2025-01-25 ASSESSMENT — PAIN - FUNCTIONAL ASSESSMENT: PAIN_FUNCTIONAL_ASSESSMENT: 0-10

## 2025-01-25 ASSESSMENT — PAIN DESCRIPTION - PAIN TYPE: TYPE: ACUTE PAIN

## 2025-01-25 ASSESSMENT — PAIN SCALES - GENERAL: PAINLEVEL_OUTOF10: 5 - MODERATE PAIN

## 2025-01-25 ASSESSMENT — PAIN DESCRIPTION - ORIENTATION: ORIENTATION: RIGHT

## 2025-01-25 ASSESSMENT — PAIN DESCRIPTION - DESCRIPTORS: DESCRIPTORS: ACHING

## 2025-01-25 ASSESSMENT — PAIN DESCRIPTION - FREQUENCY: FREQUENCY: INTERMITTENT

## 2025-01-25 NOTE — DISCHARGE INSTRUCTIONS
Please schedule follow-up appointment with your primary care doctor.  Continue take your medications as indicated.  Return immediately if concerning symptoms, as discussed.  Remember to come to the emergency room immediately if you ever have any falls particularly involving head trauma.

## 2025-01-25 NOTE — ED TRIAGE NOTES
Patient states he had a fall last night while getting out of bed and going to the bathroom. Pt states he lost his balance. Pt c/o right hip pain. Pt states he is on eliquis. Denies LOC.

## 2025-01-25 NOTE — ED PROVIDER NOTES
HPI   Chief Complaint   Patient presents with    Fall    Hip Pain       HPI  Patient is a 89-year-old male with a past medical history significant for Alzheimer's dementia, atrial fibrillation, peripheral vascular disease, DVT, chronic foot wounds, COPD who presents emergency room with a chief complaint of fall on blood thinners.  Patient states that he was getting out of bed and got out too quickly.  He then fell.  He does not know if he hit his head or not and cannot recall if he passed out but according to family members they heard him fall and ran to him right away and noted that he got up and went to the bathroom.  This happened at 9 PM last night.  He has been complaining of right hip pain since then.  He denies any other issues including chest pain, shortness of breath, dizziness, lightheadedness, new neurologic deficits.  On the way here he had a little bit of trickle of blood from his nose but he does not recall this which is baseline for him according to family.  Right now he is only complaining of pain to the right hip.      PMHx: As above  PSHx: Denies pertinent  FamilyHx: Denies pertinent  SocialHx: Denies  Allergies: ACE inhibitors  Medications: See Medication Reconciliation     ROS  As above otherwise denies    Physical Exam    GENERAL: Awake and Alert, No Acute Distress  HEENT: AT/NC, PERRL, EOMI, Normal Oropharynx, No Signs of Dehydration  NECK: Normal Inspection, No JVD  CARDIOVASCULAR: RRR, No M/R/G  RESPIRATORY: CTA Bilaterally, No Wheezes, Rales or Rhonchi, Chest Wall Non-tender  ABDOMEN: Soft, non-tender abdomen, Normal Bowel Sounds, No Distention  BACK: No CVA Tenderness  SKIN: Normal Color, Warm, Dry, No Rashes   EXTREMITIES: Slight tenderness to palpation to the right hip but has full range of motion Non-Tender, Full ROM, No Pedal Edema  NEURO: A&O x 3, Normal Motor and Sensation, Normal Mood and Affect    Nursing Assessment and Vitals Reviewed    EKG showed atrial fibrillation at 96 bpm.   There are T wave inversions in V5 through V6 as well as a left bundle branch block all of which are seen on prior.  No ischemic ST changes.    Medical Decision  Patient is a 89-year-old male with a past medical history significant for Alzheimer's dementia, atrial fibrillation, peripheral vascular disease, DVT, chronic foot wounds, COPD who presents emergency room with a chief complaint of fall on blood thinners.  Patient states that he was getting out of bed and got out too quickly.  He then fell.  He does not know if he hit his head or not and cannot recall if he passed out but according to family members they heard him fall and ran to him right away and noted that he got up and went to the bathroom.  This happened at 9 PM last night.  He has been complaining of right hip pain since then.  He denies any other issues including chest pain, shortness of breath, dizziness, lightheadedness, new neurologic deficits.  On the way here he had a little bit of trickle of blood from his nose but he does not recall this which is baseline for him according to family.  Right now he is only complaining of pain to the right hip.    On evaluation he is very well-appearing and in no acute distress.  Lungs are clear and heart is regular rate and rhythm.  Abdomen is soft nontender nondistended.  He is at his neurologic baseline.  He has full range of motion of the right hip but a little bit of tenderness to palpation particularly posteriorly.  Patient is ordered tramadol and we will perform a workup including CT imaging as he did fall on Eliquis and cannot recall if he hit his head.  Patient has baseline poor historian secondary to Alzheimer's dementia.    Workup for patient included labs that did not reveal any emergent electrolyte imbalance.  Bilirubin slightly elevated but improved from prior.  He has no leukocytosis or significant anemia.  He is without signs of urine infection.  CT head and C-spine showed chronic changes without  acute findings.  X-ray of the right hip and femur were normal and patient is ambulating and bearing weight.  Troponin was 28 which is similar to prior and repeat was downtrending.  He has remained in stable condition in the emergency room.  He feels much improved after some tramadol and family is requesting a couple of pills for home as they feel that he looks like it is on.  He is to follow-up with his primary care physician closely.  He is educated on supportive care at home as well as signs and symptoms that should prompt immediate turn to emergency room.                    Patient History   Past Medical History:   Diagnosis Date    Abnormality of plasma protein, unspecified 05/01/2017    Elevated blood protein    Benign prostatic hyperplasia without lower urinary tract symptoms 05/01/2017    BPH with elevated PSA    Disorder of prostate, unspecified 05/01/2017    Prostate disorder    Elevated prostate specific antigen (PSA) 05/01/2017    Elevated prostate specific antigen (PSA)    Elevated prostate specific antigen (PSA) 05/01/2017    Abnormal prostate specific antigen    Elevated prostate specific antigen (PSA) 05/01/2017    Abnormal PSA    Elevated prostate specific antigen (PSA) 05/01/2017    Abnormal prostate specific antigen test    Epistaxis 05/01/2017    Epistaxis, recurrent    Essential (primary) hypertension 12/19/2022    Hypertension    Frequency of micturition 05/01/2017    Urinary frequency    Hallucinations, unspecified 05/01/2017    Hallucinations    Headache, unspecified 05/01/2017    Bilateral headache    Hematuria, unspecified 05/01/2017    Hematuria    Impacted cerumen 05/23/2024    Ischemic cardiomyopathy 12/13/2021    Cardiomyopathy, ischemic    Nasal discharge 05/23/2024    Nasal mucosa dry 05/23/2024    Other amnesia 05/01/2017    Memory loss    Other microscopic hematuria 05/01/2017    Hematuria, microscopic    Other seasonal allergic rhinitis 05/01/2017    Seasonal allergies    Other  secondary cataract, right eye 05/31/2018    Posterior capsular opacification visually significant of right eye    Other specified disorders of the male genital organs 05/01/2017    Scrotal swelling    Personal history of other specified conditions 02/10/2020    History of epistaxis    Presence of intraocular lens 05/01/2017    Pseudophakia    Primary insomnia 05/01/2017    Primary insomnia    Primary open-angle glaucoma, unspecified eye, stage unspecified 05/01/2017    Open angle primary glaucoma    Unspecified atrial flutter (Multi) 05/01/2017    Atrial flutter    Unspecified glaucoma 05/01/2017    Glaucoma    Vitamin D deficiency, unspecified 05/01/2017    Vitamin D deficiency     Past Surgical History:   Procedure Laterality Date    CATARACT EXTRACTION  10/07/2014    Cataract Surgery    MR HEAD ANGIO WO IV CONTRAST  3/3/2018    MR HEAD ANGIO WO IV CONTRAST 3/3/2018 [facility] CLINICAL LEGACY    MR NECK ANGIO WO IV CONTRAST  3/3/2018    MR NECK ANGIO WO IV CONTRAST 3/3/2018 [facility] CLINICAL LEGACY    OTHER SURGICAL HISTORY  10/07/2014    Dental Surgery     Family History   Problem Relation Name Age of Onset    No Known Problems Mother      No Known Problems Father       Social History     Tobacco Use    Smoking status: Former     Types: Cigarettes     Passive exposure: Never    Smokeless tobacco: Never   Vaping Use    Vaping status: Never Used   Substance Use Topics    Alcohol use: Not Currently    Drug use: Not Currently       Physical Exam   ED Triage Vitals [01/25/25 1100]   Temperature Heart Rate Respirations BP   36.6 °C (97.9 °F) 83 18 82/67      Pulse Ox Temp Source Heart Rate Source Patient Position   98 % Temporal -- --      BP Location FiO2 (%)     -- --       Physical Exam      ED Course & MDM   Diagnoses as of 01/25/25 1502   Fall, initial encounter                 No data recorded     Watkins Coma Scale Score: 15 (01/25/25 1059 : Milad Sue RN)                           Medical Decision  Making      Procedure  Procedures     Joanne Box MD  01/25/25 3257

## 2025-01-27 LAB
Q ONSET: 215 MS
QRS COUNT: 16 BEATS
QRS DURATION: 124 MS
QT INTERVAL: 350 MS
QTC CALCULATION(BAZETT): 442 MS
QTC FREDERICIA: 409 MS
R AXIS: -12 DEGREES
T AXIS: 157 DEGREES
T OFFSET: 390 MS
VENTRICULAR RATE: 96 BPM

## 2025-01-30 ENCOUNTER — OFFICE VISIT (OUTPATIENT)
Dept: WOUND CARE | Facility: HOSPITAL | Age: OVER 89
End: 2025-01-30
Payer: MEDICARE

## 2025-01-30 PROCEDURE — 99214 OFFICE O/P EST MOD 30 MIN: CPT

## 2025-02-03 ENCOUNTER — APPOINTMENT (OUTPATIENT)
Dept: PRIMARY CARE | Facility: CLINIC | Age: OVER 89
End: 2025-02-03
Payer: MEDICARE

## 2025-02-03 VITALS
TEMPERATURE: 97.8 F | DIASTOLIC BLOOD PRESSURE: 70 MMHG | RESPIRATION RATE: 16 BRPM | HEART RATE: 68 BPM | SYSTOLIC BLOOD PRESSURE: 130 MMHG | BODY MASS INDEX: 22.71 KG/M2 | WEIGHT: 145 LBS

## 2025-02-03 DIAGNOSIS — N18.32 CHRONIC KIDNEY DISEASE, STAGE 3B (MULTI): ICD-10-CM

## 2025-02-03 DIAGNOSIS — E78.49 OTHER HYPERLIPIDEMIA: ICD-10-CM

## 2025-02-03 DIAGNOSIS — I10 PRIMARY HYPERTENSION: ICD-10-CM

## 2025-02-03 DIAGNOSIS — F02.B0 MODERATE ALZHEIMER'S DEMENTIA WITHOUT BEHAVIORAL DISTURBANCE, PSYCHOTIC DISTURBANCE, MOOD DISTURBANCE, OR ANXIETY, UNSPECIFIED TIMING OF DEMENTIA ONSET (MULTI): ICD-10-CM

## 2025-02-03 DIAGNOSIS — I50.33 ACUTE ON CHRONIC HEART FAILURE WITH PRESERVED EJECTION FRACTION: Primary | ICD-10-CM

## 2025-02-03 DIAGNOSIS — G30.9 MODERATE ALZHEIMER'S DEMENTIA WITHOUT BEHAVIORAL DISTURBANCE, PSYCHOTIC DISTURBANCE, MOOD DISTURBANCE, OR ANXIETY, UNSPECIFIED TIMING OF DEMENTIA ONSET (MULTI): ICD-10-CM

## 2025-02-03 DIAGNOSIS — E13.69 OTHER SPECIFIED DIABETES MELLITUS WITH OTHER SPECIFIED COMPLICATION, UNSPECIFIED WHETHER LONG TERM INSULIN USE (MULTI): ICD-10-CM

## 2025-02-03 PROBLEM — J44.9 CHRONIC OBSTRUCTIVE PULMONARY DISEASE (MULTI): Status: RESOLVED | Noted: 2023-05-16 | Resolved: 2025-02-03

## 2025-02-03 PROCEDURE — 1036F TOBACCO NON-USER: CPT | Performed by: INTERNAL MEDICINE

## 2025-02-03 PROCEDURE — 3078F DIAST BP <80 MM HG: CPT | Performed by: INTERNAL MEDICINE

## 2025-02-03 PROCEDURE — 1160F RVW MEDS BY RX/DR IN RCRD: CPT | Performed by: INTERNAL MEDICINE

## 2025-02-03 PROCEDURE — 1159F MED LIST DOCD IN RCRD: CPT | Performed by: INTERNAL MEDICINE

## 2025-02-03 PROCEDURE — G2211 COMPLEX E/M VISIT ADD ON: HCPCS | Performed by: INTERNAL MEDICINE

## 2025-02-03 PROCEDURE — 99214 OFFICE O/P EST MOD 30 MIN: CPT | Performed by: INTERNAL MEDICINE

## 2025-02-03 PROCEDURE — 3075F SYST BP GE 130 - 139MM HG: CPT | Performed by: INTERNAL MEDICINE

## 2025-02-03 NOTE — PROGRESS NOTES
Jam Cox is a 89 y.o. male   Patient with a past medical history significant for paroxysmal atrial fibrillation systolic congestive heart failure cardiomyopathy with an EF of 30% COPD dementia, peripheral artery disease    Had nonhealing sores on his toes  Poor circulation  Goes to wound care every week    Appetite is OK  Feels fine  No chest pain/  SOB/ dizziness  BM OK  Energy level ok  Appetite OK             Review of Systems     Constitutional: no fever, no chills, not feeling poorly, not feeling tired and no recent weight gain, no recent weight loss.   ENT: no earache, no hearing loss, no nosebleeds, no nasal discharge, no sore throat and no hoarseness.   Cardiovascular: the heart rate was not slow, the heart rate was not fast, no chest pain, no palpitations, no intermittent leg claudication and no lower extremity edema.   Respiratory: no cough, wheezing or shortness of breath at rest or exertion  Gastrointestinal: no abdominal pain, no constipation, no melena, no nausea, no diarrhea, no vomiting and no blood in stools.   Musculoskeletal: no arthralgias, no myalgias, no back pain, no joint swelling, no joint stiffness, no limb pain and no limb swelling.   Integumentary: foot ulcer  Neurological: no headache,  no numbness, no dizziness, no tingling and no fainting.   All other systems have been reviewed and are negative for complaint.     Current Outpatient Medications   Medication Instructions    atorvastatin (LIPITOR) 10 mg, oral, Daily, Follow up with PCP prior to restarting    cholecalciferol (Vitamin D-3) 50 mcg (2,000 unit) capsule 1 capsule, Daily    clindamycin (Cleocin T) 1 % lotion 1 Application, Topical, 2 times daily, Apply to affected area twice daily    digoxin (LANOXIN) 125 mcg, oral, Daily    donepezil (ARICEPT) 10 mg, oral, Daily    Eliquis 5 mg, oral, 2 times daily    finasteride (PROSCAR) 5 mg, oral, Daily    fluticasone propion-salmeteroL (Advair Diskus) 250-50 mcg/dose diskus inhaler  1 puff, inhalation, 2 times daily RT, Rinse mouth with water after use to reduce aftertaste and incidence of candidiasis. Do not swallow.    ipratropium-albuteroL (Duo-Neb) 0.5-2.5 mg/3 mL nebulizer solution 3 mL, nebulization, 4 times daily PRN    Jardiance 10 mg, oral, Daily    melatonin 3 mg, oral, Nightly    memantine (NAMENDA) 10 mg, oral, 2 times daily    metoprolol succinate XL (TOPROL-XL) 25 mg, oral, Daily, Do not crush or chew.    sacubitriL-valsartan (Entresto) 24-26 mg tablet 0.5 tablets, oral, 2 times daily    spironolactone (ALDACTONE) 12.5 mg, oral, Daily, Follow up with PCP prior to restarting    tamsulosin (FLOMAX) 0.4 mg, oral, Daily    VITAMIN B COMPLEX ORAL 1 tablet, Daily    walker (Ultra-Light Rollator) misc 1 each, miscellaneous, Daily         Vitals:    02/03/25 1227   BP: 130/70   Pulse: 68   Resp: 16   Temp: 36.6 °C (97.8 °F)          Physical Exam     Constitutional   General appearance: Alert and in no acute distress.     Pulmonary   Respiratory assessment: No respiratory distress, normal respiratory rhythm and effort.    Auscultation of Lungs: Clear bilateral breath sounds.   Cardiovascular   Auscultation of heart: irregular   Exam for edema: No peripheral edema.   Abdomen   Abdominal Exam: No bruits, normal bowel sounds, soft, non-tender, no abdominal mass palpated.    Liver and Spleen exam: No hepato-splenomegaly.   Musculoskeletal   Examination of gait: Normal.    Inspection of digits and nails: No clubbing or cyanosis of the fingernails.    Inspection/palpation of joints, bones and muscles: No joint swelling. Normal movement of all extremities.   Skin   Skin inspection: sores on bilateral third toe  Neurologic   Cranial nerves: Nerves 2-12 were intact, alert x 1-2    Assessment/Plan          Patient with a past medical history significant for paroxysmal atrial fibrillation systolic congestive heart failure cardiomyopathy with an EF of 30% COPD dementia, peripheral artery  disease      # PAD  # Toe ulcer healing well  Continue wound care  60 g of protein daily    # PAF  # Chronic systolic CHF  Currently in Afib  Frequent falls at home  Not safe  Recommend AL or long term care with 24/7 supervision   Family [facility] at [facility]    # COPD  Stable    #Alzheimer's dementia  Getting worse    # Malnutrition  Continue boost/ ensure

## 2025-02-06 ENCOUNTER — OFFICE VISIT (OUTPATIENT)
Dept: WOUND CARE | Facility: HOSPITAL | Age: OVER 89
End: 2025-02-06
Payer: MEDICARE

## 2025-02-06 PROCEDURE — 99214 OFFICE O/P EST MOD 30 MIN: CPT

## 2025-02-13 ENCOUNTER — OFFICE VISIT (OUTPATIENT)
Dept: WOUND CARE | Facility: HOSPITAL | Age: OVER 89
End: 2025-02-13
Payer: MEDICARE

## 2025-02-13 PROCEDURE — 99214 OFFICE O/P EST MOD 30 MIN: CPT

## 2025-02-14 ENCOUNTER — TELEPHONE (OUTPATIENT)
Dept: PRIMARY CARE | Facility: CLINIC | Age: OVER 89
End: 2025-02-14
Payer: MEDICARE

## 2025-02-14 DIAGNOSIS — I50.42 CHRONIC COMBINED SYSTOLIC AND DIASTOLIC CONGESTIVE HEART FAILURE: ICD-10-CM

## 2025-02-14 RX ORDER — METOPROLOL SUCCINATE 25 MG/1
25 TABLET, EXTENDED RELEASE ORAL DAILY
Qty: 60 TABLET | Refills: 3 | Status: SHIPPED | OUTPATIENT
Start: 2025-02-14 | End: 2025-10-12

## 2025-02-14 NOTE — TELEPHONE ENCOUNTER
PT daughter called in for pt refill on        RX: Metoprolol succinate XL 25 mg      ROSEMARIE GURROLA

## 2025-02-24 ENCOUNTER — OFFICE VISIT (OUTPATIENT)
Dept: VASCULAR SURGERY | Facility: CLINIC | Age: OVER 89
End: 2025-02-24
Payer: MEDICARE

## 2025-02-24 VITALS
DIASTOLIC BLOOD PRESSURE: 58 MMHG | HEART RATE: 83 BPM | BODY MASS INDEX: 21.3 KG/M2 | SYSTOLIC BLOOD PRESSURE: 114 MMHG | WEIGHT: 136 LBS

## 2025-02-24 DIAGNOSIS — I25.5 CARDIOMYOPATHY, ISCHEMIC: ICD-10-CM

## 2025-02-24 DIAGNOSIS — I70.263: Primary | ICD-10-CM

## 2025-02-24 DIAGNOSIS — I50.32 CHRONIC HEART FAILURE WITH PRESERVED EJECTION FRACTION: ICD-10-CM

## 2025-02-24 DIAGNOSIS — R41.3 MEMORY LOSS: ICD-10-CM

## 2025-02-24 PROCEDURE — 99215 OFFICE O/P EST HI 40 MIN: CPT | Performed by: SURGERY

## 2025-02-24 PROCEDURE — 3074F SYST BP LT 130 MM HG: CPT | Performed by: SURGERY

## 2025-02-24 PROCEDURE — 1036F TOBACCO NON-USER: CPT | Performed by: SURGERY

## 2025-02-24 PROCEDURE — 1159F MED LIST DOCD IN RCRD: CPT | Performed by: SURGERY

## 2025-02-24 PROCEDURE — 1126F AMNT PAIN NOTED NONE PRSNT: CPT | Performed by: SURGERY

## 2025-02-24 PROCEDURE — 3078F DIAST BP <80 MM HG: CPT | Performed by: SURGERY

## 2025-02-24 PROCEDURE — 99205 OFFICE O/P NEW HI 60 MIN: CPT | Performed by: SURGERY

## 2025-02-24 ASSESSMENT — COLUMBIA-SUICIDE SEVERITY RATING SCALE - C-SSRS
1. IN THE PAST MONTH, HAVE YOU WISHED YOU WERE DEAD OR WISHED YOU COULD GO TO SLEEP AND NOT WAKE UP?: NO
2. HAVE YOU ACTUALLY HAD ANY THOUGHTS OF KILLING YOURSELF?: NO
6. HAVE YOU EVER DONE ANYTHING, STARTED TO DO ANYTHING, OR PREPARED TO DO ANYTHING TO END YOUR LIFE?: NO

## 2025-02-24 ASSESSMENT — ENCOUNTER SYMPTOMS
DEPRESSION: 0
OCCASIONAL FEELINGS OF UNSTEADINESS: 0
LOSS OF SENSATION IN FEET: 0

## 2025-02-24 ASSESSMENT — PAIN SCALES - GENERAL: PAINLEVEL_OUTOF10: 0-NO PAIN

## 2025-02-24 NOTE — PROGRESS NOTES
Vascular Surgery Consultation, History, Physical    Osmani POLLY Cox is a 89 y.o. year old male patient.   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  He has no fevers or chills.  He comes accompanied by his wife and one of his daughter and another daughter was on the telephone.  The patient is assisted by his wife at home and has severe heart failure as well and was deemed a high risk for major surgery.  He is without pain in my office here and on examination aside from the gangrene there has been no spread of infection of the leg.  His CT scan which I reviewed from October shows right external iliac occlusion and left femoral-popliteal occlusive disease.      I had a osmani discussion with his family.  While I believe interventions may improve the possibility of ultimately healing, I do not think it would change his natural history.  He will ultimately face the choice of needing bilateral above-knee amputations but this is mitigated by the high risk of his cardiovascular disease.  They are in touch with palliative care and he is DNR.  I do not recommend any surgery at this time.  Even if a time comes where he requires 1 emergently, I do not recommend it and we will document it here.  He may follow-up with us as needed.    Past Medical History:   Diagnosis Date    Abnormality of plasma protein, unspecified 05/01/2017    Elevated blood protein    Benign prostatic hyperplasia without lower urinary tract symptoms 05/01/2017    BPH with elevated PSA    Disorder of prostate, unspecified 05/01/2017    Prostate disorder    Elevated prostate specific antigen (PSA) 05/01/2017    Elevated prostate specific antigen (PSA)    Elevated prostate specific antigen (PSA) 05/01/2017    Abnormal prostate specific antigen    Elevated prostate specific antigen (PSA) 05/01/2017    Abnormal PSA    Elevated  prostate specific antigen (PSA) 05/01/2017    Abnormal prostate specific antigen test    Epistaxis 05/01/2017    Epistaxis, recurrent    Essential (primary) hypertension 12/19/2022    Hypertension    Frequency of micturition 05/01/2017    Urinary frequency    Hallucinations, unspecified 05/01/2017    Hallucinations    Headache, unspecified 05/01/2017    Bilateral headache    Hematuria, unspecified 05/01/2017    Hematuria    Impacted cerumen 05/23/2024    Ischemic cardiomyopathy 12/13/2021    Cardiomyopathy, ischemic    Nasal discharge 05/23/2024    Nasal mucosa dry 05/23/2024    Other amnesia 05/01/2017    Memory loss    Other microscopic hematuria 05/01/2017    Hematuria, microscopic    Other seasonal allergic rhinitis 05/01/2017    Seasonal allergies    Other secondary cataract, right eye 05/31/2018    Posterior capsular opacification visually significant of right eye    Other specified disorders of the male genital organs 05/01/2017    Scrotal swelling    Personal history of other specified conditions 02/10/2020    History of epistaxis    Presence of intraocular lens 05/01/2017    Pseudophakia    Primary insomnia 05/01/2017    Primary insomnia    Primary open-angle glaucoma, unspecified eye, stage unspecified 05/01/2017    Open angle primary glaucoma    Unspecified atrial flutter (Multi) 05/01/2017    Atrial flutter    Unspecified glaucoma 05/01/2017    Glaucoma    Vitamin D deficiency, unspecified 05/01/2017    Vitamin D deficiency       Past Surgical History:   Procedure Laterality Date    CATARACT EXTRACTION  10/07/2014    Cataract Surgery    MR HEAD ANGIO WO IV CONTRAST  3/3/2018    MR HEAD ANGIO WO IV CONTRAST 3/3/2018 [facility]ICAL [facility]    MR NECK ANGIO WO IV CONTRAST  3/3/2018    MR NECK ANGIO WO IV CONTRAST 3/3/2018 [facility] CLINICAL [facility]GAC[facility]    OTHER SURGICAL HISTORY  10/07/2014    Dental Surgery       Active Ambulatory Problems     Diagnosis Date Noted    Apathy 05/16/2023    PAF (paroxysmal atrial  fibrillation) (Multi) 05/16/2023    Bacterial UTI 05/16/2023    Bilateral epiphora 05/16/2023    Benign prostatic hyperplasia 05/01/2017    Cardiomyopathy, ischemic 05/16/2023    Cerebrovascular disease 05/16/2023    Hypertension 05/16/2023    Mixed Alzheimer's and vascular dementia (Multi) 05/16/2023    (HFpEF) heart failure with preserved ejection fraction 05/16/2023    CHF (congestive heart failure) 12/22/2017    Chronic sinusitis 05/16/2023    Moderate Alzheimer's dementia without behavioral disturbance, psychotic disturbance, mood disturbance, or anxiety, unspecified timing of dementia onset (Multi) 05/16/2023    Ectropion of right lower eyelid 05/16/2023    Epistaxis 05/16/2023    Nasal drainage 05/16/2023    Folate deficiency 05/16/2023    Fullness in ear, bilateral 05/16/2023    Hallucination, visual 05/16/2023    Hearing difficulty 05/16/2023    Hyperlipidemia 05/16/2023    Bilateral impacted cerumen 05/16/2023    Impacted cerumen of right ear 05/16/2023    Lid retraction 05/16/2023    Memory loss 05/16/2023    Nasal dryness 05/16/2023    Onychomycosis 05/16/2023    Advanced open-angle glaucoma 05/16/2023    Open angle primary glaucoma 10/07/2014    Pain in toes of both feet 05/16/2023    PCO (posterior capsular opacification), bilateral 05/16/2023    Posterior capsular opacification visually significant of left eye 05/16/2023    Pseudophakia of both eyes 05/16/2023    S/P YAG capsulotomy, bilateral 05/16/2023    Vitamin B6 deficiency 05/16/2023    Shortness of breath 04/22/2024    YOSEF (acute kidney injury) (CMS-[facility]) 04/28/2024    Multifocal pneumonia 05/03/2024    Septic shock (Multi) 05/04/2024    Cardiogenic shock (Multi) 05/05/2024    Anticoagulant adverse reaction 05/23/2024    Abnormal ear sensation 05/23/2024    Seasonal allergies 05/01/2017    Urinary tract bacterial infections 05/23/2024    Ischemic myocardial dysfunction 05/23/2024    Vitamin D deficiency 05/01/2017    Visual hallucinations  05/23/2024    Amnesia 05/01/2017    After-cataract with vision obscured 10/07/2014    Bilateral posterior capsular opacification 05/23/2024    Atrial flutter (Multi) 05/01/2017    Hyperproteinemia 05/01/2017    Primary insomnia 05/01/2017    Pain of toe 05/23/2024    Elevated LFTs 08/25/2024    Protein-calorie malnutrition, unspecified severity (Multi) 11/04/2024    PAD (peripheral artery disease) (CMS-[facility]) 11/04/2024    Chronic systolic heart failure 01/09/2025    Acute on chronic heart failure with preserved ejection fraction 02/03/2025    Other specified diabetes mellitus with other specified complication, unspecified whether long term insulin use (Multi) 02/03/2025    Chronic kidney disease, stage 3b (Multi) 02/03/2025     Resolved Ambulatory Problems     Diagnosis Date Noted    Chronic obstructive pulmonary disease (Multi) 05/16/2023    Impacted cerumen 05/23/2024    History of YAG laser capsulotomy of lens 05/23/2024    Nasal discharge 05/23/2024    Nasal mucosa dry 05/23/2024     Past Medical History:   Diagnosis Date    Abnormality of plasma protein, unspecified 05/01/2017    Benign prostatic hyperplasia without lower urinary tract symptoms 05/01/2017    Disorder of prostate, unspecified 05/01/2017    Elevated prostate specific antigen (PSA) 05/01/2017    Elevated prostate specific antigen (PSA) 05/01/2017    Elevated prostate specific antigen (PSA) 05/01/2017    Elevated prostate specific antigen (PSA) 05/01/2017    Essential (primary) hypertension 12/19/2022    Frequency of micturition 05/01/2017    Hallucinations, unspecified 05/01/2017    Headache, unspecified 05/01/2017    Hematuria, unspecified 05/01/2017    Ischemic cardiomyopathy 12/13/2021    Other amnesia 05/01/2017    Other microscopic hematuria 05/01/2017    Other seasonal allergic rhinitis 05/01/2017    Other secondary cataract, right eye 05/31/2018    Other specified disorders of the male genital organs 05/01/2017    Personal history of other  specified conditions 02/10/2020    Presence of intraocular lens 05/01/2017    Primary open-angle glaucoma, unspecified eye, stage unspecified 05/01/2017    Unspecified atrial flutter (Multi) 05/01/2017    Unspecified glaucoma 05/01/2017    Vitamin D deficiency, unspecified 05/01/2017       Review of Systems   Constitutional: Negative.    HENT: Negative.     Eyes: Negative.    Respiratory: Negative.     Cardiovascular: Negative.    Gastrointestinal: Negative.    Endocrine: Negative.    Genitourinary: Negative.    Musculoskeletal: Negative.    Skin: Negative.    Allergic/Immunologic: Negative.    Neurological: Negative.    Hematological: Negative.    Psychiatric/Behavioral: Negative.       Allergies   Allergen Reactions    Ace Inhibitors Unknown     ACE Inhibitors       Vitals:   Visit Vitals  /58 (BP Location: Right arm, Patient Position: Sitting)   Pulse 83     Physical Exam:   Vascular Physical Exam  Constitutional:       General: He is awake.      Appearance: He is well-developed.   HENT:      Mouth/Throat:      Mouth: Mucous membranes are moist.   Eyes:      Pupils: Pupils are equal, round, and reactive to light.   Cardiovascular:      Rate and Rhythm: Normal rate.      Pulses:           Femoral pulses are Non-palpable on the right side and Non-palpable on the left side.       Dorsalis pedis pulses are non-palpable on the right side and non-palpable on the left side.          Posterior tibial pulses are non-palpable on the right side and non-palpable on the left side.    Pulmonary:      Effort: Pulmonary effort is normal.   Abdominal:      General: Abdomen is flat.   Musculoskeletal:      Neck: Full passive range of motion without pain.   Feet:      Right foot:      Skin integrity: Skin breakdown present.      Left foot:      Skin integrity: Skin breakdown present.   Skin:     General: Skin is warm.      Comments: Bilateral gangrene toes #2,3   Neurological:      General: No focal deficit present.       Mental Status: He is disoriented.   Psychiatric:      Comments: demented         Labs:  LABS:  CBC with Differential:    Lab Results   Component Value Date    WBC 7.5 01/25/2025    RBC 3.59 (L) 01/25/2025    HGB 11.5 (L) 01/25/2025    HCT 35.6 (L) 01/25/2025     01/25/2025    MCV 99 01/25/2025    MCH 32.0 01/25/2025    MCHC 32.3 01/25/2025    RDW 14.3 01/25/2025    NRBC 0.0 01/25/2025    BANDSPCT 15.5 05/03/2024    LYMPHOPCT 17.6 01/25/2025    LYMPHOPCT 4.9 05/03/2024    MONOPCT 9.3 01/25/2025    MONOPCT 5.7 05/03/2024    EOSPCT 2.7 01/25/2025    EOSPCT 0.0 05/03/2024    BASOPCT 0.4 01/25/2025    BASOPCT 0.0 05/03/2024    MONOSABS 0.70 01/25/2025    LYMPHSABS 1.32 01/25/2025    EOSABS 0.20 01/25/2025    EOSABS 0.00 05/03/2024    BASOSABS 0.03 01/25/2025    BASOSABS 0.00 05/03/2024     CMP:    Lab Results   Component Value Date     01/25/2025    K 4.3 01/25/2025     01/25/2025    CO2 29 01/25/2025    BUN 14 01/25/2025    CREATININE 1.27 01/25/2025    GLUCOSE 107 (H) 01/25/2025    PROT 7.4 01/25/2025    CALCIUM 8.9 01/25/2025    BILITOT 1.5 (H) 01/25/2025    ALKPHOS 67 01/25/2025    AST 13 01/25/2025    ALT 6 (L) 01/25/2025     BMP:    Lab Results   Component Value Date     01/25/2025    K 4.3 01/25/2025     01/25/2025    CO2 29 01/25/2025    BUN 14 01/25/2025    CREATININE 1.27 01/25/2025    CALCIUM 8.9 01/25/2025    GLUCOSE 107 (H) 01/25/2025     Magnesium:  Lab Results   Component Value Date    MG 2.40 08/28/2024     Troponin:    Lab Results   Component Value Date    TROPHS 25 (H) 01/25/2025    TROPHS 28 (H) 01/25/2025    TROPHS 28 (H) 08/25/2024     BNP:   Lab Results   Component Value Date    BNP 1,796 (H) 08/25/2024       Lab Results   Component Value Date    INR 2.0 (H) 10/18/2024    PROTIME 23.0 (H) 10/18/2024    CHOL 104 03/05/2018    LDLF 57 03/05/2018    HDL 32.7 (A) 03/05/2018       Imaging:       Assessment/Plan   Diagnoses and all orders for this visit:  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

## 2025-02-27 ENCOUNTER — OFFICE VISIT (OUTPATIENT)
Dept: WOUND CARE | Facility: HOSPITAL | Age: OVER 89
End: 2025-02-27
Payer: MEDICARE

## 2025-02-27 PROCEDURE — 99214 OFFICE O/P EST MOD 30 MIN: CPT

## 2025-03-10 DIAGNOSIS — F02.80 MIXED ALZHEIMER'S AND VASCULAR DEMENTIA (MULTI): ICD-10-CM

## 2025-03-10 DIAGNOSIS — N40.1 BENIGN PROSTATIC HYPERPLASIA WITH NOCTURIA: ICD-10-CM

## 2025-03-10 DIAGNOSIS — R35.1 BENIGN PROSTATIC HYPERPLASIA WITH NOCTURIA: ICD-10-CM

## 2025-03-10 DIAGNOSIS — N40.0 BENIGN PROSTATIC HYPERPLASIA, UNSPECIFIED WHETHER LOWER URINARY TRACT SYMPTOMS PRESENT: ICD-10-CM

## 2025-03-10 DIAGNOSIS — F01.50 MIXED ALZHEIMER'S AND VASCULAR DEMENTIA (MULTI): ICD-10-CM

## 2025-03-10 DIAGNOSIS — I50.42 CHRONIC COMBINED SYSTOLIC AND DIASTOLIC CONGESTIVE HEART FAILURE: ICD-10-CM

## 2025-03-10 DIAGNOSIS — G30.9 MIXED ALZHEIMER'S AND VASCULAR DEMENTIA (MULTI): ICD-10-CM

## 2025-03-10 RX ORDER — DONEPEZIL HYDROCHLORIDE 10 MG/1
10 TABLET, FILM COATED ORAL DAILY
Qty: 90 TABLET | Refills: 0 | Status: SHIPPED | OUTPATIENT
Start: 2025-03-10

## 2025-03-10 RX ORDER — FINASTERIDE 5 MG/1
5 TABLET, FILM COATED ORAL DAILY
Qty: 90 TABLET | Refills: 1 | Status: SHIPPED | OUTPATIENT
Start: 2025-03-10

## 2025-03-10 RX ORDER — TAMSULOSIN HYDROCHLORIDE 0.4 MG/1
0.4 CAPSULE ORAL DAILY
Qty: 30 CAPSULE | Refills: 11 | Status: SHIPPED | OUTPATIENT
Start: 2025-03-10 | End: 2026-03-10

## 2025-03-10 RX ORDER — METOPROLOL SUCCINATE 25 MG/1
25 TABLET, EXTENDED RELEASE ORAL DAILY
Qty: 60 TABLET | Refills: 3 | Status: SHIPPED | OUTPATIENT
Start: 2025-03-10 | End: 2025-11-05

## 2025-03-20 ENCOUNTER — OFFICE VISIT (OUTPATIENT)
Dept: WOUND CARE | Facility: HOSPITAL | Age: OVER 89
End: 2025-03-20
Payer: MEDICARE

## 2025-03-20 PROCEDURE — 99215 OFFICE O/P EST HI 40 MIN: CPT

## 2025-04-10 ENCOUNTER — OFFICE VISIT (OUTPATIENT)
Dept: WOUND CARE | Facility: HOSPITAL | Age: OVER 89
End: 2025-04-10
Payer: MEDICARE

## 2025-04-10 ENCOUNTER — APPOINTMENT (OUTPATIENT)
Dept: OPHTHALMOLOGY | Facility: CLINIC | Age: OVER 89
End: 2025-04-10
Payer: MEDICARE

## 2025-04-10 PROCEDURE — 99215 OFFICE O/P EST HI 40 MIN: CPT

## 2025-04-16 ENCOUNTER — HOSPITAL ENCOUNTER (EMERGENCY)
Facility: HOSPITAL | Age: OVER 89
Discharge: HOME | End: 2025-04-16
Attending: EMERGENCY MEDICINE
Payer: MEDICARE

## 2025-04-16 ENCOUNTER — CLINICAL SUPPORT (OUTPATIENT)
Dept: EMERGENCY MEDICINE | Facility: HOSPITAL | Age: OVER 89
End: 2025-04-16
Payer: MEDICARE

## 2025-04-16 ENCOUNTER — APPOINTMENT (OUTPATIENT)
Dept: RADIOLOGY | Facility: HOSPITAL | Age: OVER 89
End: 2025-04-16
Payer: MEDICARE

## 2025-04-16 VITALS
HEART RATE: 95 BPM | TEMPERATURE: 97.6 F | BODY MASS INDEX: 21.35 KG/M2 | DIASTOLIC BLOOD PRESSURE: 81 MMHG | OXYGEN SATURATION: 95 % | RESPIRATION RATE: 18 BRPM | WEIGHT: 136 LBS | HEIGHT: 67 IN | SYSTOLIC BLOOD PRESSURE: 123 MMHG
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
## 2025-04-16 LAB
ATRIAL RATE: 72 BPM
Q ONSET: 211 MS
QRS COUNT: 14 BEATS
QRS DURATION: 130 MS
QT INTERVAL: 360 MS
QTC CALCULATION(BAZETT): 438 MS
QTC FREDERICIA: 410 MS
R AXIS: -58 DEGREES
T AXIS: 107 DEGREES
T OFFSET: 391 MS
VENTRICULAR RATE: 89 BPM

## 2025-04-16 PROCEDURE — 99284 EMERGENCY DEPT VISIT MOD MDM: CPT | Mod: 25 | Performed by: EMERGENCY MEDICINE

## 2025-04-16 PROCEDURE — 99285 EMERGENCY DEPT VISIT HI MDM: CPT | Performed by: PHYSICIAN ASSISTANT

## 2025-04-16 PROCEDURE — 93005 ELECTROCARDIOGRAM TRACING: CPT

## 2025-04-16 PROCEDURE — 72125 CT NECK SPINE W/O DYE: CPT

## 2025-04-16 PROCEDURE — 72125 CT NECK SPINE W/O DYE: CPT | Performed by: RADIOLOGY

## 2025-04-16 PROCEDURE — 70450 CT HEAD/BRAIN W/O DYE: CPT

## 2025-04-16 PROCEDURE — 70450 CT HEAD/BRAIN W/O DYE: CPT | Performed by: RADIOLOGY

## 2025-04-16 ASSESSMENT — LIFESTYLE VARIABLES
TOTAL SCORE: 0
HAVE PEOPLE ANNOYED YOU BY CRITICIZING YOUR DRINKING: NO
EVER HAD A DRINK FIRST THING IN THE MORNING TO STEADY YOUR NERVES TO GET RID OF A HANGOVER: NO
HAVE YOU EVER FELT YOU SHOULD CUT DOWN ON YOUR DRINKING: NO
EVER FELT BAD OR GUILTY ABOUT YOUR DRINKING: NO

## 2025-04-16 ASSESSMENT — PAIN - FUNCTIONAL ASSESSMENT: PAIN_FUNCTIONAL_ASSESSMENT: WONG-BAKER FACES

## 2025-04-16 ASSESSMENT — COLUMBIA-SUICIDE SEVERITY RATING SCALE - C-SSRS
2. HAVE YOU ACTUALLY HAD ANY THOUGHTS OF KILLING YOURSELF?: NO
1. IN THE PAST MONTH, HAVE YOU WISHED YOU WERE DEAD OR WISHED YOU COULD GO TO SLEEP AND NOT WAKE UP?: NO
6. HAVE YOU EVER DONE ANYTHING, STARTED TO DO ANYTHING, OR PREPARED TO DO ANYTHING TO END YOUR LIFE?: NO

## 2025-04-16 ASSESSMENT — PAIN SCALES - WONG BAKER: WONGBAKER_NUMERICALRESPONSE: NO HURT

## 2025-04-16 NOTE — ED TRIAGE NOTES
Pt arrives to the ED, brought in by [facility], following fall from bed height. Positive headstrike, no LOC, on eliquis.     Per ED providers, does not meet trauma criteria.    A&Ox2 at baseline, confused to situation and time. Spouse arrives with patient utilized as additional historian.     Per chart review, hx of Alzheimer's dementia, atrial fibrillation, peripheral vascular disease, DVT, chronic foot wounds, COPD

## 2025-04-16 NOTE — ED PROVIDER NOTES
Emergency Department Encounter  [facility] EMERGENCY MEDICINE    Patient: Jam Cox  MRN: [id number]  : 1930  Date of Evaluation: 2025  ED Provider: Briana Sommers PA-C      Chief Complaint       Chief Complaint   Patient presents with    Fall     HPI    Jam Cox is a 89 y.o. male with PMH of alzeimer's dementia, afib on eliquis, Hfpef on Entresto and digoxin, COPD, DMII w/ neuropathy and chronic foot wounds on Jardiance, CKD, HTN, and peripharal vascular disease presents to the ED after a fall. Fall was unwitnessed, pt on blood thinners, ?LOC, likely +headstrike. Majority of history provided by spouse at bedside due to dementia limitation. Wife states at 10:30am he was sitting up on his bed with legs dangling and fell forward, where their daughter heard the fall and helped him up and called EMS right after. Wife states they noticed a bruise on his forehead which shows he likely hit his head. Wife states pt is at his baseline and is behaving as if nothing happened. Pt at baseline does not ambulate on his own, he usually needs help and uses a walker. Wife denies any recent illness, fevers, medication changes, or travel. His only meds he took today was his nebulizer and inhaler. Last fall was in 2025 in which he was discharged from the ED. Pt denies any pain.     ROS:     Review of Systems  14 systems reviewed and otherwise acutely negative except as in the HPI.    Past History   Medical History[1]  Surgical History[2]  Social History[3]    Medications/Allergies     Previous Medications    APIXABAN (ELIQUIS) 5 MG TABLET    Take 1 tablet (5 mg) by mouth 2 times a day.    ATORVASTATIN (LIPITOR) 10 MG TABLET    Take 1 tablet (10 mg) by mouth once daily. Follow up with PCP prior to restarting    CHOLECALCIFEROL (VITAMIN D-3) 50 MCG (2,000 UNIT) CAPSULE    Take 1 capsule (50 mcg) by mouth once daily.    CLINDAMYCIN (CLEOCIN T) 1 % LOTION    Apply 1 Application topically  2 times a day. Apply to affected area twice daily    DIGOXIN (LANOXIN) 125 MCG TABLET    TAKE 1 TABLET(125 MCG) BY MOUTH DAILY    DONEPEZIL (ARICEPT) 10 MG TABLET    Take 1 tablet (10 mg) by mouth once daily.    EMPAGLIFLOZIN (JARDIANCE) 10 MG    Take 1 tablet (10 mg) by mouth once daily.    FINASTERIDE (PROSCAR) 5 MG TABLET    Take 1 tablet (5 mg) by mouth once daily.    FLUTICASONE PROPION-SALMETEROL (ADVAIR DISKUS) 250-50 MCG/DOSE DISKUS INHALER    Inhale 1 puff 2 times a day. Rinse mouth with water after use to reduce aftertaste and incidence of candidiasis. Do not swallow.    IPRATROPIUM-ALBUTEROL (DUO-NEB) 0.5-2.5 MG/3 ML NEBULIZER SOLUTION    Take 3 mL by nebulization 4 times a day as needed for wheezing or shortness of breath.    MELATONIN 3 MG TABLET    Take 1 tablet (3 mg) by mouth once daily at bedtime.    MEMANTINE (NAMENDA) 10 MG TABLET    Take 1 tablet (10 mg) by mouth 2 times a day.    METOPROLOL SUCCINATE XL (TOPROL-XL) 25 MG 24 HR TABLET    Take 1 tablet (25 mg) by mouth once daily. Do not crush or chew.    SACUBITRIL-VALSARTAN (ENTRESTO) 24-26 MG TABLET    Take 0.5 tablets by mouth 2 times a day.    SPIRONOLACTONE (ALDACTONE) 25 MG TABLET    Take 0.5 tablets (12.5 mg) by mouth once daily. Follow up with PCP prior to restarting    TAMSULOSIN (FLOMAX) 0.4 MG 24 HR CAPSULE    Take 1 capsule (0.4 mg) by mouth once daily.    VITAMIN B COMPLEX ORAL    Take 1 tablet by mouth once daily.    WALKER (ULTRA-LIGHT ROLLATOR) MISC    1 each once daily.     Allergies[4]     Physical Exam       ED Triage Vitals [04/16/25 1205]   Temperature Heart Rate Respirations BP   36.4 °C (97.6 °F) 81 20 137/65      Pulse Ox Temp Source Heart Rate Source Patient Position   (!) 93 % Oral Monitor Lying      BP Location FiO2 (%)     Left arm --         Physical Exam    Physical Exam:     VS: As documented in the triage note and EMR flowsheet from this visit were reviewed.    Appearance: Alert, oriented, cooperative, in no acute  distress. Well nourished & well hydrated.    Skin: Warm, intact and dry. No lesions, rash, or petechiae.    Head: normocephalic, +small cephalohematoma to forehead. No septal hematoma or hemotympanum. Non-mobile facial structures. PERRLA, EOMs intact. No pain with EOMs. No nystagmus. Bilateral conjunctivae pink without injection or exudates. No hyphema or subconjunctival hemorrhage. Hearing grossly intact. External auditory canals patent, tympanic membranes intact with visible landmarks. Nares patent, mucus membranes moist. Pharynx clear, uvula midline and non-edematous. No drooling, dysphagia or trismus. Voice non-muffled.    Neck: Supple, without midline or paraspinal tenderness    Pulmonary: Clear bilaterally with good chest wall excursion. No rales, rhonchi or wheezing. No accessory muscle use or stridor.     Cardiac: Normal S1, S2 without murmur, rub, gallop or extrasystole. No chest tenderness. No bony stepoffs or deformities. No paradoxical breathing.    Abdomen: Soft, nontender, active bowel sounds. No palpable organomegaly. No rebound or guarding.     Musculoskeletal: Spontaneously moving all extremities without limitation. No midline tenderness. Extremities warm and well-perfused, capillary refill less than 2 seconds. Pulses full and equal. No extremity erythema, edema or increased warmth.     Neurological:  Cranial nerves II through XII are grossly intact, finger-nose touch is normal, normal sensation, no weakness, no focal findings identified.    Psychiatric: Appropriate mood and affect. Kempt appearance.      Diagnostics   Labs:  Labs Reviewed - No data to display    Radiographs:  CT head wo IV contrast   Final Result   No evidence of acute intracranial hemorrhage or calvarial fracture.        Nonspecific supratentorial white matter hypodensities most likely   related to chronic small vessel ischemic change.        Mild-to-moderate global brain parenchymal volume loss.        Old lacunar infarctions  "left thalamus, left caudate nucleus, and left   lentiform nucleus.             MACRO:   None        Signed by: Yomi Lezama 4/16/2025 2:44 PM   Dictation workstation:   [id number]      CT cervical spine wo IV contrast   Final Result   No evidence for an acute fracture or significant subluxation of the   imaged spine.        Advanced multilevel degenerative cervical spondylosis.        MACRO:   None        Signed by: Yomi Lezama 4/16/2025 2:50 PM   Dictation workstation:   [id number]          EKG #1  Date/Time: 04/16/25 1232  Interpreted by: Dr. ROXANNE Duvall  Atrial fibrillation with normal ventricular rate. Normal Qtc interval. +L axis deviation.      ED Course   Visit Vitals  /81   Pulse 95   Temp 36.4 °C (97.6 °F) (Oral)   Resp 18   Ht 1.702 m (5' 7\")   Wt 61.7 kg (136 lb)   SpO2 95%   BMI 21.30 kg/m²   Smoking Status Former   BSA 1.71 m²     Medications - No data to display    Medical Decision Making   Cts negative for acute truamatic injuries. Family at bedside expresses desire to take patient home today.  Able to tolerate oral intake.  No acute complaints.  Staffed with supervising physician, Jam Duvall MD, who agrees with workup and treatment plan.      Final Impression      1. Fall, initial encounter          DISPOSITION  Disposition: discharge  Patient condition is: Stable    Comment: Please note this report has been produced using speech recognition software and may contain errors related to that system including errors in grammar, punctuation, and spelling, as well as words and phrases that may be inappropriate.  If there are any questions or concerns please feel free to contact the dictating provider for clarification.    Briana Sommers PA-C         [1]   Past Medical History:  Diagnosis Date    Abnormality of plasma protein, unspecified 05/01/2017    Elevated blood protein    Benign prostatic hyperplasia without lower urinary tract symptoms 05/01/2017    BPH with elevated PSA    " Disorder of prostate, unspecified 05/01/2017    Prostate disorder    Elevated prostate specific antigen (PSA) 05/01/2017    Elevated prostate specific antigen (PSA)    Elevated prostate specific antigen (PSA) 05/01/2017    Abnormal prostate specific antigen    Elevated prostate specific antigen (PSA) 05/01/2017    Abnormal PSA    Elevated prostate specific antigen (PSA) 05/01/2017    Abnormal prostate specific antigen test    Epistaxis 05/01/2017    Epistaxis, recurrent    Essential (primary) hypertension 12/19/2022    Hypertension    Frequency of micturition 05/01/2017    Urinary frequency    Hallucinations, unspecified 05/01/2017    Hallucinations    Headache, unspecified 05/01/2017    Bilateral headache    Hematuria, unspecified 05/01/2017    Hematuria    Impacted cerumen 05/23/2024    Ischemic cardiomyopathy 12/13/2021    Cardiomyopathy, ischemic    Nasal discharge 05/23/2024    Nasal mucosa dry 05/23/2024    Other amnesia 05/01/2017    Memory loss    Other microscopic hematuria 05/01/2017    Hematuria, microscopic    Other seasonal allergic rhinitis 05/01/2017    Seasonal allergies    Other secondary cataract, right eye 05/31/2018    Posterior capsular opacification visually significant of right eye    Other specified disorders of the male genital organs 05/01/2017    Scrotal swelling    Personal history of other specified conditions 02/10/2020    History of epistaxis    Presence of intraocular lens 05/01/2017    Pseudophakia    Primary insomnia 05/01/2017    Primary insomnia    Primary open-angle glaucoma, unspecified eye, stage unspecified 05/01/2017    Open angle primary glaucoma    Unspecified atrial flutter (Multi) 05/01/2017    Atrial flutter    Unspecified glaucoma 05/01/2017    Glaucoma    Vitamin D deficiency, unspecified 05/01/2017    Vitamin D deficiency   [2]   Past Surgical History:  Procedure Laterality Date    CATARACT EXTRACTION  10/07/2014    Cataract Surgery    MR HEAD ANGIO WO IV CONTRAST   3/3/2018    MR HEAD ANGIO WO IV CONTRAST 3/3/2018 [facility] CLINICAL LEGAC[facility]    MR NECK ANGIO WO IV CONTRAST  3/3/2018    MR NECK ANGIO WO IV CONTRAST 3/3/2018 [facility] CLINICAL LEGACY    OTHER SURGICAL HISTORY  10/07/2014    Dental Surgery   [3]   Social History  Socioeconomic History    Marital status:    Tobacco Use    Smoking status: Former     Types: Cigarettes     Passive exposure: Never    Smokeless tobacco: Never   Vaping Use    Vaping status: Never Used   Substance and Sexual Activity    Alcohol use: Not Currently    Drug use: Not Currently     Social Drivers of Health     Financial Resource Strain: Low Risk  (9/2/2024)    Overall Financial Resource Strain (CARDIA)     Difficulty of Paying Living Expenses: Not hard at all   Food Insecurity: No Food Insecurity (9/2/2024)    Hunger Vital Sign     Worried About Running Out of Food in the Last Year: Never true     Ran Out of Food in the Last Year: Never true   Transportation Needs: No Transportation Needs (10/3/2024)    OASIS : Transportation     Lack of Transportation (Medical): No     Lack of Transportation (Non-Medical): No     Patient Unable or Declines to Respond: No   Physical Activity: Inactive (9/2/2024)    Exercise Vital Sign     Days of Exercise per Week: 0 days     Minutes of Exercise per Session: 0 min   Stress: Stress Concern Present (9/2/2024)    [employer] [place] [employer] - Occupational Stress Questionnaire     Feeling of Stress : To some extent   Social Connections: Feeling Socially Integrated (10/3/2024)    OASIS : Social Isolation     Frequency of experiencing loneliness or isolation: Never   Intimate Partner Violence: Not At Risk (9/2/2024)    Humiliation, Afraid, Rape, and Kick questionnaire     Fear of Current or Ex-Partner: No     Emotionally Abused: No     Physically Abused: No     Sexually Abused: No   Housing Stability: Low Risk  (9/2/2024)    Housing Stability Vital Sign     Unable to Pay for Housing in the Last  Year: No     Number of Times Moved in the Last Year: 0     Homeless in the Last Year: No   [4]   Allergies  Allergen Reactions    Ace Inhibitors Unknown     ACE Inhibitors        Briana Sommers PA-C  04/16/25 1526

## 2025-04-17 ENCOUNTER — OFFICE VISIT (OUTPATIENT)
Dept: WOUND CARE | Facility: HOSPITAL | Age: OVER 89
End: 2025-04-17
Payer: MEDICARE

## 2025-04-17 PROCEDURE — 99213 OFFICE O/P EST LOW 20 MIN: CPT

## 2025-05-06 ENCOUNTER — TELEPHONE (OUTPATIENT)
Dept: PRIMARY CARE | Facility: CLINIC | Age: OVER 89
End: 2025-05-06

## 2025-05-06 NOTE — TELEPHONE ENCOUNTER
"Patient is in [place] now will not be coming into the office anymore per daughter \"Mariah Cox\"   "

## 2025-05-08 ENCOUNTER — APPOINTMENT (OUTPATIENT)
Dept: PRIMARY CARE | Facility: CLINIC | Age: OVER 89
End: 2025-05-08
Payer: MEDICARE

## 2025-05-15 DIAGNOSIS — M79.674 PAIN IN TOES OF BOTH FEET: ICD-10-CM

## 2025-05-15 DIAGNOSIS — I48.0 PAF (PAROXYSMAL ATRIAL FIBRILLATION) (MULTI): Primary | ICD-10-CM

## 2025-05-15 DIAGNOSIS — M79.675 PAIN IN TOES OF BOTH FEET: ICD-10-CM

## 2025-05-15 RX ORDER — METHADONE HYDROCHLORIDE 5 MG/1
2.5 TABLET ORAL EVERY 12 HOURS
Qty: 15 TABLET | Refills: 0 | Status: SHIPPED | OUTPATIENT
Start: 2025-05-15 | End: 2025-05-16 | Stop reason: WASHOUT

## 2025-05-15 RX ORDER — OXYCODONE HYDROCHLORIDE 5 MG/1
5 TABLET ORAL EVERY 4 HOURS PRN
Qty: 28 TABLET | Refills: 0 | Status: SHIPPED | OUTPATIENT
Start: 2025-05-15 | End: 2025-05-16 | Stop reason: WASHOUT

## 2025-05-15 NOTE — PROGRESS NOTES
Covering for Dr. Lam with [facility]. This patient is enrolled in hospice. Pain poorly controlled. Will start methadone 2.5mg BID. And continue oxycodone scheduled for 48 hours and then stop it and start oxycodone 5mg q4H PRN

## 2025-05-16 DIAGNOSIS — F02.B0 MODERATE ALZHEIMER'S DEMENTIA WITHOUT BEHAVIORAL DISTURBANCE, PSYCHOTIC DISTURBANCE, MOOD DISTURBANCE, OR ANXIETY, UNSPECIFIED TIMING OF DEMENTIA ONSET (MULTI): ICD-10-CM

## 2025-05-16 DIAGNOSIS — I50.32 CHRONIC HEART FAILURE WITH PRESERVED EJECTION FRACTION: Primary | ICD-10-CM

## 2025-05-16 DIAGNOSIS — I73.9 PAD (PERIPHERAL ARTERY DISEASE): ICD-10-CM

## 2025-05-16 DIAGNOSIS — G30.9 MODERATE ALZHEIMER'S DEMENTIA WITHOUT BEHAVIORAL DISTURBANCE, PSYCHOTIC DISTURBANCE, MOOD DISTURBANCE, OR ANXIETY, UNSPECIFIED TIMING OF DEMENTIA ONSET (MULTI): ICD-10-CM

## 2025-05-16 DIAGNOSIS — R45.1 TERMINAL RESTLESSNESS: ICD-10-CM

## 2025-05-16 DIAGNOSIS — J44.9 CHRONIC OBSTRUCTIVE PULMONARY DISEASE, UNSPECIFIED COPD TYPE (MULTI): ICD-10-CM

## 2025-05-16 RX ORDER — MORPHINE SULFATE 20 MG/ML
20 SOLUTION ORAL EVERY 6 HOURS
Qty: 30 ML | Refills: 0 | Status: SHIPPED | OUTPATIENT
Start: 2025-05-16

## 2025-05-16 RX ORDER — LORAZEPAM 0.5 MG/1
0.5 TABLET ORAL EVERY 2 HOUR PRN
Qty: 15 TABLET | Refills: 0 | Status: SHIPPED | OUTPATIENT
Start: 2025-05-16

## 2025-05-16 RX ORDER — MORPHINE SULFATE 20 MG/ML
10 SOLUTION ORAL EVERY 2 HOUR PRN
Qty: 30 ML | Refills: 0 | Status: SHIPPED | OUTPATIENT
Start: 2025-05-16

## 2025-05-16 NOTE — PROGRESS NOTES
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 Patient is in distress. Methadone not working well. In respiratory distress. Will discontinue oxycodone and methadone and start morphine concentrated liquid 10mg q6 H scheduled and 10mg q2H PRN. And will increaes frequency of lorazepam to q2H PRn

## 2025-06-03 ENCOUNTER — APPOINTMENT (OUTPATIENT)
Dept: RADIOLOGY | Facility: HOSPITAL | Age: OVER 89
End: 2025-06-03
Payer: MEDICARE